# Patient Record
Sex: FEMALE | Race: WHITE | NOT HISPANIC OR LATINO | Employment: PART TIME | URBAN - METROPOLITAN AREA
[De-identification: names, ages, dates, MRNs, and addresses within clinical notes are randomized per-mention and may not be internally consistent; named-entity substitution may affect disease eponyms.]

---

## 2017-02-10 ENCOUNTER — OFFICE VISIT (OUTPATIENT)
Dept: FAMILY MEDICINE CLINIC | Facility: CLINIC | Age: 67
End: 2017-02-10

## 2017-02-10 VITALS
HEART RATE: 78 BPM | WEIGHT: 163.4 LBS | HEIGHT: 61 IN | BODY MASS INDEX: 30.85 KG/M2 | TEMPERATURE: 98.7 F | SYSTOLIC BLOOD PRESSURE: 118 MMHG | OXYGEN SATURATION: 96 % | DIASTOLIC BLOOD PRESSURE: 80 MMHG | RESPIRATION RATE: 16 BRPM

## 2017-02-10 DIAGNOSIS — I10 ESSENTIAL HYPERTENSION: Primary | ICD-10-CM

## 2017-02-10 DIAGNOSIS — M85.80 OSTEOPENIA: ICD-10-CM

## 2017-02-10 DIAGNOSIS — J06.9 VIRAL URI: ICD-10-CM

## 2017-02-10 DIAGNOSIS — E78.00 HYPERCHOLESTEROLEMIA: ICD-10-CM

## 2017-02-10 DIAGNOSIS — Z13.820 ENCOUNTER FOR SCREENING FOR OSTEOPOROSIS: ICD-10-CM

## 2017-02-10 DIAGNOSIS — J30.2 SEASONAL ALLERGIC RHINITIS, UNSPECIFIED ALLERGIC RHINITIS TRIGGER: ICD-10-CM

## 2017-02-10 DIAGNOSIS — Z12.11 ENCOUNTER FOR SCREENING COLONOSCOPY: ICD-10-CM

## 2017-02-10 DIAGNOSIS — D35.2 PITUITARY ADENOMA (HCC): ICD-10-CM

## 2017-02-10 PROCEDURE — 99214 OFFICE O/P EST MOD 30 MIN: CPT | Performed by: FAMILY MEDICINE

## 2017-02-10 RX ORDER — ASPIRIN 81 MG/1
81 TABLET ORAL DAILY
COMMUNITY
End: 2019-12-16 | Stop reason: HOSPADM

## 2017-02-10 RX ORDER — FLUTICASONE PROPIONATE 50 MCG
2 SPRAY, SUSPENSION (ML) NASAL DAILY
Qty: 1 EACH | Refills: 5 | Status: SHIPPED | OUTPATIENT
Start: 2017-02-10 | End: 2017-03-12

## 2017-02-10 RX ORDER — ALENDRONATE SODIUM 70 MG/1
70 TABLET ORAL
Qty: 12 TABLET | Refills: 1 | Status: SHIPPED | OUTPATIENT
Start: 2017-02-10 | End: 2017-07-30 | Stop reason: SDUPTHER

## 2017-02-10 RX ORDER — BENZONATATE 200 MG/1
200 CAPSULE ORAL 3 TIMES DAILY PRN
Qty: 20 CAPSULE | Refills: 0 | Status: SHIPPED | OUTPATIENT
Start: 2017-02-10 | End: 2017-08-17

## 2017-02-10 RX ORDER — ATORVASTATIN CALCIUM 40 MG/1
40 TABLET, FILM COATED ORAL DAILY
Qty: 90 TABLET | Refills: 1 | Status: SHIPPED | OUTPATIENT
Start: 2017-02-10 | End: 2018-04-05 | Stop reason: DRUGHIGH

## 2017-02-10 RX ORDER — PHENOL 1.4 %
600 AEROSOL, SPRAY (ML) MUCOUS MEMBRANE DAILY
COMMUNITY
End: 2020-10-23

## 2017-02-10 RX ORDER — LISINOPRIL AND HYDROCHLOROTHIAZIDE 12.5; 1 MG/1; MG/1
1 TABLET ORAL DAILY
Qty: 90 TABLET | Refills: 1 | Status: SHIPPED | OUTPATIENT
Start: 2017-02-10 | End: 2017-08-17 | Stop reason: SDUPTHER

## 2017-02-10 NOTE — PROGRESS NOTES
"Subjective   Ariadne Telles is a 66 y.o. female.     Hypertension    History of Present Illness     Repetitive URI, allergy symptoms.  On and off this winter.  Last 10 days or so and get better.  Sore throat runny nose and cough.  No wheezing.  No fever.  She works in .  History of allergies.    Hypertension follow up. Doing well with current medication which she is taking as directed. No known high or low blood pressure episodes. No cardiovascular or neurological symptoms. Today's BP: 118/80.      Hyperlipidemia follow up. She is taking statin medication without complaint. No myopathy symptoms.     Last lipid panel:   Lab Results   Component Value Date    HDL 55 08/25/2016     Lab Results   Component Value Date     (H) 08/25/2016     Lab Results   Component Value Date    TRIG 103 08/25/2016     History of osteopenia.  Fosamax weekly prescribed in the past.  She continues to take this.  She's not taking calcium lately but she is taking vitamin D.  Last DEXA scan was sometime ago.  She's not sure.    In need of screening colonoscopy last one was 10 years ago.    History of what sounds like a pituitary adenoma.  A number of years ago she continued to breast-feed after 5 years despite not really trying.  She states she had a MRI done which showed a \"pituitary tumor\".  They watched her for number of years and she last checked in about 10 years ago.  She is complaining of some intermittent mild headaches on the right temporal region.  However no focal neurological deficits.  Breast leakage.  No vaginal bleeding.  No changes in vision.    The following portions of the patient's history were reviewed and updated as appropriate: allergies, current medications, past family history, past medical history, past social history, past surgical history and problem list.      Review of Systems   Constitutional: Negative for activity change, appetite change, fatigue and fever.   Eyes: Negative for visual " "disturbance.   Respiratory: Positive for cough. Negative for chest tightness and shortness of breath.    Cardiovascular: Negative for chest pain, palpitations and leg swelling.   Gastrointestinal: Negative.    Skin: Negative for rash.   Neurological: Positive for headaches.   Psychiatric/Behavioral: Negative for confusion.       Objective   Blood pressure 118/80, pulse 78, temperature 98.7 °F (37.1 °C), temperature source Oral, resp. rate 16, height 61\" (154.9 cm), weight 163 lb 6.4 oz (74.1 kg), SpO2 96 %.  Physical Exam   Constitutional: She appears well-developed and well-nourished. No distress.   No acute distress.  Nontoxic.   HENT:   Right Ear: Tympanic membrane, external ear and ear canal normal.   Left Ear: Tympanic membrane, external ear and ear canal normal.   Nose: Nose normal.   Mouth/Throat: Oropharynx is clear and moist. No oropharyngeal exudate.   Eyes: Conjunctivae are normal. Right eye exhibits no discharge. Left eye exhibits no discharge. No scleral icterus.   Neck: No thyromegaly present.   Cardiovascular: Normal rate, regular rhythm, normal heart sounds and intact distal pulses.    Pulmonary/Chest: Effort normal and breath sounds normal. No stridor. No respiratory distress. She has no wheezes. She has no rales.   No tachypnea   Musculoskeletal: She exhibits no edema.   Lymphadenopathy:     She has no cervical adenopathy.   Skin: Skin is warm and dry. No rash noted.   Psychiatric: She has a normal mood and affect. Her behavior is normal. Judgment and thought content normal.   Nursing note and vitals reviewed.      Assessment/Plan   Ariadne was seen today for hypertension.    Diagnoses and all orders for this visit:    Essential hypertension  -     lisinopril-hydrochlorothiazide (PRINZIDE,ZESTORETIC) 10-12.5 MG per tablet; Take 1 tablet by mouth Daily.    Hypercholesterolemia  -     atorvastatin (LIPITOR) 40 MG tablet; Take 1 tablet by mouth Daily.    Osteopenia  -     DEXA Bone Density Axial; " Future  -     alendronate (FOSAMAX) 70 MG tablet; Take 1 tablet by mouth Every 7 (Seven) Days.    Seasonal allergic rhinitis, unspecified allergic rhinitis trigger    Encounter for screening colonoscopy  -     Ambulatory Referral For Screening Colonoscopy    Encounter for screening for osteoporosis  -     DEXA Bone Density Axial; Future    Viral URI    Pituitary adenoma  -     MRI Brain Without Contrast; Future    Other orders  -     fluticasone (FLONASE) 50 MCG/ACT nasal spray; 2 sprays into each nostril Daily for 30 days.  -     benzonatate (TESSALON) 200 MG capsule; Take 1 capsule by mouth 3 (Three) Times a Day As Needed for cough.      Hypertension.  Stable.  Refilled lisinopril hydrochlorothiazide.    Hyperlipidemia.  Refill atorvastatin.  Lipid panel and CMP August of last year was within normal limits.    History of osteopenia.  I'm repeating a DEXA scan.  Continue Fosamax as instructed.    Allergies versus viral URI versus both.  Flonase.  Tessalon Perles as needed.  Nonsmoker, never smoker.  If cough persists 1 more month, return for evaluation, chest x-ray, and to consider changing the lisinopril.    History of what sounds like a benign pituitary adenoma.  She previously had pituitary symptoms including persistent breast milk.  She's having some headaches now for some time but they are mild to moderate and not specific.  I'm recommending a follow-up MRI.  Last one was done about 10 years ago.    I'll see her in 6 months for a blood pressure check an annual wellness visit.  She will call sooner with concerns    Screening colonoscopy ordered

## 2017-02-17 ENCOUNTER — APPOINTMENT (OUTPATIENT)
Dept: BONE DENSITY | Facility: HOSPITAL | Age: 67
End: 2017-02-17

## 2017-02-23 ENCOUNTER — HOSPITAL ENCOUNTER (OUTPATIENT)
Dept: MRI IMAGING | Facility: HOSPITAL | Age: 67
Discharge: HOME | End: 2017-02-23
Admitting: FAMILY MEDICINE

## 2017-02-23 ENCOUNTER — APPOINTMENT (OUTPATIENT)
Dept: BONE DENSITY | Facility: HOSPITAL | Age: 67
End: 2017-02-23

## 2017-02-23 ENCOUNTER — HOSPITAL ENCOUNTER (OUTPATIENT)
Dept: BONE DENSITY | Facility: HOSPITAL | Age: 67
Discharge: HOME | End: 2017-02-23

## 2017-02-23 DIAGNOSIS — D35.2 PITUITARY ADENOMA (HCC): ICD-10-CM

## 2017-02-23 DIAGNOSIS — M85.80 OSTEOPENIA: ICD-10-CM

## 2017-02-23 DIAGNOSIS — Z13.820 ENCOUNTER FOR SCREENING FOR OSTEOPOROSIS: ICD-10-CM

## 2017-02-23 PROCEDURE — 70553 MRI BRAIN STEM W/O & W/DYE: CPT

## 2017-02-23 PROCEDURE — 77080 DXA BONE DENSITY AXIAL: CPT

## 2017-02-23 PROCEDURE — 0 GADOBENATE DIMEGLUMINE 529 MG/ML SOLUTION: Performed by: FAMILY MEDICINE

## 2017-02-23 PROCEDURE — A9577 INJ MULTIHANCE: HCPCS | Performed by: FAMILY MEDICINE

## 2017-02-23 PROCEDURE — 82565 ASSAY OF CREATININE: CPT

## 2017-02-23 RX ADMIN — GADOBENATE DIMEGLUMINE 15 ML: 529 INJECTION, SOLUTION INTRAVENOUS at 12:00

## 2017-02-24 LAB — CREAT BLDA-MCNC: 0.8 MG/DL (ref 0.6–1.3)

## 2017-02-24 NOTE — PROGRESS NOTES
The bone density scan shows osteopenia but not osteoporosis.  This is good.  I do not have an old study to compare to, however I would recommend continuing the Fosamax as is.

## 2017-03-06 DIAGNOSIS — Z86.018 HISTORY OF PITUITARY ADENOMA: Primary | ICD-10-CM

## 2017-03-06 NOTE — PROGRESS NOTES
Phone call with patient.  MRI was read as normal.  However the radiologist called me, he cannot completely exclude a very small adenoma.  I am going to order a prolactin level as precaution.  Patient is otherwise doing well.  If headaches are getting worse, will need further investigation.

## 2017-03-10 LAB — PROLACTIN SERPL-MCNC: 8.3 NG/ML (ref 4.8–23.3)

## 2017-03-11 ENCOUNTER — RESULTS ENCOUNTER (OUTPATIENT)
Dept: FAMILY MEDICINE CLINIC | Facility: CLINIC | Age: 67
End: 2017-03-11

## 2017-03-11 DIAGNOSIS — Z86.018 HISTORY OF PITUITARY ADENOMA: ICD-10-CM

## 2017-05-22 RX ORDER — ATORVASTATIN CALCIUM 40 MG/1
TABLET, FILM COATED ORAL
Qty: 90 TABLET | Refills: 1 | Status: SHIPPED | OUTPATIENT
Start: 2017-05-22 | End: 2017-08-17 | Stop reason: SDUPTHER

## 2017-07-30 DIAGNOSIS — M85.80 OSTEOPENIA: ICD-10-CM

## 2017-07-31 RX ORDER — ALENDRONATE SODIUM 70 MG/1
TABLET ORAL
Qty: 12 TABLET | Refills: 1 | Status: SHIPPED | OUTPATIENT
Start: 2017-07-31 | End: 2018-02-10 | Stop reason: SDUPTHER

## 2017-08-17 ENCOUNTER — OFFICE VISIT (OUTPATIENT)
Dept: OBSTETRICS AND GYNECOLOGY | Age: 67
End: 2017-08-17

## 2017-08-17 VITALS
DIASTOLIC BLOOD PRESSURE: 80 MMHG | BODY MASS INDEX: 30.21 KG/M2 | WEIGHT: 160 LBS | SYSTOLIC BLOOD PRESSURE: 124 MMHG | HEIGHT: 61 IN

## 2017-08-17 DIAGNOSIS — Z11.51 SPECIAL SCREENING EXAMINATION FOR HUMAN PAPILLOMAVIRUS (HPV): ICD-10-CM

## 2017-08-17 DIAGNOSIS — Z01.419 WELL WOMAN EXAM WITH ROUTINE GYNECOLOGICAL EXAM: Primary | ICD-10-CM

## 2017-08-17 DIAGNOSIS — I10 ESSENTIAL HYPERTENSION: ICD-10-CM

## 2017-08-17 DIAGNOSIS — Z85.42 HISTORY OF ENDOMETRIAL CANCER: ICD-10-CM

## 2017-08-17 PROBLEM — C54.1 ENDOMETRIAL CARCINOMA: Status: ACTIVE | Noted: 2017-08-17

## 2017-08-17 PROBLEM — C54.1 ENDOMETRIAL CARCINOMA: Status: RESOLVED | Noted: 2017-08-17 | Resolved: 2017-08-17

## 2017-08-17 LAB
BILIRUB BLD-MCNC: NEGATIVE MG/DL
GLUCOSE UR STRIP-MCNC: NEGATIVE MG/DL
KETONES UR QL: NEGATIVE
LEUKOCYTE EST, POC: NEGATIVE
NITRITE UR-MCNC: NEGATIVE MG/ML
PH UR: 6 [PH] (ref 5–8)
PROT UR STRIP-MCNC: NEGATIVE MG/DL
RBC # UR STRIP: ABNORMAL /UL
SP GR UR: 1.03 (ref 1–1.03)
UROBILINOGEN UR QL: NORMAL

## 2017-08-17 PROCEDURE — 81003 URINALYSIS AUTO W/O SCOPE: CPT | Performed by: OBSTETRICS & GYNECOLOGY

## 2017-08-17 PROCEDURE — G0101 CA SCREEN;PELVIC/BREAST EXAM: HCPCS | Performed by: OBSTETRICS & GYNECOLOGY

## 2017-08-17 NOTE — PROGRESS NOTES
ANNUAL GYN EXAM        2017    Subjective     Ariadne Telles is a 67 y.o.,  (one still birth) White Female.    Chief Complaint   Patient presents with   • Gynecologic Exam     New patient, former patient last seen ~five years ago.  She had a mammogram in 2017 at [facility].  Her last colonoscopy was ten years ago and she stated that she had colon polyps.          Gyn Complaints:  No complaints        Current contraception:  ~ ANGEL, BSO  Endometrial cancer,  NO HRT.  Exercise:   no  Mammogram: up to date.  Colonoscopy: recommended.  DEXA: up to date. =Osteopenia. Taking fosamax.  Last Pap Smear:  3-5 Yr ago. Had an abn pap  or , colposcopy= Bx OK.            1. Menstrual Hx:  About ,  ANGEL, BSO for endometrial carcinoma, is not on HRT.    2. Pap Smear Hx: History of regular Pap smears.    She did have an abnormal Pap smear in either  or , but colposcopy with biopsy was normal.  Subsequent Pap smears were normal.  Last Pap smear about 3-5 years ago.    3. Breast / Ovarian Hx:  Regular SBE.        Family history of breast cancer: Maternal Aunt in her 70's.      Family history of ovarian cancer: None    4. Family Hx of Colon Ca: None      Family Hx of Uterine Ca: None    5. New Past Medical History:  None         Past Medical History:   Diagnosis Date   • Colon polyps    [facility]    • Hyperlipidemia    • Hypertension    • Osteoporosis    • Pituitary adenoma    • Vaginal delivery     x1  NITO        6. New Past Surgical History:   Right Cataract Surgery 2016        Past Surgical History:   Procedure Laterality Date   • CATARACT EXTRACTION Right 2016   •  SECTION      x2   one stillborn Ariadne Hernandez  chela Mathews   • COLONOSCOPY  2007    polyps  Dr. Rueda    • TOTAL ABDOMINAL HYSTERECTOMY WITH SALPINGO OOPHORECTOMY      Dr. Alejo        7. New Family Medical History: None         Family History   Problem Relation Age  of Onset   • Heart disease Mother    • COPD Father    • Parkinsonism Father      ?   • Heart defect Sister       at 18   • Alcohol abuse Brother    • Diabetes Brother    • Heart disease Brother    • Hypertension Brother    • Hyperlipidemia Brother    • No Known Problems Daughter    • Sleep apnea Son    • No Known Problems Maternal Grandmother    • No Known Problems Paternal Grandmother    • Breast cancer Maternal Aunt 75   • No Known Problems Paternal Aunt    • No Known Problems Maternal Grandfather    • No Known Problems Paternal Grandfather    • Kidney cancer Sister    • No Known Problems Son    • BRCA 1/2 Neg Hx    • Colon cancer Neg Hx    • Endometrial cancer Neg Hx    • Ovarian cancer Neg Hx          8.   OB History    Para Term  AB SAB TAB Ectopic Multiple Living   3 3 3 0 0 0 0 0 0 2      # Outcome Date GA Lbr Junior/2nd Weight Sex Delivery Anes PTL Lv   3 Term     M CS-Unspec   Y   2 Term     F CS-Unspec   FD   1 Term     M Vag-Spont   Y           9.   Health Maintenance   Topic Date Due   • PNEUMOCOCCAL VACCINES (65+ LOW/MEDIUM RISK) (1 of 2 - PCV13) 2015   • HEPATITIS C SCREENING  2016   • MEDICARE ANNUAL WELLNESS  2016   • ZOSTER VACCINE  2016   • COLONOSCOPY  2017   • LIPID PANEL  2017   • INFLUENZA VACCINE  2017   • DXA SCAN  2019   • MAMMOGRAM  2019   • TDAP/TD VACCINES (2 - Td) 2026       The following portions of the patient's history were reviewed / updated as appropriate: allergies, current medications, past medical history, past surgical history, past family history, past social history, and problem list.    Review of Systems   HENT: Positive for hearing loss (decreasing for the last 5 years. Saw an audiologist.).    Cardiovascular:        Has a very slight narrowing of right carotid artery.   Gastrointestinal:        Reflux / indigestion with anything she eats. Can occur with suresh crackers alone.  "  Allergic/Immunologic: Positive for environmental allergies (seasonal allergies.).   All other systems reviewed and are negative.      Objective     /80  Ht 61\" (154.9 cm)  Wt 160 lb (72.6 kg)  LMP  (LMP Unknown)  BMI 30.23 kg/m2    PHYSICAL EXAM    Constitutional: Well-developed, well-nourished, Slightly obese white female, in no distress, alert and well oriented ×3.    Skin is warm, dry, without rash.       Neck appears normal, trachea in the midline.  Thyroid exam normal.  No carotid bruits bilaterally.         No cervical lymphadenopathy.    Lungs clear to auscultation.  Chest wall appears normal.    Heart with regular rhythm without murmur or gallop.    Breasts:         Right breast nontender, without dominant mass.  No nipple discharge.            No superficial skin changes.  No axillary adenopathy.        Left breast nontender, without dominant mass.  No nipple discharge.            No superficial skin changes.  No axillary adenopathy.      Abdomen is flat, soft and nontender.No palpable mass.           No hepatosplenomegaly.  Flanks are negative.          Bowel sounds normal.  No abdominal bruit.          No inguinal lymphadenopathy bilaterally.     Pelvic exam :  Vulva normal.           External genitalia normal.  BUS negative.             Introitus normal.  Vaginal mucosa atrophic, pale.           Vaginal cuff is normal, atrophic, Pap smear done with reflex.  No tenderness.  No palpable mass.          Rectovaginal exam negative .  Stool heme negative.  Minimal stool is present.    Musc /Skel: Lower extremities without edema.     Neuro: Coordination is grossly normal.  Gait is normal.    Psych: Mood and affect is normal.  Behavior normal.  Thought content normal.            Judgment normal.       =All questions were answered.   =Breast self-examination technique was reviewed and the patient encouraged to perform self breast exams monthly.   = We discussed healthy lifestyle modifications.  She " has a fairly reasonable diet.  =I recommended 30 minutes of aerobic exercise 5 times a week.  The patient doesn't really planned exercise.       Assessment/Plan   Ariadne was seen today for gynecologic exam.    Diagnoses and all orders for this visit:    Well woman exam with routine gynecological exam  -     POC Urinalysis Dipstick, Automated  -     PapIG, HPV, Rfx 16 / 18    Special screening examination for human papillomavirus (HPV)  -     PapIG, HPV, Rfx 16 / 18    History of endometrial cancer  -     PapIG, HPV, Rfx 16 / 18      COMMENTS: Since it has been over 20 years since her hysterectomy for endometrial carcinoma, it would be reasonable to use a small dose of vaginal estrogen if she is interested in this to facilitate intercourse.  I don't think they're actually having intercourse that often and I doubt that she will decide to do this.    Catracho Alejo MD  8/17/2017

## 2017-08-18 ENCOUNTER — RESULTS ENCOUNTER (OUTPATIENT)
Dept: FAMILY MEDICINE CLINIC | Facility: CLINIC | Age: 67
End: 2017-08-18

## 2017-08-18 ENCOUNTER — OFFICE VISIT (OUTPATIENT)
Dept: FAMILY MEDICINE CLINIC | Facility: CLINIC | Age: 67
End: 2017-08-18

## 2017-08-18 VITALS
DIASTOLIC BLOOD PRESSURE: 82 MMHG | WEIGHT: 161.6 LBS | HEART RATE: 73 BPM | HEIGHT: 61 IN | BODY MASS INDEX: 30.51 KG/M2 | TEMPERATURE: 97.8 F | SYSTOLIC BLOOD PRESSURE: 122 MMHG | OXYGEN SATURATION: 98 %

## 2017-08-18 DIAGNOSIS — E78.00 HYPERCHOLESTEROLEMIA: ICD-10-CM

## 2017-08-18 DIAGNOSIS — I10 ESSENTIAL HYPERTENSION: ICD-10-CM

## 2017-08-18 DIAGNOSIS — M85.80 OSTEOPENIA: ICD-10-CM

## 2017-08-18 DIAGNOSIS — Z12.11 ENCOUNTER FOR SCREENING COLONOSCOPY: ICD-10-CM

## 2017-08-18 DIAGNOSIS — Z00.00 MEDICARE ANNUAL WELLNESS VISIT, SUBSEQUENT: Primary | ICD-10-CM

## 2017-08-18 DIAGNOSIS — G56.01 CARPAL TUNNEL SYNDROME OF RIGHT WRIST: ICD-10-CM

## 2017-08-18 PROBLEM — Z86.018 HISTORY OF PITUITARY ADENOMA: Status: RESOLVED | Noted: 2017-03-06 | Resolved: 2017-08-18

## 2017-08-18 LAB
ALBUMIN SERPL-MCNC: 4.6 G/DL (ref 3.5–5.2)
ALBUMIN/GLOB SERPL: 1.6 G/DL
ALP SERPL-CCNC: 116 U/L (ref 39–117)
ALT SERPL-CCNC: 25 U/L (ref 1–33)
AST SERPL-CCNC: 28 U/L (ref 1–32)
BILIRUB SERPL-MCNC: 0.9 MG/DL (ref 0.1–1.2)
BUN SERPL-MCNC: 21 MG/DL (ref 8–23)
BUN/CREAT SERPL: 28.4 (ref 7–25)
CALCIUM SERPL-MCNC: 9.7 MG/DL (ref 8.6–10.5)
CHLORIDE SERPL-SCNC: 102 MMOL/L (ref 98–107)
CHOLEST SERPL-MCNC: 202 MG/DL (ref 0–200)
CO2 SERPL-SCNC: 26.7 MMOL/L (ref 22–29)
CREAT SERPL-MCNC: 0.74 MG/DL (ref 0.57–1)
GLOBULIN SER CALC-MCNC: 2.9 GM/DL
GLUCOSE SERPL-MCNC: 79 MG/DL (ref 65–99)
HDLC SERPL-MCNC: 59 MG/DL (ref 40–60)
LDLC SERPL CALC-MCNC: 117 MG/DL (ref 0–100)
POTASSIUM SERPL-SCNC: 4.1 MMOL/L (ref 3.5–5.2)
PROT SERPL-MCNC: 7.5 G/DL (ref 6–8.5)
SODIUM SERPL-SCNC: 144 MMOL/L (ref 136–145)
TRIGL SERPL-MCNC: 131 MG/DL (ref 0–150)
VLDLC SERPL CALC-MCNC: 26.2 MG/DL (ref 5–40)

## 2017-08-18 PROCEDURE — 99213 OFFICE O/P EST LOW 20 MIN: CPT | Performed by: FAMILY MEDICINE

## 2017-08-18 PROCEDURE — 90670 PCV13 VACCINE IM: CPT | Performed by: FAMILY MEDICINE

## 2017-08-18 PROCEDURE — G0439 PPPS, SUBSEQ VISIT: HCPCS | Performed by: FAMILY MEDICINE

## 2017-08-18 PROCEDURE — G0009 ADMIN PNEUMOCOCCAL VACCINE: HCPCS | Performed by: FAMILY MEDICINE

## 2017-08-18 RX ORDER — LISINOPRIL AND HYDROCHLOROTHIAZIDE 12.5; 1 MG/1; MG/1
TABLET ORAL
Qty: 90 TABLET | Refills: 1 | Status: SHIPPED | OUTPATIENT
Start: 2017-08-18 | End: 2018-02-16 | Stop reason: SDUPTHER

## 2017-08-18 NOTE — PROGRESS NOTES
"Subjective   Ariadne Telles is a 67 y.o. female.     Annual Exam (subsequent, awv pt is fasting)    History of Present Illness    Hypertension follow up. Doing well with current medication which she is taking as directed. No known high or low blood pressure episodes. No cardiovascular or neurological symptoms. Today's BP: 122/82.      Hyperlipidemia follow up. She is taking statin medication without complaint. No myopathy symptoms.     Last lipid panel:   Lab Results   Component Value Date    HDL 55 08/25/2016     Lab Results   Component Value Date     (H) 08/25/2016     Lab Results   Component Value Date    TRIG 103 08/25/2016     Osteopenia. DEXA scan stable.  You're continuing the weekly Fosamax    . The following portions of the patient's history were reviewed and updated as appropriate: allergies, current medications, past family history, past medical history, past social history, past surgical history and problem list.      Review of Systems   Constitutional: Negative.    Respiratory: Negative.    Cardiovascular: Negative.    Musculoskeletal: Negative.    Neurological: Negative.    Psychiatric/Behavioral: Negative.        Objective   Blood pressure 122/82, pulse 73, temperature 97.8 °F (36.6 °C), temperature source Oral, height 61\" (154.9 cm), weight 161 lb 9.6 oz (73.3 kg), SpO2 98 %.  Physical Exam   Constitutional: She appears well-developed and well-nourished. No distress.   Neck: No thyromegaly present.   Cardiovascular: Normal rate, regular rhythm, normal heart sounds and intact distal pulses.    Pulmonary/Chest: Effort normal and breath sounds normal.   Musculoskeletal: She exhibits no edema.   Skin: Skin is warm and dry.   Psychiatric: She has a normal mood and affect. Her behavior is normal. Judgment and thought content normal.   Nursing note and vitals reviewed.      Assessment/Plan   Ariadne was seen today for annual exam.    Diagnoses and all orders for this visit:    Medicare annual " wellness visit, subsequent    Hypercholesterolemia  -     Comprehensive Metabolic Panel  -     Lipid Panel    Essential hypertension    Osteopenia    Carpal tunnel syndrome of right wrist  -     Ambulatory Referral to Hand Surgery    Encounter for screening colonoscopy  -     Cologuard; Future    Other orders  -     Pneumococcal Conjugate Vaccine 13-Valent All      Hyperlipidemia.  Continue atorvastatin.  Check CMP and lipid panel today.    Hypertension.  Stable.  Continue lisinopril hydrochlorothiazide.    Osteopenia.  DEXA scan stable.  Continue weekly Fosamax.  She's been on it now about 2 years altogether.    Follow-up in 6 months for recheck

## 2017-08-18 NOTE — PROGRESS NOTES
QUICK REFERENCE INFORMATION:  The ABCs of the Annual Wellness Visit    Subsequent Medicare Wellness Visit    HEALTH RISK ASSESSMENT    1950    Recent Hospitalizations:  No hospitalization(s) within the last year..        Current Medical Providers:  Patient Care Team:  Nick Ariza MD as PCP - General  Nick Ariza MD as PCP - Claims Attributed        Smoking Status:  History   Smoking Status   • Never Smoker   Smokeless Tobacco   • Never Used       Alcohol Consumption:  History   Alcohol Use No       Depression Screen:   PHQ-9 Depression Screening 8/18/2017   Little interest or pleasure in doing things 0   Feeling down, depressed, or hopeless 0   PHQ-9 Total Score 0       Health Habits and Functional and Cognitive Screening:  Functional & Cognitive Status 8/18/2017   Do you have difficulty preparing food and eating? No   Do you have difficulty bathing yourself? No   Do you have difficulty getting dressed? No   Do you have difficulty using the toilet? No   Do you have difficulty moving around from place to place? No   In the past year have you fallen or experienced a near fall? No   Do you need help using the phone?  No   Are you deaf or do you have serious difficulty hearing?  No   Do you need help with transportation? No   Do you need help shopping? No   Do you need help preparing meals?  No   Do you need help with housework?  No   Do you need help with laundry? No   Do you need help taking your medications? No   Do you need help managing money? No   Do you have difficulty concentrating, remembering or making decisions? No       Health Habits  Current Diet: Unhealthy Diet  Dental Exam: Up to date  Eye Exam: Up to date  Exercise (times per week): 0 times per week  Current Exercise Activities Include: None      Does the patient have evidence of cognitive impairment? No    Aspirin use counseling: Taking ASA appropriately as indicated      Recent Lab Results:  CMP:  Lab Results   Component Value Date    GLU  85 08/25/2016    BUN 22 08/25/2016    CREATININE 0.80 02/23/2017    EGFRIFNONA 69 08/25/2016    EGFRIFAFRI 83 08/25/2016    BCR 26.5 (H) 08/25/2016     08/25/2016    K 4.3 08/25/2016    CO2 24.3 08/25/2016    CALCIUM 9.8 08/25/2016    PROTENTOTREF 7.2 08/25/2016    ALBUMIN 4.60 08/25/2016    LABGLOBREF 2.6 08/25/2016    LABIL2 1.8 08/25/2016    BILITOT 0.7 08/25/2016    ALKPHOS 139 (H) 08/25/2016    AST 24 08/25/2016    ALT 17 08/25/2016     Lipid Panel:  Lab Results   Component Value Date    TRIG 103 08/25/2016    HDL 55 08/25/2016    VLDL 20.6 08/25/2016    LDLHDL 3.5 08/27/2015     HbA1c:       Visual Acuity:  No exam data present    Age-appropriate Screening Schedule:  Refer to the list below for future screening recommendations based on patient's age, sex and/or medical conditions. Orders for these recommended tests are listed in the plan section. The patient has been provided with a written plan.    Health Maintenance   Topic Date Due   • PNEUMOCOCCAL VACCINES (65+ LOW/MEDIUM RISK) (1 of 2 - PCV13) 06/13/2015   • ZOSTER VACCINE  02/25/2016   • COLONOSCOPY  01/31/2017   • LIPID PANEL  08/25/2017   • INFLUENZA VACCINE  09/01/2017   • DXA SCAN  02/23/2019   • MAMMOGRAM  07/20/2019   • TDAP/TD VACCINES (2 - Td) 08/25/2026        Immunization History   Administered Date(s) Administered   • Pneumococcal Conjugate 13-Valent 08/18/2017   • Td 08/01/2007   • Tdap 08/25/2016         Subjective   History of Present Illness    Ariadne Telles is a 67 y.o. female who presents for an Subsequent Wellness Visit.    The following portions of the patient's history were reviewed and updated as appropriate: allergies, current medications, past family history, past medical history, past social history, past surgical history and problem list.    Outpatient Medications Prior to Visit   Medication Sig Dispense Refill   • alendronate (FOSAMAX) 70 MG tablet TAKE 1 TABLET BY MOUTH EVERY 7 DAYS 12 tablet 1   • aspirin 81 MG EC  "tablet Take 81 mg by mouth Daily.     • atorvastatin (LIPITOR) 40 MG tablet Take 1 tablet by mouth Daily. 90 tablet 1   • calcium carbonate (OS-SHANNAN) 600 MG tablet Take 600 mg by mouth Daily.     • Cholecalciferol (VITAMIN D PO) Take  by mouth.     • Cyanocobalamin (VITAMIN B 12 PO) Take  by mouth.     • glucosamine-chondroitin 500-400 MG per tablet Take 1 tablet by mouth 3 (three) times a day.     • lisinopril-hydrochlorothiazide (PRINZIDE,ZESTORETIC) 10-12.5 MG per tablet TAKE ONE TABLET BY MOUTH ONCE DAILY 90 tablet 1   • Multiple Vitamin (MULTIVITAMIN) capsule Take  by mouth.     • BESIVANCE 0.6 % suspension ophthalmic suspension   1   • diclofenac (VOLTAREN) 0.1 % ophthalmic solution   1     No facility-administered medications prior to visit.        Patient Active Problem List   Diagnosis   • Gastroesophageal reflux disease   • Bradycardia   • Hypercholesterolemia   • Essential hypertension   • Osteopenia   • Rectal polyp   • History of endometrial cancer       Advance Care Planning:  has an advance directive - a copy HAS NOT been provided. Have asked the patient to send this to us to add to record.    Identification of Risk Factors:  Risk factors include: cardiovascular risk.    Review of Systems    Compared to one year ago, the patient feels her physical health is the same.  Compared to one year ago, the patient feels her mental health is the same.    Objective     Physical Exam    Vitals:    08/18/17 0946   BP: 122/82   Pulse: 73   Temp: 97.8 °F (36.6 °C)   TempSrc: Oral   SpO2: 98%   Weight: 161 lb 9.6 oz (73.3 kg)   Height: 61\" (154.9 cm)       Body mass index is 30.53 kg/(m^2).  Discussed the patient's BMI with her. The BMI is elevated. .    Assessment/Plan   Patient Self-Management and Personalized Health Advice  The patient has been provided with information about: exercise, prevention of cardiac or vascular disease and designing advance directives and preventive services including:   · Advance " directive, Pneumococcal vaccine .  · Colorectal cancer screening.  Patient states she cannot afford the out-of-pocket expense for the colonoscopy.  History of polyps.  I do recommend a Cologuard screening test.  This has been ordered.    Visit Diagnoses:    ICD-10-CM ICD-9-CM   1. Medicare annual wellness visit, subsequent Z00.00 V70.0   2. Hypercholesterolemia E78.00 272.0   3. Essential hypertension I10 401.9   4. Osteopenia M85.80 733.90   5. Carpal tunnel syndrome of right wrist G56.01 354.0   6. Encounter for screening colonoscopy Z12.11 V76.51       Orders Placed This Encounter   Procedures   • Pneumococcal Conjugate Vaccine 13-Valent All   • Comprehensive Metabolic Panel   • Lipid Panel   • Cologuard     Standing Status:   Future     Standing Expiration Date:   8/18/2018   • Ambulatory Referral to Hand Surgery     Referral Priority:   Routine     Referral Type:   Consultation     Referral Reason:   Specialty Services Required     Referred to Provider:   Jasvir Guerrero MD     Requested Specialty:   Hand Surgery     Number of Visits Requested:   1       Outpatient Encounter Prescriptions as of 8/18/2017   Medication Sig Dispense Refill   • alendronate (FOSAMAX) 70 MG tablet TAKE 1 TABLET BY MOUTH EVERY 7 DAYS 12 tablet 1   • aspirin 81 MG EC tablet Take 81 mg by mouth Daily.     • atorvastatin (LIPITOR) 40 MG tablet Take 1 tablet by mouth Daily. 90 tablet 1   • calcium carbonate (OS-SHANNAN) 600 MG tablet Take 600 mg by mouth Daily.     • Cholecalciferol (VITAMIN D PO) Take  by mouth.     • Cyanocobalamin (VITAMIN B 12 PO) Take  by mouth.     • glucosamine-chondroitin 500-400 MG per tablet Take 1 tablet by mouth 3 (three) times a day.     • lisinopril-hydrochlorothiazide (PRINZIDE,ZESTORETIC) 10-12.5 MG per tablet TAKE ONE TABLET BY MOUTH ONCE DAILY 90 tablet 1   • Multiple Vitamin (MULTIVITAMIN) capsule Take  by mouth.     • [DISCONTINUED] BESIVANCE 0.6 % suspension ophthalmic suspension   1   •  [DISCONTINUED] diclofenac (VOLTAREN) 0.1 % ophthalmic solution   1     No facility-administered encounter medications on file as of 8/18/2017.        Reviewed use of high risk medication in the elderly: yes  Reviewed for potential of harmful drug interactions in the elderly: yes    Follow Up:  Return in about 6 months (around 2/18/2018) for Recheck 1/2 hour.     An After Visit Summary and PPPS with all of these plans were given to the patient.

## 2017-08-25 LAB
CYTOLOGIST CVX/VAG CYTO: ABNORMAL
CYTOLOGY CVX/VAG DOC THIN PREP: ABNORMAL
DX ICD CODE: ABNORMAL
DX ICD CODE: ABNORMAL
HIV 1 & 2 AB SER-IMP: ABNORMAL
HPV I/H RISK 1 DNA CVX QL PROBE+SIG AMP: POSITIVE
OTHER STN SPEC: ABNORMAL
PATH REPORT.FINAL DX SPEC: ABNORMAL
PATHOLOGIST CVX/VAG CYTO: ABNORMAL
STAT OF ADQ CVX/VAG CYTO-IMP: ABNORMAL

## 2017-09-05 ENCOUNTER — TELEPHONE (OUTPATIENT)
Dept: OBSTETRICS AND GYNECOLOGY | Age: 67
End: 2017-09-05

## 2017-09-27 ENCOUNTER — PROCEDURE VISIT (OUTPATIENT)
Dept: OBSTETRICS AND GYNECOLOGY | Age: 67
End: 2017-09-27

## 2017-09-27 VITALS
DIASTOLIC BLOOD PRESSURE: 70 MMHG | HEIGHT: 61 IN | WEIGHT: 163 LBS | BODY MASS INDEX: 30.78 KG/M2 | SYSTOLIC BLOOD PRESSURE: 108 MMHG

## 2017-09-27 DIAGNOSIS — R87.620 ATYPICAL SQUAMOUS CELL CHANGES OF UNDETERMINED SIGNIFICANCE (ASCUS) ON VAGINAL CYTOLOGY WITH POSITIVE HIGH RISK HUMAN PAPILLOMA VIRUS (HPV): Primary | ICD-10-CM

## 2017-09-27 DIAGNOSIS — R87.811 ATYPICAL SQUAMOUS CELL CHANGES OF UNDETERMINED SIGNIFICANCE (ASCUS) ON VAGINAL CYTOLOGY WITH POSITIVE HIGH RISK HUMAN PAPILLOMA VIRUS (HPV): Primary | ICD-10-CM

## 2017-09-27 PROCEDURE — 99212 OFFICE O/P EST SF 10 MIN: CPT | Performed by: OBSTETRICS & GYNECOLOGY

## 2017-09-27 PROCEDURE — 57452 EXAM OF CERVIX W/SCOPE: CPT | Performed by: OBSTETRICS & GYNECOLOGY

## 2017-09-27 NOTE — PROGRESS NOTES
"COLPOSCOPY EXAM                         9/27/2017     Subjective        Ariadne Telles is a 67 y.o., G 3, P 3002 ( one stillborn ), White  Female    Indication: August 18, 2017, annual exam Pap smear showed ASCUS and ROLANDO (atypical glandular cells of undetermined significance).  She is also positive for high risk HPV. She has a history of ANGEL, BSO in 1994 for an endometrial carcinoma.       She is rarely sexually active, the last intercourse she thinks was about 6 months agoand her partner has erectile dysfunction, it did not soundlike they even had penetration.    Pap Smear History: History of regular Pap smears.    She did have an abnormal Pap smear in either 2008 or 2009, but colposcopy with biopsy was normal.  Subsequent Pap smears were normal.  Last Pap smear about 3-5 years ago.    Objective         /70  Ht 61\" (154.9 cm)  Wt 163 lb (73.9 kg)  LMP  (LMP Unknown)  BMI 30.8 kg/m2    EXAM       Vulva: normal exam.    COLPOSCOPY  Speculum placed in vagina, visualization of the cervix is adequate.   3 cotton balls soaked with vinegar placed in the vagina and left for 1 minute or more, then removed.     Examination of the vaginal cuff with the colposcope revealed:         Normal vagina.  No areas of white epithelium,punctation, mosaic, or atypical vessels at all.  Very normal exam.    TISSUE SAMPLES obtained:  None.       VAGINA was examined as the speculum was removed slowly: No abnormalities were seen.     EXTERNAL GENITALIA, VULVA, AND PERIANAL AREA were then examined with the colposcope= No abnormalities were seen.     ADEQUACY OF EXAM: Adequate.    The vagina and vulva were completely visualized.     Assessment/Plan   Ariadne was seen today for procedure.    Diagnoses and all orders for this visit:    Atypical squamous cell changes of undetermined significance (ASCUS) on vaginal cytology with positive high risk human papilloma virus (HPV)  -     Colposcopy        COLPOSCOPY " ASSESSMENT  FINDINGS:  Vagina normal.  Vulva normal.     The patient tolerated the procedure very well.   The findings were explained, and drawings were made.  All questions were answered, the patient demonstrated understanding.    PLAN: I will repeat a Pap smear in 6 months only because of her past history of endometrial carcinoma.    Catracho Alejo MD    9/27/2017  7:42 PM

## 2017-10-19 ENCOUNTER — TELEPHONE (OUTPATIENT)
Dept: OBSTETRICS AND GYNECOLOGY | Age: 67
End: 2017-10-19

## 2018-01-19 ENCOUNTER — HOSPITAL ENCOUNTER (OUTPATIENT)
Dept: CARDIOLOGY | Facility: HOSPITAL | Age: 68
Discharge: HOME | End: 2018-01-19
Attending: PLASTIC SURGERY | Admitting: PLASTIC SURGERY

## 2018-01-19 ENCOUNTER — TRANSCRIBE ORDERS (OUTPATIENT)
Dept: ADMINISTRATIVE | Facility: HOSPITAL | Age: 68
End: 2018-01-19

## 2018-01-19 ENCOUNTER — LAB (OUTPATIENT)
Dept: LAB | Facility: HOSPITAL | Age: 68
End: 2018-01-19
Attending: PLASTIC SURGERY

## 2018-01-19 DIAGNOSIS — Z01.818 PRE-OP TESTING: ICD-10-CM

## 2018-01-19 DIAGNOSIS — Z01.818 PRE-OP TESTING: Primary | ICD-10-CM

## 2018-01-19 LAB
ANION GAP SERPL CALCULATED.3IONS-SCNC: 11.8 MMOL/L
BUN BLD-MCNC: 21 MG/DL (ref 8–23)
BUN/CREAT SERPL: 26.3 (ref 7–25)
CALCIUM SPEC-SCNC: 10.1 MG/DL (ref 8.6–10.5)
CHLORIDE SERPL-SCNC: 102 MMOL/L (ref 98–107)
CO2 SERPL-SCNC: 30.2 MMOL/L (ref 22–29)
CREAT BLD-MCNC: 0.8 MG/DL (ref 0.57–1)
DEPRECATED RDW RBC AUTO: 42.8 FL (ref 37–54)
ERYTHROCYTE [DISTWIDTH] IN BLOOD BY AUTOMATED COUNT: 12.5 % (ref 11.7–13)
GFR SERPL CREATININE-BSD FRML MDRD: 72 ML/MIN/1.73
GLUCOSE BLD-MCNC: 93 MG/DL (ref 65–99)
HCT VFR BLD AUTO: 41.7 % (ref 35.6–45.5)
HGB BLD-MCNC: 14.4 G/DL (ref 11.9–15.5)
MCH RBC QN AUTO: 31.9 PG (ref 26.9–32)
MCHC RBC AUTO-ENTMCNC: 34.5 G/DL (ref 32.4–36.3)
MCV RBC AUTO: 92.5 FL (ref 80.5–98.2)
PLATELET # BLD AUTO: 290 10*3/MM3 (ref 140–500)
PMV BLD AUTO: 8.9 FL (ref 6–12)
POTASSIUM BLD-SCNC: 3.9 MMOL/L (ref 3.5–5.2)
RBC # BLD AUTO: 4.51 10*6/MM3 (ref 3.9–5.2)
SODIUM BLD-SCNC: 144 MMOL/L (ref 136–145)
WBC NRBC COR # BLD: 6.56 10*3/MM3 (ref 4.5–10.7)

## 2018-01-19 PROCEDURE — 36415 COLL VENOUS BLD VENIPUNCTURE: CPT

## 2018-01-19 PROCEDURE — 80048 BASIC METABOLIC PNL TOTAL CA: CPT

## 2018-01-19 PROCEDURE — 85027 COMPLETE CBC AUTOMATED: CPT

## 2018-01-19 PROCEDURE — 93005 ELECTROCARDIOGRAM TRACING: CPT | Performed by: PLASTIC SURGERY

## 2018-01-19 PROCEDURE — 93010 ELECTROCARDIOGRAM REPORT: CPT | Performed by: INTERNAL MEDICINE

## 2018-02-10 DIAGNOSIS — M85.80 OSTEOPENIA: ICD-10-CM

## 2018-02-12 RX ORDER — ALENDRONATE SODIUM 70 MG/1
TABLET ORAL
Qty: 12 TABLET | Refills: 1 | Status: SHIPPED | OUTPATIENT
Start: 2018-02-12 | End: 2018-07-24 | Stop reason: SDUPTHER

## 2018-02-14 DIAGNOSIS — E78.00 PURE HYPERCHOLESTEROLEMIA: ICD-10-CM

## 2018-02-14 DIAGNOSIS — I10 ESSENTIAL HYPERTENSION: Primary | ICD-10-CM

## 2018-02-15 LAB
ALBUMIN SERPL-MCNC: 4.1 G/DL (ref 3.5–5.2)
ALBUMIN/GLOB SERPL: 1.5 G/DL
ALP SERPL-CCNC: 128 U/L (ref 39–117)
ALT SERPL-CCNC: 20 U/L (ref 1–33)
AST SERPL-CCNC: 24 U/L (ref 1–32)
BILIRUB SERPL-MCNC: 0.7 MG/DL (ref 0.1–1.2)
BUN SERPL-MCNC: 27 MG/DL (ref 8–23)
BUN/CREAT SERPL: 32.9 (ref 7–25)
CALCIUM SERPL-MCNC: 9.5 MG/DL (ref 8.6–10.5)
CHLORIDE SERPL-SCNC: 100 MMOL/L (ref 98–107)
CHOLEST SERPL-MCNC: 161 MG/DL (ref 0–200)
CO2 SERPL-SCNC: 28.5 MMOL/L (ref 22–29)
CREAT SERPL-MCNC: 0.82 MG/DL (ref 0.57–1)
GFR SERPLBLD CREATININE-BSD FMLA CKD-EPI: 70 ML/MIN/1.73
GFR SERPLBLD CREATININE-BSD FMLA CKD-EPI: 84 ML/MIN/1.73
GLOBULIN SER CALC-MCNC: 2.8 GM/DL
GLUCOSE SERPL-MCNC: 82 MG/DL (ref 65–99)
HDLC SERPL-MCNC: 56 MG/DL (ref 40–60)
LDLC SERPL CALC-MCNC: 87 MG/DL (ref 0–100)
LDLC/HDLC SERPL: 1.55 {RATIO}
POTASSIUM SERPL-SCNC: 4.2 MMOL/L (ref 3.5–5.2)
PROT SERPL-MCNC: 6.9 G/DL (ref 6–8.5)
SODIUM SERPL-SCNC: 141 MMOL/L (ref 136–145)
TRIGL SERPL-MCNC: 90 MG/DL (ref 0–150)
VLDLC SERPL CALC-MCNC: 18 MG/DL (ref 5–40)

## 2018-02-16 DIAGNOSIS — I10 ESSENTIAL HYPERTENSION: ICD-10-CM

## 2018-02-16 RX ORDER — LISINOPRIL AND HYDROCHLOROTHIAZIDE 12.5; 1 MG/1; MG/1
TABLET ORAL
Qty: 90 TABLET | Refills: 1 | Status: SHIPPED | OUTPATIENT
Start: 2018-02-16 | End: 2018-02-22

## 2018-02-22 ENCOUNTER — OFFICE VISIT (OUTPATIENT)
Dept: FAMILY MEDICINE CLINIC | Facility: CLINIC | Age: 68
End: 2018-02-22

## 2018-02-22 VITALS
HEART RATE: 69 BPM | SYSTOLIC BLOOD PRESSURE: 120 MMHG | BODY MASS INDEX: 30.94 KG/M2 | TEMPERATURE: 98 F | HEIGHT: 61 IN | DIASTOLIC BLOOD PRESSURE: 84 MMHG | OXYGEN SATURATION: 98 % | WEIGHT: 163.9 LBS

## 2018-02-22 DIAGNOSIS — E78.00 HYPERCHOLESTEROLEMIA: Primary | ICD-10-CM

## 2018-02-22 DIAGNOSIS — M85.80 OSTEOPENIA, UNSPECIFIED LOCATION: ICD-10-CM

## 2018-02-22 DIAGNOSIS — I10 ESSENTIAL HYPERTENSION: ICD-10-CM

## 2018-02-22 PROBLEM — G56.00 CARPAL TUNNEL SYNDROME: Status: ACTIVE | Noted: 2018-02-22

## 2018-02-22 PROCEDURE — 99214 OFFICE O/P EST MOD 30 MIN: CPT | Performed by: FAMILY MEDICINE

## 2018-02-22 RX ORDER — OXYCODONE HYDROCHLORIDE AND ACETAMINOPHEN 5; 325 MG/1; MG/1
TABLET ORAL
COMMUNITY
Start: 2018-02-08 | End: 2018-04-05

## 2018-02-22 RX ORDER — LISINOPRIL 10 MG/1
10 TABLET ORAL DAILY
Qty: 90 TABLET | Refills: 1 | Status: SHIPPED | OUTPATIENT
Start: 2018-02-22 | End: 2018-07-30 | Stop reason: SDUPTHER

## 2018-02-22 RX ORDER — FLUTICASONE PROPIONATE 50 MCG
SPRAY, SUSPENSION (ML) NASAL
COMMUNITY
Start: 2017-12-19 | End: 2018-02-22 | Stop reason: SDUPTHER

## 2018-02-22 RX ORDER — FLUTICASONE PROPIONATE 50 MCG
2 SPRAY, SUSPENSION (ML) NASAL DAILY
Qty: 16 G | Refills: 3 | Status: SHIPPED | OUTPATIENT
Start: 2018-02-22 | End: 2023-03-02

## 2018-02-22 NOTE — PROGRESS NOTES
"Subjective   Ariadne Telles is a 67 y.o. female.     Hypertension (follow up labs)    History of Present Illness    Hypertension follow up. Doing well with current medication which she is taking as directed.  She has had some lightheadedness.  At times when she stands up her vision goes little dark.  She almost passed out one point.  No focal neurological deficits.  No vertigo.  No palpitations.  No chest pain.  Symptoms have been going on for about 2 months.  A few times a week.  Fairly consistent.  She has not been checking her blood pressure at home.  She does have a blood pressure monitor.  However at the hand surgeon's office, she recently had carpal tunnel surgery, she states it was low both times.  Today's BP: 120/84.      Hyperlipidemia follow up. She is taking statin medication without complaint. No myopathy symptoms.     Last lipid panel:   Lab Results   Component Value Date    HDL 56 02/15/2018     Lab Results   Component Value Date    LDL 87 02/15/2018     Lab Results   Component Value Date    TRIG 90 02/15/2018     Osteopenia.  Patient continues Fosamax.    The following portions of the patient's history were reviewed and updated as appropriate: allergies, current medications, past family history, past medical history, past social history, past surgical history and problem list.      Review of Systems   Constitutional: Negative.    Eyes: Positive for visual disturbance.   Respiratory: Negative.    Cardiovascular: Negative.    Musculoskeletal: Negative.    Neurological: Positive for light-headedness. Negative for dizziness, facial asymmetry and headaches.   Psychiatric/Behavioral: Negative.        Objective   Blood pressure 120/84, pulse 69, temperature 98 °F (36.7 °C), temperature source Oral, height 154.9 cm (60.98\"), weight 74.3 kg (163 lb 14.4 oz), SpO2 98 %.  Physical Exam    Assessment/Plan   Ariadne was seen today for hypertension.    Diagnoses and all orders for this " visit:    Hypercholesterolemia    Essential hypertension    Osteopenia, unspecified location    Other orders  -     fluticasone (FLONASE) 50 MCG/ACT nasal spray; 2 sprays into each nostril Daily.  -     lisinopril (PRINIVIL,ZESTRIL) 10 MG tablet; Take 1 tablet by mouth Daily.      Hypertension.  Not stable.  Not adequately controlled.  At this time blood pressure medication dosing is too high.  I'm recommending that we stop the hydrochlorothiazide.  Continue lisinopril 10 mg a day.  I've instructed the patient to please check her blood pressure at home a few times a week.  Then let us know what the numbers are.  I also want to see her back within one month for recheck.  I'm concerned about overtreatment of her hypertension at this time.    Hyperlipidemia.  Cholesterol panel remains at target.  She continues her statin.    Osteopenia.  Patient continues her Fosamax without complaint.

## 2018-03-09 ENCOUNTER — TREATMENT (OUTPATIENT)
Dept: PHYSICAL THERAPY | Facility: CLINIC | Age: 68
End: 2018-03-09

## 2018-03-09 ENCOUNTER — TRANSCRIBE ORDERS (OUTPATIENT)
Dept: PHYSICAL THERAPY | Facility: CLINIC | Age: 68
End: 2018-03-09

## 2018-03-09 DIAGNOSIS — Z98.890 S/P CUBITAL TUNNEL RELEASE: Primary | ICD-10-CM

## 2018-03-09 DIAGNOSIS — M79.641 PAIN OF RIGHT HAND: Primary | ICD-10-CM

## 2018-03-09 DIAGNOSIS — Z98.890 S/P CARPAL TUNNEL RELEASE: ICD-10-CM

## 2018-03-09 PROCEDURE — G8985 CARRY GOAL STATUS: HCPCS | Performed by: PHYSICAL THERAPIST

## 2018-03-09 PROCEDURE — 97110 THERAPEUTIC EXERCISES: CPT | Performed by: PHYSICAL THERAPIST

## 2018-03-09 PROCEDURE — G8984 CARRY CURRENT STATUS: HCPCS | Performed by: PHYSICAL THERAPIST

## 2018-03-09 PROCEDURE — 97161 PT EVAL LOW COMPLEX 20 MIN: CPT | Performed by: PHYSICAL THERAPIST

## 2018-03-09 NOTE — PROGRESS NOTES
Physical Therapy Initial Evaluation and Plan of Care    Patient Name: Ariadne Telles       Patient MRN: [id number]  : 1950  Physician:Jasvir Guerrero MD  Date: 3/9/2018    Encounter Diagnoses   Name Primary?   • S/P cubital tunnel release Yes   • S/P carpal tunnel release        Subjective Evaluation    History of Present Illness  Onset date: 2 yrs ago.  Date of surgery: 2018    Subjective comment: scars are sensitive, weak   Patient Occupation: works at [employer] in    Precautions and Work Restrictions: RTW Monday full dutyPain  Current pain ratin  At worst pain ratin  Location: medial elbow  Quality: sharp and dull ache  Aggravating factors: movement and lifting (gripping)    Social Support  Lives with: alone    Hand dominance: right    Diagnostic Tests  EMG: normal    Treatments  Previous treatment: physical therapy  Patient Goals  Patient goals for therapy: decreased edema, decreased pain, increased motion, increased strength, independence with ADLs/IADLs and return to work           Objective       Observations     Right Wrist/Hand   Positive for edema (medial albow at incision, CT and CuT incisions well healed) and incision (scar tissue under distal CT scar and distal 1/3 on CuT scar).     Tenderness     Additional Tenderness Details  CT and CuT incisions      Neurological Testing     Sensation     Wrist/Hand     Right   Diminished: light touch    Comments   Right light touch: median and ulnar nerve dist, numb medial ebow    Active Range of Motion     Right Elbow   Flexion: WFL  Extension: -13 degrees     Right Wrist   Wrist flexion: 76 degrees   Wrist extension: 65 degrees     Additional Active Range of Motion Details  Full composite fist and full ext of all fingers on R    Strength/Myotome Testing     Left Wrist/Hand      (2nd hand position)   lbs: 35    Right Wrist/Hand      (2nd hand position)     Trial 1: 21 lbs    Trial 2: 16 lbs    Trial 3: 15 lbs     Average: 17.33 lbs    Thumb Strength   Key/Lateral Pinch     Trial 1: 6 lbs  Tip/Two-Point Pinch     Trial 1: 5 lbs  Palmar/Three-Point Pinch     Trial 1: 7 lbs    Tests     Right Elbow   Positive Tinel's sign (cubital tunnel).     Right Wrist/Hand   Positive Tinel's sign (medial nerve).          Assessment & Plan     Assessment  Impairments: abnormal or restricted ROM, activity intolerance, impaired physical strength, lacks appropriate home exercise program and pain with function  Other impairment: decreased sensation causing safety issue  Assessment details: Patient is a candidate for PT to address the listed impairments and functional limitations. Patient is s/p CT and CuT release.   Prognosis: good  Functional Limitations: carrying objects, lifting, pulling, pushing, uncomfortable because of pain, reaching behind back, reaching overhead and unable to perform repetitive tasks  Goals  Plan Goals: STG: To be met in 2 weeks:  1. Patient demonstrates independence/compliance with HEP to maintain gains made in PT.  2. Right elbow ext AROM is 0 degrees.  3. Patient tolerated  R  and pinch strengthening without problems.          Plan  Therapy options: will be seen for skilled physical therapy services  Planned modality interventions: cryotherapy, thermotherapy (paraffin bath), ultrasound and high voltage pulsed current (pain management)  Planned therapy interventions: home exercise program, manual therapy, joint mobilization, soft tissue mobilization, strengthening, stretching and functional ROM exercises  Frequency: 2x week  Duration in weeks: 3  Treatment plan discussed with: patient        See Exercise, Manual, and Modality Logs for complete treatment.     PROCEDURES AND MODALITIES:  Paraffin:   pre-rx  Moist Heat:    Ice:   post-rx  E-Stim:    Ultrasound:    Ionto:   Traction:    Dry Needling:         Manual PT [id number] 6 minutes and Therapy Exercise [id number] 12 minutes     Timed Code Treatment: 18 Minutes  Total  Treatment Time: 45 Minutes    PT SIGNATURE: Irais Canada PT, DPT  [place] License # [id number]  DATE TREATMENT INITIATED: 3/9/2018    Initial Certification  Certification Period: 6/7/2018  I certify that the therapy services are furnished while this patient is under my care.  The services outlined above are required by this patient, and will be reviewed every 90 days.     PHYSICIAN: Jasvir Guerrero MD      DATE:     Please sign and return via fax to [phone].. Thank you, [facility].

## 2018-03-16 ENCOUNTER — TREATMENT (OUTPATIENT)
Dept: PHYSICAL THERAPY | Facility: CLINIC | Age: 68
End: 2018-03-16

## 2018-03-16 DIAGNOSIS — Z98.890 S/P CUBITAL TUNNEL RELEASE: Primary | ICD-10-CM

## 2018-03-16 DIAGNOSIS — Z98.890 S/P CARPAL TUNNEL RELEASE: ICD-10-CM

## 2018-03-16 PROCEDURE — 97110 THERAPEUTIC EXERCISES: CPT | Performed by: PHYSICAL THERAPIST

## 2018-03-16 PROCEDURE — 97140 MANUAL THERAPY 1/> REGIONS: CPT | Performed by: PHYSICAL THERAPIST

## 2018-03-18 NOTE — PROGRESS NOTES
Physical Therapy Daily Progress Note    Visit #: 2  Ariadne Telles reports: Still numb at the elbow. CT scar is the most tender. I have been doing a lot of scar massage.   Pain Scale (0-10):     Subjective       Objective   See Exercise, Manual, and Modality Logs for complete treatment.     PROCEDURES AND MODALITIES:  Paraffin:   pre-rx  Moist Heat:    Ice:   post-rx  E-Stim:    Ultrasound: Rx Minutes: 7/10  Ionto:   Traction:    Dry Needling:         Manual PT [id number] 8 minutes and Therapy Exercise [id number] 11 minutes     Timed Code Treatment: 29 Minutes  Total Treatment Time: 35 Minutes    Assessment/Plan  Wrist and elbow ext are improved. Tolerated  strengthening well.    Progress strengthening /stabilization /functional activity      Irais Canada, PT, DPT  Physical Therapist  [place] License # [id number]

## 2018-03-23 ENCOUNTER — TREATMENT (OUTPATIENT)
Dept: PHYSICAL THERAPY | Facility: CLINIC | Age: 68
End: 2018-03-23

## 2018-03-23 DIAGNOSIS — Z98.890 S/P CUBITAL TUNNEL RELEASE: Primary | ICD-10-CM

## 2018-03-23 DIAGNOSIS — Z98.890 S/P CARPAL TUNNEL RELEASE: ICD-10-CM

## 2018-03-23 PROCEDURE — 97140 MANUAL THERAPY 1/> REGIONS: CPT | Performed by: PHYSICAL THERAPIST

## 2018-03-23 PROCEDURE — 97110 THERAPEUTIC EXERCISES: CPT | Performed by: PHYSICAL THERAPIST

## 2018-03-23 NOTE — PROGRESS NOTES
Physical Therapy Daily Progress Note    Visit #: 3  Ariadne Telles reports: I am not having much pain. Tenderness of scars is improved a lot in past week. Still has numbness about elbow.   Pain Scale (0-10):     Subjective       Objective   See Exercise, Manual, and Modality Logs for complete treatment.     PROCEDURES AND MODALITIES:  Paraffin:   pre-rx  Moist Heat:    Ice:   post-rx  E-Stim:    Ultrasound: Rx Minutes: 7/10  Ionto:   Traction:    Dry Needling:         Manual PT [id number] 10 minutes and Therapy Exercise [id number] 14 minutes     Timed Code Treatment: 34 Minutes  Total Treatment Time: 35 Minutes    Assessment/Plan  Pain is improving. Sensation is normal in hand. Tolerating strengthening well. Pt is progressing well under current treatment plan and will continue to improve with skilled PT and HEP performance.       Progress strengthening /stabilization /functional activity      Irais Canada, PT, DPT  Physical Therapist  [place] License # [id number]

## 2018-04-05 ENCOUNTER — PROCEDURE VISIT (OUTPATIENT)
Dept: OBSTETRICS AND GYNECOLOGY | Age: 68
End: 2018-04-05

## 2018-04-05 VITALS
DIASTOLIC BLOOD PRESSURE: 84 MMHG | SYSTOLIC BLOOD PRESSURE: 130 MMHG | HEIGHT: 61 IN | WEIGHT: 169 LBS | BODY MASS INDEX: 31.91 KG/M2

## 2018-04-05 DIAGNOSIS — Z11.51 SCREENING FOR HUMAN PAPILLOMAVIRUS: ICD-10-CM

## 2018-04-05 DIAGNOSIS — R87.620 ATYPICAL SQUAMOUS CELL CHANGES OF UNDETERMINED SIGNIFICANCE (ASCUS) ON VAGINAL CYTOLOGY WITH POSITIVE HIGH RISK HUMAN PAPILLOMA VIRUS (HPV): Primary | ICD-10-CM

## 2018-04-05 DIAGNOSIS — Z12.4 ENCOUNTER FOR REPEAT PAPANICOLAOU SMEAR OF CERVIX: ICD-10-CM

## 2018-04-05 DIAGNOSIS — R87.811 ATYPICAL SQUAMOUS CELL CHANGES OF UNDETERMINED SIGNIFICANCE (ASCUS) ON VAGINAL CYTOLOGY WITH POSITIVE HIGH RISK HUMAN PAPILLOMA VIRUS (HPV): Primary | ICD-10-CM

## 2018-04-05 PROCEDURE — 99213 OFFICE O/P EST LOW 20 MIN: CPT | Performed by: OBSTETRICS & GYNECOLOGY

## 2018-04-05 RX ORDER — ATORVASTATIN CALCIUM 40 MG/1
40 TABLET, FILM COATED ORAL DAILY
COMMUNITY
End: 2018-04-18

## 2018-04-05 NOTE — ADDENDUM NOTE
Addended by: YESENIA CHAN on: 4/5/2018 11:45 AM     Modules accepted: Orders     Informed pt of lab results. Reviewed and disucssed results with pt. Verbalized understanding.    Informed pt that he has some more labs that have been ordered. Pt informed that he will need to fast over night, and have the labs completed in the morning. Informed pt that he can take his medication with him and take them after he has his labs completed. Verbalized understanding.

## 2018-04-05 NOTE — PROGRESS NOTES
" GYN PROBLEM EXAM                                    2018    Subjective   Hattie Telles is a 67 y.o.  Female,      Chief complaint:  Follow-up (HATTIE IS HERE FOR A REPEAT PAP SMEAR. PAP WITH ascus positive for high risk HPV. )    History of Present Illness:  S/P ANGEL, BSO for endometrial cancer.   Pap last year with ASCUS and AGCUS, but colpo was normal.  Repeat Pap today.    Her son has now been 5 years clean, off of drugs.    The following portions of the patient's history were reviewed / updated as appropriate:   allergies, current medications,  past medical history, past surgical history, past family history, past social history, and problem list.    Review of Systems   : No itching or irritation.  No discharge.  No dysuria.    /84 (BP Location: Left arm, Patient Position: Sitting, Cuff Size: Large Adult)   Ht 154.9 cm (60.98\")   Wt 76.7 kg (169 lb)   LMP  (LMP Unknown)   Breastfeeding? No   BMI 31.95 kg/m²     Objective   OBGyn Exam     PHYSICAL EXAM      Well-developed, well-nourished, slightly obese  female, in no distress, alert and well oriented ×3.         Pelvic exam :  Vulva normal.           External genitalia normal.  BUS negative.             Introitus atrophic.  Vaginal mucosa looks surprisingly good for menopause.  Thin white discharge, looks normal.          Vaginal cuff looks normal.  Pap smear done with HPV.          Bimanual exam is normal.  2 tiny granular areas felt, but not seen on the anterior aspect of the vaginal cuff,  Feels about as big as a grain of sand.              No tenderness.  No palpable mass.          Rectovaginal exam deferred .           Lower extremities without edema.          Coordination is grossly normal.         Mood and affect is normal.  Behavior normal.  Thought content normal.            Judgment normal.         Assessment/Plan   Hattie was seen today for follow-up.    Diagnoses and all orders for this visit:    Atypical " squamous cell changes of undetermined significance (ASCUS) on vaginal cytology with positive high risk human papilloma virus (HPV)  Comments:  Normal colposcopy       COMMENTS: Repeat Pap smear today.  We'll repeat Pap smear in 6 months at her annual exam than yearly if these are normal.    Catracho Alejo MD  4/5/2018

## 2018-04-10 LAB
CYTOLOGIST CVX/VAG CYTO: ABNORMAL
CYTOLOGY CVX/VAG DOC THIN PREP: ABNORMAL
DX ICD CODE: ABNORMAL
HIV 1 & 2 AB SER-IMP: ABNORMAL
HPV I/H RISK 1 DNA CVX QL PROBE+SIG AMP: POSITIVE
HPV16 DNA CVX QL PROBE+SIG AMP: NEGATIVE
HPV18 DNA CVX QL PROBE+SIG AMP: NEGATIVE
Lab: ABNORMAL
OTHER STN SPEC: ABNORMAL
PATH REPORT.FINAL DX SPEC: ABNORMAL
STAT OF ADQ CVX/VAG CYTO-IMP: ABNORMAL

## 2018-04-14 NOTE — PROGRESS NOTES
April 14, 2018.  3:51 PM.  I notified Ariadne that her Pap smear this time came back negative, no abnormal cells, no atypical cells, but was positive for high-risk HPV.  However it was negative for type 16 and 18.  I think this is reassuring.  Her last Pap smear showed some atypical cells and was positive for high-risk HPV, but her colposcopy was totally negative.  I have recommended that I will see her back in 6 months for a repeat Pap smear at her annual exam.  I think this is a normal progression back to normal Pap smears.

## 2018-04-18 DIAGNOSIS — E78.00 HYPERCHOLESTEROLEMIA: ICD-10-CM

## 2018-04-18 RX ORDER — ATORVASTATIN CALCIUM 40 MG/1
TABLET, FILM COATED ORAL
Qty: 90 TABLET | Refills: 1 | Status: SHIPPED | OUTPATIENT
Start: 2018-04-18 | End: 2018-07-24 | Stop reason: SDUPTHER

## 2018-05-15 ENCOUNTER — HOSPITAL ENCOUNTER (OUTPATIENT)
Dept: CARDIOLOGY | Facility: HOSPITAL | Age: 68
Discharge: HOME | End: 2018-05-15
Admitting: INTERNAL MEDICINE

## 2018-05-15 ENCOUNTER — HOSPITAL ENCOUNTER (EMERGENCY)
Facility: HOSPITAL | Age: 68
Discharge: HOME | End: 2018-05-15
Attending: EMERGENCY MEDICINE | Admitting: EMERGENCY MEDICINE

## 2018-05-15 ENCOUNTER — HOSPITAL ENCOUNTER (OUTPATIENT)
Dept: CARDIOLOGY | Facility: HOSPITAL | Age: 68
Discharge: HOME | End: 2018-05-15
Attending: INTERNAL MEDICINE

## 2018-05-15 ENCOUNTER — APPOINTMENT (OUTPATIENT)
Dept: GENERAL RADIOLOGY | Facility: HOSPITAL | Age: 68
End: 2018-05-15

## 2018-05-15 VITALS
HEART RATE: 64 BPM | WEIGHT: 166 LBS | DIASTOLIC BLOOD PRESSURE: 67 MMHG | SYSTOLIC BLOOD PRESSURE: 139 MMHG | TEMPERATURE: 97.6 F | RESPIRATION RATE: 20 BRPM | HEIGHT: 61 IN | OXYGEN SATURATION: 96 % | BODY MASS INDEX: 31.34 KG/M2

## 2018-05-15 VITALS
SYSTOLIC BLOOD PRESSURE: 162 MMHG | HEART RATE: 64 BPM | RESPIRATION RATE: 16 BRPM | DIASTOLIC BLOOD PRESSURE: 84 MMHG | OXYGEN SATURATION: 94 %

## 2018-05-15 VITALS — HEART RATE: 74 BPM

## 2018-05-15 DIAGNOSIS — E78.00 HYPERCHOLESTEROLEMIA: ICD-10-CM

## 2018-05-15 DIAGNOSIS — I34.1 MVP (MITRAL VALVE PROLAPSE): Chronic | ICD-10-CM

## 2018-05-15 DIAGNOSIS — R07.9 CHEST PAIN WITH HIGH RISK FOR CARDIAC ETIOLOGY: Primary | ICD-10-CM

## 2018-05-15 DIAGNOSIS — I10 ESSENTIAL HYPERTENSION: ICD-10-CM

## 2018-05-15 DIAGNOSIS — R07.2 PRECORDIAL PAIN: Primary | ICD-10-CM

## 2018-05-15 DIAGNOSIS — R07.2 PRECORDIAL PAIN: ICD-10-CM

## 2018-05-15 LAB
ALBUMIN SERPL-MCNC: 4.2 G/DL (ref 3.5–5.2)
ALBUMIN/GLOB SERPL: 1.3 G/DL
ALP SERPL-CCNC: 119 U/L (ref 39–117)
ALT SERPL W P-5'-P-CCNC: 16 U/L (ref 1–33)
ANION GAP SERPL CALCULATED.3IONS-SCNC: 11.9 MMOL/L
ASCENDING AORTA: 3.1 CM
AST SERPL-CCNC: 20 U/L (ref 1–32)
BASOPHILS # BLD AUTO: 0.05 10*3/MM3 (ref 0–0.2)
BASOPHILS NFR BLD AUTO: 0.6 % (ref 0–1.5)
BH CV ECHO MEAS - ACS: 1.7 CM
BH CV ECHO MEAS - AO MAX PG: 5.7 MMHG
BH CV ECHO MEAS - AO ROOT AREA (BSA CORRECTED): 1.7
BH CV ECHO MEAS - AO ROOT AREA: 6.9 CM^2
BH CV ECHO MEAS - AO ROOT DIAM: 3 CM
BH CV ECHO MEAS - AO V2 MAX: 119.4 CM/SEC
BH CV ECHO MEAS - BSA(HAYCOCK): 1.8 M^2
BH CV ECHO MEAS - BSA: 1.7 M^2
BH CV ECHO MEAS - BZI_BMI: 31.4 KILOGRAMS/M^2
BH CV ECHO MEAS - BZI_METRIC_HEIGHT: 154.9 CM
BH CV ECHO MEAS - BZI_METRIC_WEIGHT: 75.3 KG
BH CV ECHO MEAS - CONTRAST EF 4CH: 65.5 ML/M^2
BH CV ECHO MEAS - EDV(MOD-SP4): 87 ML
BH CV ECHO MEAS - EDV(TEICH): 72.9 ML
BH CV ECHO MEAS - EF(CUBED): 72.5 %
BH CV ECHO MEAS - EF(MOD-BP): 66 %
BH CV ECHO MEAS - EF(MOD-SP4): 65.5 %
BH CV ECHO MEAS - EF(TEICH): 64.8 %
BH CV ECHO MEAS - ESV(MOD-SP4): 30 ML
BH CV ECHO MEAS - ESV(TEICH): 25.7 ML
BH CV ECHO MEAS - FS: 35 %
BH CV ECHO MEAS - IVS/LVPW: 1
BH CV ECHO MEAS - IVSD: 1.3 CM
BH CV ECHO MEAS - LAT PEAK E' VEL: 8 CM/SEC
BH CV ECHO MEAS - LV DIASTOLIC VOL/BSA (35-75): 49.8 ML/M^2
BH CV ECHO MEAS - LV MASS(C)D: 182.8 GRAMS
BH CV ECHO MEAS - LV MASS(C)DI: 104.7 GRAMS/M^2
BH CV ECHO MEAS - LV SYSTOLIC VOL/BSA (12-30): 17.2 ML/M^2
BH CV ECHO MEAS - LVIDD: 4.1 CM
BH CV ECHO MEAS - LVIDS: 2.6 CM
BH CV ECHO MEAS - LVLD AP4: 7.4 CM
BH CV ECHO MEAS - LVLS AP4: 5.8 CM
BH CV ECHO MEAS - LVPWD: 1.3 CM
BH CV ECHO MEAS - MED PEAK E' VEL: 4 CM/SEC
BH CV ECHO MEAS - MR MAX PG: 89.3 MMHG
BH CV ECHO MEAS - MR MAX VEL: 472.4 CM/SEC
BH CV ECHO MEAS - MV A DUR: 0.13 SEC
BH CV ECHO MEAS - MV A MAX VEL: 83.4 CM/SEC
BH CV ECHO MEAS - MV DEC SLOPE: 189.8 CM/SEC^2
BH CV ECHO MEAS - MV DEC TIME: 0.27 SEC
BH CV ECHO MEAS - MV E MAX VEL: 54.3 CM/SEC
BH CV ECHO MEAS - MV E/A: 0.65
BH CV ECHO MEAS - MV P1/2T MAX VEL: 54.8 CM/SEC
BH CV ECHO MEAS - MV P1/2T: 84.6 MSEC
BH CV ECHO MEAS - MVA P1/2T LCG: 4 CM^2
BH CV ECHO MEAS - MVA(P1/2T): 2.6 CM^2
BH CV ECHO MEAS - PULM A REVS DUR: 0.11 SEC
BH CV ECHO MEAS - PULM A REVS VEL: 21.8 CM/SEC
BH CV ECHO MEAS - PULM DIAS VEL: 31 CM/SEC
BH CV ECHO MEAS - PULM S/D: 1.3
BH CV ECHO MEAS - PULM SYS VEL: 39.3 CM/SEC
BH CV ECHO MEAS - RAP SYSTOLE: 8 MMHG
BH CV ECHO MEAS - RVSP: 27.1 MMHG
BH CV ECHO MEAS - SI(CUBED): 28 ML/M^2
BH CV ECHO MEAS - SI(MOD-SP4): 32.7 ML/M^2
BH CV ECHO MEAS - SI(TEICH): 27 ML/M^2
BH CV ECHO MEAS - SV(CUBED): 48.8 ML
BH CV ECHO MEAS - SV(MOD-SP4): 57 ML
BH CV ECHO MEAS - SV(TEICH): 47.2 ML
BH CV ECHO MEAS - TAPSE (>1.6): 2.1 CM2
BH CV ECHO MEAS - TR MAX VEL: 218.5 CM/SEC
BH CV ECHO MEASUREMENTS AVERAGE E/E' RATIO: 9.05
BH CV STRESS BP STAGE 1: NORMAL
BH CV STRESS BP STAGE 2: NORMAL
BH CV STRESS DURATION MIN STAGE 1: 3
BH CV STRESS DURATION MIN STAGE 2: 3
BH CV STRESS DURATION MIN STAGE 3: 1
BH CV STRESS DURATION SEC STAGE 1: 0
BH CV STRESS DURATION SEC STAGE 2: 0
BH CV STRESS DURATION SEC STAGE 3: 10
BH CV STRESS ECHO POST STRESS EJECTION FRACTION EF: 75 %
BH CV STRESS GRADE STAGE 1: 10
BH CV STRESS GRADE STAGE 2: 12
BH CV STRESS GRADE STAGE 3: 14
BH CV STRESS HR STAGE 1: 105
BH CV STRESS HR STAGE 2: 121
BH CV STRESS HR STAGE 3: 132
BH CV STRESS METS STAGE 1: 5
BH CV STRESS METS STAGE 2: 7.5
BH CV STRESS METS STAGE 3: 10
BH CV STRESS PROTOCOL 1: NORMAL
BH CV STRESS SPEED STAGE 1: 1.7
BH CV STRESS SPEED STAGE 2: 2.5
BH CV STRESS SPEED STAGE 3: 3.4
BH CV STRESS STAGE 1: 1
BH CV STRESS STAGE 2: 2
BH CV STRESS STAGE 3: 3
BH CV XLRA - RV BASE: 2.3 CM
BH CV XLRA - TDI S': 13 CM/SEC
BILIRUB SERPL-MCNC: 0.9 MG/DL (ref 0.1–1.2)
BUN BLD-MCNC: 20 MG/DL (ref 8–23)
BUN/CREAT SERPL: 28.6 (ref 7–25)
CALCIUM SPEC-SCNC: 10.1 MG/DL (ref 8.6–10.5)
CHLORIDE SERPL-SCNC: 103 MMOL/L (ref 98–107)
CO2 SERPL-SCNC: 26.1 MMOL/L (ref 22–29)
CREAT BLD-MCNC: 0.7 MG/DL (ref 0.57–1)
DEPRECATED RDW RBC AUTO: 42.4 FL (ref 37–54)
EOSINOPHIL # BLD AUTO: 0.32 10*3/MM3 (ref 0–0.7)
EOSINOPHIL NFR BLD AUTO: 3.6 % (ref 0.3–6.2)
ERYTHROCYTE [DISTWIDTH] IN BLOOD BY AUTOMATED COUNT: 12.5 % (ref 11.7–13)
GFR SERPL CREATININE-BSD FRML MDRD: 83 ML/MIN/1.73
GLOBULIN UR ELPH-MCNC: 3.2 GM/DL
GLUCOSE BLD-MCNC: 92 MG/DL (ref 65–99)
HCT VFR BLD AUTO: 42.3 % (ref 35.6–45.5)
HGB BLD-MCNC: 14.9 G/DL (ref 11.9–15.5)
IMM GRANULOCYTES # BLD: 0.02 10*3/MM3 (ref 0–0.03)
IMM GRANULOCYTES NFR BLD: 0.2 % (ref 0–0.5)
LEFT ATRIUM VOLUME INDEX: 23 ML/M2
LEFT ATRIUM VOLUME: 41 CM3
LV EF 2D ECHO EST: 66 %
LYMPHOCYTES # BLD AUTO: 1.6 10*3/MM3 (ref 0.9–4.8)
LYMPHOCYTES NFR BLD AUTO: 17.9 % (ref 19.6–45.3)
MAXIMAL PREDICTED HEART RATE: 153 BPM
MCH RBC QN AUTO: 33.1 PG (ref 26.9–32)
MCHC RBC AUTO-ENTMCNC: 35.2 G/DL (ref 32.4–36.3)
MCV RBC AUTO: 94 FL (ref 80.5–98.2)
MONOCYTES # BLD AUTO: 0.65 10*3/MM3 (ref 0.2–1.2)
MONOCYTES NFR BLD AUTO: 7.3 % (ref 5–12)
NEUTROPHILS # BLD AUTO: 6.34 10*3/MM3 (ref 1.9–8.1)
NEUTROPHILS NFR BLD AUTO: 70.6 % (ref 42.7–76)
NT-PROBNP SERPL-MCNC: 65.9 PG/ML (ref 5–900)
PERCENT MAX PREDICTED HR: 86.27 %
PLATELET # BLD AUTO: 277 10*3/MM3 (ref 140–500)
PMV BLD AUTO: 9.4 FL (ref 6–12)
POTASSIUM BLD-SCNC: 3.9 MMOL/L (ref 3.5–5.2)
PROT SERPL-MCNC: 7.4 G/DL (ref 6–8.5)
RBC # BLD AUTO: 4.5 10*6/MM3 (ref 3.9–5.2)
SINUS: 2.8 CM
SODIUM BLD-SCNC: 141 MMOL/L (ref 136–145)
STJ: 2.6 CM
STRESS BASELINE BP: NORMAL MMHG
STRESS BASELINE HR: 74 BPM
STRESS O2 SAT REST: 94 %
STRESS PERCENT HR: 101 %
STRESS POST ESTIMATED WORKLOAD: 8 METS
STRESS POST EXERCISE DUR MIN: 7 MIN
STRESS POST EXERCISE DUR SEC: 10 SEC
STRESS POST PEAK BP: NORMAL MMHG
STRESS POST PEAK HR: 132 BPM
STRESS TARGET HR: 130 BPM
TROPONIN T SERPL-MCNC: <0.01 NG/ML (ref 0–0.03)
TROPONIN T SERPL-MCNC: <0.01 NG/ML (ref 0–0.03)
WBC NRBC COR # BLD: 8.96 10*3/MM3 (ref 4.5–10.7)

## 2018-05-15 PROCEDURE — 93005 ELECTROCARDIOGRAM TRACING: CPT | Performed by: EMERGENCY MEDICINE

## 2018-05-15 PROCEDURE — 94760 N-INVAS EAR/PLS OXIMETRY 1: CPT

## 2018-05-15 PROCEDURE — 93018 CV STRESS TEST I&R ONLY: CPT | Performed by: INTERNAL MEDICINE

## 2018-05-15 PROCEDURE — 25010000002 PERFLUTREN (DEFINITY) 8.476 MG IN SODIUM CHLORIDE 0.9 % 10 ML INJECTION: Performed by: INTERNAL MEDICINE

## 2018-05-15 PROCEDURE — 99284 EMERGENCY DEPT VISIT MOD MDM: CPT

## 2018-05-15 PROCEDURE — 99204 OFFICE O/P NEW MOD 45 MIN: CPT | Performed by: INTERNAL MEDICINE

## 2018-05-15 PROCEDURE — 84484 ASSAY OF TROPONIN QUANT: CPT | Performed by: EMERGENCY MEDICINE

## 2018-05-15 PROCEDURE — 93350 STRESS TTE ONLY: CPT | Performed by: INTERNAL MEDICINE

## 2018-05-15 PROCEDURE — 93320 DOPPLER ECHO COMPLETE: CPT

## 2018-05-15 PROCEDURE — 93325 DOPPLER ECHO COLOR FLOW MAPG: CPT | Performed by: INTERNAL MEDICINE

## 2018-05-15 PROCEDURE — 83880 ASSAY OF NATRIURETIC PEPTIDE: CPT | Performed by: EMERGENCY MEDICINE

## 2018-05-15 PROCEDURE — 93352 ADMIN ECG CONTRAST AGENT: CPT | Performed by: INTERNAL MEDICINE

## 2018-05-15 PROCEDURE — 93350 STRESS TTE ONLY: CPT

## 2018-05-15 PROCEDURE — 93325 DOPPLER ECHO COLOR FLOW MAPG: CPT

## 2018-05-15 PROCEDURE — 71046 X-RAY EXAM CHEST 2 VIEWS: CPT

## 2018-05-15 PROCEDURE — 93010 ELECTROCARDIOGRAM REPORT: CPT | Performed by: INTERNAL MEDICINE

## 2018-05-15 PROCEDURE — 85025 COMPLETE CBC W/AUTO DIFF WBC: CPT | Performed by: EMERGENCY MEDICINE

## 2018-05-15 PROCEDURE — 93017 CV STRESS TEST TRACING ONLY: CPT

## 2018-05-15 PROCEDURE — 93016 CV STRESS TEST SUPVJ ONLY: CPT | Performed by: INTERNAL MEDICINE

## 2018-05-15 PROCEDURE — 93320 DOPPLER ECHO COMPLETE: CPT | Performed by: INTERNAL MEDICINE

## 2018-05-15 PROCEDURE — 80053 COMPREHEN METABOLIC PANEL: CPT | Performed by: EMERGENCY MEDICINE

## 2018-05-15 RX ORDER — SODIUM CHLORIDE 0.9 % (FLUSH) 0.9 %
10 SYRINGE (ML) INJECTION AS NEEDED
Status: DISCONTINUED | OUTPATIENT
Start: 2018-05-15 | End: 2018-05-15 | Stop reason: HOSPADM

## 2018-05-15 RX ORDER — NITROGLYCERIN 0.4 MG/1
0.4 TABLET SUBLINGUAL
Status: DISCONTINUED | OUTPATIENT
Start: 2018-05-15 | End: 2018-11-30

## 2018-05-15 RX ORDER — ACETAMINOPHEN 500 MG
1000 TABLET ORAL ONCE
Status: COMPLETED | OUTPATIENT
Start: 2018-05-15 | End: 2018-05-15

## 2018-05-15 RX ORDER — ASPIRIN 325 MG
325 TABLET ORAL ONCE
Status: COMPLETED | OUTPATIENT
Start: 2018-05-15 | End: 2018-05-15

## 2018-05-15 RX ORDER — NITROGLYCERIN 0.4 MG/1
0.4 TABLET SUBLINGUAL
Status: DISCONTINUED | OUTPATIENT
Start: 2018-05-15 | End: 2018-05-15 | Stop reason: HOSPADM

## 2018-05-15 RX ORDER — SODIUM CHLORIDE 0.9 % (FLUSH) 0.9 %
10 SYRINGE (ML) INJECTION AS NEEDED
Status: DISCONTINUED | OUTPATIENT
Start: 2018-05-15 | End: 2018-11-30

## 2018-05-15 RX ADMIN — ASPIRIN 325 MG: 325 TABLET ORAL at 08:17

## 2018-05-15 RX ADMIN — ACETAMINOPHEN 1000 MG: 500 TABLET, FILM COATED ORAL at 10:10

## 2018-05-15 RX ADMIN — PERFLUTREN 3 ML: 6.52 INJECTION, SUSPENSION INTRAVENOUS at 15:00

## 2018-05-15 RX ADMIN — NITROGLYCERIN 0.4 MG: 0.4 TABLET SUBLINGUAL at 08:18

## 2018-05-15 NOTE — PROGRESS NOTES
Subjective:     Encounter Date:05/15/2018      Patient ID: Ariadne Telles is a 67 y.o. female.    Chief Complaint: CP  History of Present Illness    Dear Dr. Ariza,    This patient was referred from the ER for evaluation of CP. She presented to the ER when she developed CP at work.She states this was a mid precordial discomfort that she's had off and on for some time now it occurs both with activity as well as at rest.  She had a little bit of this discomfort seemed to radiate into her neck.  She took a deep breath it perhaps was a little bit more prominent.  No associated vomiting, or diaphoresis.  He did feel a little bit of nausea.  She states she mentioned this to a coworker and they recommended that she go to the emergency room.    Patient came to the emergency room at [facility].  She was given one sublingual nitroglycerin but she states the pain was pretty much gone by the time that occurred.  With the nitroglycerin she simply got a headache.  Once her pain went away it did not recur.    She was seen in the emergency room and had a negative evaluation.  2 serial troponins were obtained and were unremarkable.  She was then referred to our clinic for further evaluation.    She did not have any sensation of palpitations or heart racing.  No presyncope or syncope.  No orthopnea or PND.    This patient has no known cardiac history.  This patient has no history of coronary artery disease, congestive heart failure, rheumatic fever, rheumatic heart disease, congenital heart disease or heart murmur.  This patient has never required invasive cardiovascular evaluation.    She saw Dr. Kinney many, many years ago and at that point her evaluation was unremarkable.  She's not had to be seen by cardiologist anytime recently.    The following portions of the patient's history were reviewed and updated as appropriate: allergies, current medications, past family history, past medical history, past social history, past  surgical history and problem list.    Past Medical History:   Diagnosis Date   • Colon polyps    [facility] Cat[facility]    • Hyperlipidemia    • Hypertension    • MVP (mitral valve prolapse) 5/15/2018   • Osteoporosis    • Pituitary adenoma    • Vaginal delivery     x1  NITO       Past Surgical History:   Procedure Laterality Date   • CARPAL TUNNEL RELEASE WITH CUBITAL TUNNEL RELEASE  2018    right   • CATARACT EXTRACTION Right 2016   •  SECTION      x2   one stillborn Ariadne Isbell   • COLONOSCOPY  2007    polyps  Dr. Rueda    • TOTAL ABDOMINAL HYSTERECTOMY WITH SALPINGO OOPHORECTOMY      Dr. Alejo       Social History     Social History   • Marital status:      Spouse name: N/A   • Number of children: 2   • Years of education: N/A     Occupational History   • Not on file.     Social History Main Topics   • Smoking status: Never Smoker   • Smokeless tobacco: Never Used   • Alcohol use No   • Drug use: No   • Sexual activity: Defer      Comment:      Other Topics Concern   • Not on file     Social History Narrative   • No narrative on file       Review of Systems   Constitution: Negative for chills, decreased appetite, fever and night sweats.   HENT: Negative for ear discharge, ear pain, hearing loss, nosebleeds and sore throat.    Eyes: Negative for blurred vision, double vision and pain.   Cardiovascular: Negative for cyanosis.   Respiratory: Negative for hemoptysis and sputum production.    Endocrine: Negative for cold intolerance and heat intolerance.   Hematologic/Lymphatic: Negative for adenopathy.   Skin: Negative for dry skin, itching, nail changes, rash and suspicious lesions.   Musculoskeletal: Negative for arthritis, gout, muscle cramps, muscle weakness, myalgias and neck pain.   Gastrointestinal: Negative for anorexia, bowel incontinence, constipation, diarrhea, dysphagia, hematemesis and jaundice.    Genitourinary: Negative for bladder incontinence, dysuria, flank pain, frequency, hematuria and nocturia.   Neurological: Negative for focal weakness, numbness, paresthesias and seizures.   Psychiatric/Behavioral: Negative for altered mental status, hallucinations, hypervigilance, suicidal ideas and thoughts of violence.   Allergic/Immunologic: Negative for persistent infections.       Procedures       Objective:     Vitals:    05/15/18 1316 05/15/18 1320   BP: (!) 183/91 162/84   BP Location: Right arm Left arm   Patient Position: Sitting Sitting   Pulse: 64    Resp: 16    SpO2: 94%          Physical Exam   Constitutional: She is oriented to person, place, and time. She appears well-developed and well-nourished. No distress.   HENT:   Head: Normocephalic and atraumatic.   Nose: Nose normal.   Mouth/Throat: Oropharynx is clear and moist.   Eyes: Conjunctivae and EOM are normal. Pupils are equal, round, and reactive to light. Right eye exhibits no discharge. Left eye exhibits no discharge.   Neck: Normal range of motion. Neck supple. No tracheal deviation present. No thyromegaly present.   Cardiovascular: Normal rate, regular rhythm, S1 normal, S2 normal, normal heart sounds and normal pulses.  Exam reveals no S3.    Pulmonary/Chest: Effort normal and breath sounds normal. No stridor. No respiratory distress. She exhibits no tenderness.   Abdominal: Soft. Bowel sounds are normal. She exhibits no distension and no mass. There is no tenderness. There is no rebound and no guarding.   Musculoskeletal: Normal range of motion. She exhibits no tenderness or deformity.   Lymphadenopathy:     She has no cervical adenopathy.   Neurological: She is alert and oriented to person, place, and time. She has normal reflexes.   Skin: Skin is warm and dry. No rash noted. She is not diaphoretic. No erythema.   Psychiatric: She has a normal mood and affect. Thought content normal.       Lab Review:     Results from last 7 days  Lab  Units 05/15/18  0805   SODIUM mmol/L 141   POTASSIUM mmol/L 3.9   CHLORIDE mmol/L 103   CO2 mmol/L 26.1   BUN mg/dL 20   CREATININE mg/dL 0.70   GLUCOSE mg/dL 92   CALCIUM mg/dL 10.1           Performed        Assessment:          Diagnosis Plan   1. Precordial pain  Cardiac Monitoring    Vital Signs - Once    Vital Signs - As Needed    Pulse Oximetry    Oxygen Therapy- Nasal Cannula; Titrate for SPO2: 92%, equal to or greater than    Insert Peripheral IV    sodium chloride 0.9 % flush 10 mL    nitroglycerin (NITROSTAT) SL tablet 0.4 mg    NPO Diet    Bathroom Privileges With Assistance    Cardiac Monitoring    Vital Signs - Once    Oxygen Therapy- Nasal Cannula; Titrate for SPO2: 92%, equal to or greater than    Insert Peripheral IV    NPO Diet    Bathroom Privileges With Assistance   2. Essential hypertension     3. Hypercholesterolemia            Plan:       1.  Chest discomfort-patient presented with chest discomfort with both typical and atypical components.  Patient has now undergone a stress echocardiogram.  This is normal with no evidence of ischemia and normal left ventricular systolic function.  Accordingly, these results are consistent with a noncardiac etiology of chest discomfort.  She will be discharged from the Cardiac Evaluation Clinic.  Thank you very much for allowing us to participate in the care of this pleasant patient.  Please don't hesitate to call if I can be of assistance in any way.      Current Outpatient Prescriptions:   •  alendronate (FOSAMAX) 70 MG tablet, TAKE 1 TABLET BY MOUTH EVERY 7 DAYS, Disp: 12 tablet, Rfl: 1  •  aspirin 81 MG EC tablet, Take 81 mg by mouth Daily., Disp: , Rfl:   •  atorvastatin (LIPITOR) 40 MG tablet, TAKE ONE TABLET BY MOUTH ONCE DAILY, Disp: 90 tablet, Rfl: 1  •  calcium carbonate (OS-SHANNAN) 600 MG tablet, Take 600 mg by mouth Daily., Disp: , Rfl:   •  Cholecalciferol (VITAMIN D PO), Take  by mouth., Disp: , Rfl:   •  Cyanocobalamin (VITAMIN B 12 PO), Take  by  mouth., Disp: , Rfl:   •  fluticasone (FLONASE) 50 MCG/ACT nasal spray, 2 sprays into each nostril Daily., Disp: 16 g, Rfl: 3  •  glucosamine-chondroitin 500-400 MG per tablet, Take 1 tablet by mouth 3 (three) times a day., Disp: , Rfl:   •  lisinopril (PRINIVIL,ZESTRIL) 10 MG tablet, Take 1 tablet by mouth Daily., Disp: 90 tablet, Rfl: 1  •  Multiple Vitamin (MULTIVITAMIN) capsule, Take  by mouth., Disp: , Rfl:     Current Facility-Administered Medications:   •  nitroglycerin (NITROSTAT) SL tablet 0.4 mg, 0.4 mg, Sublingual, Q5 Min PRN, Osman Reilly III, MD  •  sodium chloride 0.9 % flush 10 mL, 10 mL, Intravenous, PRN, Osman Reilly III, MD    Facility-Administered Medications Ordered in Other Encounters:   •  nitroglycerin (NITROSTAT) SL tablet 0.4 mg, 0.4 mg, Sublingual, Q5 Min PRN, Franklin Garza MD, 0.4 mg at 05/15/18 0818  •  Insert peripheral IV, , , Once **AND** sodium chloride 0.9 % flush 10 mL, 10 mL, Intravenous, PRN, Franklin Garza MD

## 2018-05-15 NOTE — ED PROVIDER NOTES
EMERGENCY DEPARTMENT ENCOUNTER    CHIEF COMPLAINT  Chief Complaint: chest pain  History given by: Patient  History limited by: N/A  Room Number: 03/03  PMD: Nick Ariza MD      HPI:  Pt is a 67 y.o. female who presents complaining of chest pain which began at 0645 this morning while doing routine work.  Pt reports the pain began in her left chest and radiated to her L neck and slightly into her L arm. Pt states the pain was 9/10 at it's highest, and is currently 4-5/10. Pt reports taking her BP at the time and reports it was 155/98.  Pt also c/o nausea, diaphoresis and generalized weakness at the onset of the episode, but denies SOA, fever, abd pain, diarrhea, black or bloody stools, or BLE swelling.  Pt denies any exertion, heavy lifting, or recent long travel.  Pt reports hx of HTN and hyperlipidemia, but denies hx of blood clots.    Duration:  1 hour  Onset: gradual  Timing: constant  Location: L chest  Radiation: L neck, slightly L arm  Quality: pain, currently 4-5/10  Intensity/Severity: moderate  Progression: gradually improving  Associated Symptoms: nausea, diaphoresis and generalized weakness   Aggravating Factors: none stated  Alleviating Factors: none stated  Previous Episodes: none  Treatment before arrival: none    PAST MEDICAL HISTORY  Active Ambulatory Problems     Diagnosis Date Noted   • Gastroesophageal reflux disease 02/25/2016   • Bradycardia 02/25/2016   • Hypercholesterolemia 02/25/2016   • Essential hypertension 02/25/2016   • Osteopenia 02/25/2016   • Rectal polyp 02/25/2016   • History of endometrial cancer 08/17/2017   • Carpal tunnel syndrome 02/22/2018     Resolved Ambulatory Problems     Diagnosis Date Noted   • Headache 02/25/2016   • History of pituitary adenoma 03/06/2017   • Endometrial carcinoma 08/17/2017     Past Medical History:   Diagnosis Date   • Colon polyps 2007   • Endometrial cancer    • Hyperlipidemia    • Hypertension    • Osteoporosis    • Pituitary adenoma    •  Vaginal delivery        PAST SURGICAL HISTORY  Past Surgical History:   Procedure Laterality Date   • CARPAL TUNNEL RELEASE WITH CUBITAL TUNNEL RELEASE  2018    right   • CATARACT EXTRACTION Right 2016   •  SECTION      x2   one stillborn Ariadne Isbell   • COLONOSCOPY  2007    polyps  Dr. Rueda    • TOTAL ABDOMINAL HYSTERECTOMY WITH SALPINGO OOPHORECTOMY      Dr. Alejo       FAMILY HISTORY  Family History   Problem Relation Age of Onset   • Heart disease Mother    • COPD Father    • Parkinsonism Father      ?   • Heart defect Sister       at 18   • Alcohol abuse Brother    • Diabetes Brother    • Heart disease Brother    • Hypertension Brother    • Hyperlipidemia Brother    • Other Daughter      stillborn   • Sleep apnea Son    • No Known Problems Maternal Grandmother    • No Known Problems Paternal Grandmother    • Breast cancer Maternal Aunt 75   • No Known Problems Paternal Aunt    • No Known Problems Maternal Grandfather    • No Known Problems Paternal Grandfather    • Kidney cancer Sister    • No Known Problems Son    • BRCA 1/2 Neg Hx    • Colon cancer Neg Hx    • Endometrial cancer Neg Hx    • Ovarian cancer Neg Hx        SOCIAL HISTORY  Social History     Social History   • Marital status:      Spouse name: N/A   • Number of children: 2   • Years of education: N/A     Occupational History   • Not on file.     Social History Main Topics   • Smoking status: Never Smoker   • Smokeless tobacco: Never Used   • Alcohol use No   • Drug use: No   • Sexual activity: Not on file      Comment:      Other Topics Concern   • Not on file     Social History Narrative   • No narrative on file       ALLERGIES  Naproxen and Paroxetine    REVIEW OF SYSTEMS  Review of Systems   Constitutional: Positive for diaphoresis. Negative for fever.   HENT: Negative for sore throat.    Eyes: Negative.    Respiratory: Negative for cough and shortness of breath.    Cardiovascular:  Positive for chest pain (L sided, 1 hour).   Gastrointestinal: Positive for nausea. Negative for abdominal pain, blood in stool, diarrhea and vomiting.   Genitourinary: Negative for dysuria.   Musculoskeletal: Negative for neck pain.   Skin: Negative for rash.   Allergic/Immunologic: Negative.    Neurological: Positive for weakness (generalized). Negative for numbness and headaches.   Hematological: Negative.    Psychiatric/Behavioral: Negative.    All other systems reviewed and are negative.      PHYSICAL EXAM  ED Triage Vitals   Temp Heart Rate Resp BP SpO2   05/15/18 0746 05/15/18 0746 05/15/18 0746 05/15/18 0751 05/15/18 0746   97.6 °F (36.4 °C) 96 16 163/94 91 %      Temp src Heart Rate Source Patient Position BP Location FiO2 (%)   05/15/18 0746 05/15/18 0746 -- -- --   Tympanic Monitor          Physical Exam   Constitutional: She is oriented to person, place, and time and well-developed, well-nourished, and in no distress. No distress.   HENT:   Head: Normocephalic and atraumatic.   Eyes: EOM are normal. Pupils are equal, round, and reactive to light.   Neck: Normal range of motion. Neck supple.   Cardiovascular: Normal rate, regular rhythm and normal heart sounds.    Pulmonary/Chest: Effort normal and breath sounds normal. No respiratory distress.   Abdominal: Soft. There is no tenderness. There is no rebound and no guarding.   Musculoskeletal: Normal range of motion. She exhibits no edema.   Neurological: She is alert and oriented to person, place, and time. She has normal sensation and normal strength.   Skin: Skin is warm and dry. No rash noted.   Psychiatric: Mood and affect normal.   Nursing note and vitals reviewed.      LAB RESULTS  Lab Results (last 24 hours)     Procedure Component Value Units Date/Time    CBC & Differential [[id number]] Collected:  05/15/18 0805    Specimen:  Blood Updated:  05/15/18 0819    Narrative:       The following orders were created for panel order CBC &  Differential.  Procedure                               Abnormality         Status                     ---------                               -----------         ------                     CBC Auto Differential[[id number]]        Abnormal            Final result                 Please view results for these tests on the individual orders.    Comprehensive Metabolic Panel [[id number]]  (Abnormal) Collected:  05/15/18 0805    Specimen:  Blood Updated:  05/15/18 0842     Glucose 92 mg/dL      BUN 20 mg/dL      Creatinine 0.70 mg/dL      Sodium 141 mmol/L      Potassium 3.9 mmol/L      Chloride 103 mmol/L      CO2 26.1 mmol/L      Calcium 10.1 mg/dL      Total Protein 7.4 g/dL      Albumin 4.20 g/dL      ALT (SGPT) 16 U/L      AST (SGOT) 20 U/L      Alkaline Phosphatase 119 (H) U/L      Total Bilirubin 0.9 mg/dL      eGFR Non African Amer 83 mL/min/1.73      Globulin 3.2 gm/dL      A/G Ratio 1.3 g/dL      BUN/Creatinine Ratio 28.6 (H)     Anion Gap 11.9 mmol/L     BNP [[id number]]  (Normal) Collected:  05/15/18 0805    Specimen:  Blood Updated:  05/15/18 0840     proBNP 65.9 pg/mL     Narrative:       Among patients with dyspnea, NT-proBNP is highly sensitive for the detection of acute congestive heart failure. In addition NT-proBNP of <300 pg/ml effectively rules out acute congestive heart failure with 99% negative predictive value.    Troponin [[id number]]  (Normal) Collected:  05/15/18 0805    Specimen:  Blood Updated:  05/15/18 0842     Troponin T <0.010 ng/mL     Narrative:       Troponin T Reference Ranges:  Less than 0.03 ng/mL:    Negative for AMI  0.03 to 0.09 ng/mL:      Indeterminant for AMI  Greater than 0.09 ng/mL: Positive for AMI    CBC Auto Differential [[id number]]  (Abnormal) Collected:  05/15/18 0805    Specimen:  Blood Updated:  05/15/18 0819     WBC 8.96 10*3/mm3      RBC 4.50 10*6/mm3      Hemoglobin 14.9 g/dL      Hematocrit 42.3 %      MCV 94.0 fL      MCH 33.1 (H) pg      MCHC 35.2 g/dL      RDW 12.5  %      RDW-SD 42.4 fl      MPV 9.4 fL      Platelets 277 10*3/mm3      Neutrophil % 70.6 %      Lymphocyte % 17.9 (L) %      Monocyte % 7.3 %      Eosinophil % 3.6 %      Basophil % 0.6 %      Immature Grans % 0.2 %      Neutrophils, Absolute 6.34 10*3/mm3      Lymphocytes, Absolute 1.60 10*3/mm3      Monocytes, Absolute 0.65 10*3/mm3      Eosinophils, Absolute 0.32 10*3/mm3      Basophils, Absolute 0.05 10*3/mm3      Immature Grans, Absolute 0.02 10*3/mm3     Troponin [[id number]]  (Normal) Collected:  05/15/18 1014    Specimen:  Blood Updated:  05/15/18 1123     Troponin T <0.010 ng/mL     Narrative:       Troponin T Reference Ranges:  Less than 0.03 ng/mL:    Negative for AMI  0.03 to 0.09 ng/mL:      Indeterminant for AMI  Greater than 0.09 ng/mL: Positive for AMI          I ordered the above labs and reviewed the results    RADIOLOGY  XR Chest 2 View   Final Result   No evidence for active disease in the chest.       This report was finalized on 5/15/2018 9:25 AM by Dr. Jose Francisco Reddy M.D.               I ordered the above noted radiological studies. Interpreted by radiologist.  Reviewed by me in PACS.       PROCEDURES  Procedures    EKG           EKG time: 0746  Rhythm/Rate: NSR, 69  P waves and ID: normal  QRS, axis: RBBB   ST and T waves: normal     Interpreted Contemporaneously by me, independently viewed  Unchanged compared to prior 1/19/18.    HEART Score for Major Cardiac Events from [email or web address]  on 5/15/2018  ** All calculations should be rechecked by clinician prior to use **    RESULT SUMMARY:  5 points  Moderate Score (4-6 points)    Risk of MACE of 12-16.6%.      INPUTS:  History --> 1 = Moderately suspicious  EKG --> 0 = Normal  Age --> 2 = =65  Risk factors --> 2 = =3 risk factors or history of atherosclerotic disease  Initial troponin --> 0 = =normal limit    PROGRESS AND CONSULTS  ED Course   Comment By Time   11:14 AM  Awaiting repeat troponin results. Labs drawn at 1015. Franklin Garza MD  05/15 1114     0746  Ordered EKG.     0800  Initial encounter. Discussed with pt I have reviewed her EKG, which is acutely unremarkable.  Discussed plan to order the pt ASA and nitro for her pain and perform testing for further evaluation.  Pt understands and agrees with the plan, all questions answered.    0802  Ordered ASA and nitro for the pt's CP.  Ordered CXR, CMP, NP, Troponin, CBC.      0846  Ordered repeat troponin.    1006  Per RN, pt's CP has resolved and she is currently c/o HA.  Ordered tylenol for the pt's HA.      1130  Rechecked pt, who is resting comfortably.  Discussed with pt her labs, including troponin and repeat troponin, resulted unremarkably, and CXR shows NAD.  Discussed plan to discuss case with cardiology. Pt understands and agrees with the plan, all questions answered.    1126  Performed HEART Score=5 (see results in procedure note). Placed call to [facility].      1207  Discussed pt's [place] [place] can be seen at the [facility] Pain Unit by [facility] today.    1215  Rechecked pt, who is resting comfortably. Discussed with pt plan for discharge to go to the Chest Pain Unit for further cardiac workup..  Pt understands and agrees with the plan, all questions answered.      MEDICAL DECISION MAKING  Results were reviewed/discussed with the patient and they were also made aware of online access. Pt also made aware that some labs, such as cultures, will not be resulted during ER visit and follow up with PMD is necessary.     MDM  Number of Diagnoses or Management Options     Amount and/or Complexity of Data Reviewed  Clinical lab tests: reviewed and ordered (Troponin and repeat troponin negative. BNP: 65.9.  Lab results unremarkable.)  Tests in the radiology section of CPT®: reviewed and ordered (CXR shows NAD.)  Tests in the medicine section of CPT®: reviewed and ordered (See EKG in procedure note.)  Discuss the patient with other providers: yes (Dr. Doyle  (cardiology))  Independent visualization of images, tracings, or specimens: yes    Patient Progress  Patient progress: stable         DIAGNOSIS  Final diagnoses:   Chest pain with high risk for cardiac etiology       DISPOSITION  DISCHARGE    Patient discharged in stable condition.    Reviewed implications of results, diagnosis, meds, responsibility to follow up, warning signs and symptoms of possible worsening, potential complications and reasons to return to ER.    Patient/Family voiced understanding of above instructions.    Discussed plan for discharge, as there is no emergent indication for admission. Patient referred to primary care provider for BP management due to today's BP. Pt/family is agreeable and understands need for follow up and repeat testing.  Pt is aware that discharge does not mean that nothing is wrong but it indicates no emergency is present that requires admission and they must continue care with follow-up as given below or physician of their choice.     FOLLOW-UP  Osman Reilly III, MD  [address] [address]  [phone]    Today           Medication List      No changes were made to your prescriptions during this visit.           Latest Documented Vital Signs:  As of 12:33 PM  BP- 139/67 HR- 64 Temp- 97.6 °F (36.4 °C) (Tympanic) O2 sat- 96%    --  Documentation assistance provided by courtney Sood for Dr. Garza.  Information recorded by the scribe was done at my direction and has been verified and validated by me.            Reena Sood  05/15/18 1234       Franklin Garza MD  05/15/18 1400

## 2018-05-15 NOTE — ADDENDUM NOTE
Encounter addended by: Melba Martinez RN on: 5/15/2018  2:56 PM<BR>    Actions taken: LDA properties accepted

## 2018-05-15 NOTE — ED TRIAGE NOTES
Pt states that she started having left sided chest pain that would feel like a tiny spot and grow larger until it radiated into left neck. Pt states that she garcia also had nausea. She states that she started having episodes of this chest pain this morning.

## 2018-05-21 ENCOUNTER — TELEPHONE (OUTPATIENT)
Dept: CARDIOLOGY | Facility: HOSPITAL | Age: 68
End: 2018-05-21

## 2018-05-24 ENCOUNTER — EPISODE CHANGES (OUTPATIENT)
Dept: CASE MANAGEMENT | Facility: OTHER | Age: 68
End: 2018-05-24

## 2018-06-29 ENCOUNTER — OFFICE VISIT (OUTPATIENT)
Dept: FAMILY MEDICINE CLINIC | Facility: CLINIC | Age: 68
End: 2018-06-29

## 2018-06-29 ENCOUNTER — APPOINTMENT (OUTPATIENT)
Dept: CT IMAGING | Facility: HOSPITAL | Age: 68
End: 2018-06-29

## 2018-06-29 ENCOUNTER — HOSPITAL ENCOUNTER (EMERGENCY)
Facility: HOSPITAL | Age: 68
Discharge: HOME | End: 2018-06-29
Attending: EMERGENCY MEDICINE | Admitting: EMERGENCY MEDICINE

## 2018-06-29 VITALS
OXYGEN SATURATION: 98 % | TEMPERATURE: 98.3 F | HEIGHT: 61 IN | BODY MASS INDEX: 31.93 KG/M2 | HEART RATE: 86 BPM | SYSTOLIC BLOOD PRESSURE: 130 MMHG | WEIGHT: 169.1 LBS | DIASTOLIC BLOOD PRESSURE: 70 MMHG

## 2018-06-29 VITALS
RESPIRATION RATE: 16 BRPM | HEIGHT: 61 IN | HEART RATE: 67 BPM | TEMPERATURE: 97.9 F | DIASTOLIC BLOOD PRESSURE: 84 MMHG | SYSTOLIC BLOOD PRESSURE: 159 MMHG | WEIGHT: 170 LBS | OXYGEN SATURATION: 96 % | BODY MASS INDEX: 32.1 KG/M2

## 2018-06-29 DIAGNOSIS — R10.9 FLANK PAIN: ICD-10-CM

## 2018-06-29 DIAGNOSIS — N30.90 BLADDER INFECTION: ICD-10-CM

## 2018-06-29 DIAGNOSIS — R10.31 RIGHT LOWER QUADRANT ABDOMINAL PAIN: Primary | ICD-10-CM

## 2018-06-29 DIAGNOSIS — Z87.442 HISTORY OF KIDNEY STONES: ICD-10-CM

## 2018-06-29 DIAGNOSIS — N30.90 CYSTITIS: Primary | ICD-10-CM

## 2018-06-29 LAB
ALBUMIN SERPL-MCNC: 4.5 G/DL (ref 3.5–5.2)
ALBUMIN/GLOB SERPL: 1.3 G/DL
ALP SERPL-CCNC: 128 U/L (ref 39–117)
ALT SERPL W P-5'-P-CCNC: 16 U/L (ref 1–33)
ANION GAP SERPL CALCULATED.3IONS-SCNC: 13.7 MMOL/L
AST SERPL-CCNC: 19 U/L (ref 1–32)
BACTERIA UR QL AUTO: ABNORMAL /HPF
BASOPHILS # BLD AUTO: 0.05 10*3/MM3 (ref 0–0.2)
BASOPHILS NFR BLD AUTO: 0.5 % (ref 0–1.5)
BILIRUB BLD-MCNC: NEGATIVE MG/DL
BILIRUB SERPL-MCNC: 0.8 MG/DL (ref 0.1–1.2)
BILIRUB UR QL STRIP: NEGATIVE
BUN BLD-MCNC: 22 MG/DL (ref 8–23)
BUN/CREAT SERPL: 28.9 (ref 7–25)
CALCIUM SPEC-SCNC: 9.9 MG/DL (ref 8.6–10.5)
CHLORIDE SERPL-SCNC: 101 MMOL/L (ref 98–107)
CLARITY UR: CLEAR
CLARITY, POC: CLEAR
CO2 SERPL-SCNC: 27.3 MMOL/L (ref 22–29)
COLOR UR: YELLOW
COLOR UR: YELLOW
CREAT BLD-MCNC: 0.76 MG/DL (ref 0.57–1)
DEPRECATED RDW RBC AUTO: 43.6 FL (ref 37–54)
EOSINOPHIL # BLD AUTO: 0.32 10*3/MM3 (ref 0–0.7)
EOSINOPHIL NFR BLD AUTO: 2.9 % (ref 0.3–6.2)
ERYTHROCYTE [DISTWIDTH] IN BLOOD BY AUTOMATED COUNT: 12.5 % (ref 11.7–13)
GFR SERPL CREATININE-BSD FRML MDRD: 76 ML/MIN/1.73
GLOBULIN UR ELPH-MCNC: 3.5 GM/DL
GLUCOSE BLD-MCNC: 79 MG/DL (ref 65–99)
GLUCOSE UR STRIP-MCNC: NEGATIVE MG/DL
GLUCOSE UR STRIP-MCNC: NEGATIVE MG/DL
HCT VFR BLD AUTO: 45.3 % (ref 35.6–45.5)
HGB BLD-MCNC: 15.4 G/DL (ref 11.9–15.5)
HGB UR QL STRIP.AUTO: ABNORMAL
HOLD SPECIMEN: NORMAL
HOLD SPECIMEN: NORMAL
HYALINE CASTS UR QL AUTO: ABNORMAL /LPF
IMM GRANULOCYTES # BLD: 0.02 10*3/MM3 (ref 0–0.03)
IMM GRANULOCYTES NFR BLD: 0.2 % (ref 0–0.5)
KETONES UR QL STRIP: NEGATIVE
KETONES UR QL: NEGATIVE
LEUKOCYTE EST, POC: ABNORMAL
LEUKOCYTE ESTERASE UR QL STRIP.AUTO: ABNORMAL
LIPASE SERPL-CCNC: 30 U/L (ref 13–60)
LYMPHOCYTES # BLD AUTO: 1.38 10*3/MM3 (ref 0.9–4.8)
LYMPHOCYTES NFR BLD AUTO: 12.6 % (ref 19.6–45.3)
MCH RBC QN AUTO: 32.4 PG (ref 26.9–32)
MCHC RBC AUTO-ENTMCNC: 34 G/DL (ref 32.4–36.3)
MCV RBC AUTO: 95.2 FL (ref 80.5–98.2)
MONOCYTES # BLD AUTO: 1.09 10*3/MM3 (ref 0.2–1.2)
MONOCYTES NFR BLD AUTO: 9.9 % (ref 5–12)
NEUTROPHILS # BLD AUTO: 8.1 10*3/MM3 (ref 1.9–8.1)
NEUTROPHILS NFR BLD AUTO: 73.9 % (ref 42.7–76)
NITRITE UR QL STRIP: NEGATIVE
NITRITE UR-MCNC: NEGATIVE MG/ML
PH UR STRIP.AUTO: 7 [PH] (ref 5–8)
PH UR: 6 [PH] (ref 5–8)
PLATELET # BLD AUTO: 301 10*3/MM3 (ref 140–500)
PMV BLD AUTO: 9.6 FL (ref 6–12)
POTASSIUM BLD-SCNC: 4.2 MMOL/L (ref 3.5–5.2)
PROT SERPL-MCNC: 8 G/DL (ref 6–8.5)
PROT UR QL STRIP: NEGATIVE
PROT UR STRIP-MCNC: NEGATIVE MG/DL
RBC # BLD AUTO: 4.76 10*6/MM3 (ref 3.9–5.2)
RBC # UR STRIP: ABNORMAL /UL
RBC # UR: ABNORMAL /HPF
REF LAB TEST METHOD: ABNORMAL
SODIUM BLD-SCNC: 142 MMOL/L (ref 136–145)
SP GR UR STRIP: 1.01 (ref 1–1.03)
SP GR UR: 1.01 (ref 1–1.03)
SQUAMOUS #/AREA URNS HPF: ABNORMAL /HPF
UROBILINOGEN UR QL STRIP: ABNORMAL
UROBILINOGEN UR QL: NORMAL
WBC NRBC COR # BLD: 10.96 10*3/MM3 (ref 4.5–10.7)
WBC UR QL AUTO: ABNORMAL /HPF
WHOLE BLOOD HOLD SPECIMEN: NORMAL
WHOLE BLOOD HOLD SPECIMEN: NORMAL

## 2018-06-29 PROCEDURE — 81001 URINALYSIS AUTO W/SCOPE: CPT

## 2018-06-29 PROCEDURE — 74176 CT ABD & PELVIS W/O CONTRAST: CPT

## 2018-06-29 PROCEDURE — 81003 URINALYSIS AUTO W/O SCOPE: CPT | Performed by: NURSE PRACTITIONER

## 2018-06-29 PROCEDURE — 85025 COMPLETE CBC W/AUTO DIFF WBC: CPT | Performed by: PHYSICIAN ASSISTANT

## 2018-06-29 PROCEDURE — 99283 EMERGENCY DEPT VISIT LOW MDM: CPT

## 2018-06-29 PROCEDURE — 80053 COMPREHEN METABOLIC PANEL: CPT | Performed by: PHYSICIAN ASSISTANT

## 2018-06-29 PROCEDURE — 83690 ASSAY OF LIPASE: CPT | Performed by: PHYSICIAN ASSISTANT

## 2018-06-29 PROCEDURE — 99214 OFFICE O/P EST MOD 30 MIN: CPT | Performed by: NURSE PRACTITIONER

## 2018-06-29 RX ORDER — PHENAZOPYRIDINE HYDROCHLORIDE 200 MG/1
200 TABLET, FILM COATED ORAL 3 TIMES DAILY PRN
Qty: 6 TABLET | Refills: 0 | Status: SHIPPED | OUTPATIENT
Start: 2018-06-29 | End: 2018-08-17

## 2018-06-29 RX ORDER — HYDROCODONE BITARTRATE AND ACETAMINOPHEN 7.5; 325 MG/1; MG/1
1 TABLET ORAL ONCE
Status: COMPLETED | OUTPATIENT
Start: 2018-06-29 | End: 2018-06-29

## 2018-06-29 RX ORDER — HYDROCODONE BITARTRATE AND ACETAMINOPHEN 5; 325 MG/1; MG/1
1 TABLET ORAL EVERY 6 HOURS PRN
Qty: 10 TABLET | Refills: 0 | Status: SHIPPED | OUTPATIENT
Start: 2018-06-29 | End: 2018-08-17

## 2018-06-29 RX ORDER — SULFAMETHOXAZOLE AND TRIMETHOPRIM 800; 160 MG/1; MG/1
1 TABLET ORAL 2 TIMES DAILY
Qty: 14 TABLET | Refills: 0 | Status: SHIPPED | OUTPATIENT
Start: 2018-06-29 | End: 2018-08-17

## 2018-06-29 RX ORDER — SODIUM CHLORIDE 0.9 % (FLUSH) 0.9 %
10 SYRINGE (ML) INJECTION AS NEEDED
Status: DISCONTINUED | OUTPATIENT
Start: 2018-06-29 | End: 2018-06-29 | Stop reason: HOSPADM

## 2018-06-29 RX ADMIN — HYDROCODONE BITARTRATE AND ACETAMINOPHEN 1 TABLET: 7.5; 325 TABLET ORAL at 17:20

## 2018-06-29 NOTE — PROGRESS NOTES
Subjective   Ariadne Telles is a 68 y.o. female presents with one week history of urinary symptoms. Became more persistent on Monday with bright red blood in her urine. The next day, she noticed a clot, but no further bleeding since that time. She denies fever or chills, although she reports feeling very hot and clammy since giving a urine specimen. Her shirt is damp from perspiration. She did have pain 8/10 with urination, towards end of stream. She denies flank pain. Pain is lower abdomen that seemed to improve yesterday, but woke at 2 am in a significant amount of pain. She decided to make an appointment at that time. History of kidney stones on two separate occasion, feels similar, last one around 10 years ago.     Difficulty Urinating   This is a new problem. The current episode started in the past 7 days. The problem occurs intermittently. The problem has been gradually worsening. Associated symptoms include abdominal pain (right lower abdominal), diaphoresis (today while in office) and urinary symptoms. Pertinent negatives include no anorexia, arthralgias, change in bowel habit, chest pain, chills, congestion, coughing, fatigue, fever, headaches, joint swelling, myalgias, nausea, neck pain, numbness, rash, sore throat, swollen glands, vertigo, visual change, vomiting or weakness. Exacerbated by: urinating. She has tried rest and acetaminophen for the symptoms. The treatment provided no relief.        The following portions of the patient's history were reviewed and updated as appropriate: allergies, current medications, past family history, past medical history, past social history, past surgical history and problem list.    Review of Systems   Constitutional: Positive for diaphoresis (today while in office). Negative for appetite change, chills, fatigue and fever.   HENT: Negative.  Negative for congestion and sore throat.    Eyes: Negative.    Respiratory: Negative.  Negative for cough.     Cardiovascular: Negative.  Negative for chest pain.   Gastrointestinal: Positive for abdominal pain (right lower abdominal). Negative for anorexia, change in bowel habit, nausea and vomiting.   Endocrine: Negative.    Genitourinary: Positive for decreased urine volume, difficulty urinating, dysuria, frequency, hematuria (x 1 week) and urgency. Negative for flank pain.   Musculoskeletal: Negative for arthralgias, back pain, joint swelling, myalgias and neck pain.   Skin: Negative.  Negative for rash.   Allergic/Immunologic: Negative.    Neurological: Negative.  Negative for vertigo, weakness, numbness and headaches.   Hematological: Negative.    Psychiatric/Behavioral: Negative.        Objective   Physical Exam   Constitutional: She appears well-developed and well-nourished. She appears ill.   Clammy, facial flushing   HENT:   Head: Normocephalic and atraumatic.   Cardiovascular: Normal rate, regular rhythm and normal heart sounds.  Exam reveals no gallop and no friction rub.    No murmur heard.  Pulmonary/Chest: Effort normal and breath sounds normal. No respiratory distress. She has no wheezes. She has no rales.   Abdominal: Soft. Normal appearance and bowel sounds are normal. There is tenderness in the right lower quadrant. There is guarding and CVA tenderness (right). There is no rigidity and no rebound.   Vitals reviewed.    Vitals:    06/29/18 1356   BP: 130/70   Pulse: 86   Temp: 98.3 °F (36.8 °C)   SpO2: 98%       Assessment/Plan   Ariadne was seen today for urinary tract infection.    Diagnoses and all orders for this visit:    Right lower quadrant abdominal pain    Bladder infection  -     POCT urinalysis dipstick, automated  -     Urine Culture - Urine, Urine, Clean Catch    Flank pain    History of kidney stones      With significant RLQ pain and flank pain, recommend further evaluation in ER. Patient declined EMS transport. Has not eaten or had anything to drink since before noon today. Instructed patient  to remain NPO until further evaluation in ER. Discussed importance of follow up including infection, obstructive kidney stone ect, especially with symptoms ongoing x 7 days. Patient agreeable with recommendations. Patient left ambulatory in stable condition

## 2018-07-04 LAB
BACTERIA UR CULT: ABNORMAL
BACTERIA UR CULT: ABNORMAL
OTHER ANTIBIOTIC SUSC ISLT: ABNORMAL

## 2018-07-05 ENCOUNTER — EPISODE CHANGES (OUTPATIENT)
Dept: CASE MANAGEMENT | Facility: OTHER | Age: 68
End: 2018-07-05

## 2018-07-24 DIAGNOSIS — M85.80 OSTEOPENIA, UNSPECIFIED LOCATION: ICD-10-CM

## 2018-07-24 DIAGNOSIS — E78.00 HYPERCHOLESTEROLEMIA: ICD-10-CM

## 2018-07-24 RX ORDER — ALENDRONATE SODIUM 70 MG/1
70 TABLET ORAL
Qty: 12 TABLET | Refills: 0 | Status: SHIPPED | OUTPATIENT
Start: 2018-07-24 | End: 2018-12-20 | Stop reason: SDUPTHER

## 2018-07-24 RX ORDER — ATORVASTATIN CALCIUM 40 MG/1
40 TABLET, FILM COATED ORAL DAILY
Qty: 90 TABLET | Refills: 0 | Status: SHIPPED | OUTPATIENT
Start: 2018-07-24 | End: 2019-02-09 | Stop reason: SDUPTHER

## 2018-07-27 DIAGNOSIS — E78.00 HYPERCHOLESTEROLEMIA: ICD-10-CM

## 2018-07-30 ENCOUNTER — TELEPHONE (OUTPATIENT)
Dept: FAMILY MEDICINE CLINIC | Facility: CLINIC | Age: 68
End: 2018-07-30

## 2018-07-30 RX ORDER — LISINOPRIL 10 MG/1
10 TABLET ORAL DAILY
Qty: 90 TABLET | Refills: 0 | Status: SHIPPED | OUTPATIENT
Start: 2018-07-30 | End: 2018-08-02 | Stop reason: SDUPTHER

## 2018-08-02 RX ORDER — LISINOPRIL 10 MG/1
10 TABLET ORAL DAILY
Qty: 90 TABLET | Refills: 0 | Status: SHIPPED | OUTPATIENT
Start: 2018-08-02 | End: 2018-08-17 | Stop reason: SDUPTHER

## 2018-08-17 ENCOUNTER — OFFICE VISIT (OUTPATIENT)
Dept: FAMILY MEDICINE CLINIC | Facility: CLINIC | Age: 68
End: 2018-08-17

## 2018-08-17 VITALS
TEMPERATURE: 97.3 F | OXYGEN SATURATION: 98 % | BODY MASS INDEX: 31.42 KG/M2 | WEIGHT: 166.4 LBS | HEIGHT: 61 IN | DIASTOLIC BLOOD PRESSURE: 89 MMHG | HEART RATE: 60 BPM | SYSTOLIC BLOOD PRESSURE: 164 MMHG

## 2018-08-17 DIAGNOSIS — M85.80 OSTEOPENIA, UNSPECIFIED LOCATION: Chronic | ICD-10-CM

## 2018-08-17 DIAGNOSIS — E78.00 HYPERCHOLESTEROLEMIA: Chronic | ICD-10-CM

## 2018-08-17 DIAGNOSIS — M19.019 AC JOINT ARTHROPATHY: ICD-10-CM

## 2018-08-17 DIAGNOSIS — I10 ESSENTIAL HYPERTENSION: Primary | Chronic | ICD-10-CM

## 2018-08-17 PROCEDURE — 99214 OFFICE O/P EST MOD 30 MIN: CPT | Performed by: FAMILY MEDICINE

## 2018-08-17 RX ORDER — LISINOPRIL 20 MG/1
20 TABLET ORAL DAILY
Qty: 90 TABLET | Refills: 1 | Status: SHIPPED | OUTPATIENT
Start: 2018-08-17 | End: 2019-02-08 | Stop reason: SDUPTHER

## 2018-08-17 NOTE — PROGRESS NOTES
Subjective   Ariadne Telles is a 68 y.o. female.     Hypertension (pt is fasting); Hyperlipidemia; and Shoulder Pain (bilateral shoulders in the joints has trouble sleeping because of it.)    History of Present Illness    Hypertension follow up. Doing well with current medication which she is taking as directed. No known high or low blood pressure episodes. No cardiovascular or neurological symptoms. Today's BP: 164/89.  Last visit we stopped the hydrochlorothiazide because of lightheadedness when standing and some low blood pressure readings.  The lightheadedness stopped after stopping the hydrochlorothiazide.  However she didn't really check her blood pressure.  It was high here last visit with the nurse practitioner.  High today.    Hyperlipidemia follow up. She is taking statin medication without complaint. No myopathy symptoms.     Last lipid panel:   Lab Results   Component Value Date    HDL 56 02/15/2018     Lab Results   Component Value Date    LDL 87 02/15/2018     Lab Results   Component Value Date    TRIG 90 02/15/2018     Osteopenia.  Patient continues Fosamax 70 mg weekly.    Left shoulder pain.  A few months.  Bothers her only at nighttime.  When she lays on it a certain way.  No weakness.  No numbness.  No troubles during the day.  But the pain is getting worse.  She's got now persistent symptoms.  She does not recall any injury.    The following portions of the patient's history were reviewed and updated as appropriate: allergies, current medications, past family history, past medical history, past social history, past surgical history and problem list.      Review of Systems   Constitutional: Negative.    Respiratory: Negative.    Cardiovascular: Negative.    Musculoskeletal: Positive for arthralgias. Negative for joint swelling.   Neurological: Negative.    Psychiatric/Behavioral: Negative.        Objective   Blood pressure 164/89, pulse 60, temperature 97.3 °F (36.3 °C), temperature source  "Oral, height 154.9 cm (60.98\"), weight 75.5 kg (166 lb 6.4 oz), SpO2 98 %, not currently breastfeeding.  Physical Exam   Constitutional: She appears well-developed and well-nourished. No distress.   Neck: No thyromegaly present.   Cardiovascular: Normal rate, regular rhythm, normal heart sounds and intact distal pulses.    Pulmonary/Chest: Effort normal and breath sounds normal.   Musculoskeletal: She exhibits no edema.   Left shoulder.  Good range of motion.  Including internal rotation.  Negative impingement testing.  She has pain to palpation over the left acromioclavicular joint.   Skin: Skin is warm and dry.   Psychiatric: She has a normal mood and affect. Her behavior is normal. Judgment and thought content normal.   Nursing note and vitals reviewed.      Assessment/Plan   Ariadne was seen today for hypertension, hyperlipidemia and shoulder pain.    Diagnoses and all orders for this visit:    Essential hypertension    AC joint arthropathy  -     Ambulatory Referral to Sports Medicine    Hypercholesterolemia  -     Lipid Panel; Future  -     Comprehensive Metabolic Panel; Future    Osteopenia, unspecified location    Other orders  -     lisinopril (PRINIVIL,ZESTRIL) 20 MG tablet; Take 1 tablet by mouth Daily.      Hypertension.  Only fair control.  Increase lisinopril from 10 mg to 20 mg a day.  If not improving, and hydrochlorothiazide.  Recommend she check her blood pressure at home and let us know the results.  Otherwise see me in 6 months.    Acromioclavicular joint inflammation versus rotator cuff tendinitis.  Referral to sports medicine.  She may be a good candidate for steroid injection.    Hyperlipidemia.  Continue atorvastatin.    Osteopenia.  Nearly osteoporosis on DEXA scan.  Recommend continuing Fosamax 70 mg a week.  She's taking according to instructions including an empty stomach, plenty water, not lying down for half hour afterwards.    Needs wellness visit next visit         "

## 2018-08-24 ENCOUNTER — OFFICE VISIT (OUTPATIENT)
Dept: SPORTS MEDICINE | Facility: CLINIC | Age: 68
End: 2018-08-24

## 2018-08-24 VITALS
BODY MASS INDEX: 30.96 KG/M2 | WEIGHT: 164 LBS | SYSTOLIC BLOOD PRESSURE: 130 MMHG | DIASTOLIC BLOOD PRESSURE: 74 MMHG | HEIGHT: 61 IN

## 2018-08-24 DIAGNOSIS — M25.512 CHRONIC PAIN OF BOTH SHOULDERS: Primary | ICD-10-CM

## 2018-08-24 DIAGNOSIS — M25.511 CHRONIC PAIN OF BOTH SHOULDERS: Primary | ICD-10-CM

## 2018-08-24 DIAGNOSIS — M75.42 IMPINGEMENT SYNDROME OF BOTH SHOULDERS: ICD-10-CM

## 2018-08-24 DIAGNOSIS — G89.29 CHRONIC PAIN OF BOTH SHOULDERS: Primary | ICD-10-CM

## 2018-08-24 DIAGNOSIS — M75.41 IMPINGEMENT SYNDROME OF BOTH SHOULDERS: ICD-10-CM

## 2018-08-24 PROCEDURE — 73030 X-RAY EXAM OF SHOULDER: CPT | Performed by: FAMILY MEDICINE

## 2018-08-24 PROCEDURE — 99214 OFFICE O/P EST MOD 30 MIN: CPT | Performed by: FAMILY MEDICINE

## 2018-08-24 PROCEDURE — 20610 DRAIN/INJ JOINT/BURSA W/O US: CPT | Performed by: FAMILY MEDICINE

## 2018-08-24 RX ORDER — TRIAMCINOLONE ACETONIDE 40 MG/ML
80 INJECTION, SUSPENSION INTRA-ARTICULAR; INTRAMUSCULAR ONCE
Status: COMPLETED | OUTPATIENT
Start: 2018-08-24 | End: 2018-08-24

## 2018-08-24 RX ADMIN — TRIAMCINOLONE ACETONIDE 80 MG: 40 INJECTION, SUSPENSION INTRA-ARTICULAR; INTRAMUSCULAR at 14:49

## 2018-08-24 RX ADMIN — TRIAMCINOLONE ACETONIDE 80 MG: 40 INJECTION, SUSPENSION INTRA-ARTICULAR; INTRAMUSCULAR at 14:50

## 2018-08-24 NOTE — PROGRESS NOTES
"Ariadne is a 68 y.o. year old female    Chief Complaint   Patient presents with   • Shoulder Pain     Bi-lat       History of Present Illness  HPI   L>R shoulder pain, worsening gradually for a few months, worse with laying on that side in bed. Sharp, no radiation, no assoc numbness.     I have reviewed the patient's medical, family, and social history in detail and updated the computerized patient record.    Review of Systems   Constitutional: Negative for fever.   Skin: Negative for wound.   Neurological: Negative for numbness.   All other systems reviewed and are negative.      /74   Ht 154.9 cm (61\")   Wt 74.4 kg (164 lb)   LMP  (LMP Unknown)   BMI 30.99 kg/m²      Physical Exam    Vital signs reviewed.   General: No acute distress.  Eyes: conjunctiva clear; pupils equally round and reactive  ENT: external ears and nose atraumatic; oropharynx clear  CV: no peripheral edema, 2+ distal pulses  Resp: normal respiratory effort, no use of accessory muscles  Skin: no rashes or wounds; normal turgor  Psych: mood and affect appropriate; recent and remote memory intact  Neuro: sensation to light touch intact    MSK Exam:  Ortho Exam  Bilateral shoulders: Normal appearance.  Left worse than right tenderness in the subacromial space.  Normal range of motion, but there is pain with flexion, abduction, internal rotation.  Positive Neer and Carver tests bilaterally.  Normal strength rotator cuff testing, but there is pain with empty can.  Mild proximal biceps tenderness palpation on the left.    Bilateral Shoulder X-Ray  Indication: Pain  Views: AP Internal and External Rotation    Findings:  No fracture  No bony lesion  Normal soft tissues  Normal joint spaces    No prior studies were available for comparison.    Shoulder Injection Procedure Note    Bilateral shoulder subacromial bursa injection was discussed with the patient in detail, including indication, risks, benefits, and alternatives. Verbal consent was given " "for the procedure. Injection was performed by MD.  Injection site was identified by physical examination and cleaned with Betadine and alcohol swabs. Prior to needle insertion, ethyl chloride spray was used for surface anesthesia. Sterile technique was used.  A 25-gauge, 1.5\" needle was directed to the joint from a(n) posterior approach. Injectate was passed into the subacromial bursa without difficulty. The needle was removed and a simple bandage was applied. The procedure was tolerated well without difficulty.    Injection mixture:  1% lidocaine without epinephrine: 2 mL  40 mg/mL triamcinolone acetonide: 2 mL         Diagnoses and all orders for this visit:    Chronic pain of both shoulders  -     XR Shoulder 2+ View Bilateral    Impingement syndrome of both shoulders  -     triamcinolone acetonide (KENALOG-40) injection 80 mg; Inject 2 mL into the appropriate joint as directed by provider 1 (One) Time.  -     triamcinolone acetonide (KENALOG-40) injection 80 mg; Inject 2 mL into the appropriate joint as directed by provider 1 (One) Time.    Discussed the nature of subacromial impingement.  Discussed treatment options.  Given her past allergic reaction to naproxen, will try to avoid oral NSAIDs.  Agreed on bilateral subacromial injection, which was tolerated well.      EMR Dragon/Transcription disclaimer:    Much of this encounter note is an electronic transcription/translation of spoken language to printed text.  The electronic translation of spoken language may permit erroneous, or at times, nonsensical words or phrases to be inadvertently transcribed.  Although I have reviewed the note for such errors some may still exist.    "

## 2018-11-15 ENCOUNTER — RESULTS ENCOUNTER (OUTPATIENT)
Dept: FAMILY MEDICINE CLINIC | Facility: CLINIC | Age: 68
End: 2018-11-15

## 2018-11-15 DIAGNOSIS — E78.00 HYPERCHOLESTEROLEMIA: Chronic | ICD-10-CM

## 2018-11-30 ENCOUNTER — OFFICE VISIT (OUTPATIENT)
Dept: OBSTETRICS AND GYNECOLOGY | Age: 68
End: 2018-11-30

## 2018-11-30 VITALS
WEIGHT: 164 LBS | HEIGHT: 61 IN | SYSTOLIC BLOOD PRESSURE: 158 MMHG | BODY MASS INDEX: 30.96 KG/M2 | DIASTOLIC BLOOD PRESSURE: 98 MMHG

## 2018-11-30 DIAGNOSIS — Z11.51 SCREENING FOR HPV (HUMAN PAPILLOMAVIRUS): ICD-10-CM

## 2018-11-30 DIAGNOSIS — Z12.31 VISIT FOR SCREENING MAMMOGRAM: ICD-10-CM

## 2018-11-30 DIAGNOSIS — Z01.419 WELL WOMAN EXAM WITH ROUTINE GYNECOLOGICAL EXAM: Primary | ICD-10-CM

## 2018-11-30 DIAGNOSIS — Z85.42 HISTORY OF ENDOMETRIAL CANCER: ICD-10-CM

## 2018-11-30 DIAGNOSIS — Z13.89 SCREENING FOR HEMATURIA OR PROTEINURIA: ICD-10-CM

## 2018-11-30 DIAGNOSIS — Z78.0 MENOPAUSE: ICD-10-CM

## 2018-11-30 DIAGNOSIS — R87.811 VAGINAL HIGH RISK HUMAN PAPILLOMAVIRUS (HPV) DNA TEST POSITIVE: ICD-10-CM

## 2018-11-30 DIAGNOSIS — Z12.4 SCREENING FOR CERVICAL CANCER: ICD-10-CM

## 2018-11-30 LAB
BILIRUB BLD-MCNC: NEGATIVE MG/DL
CLARITY, POC: CLEAR
COLOR UR: YELLOW
GLUCOSE UR STRIP-MCNC: NEGATIVE MG/DL
KETONES UR QL: NEGATIVE
LEUKOCYTE EST, POC: NEGATIVE
NITRITE UR-MCNC: NEGATIVE MG/ML
PH UR: 7 [PH] (ref 5–8)
PROT UR STRIP-MCNC: NEGATIVE MG/DL
RBC # UR STRIP: ABNORMAL /UL
SP GR UR: 1.02 (ref 1–1.03)
UROBILINOGEN UR QL: NORMAL

## 2018-11-30 PROCEDURE — 81002 URINALYSIS NONAUTO W/O SCOPE: CPT | Performed by: OBSTETRICS & GYNECOLOGY

## 2018-11-30 PROCEDURE — G0101 CA SCREEN;PELVIC/BREAST EXAM: HCPCS | Performed by: OBSTETRICS & GYNECOLOGY

## 2018-12-02 PROBLEM — M81.0 OSTEOPOROSIS: Status: ACTIVE | Noted: 2017-01-01

## 2018-12-05 LAB
CYTOLOGIST CVX/VAG CYTO: ABNORMAL
CYTOLOGY CVX/VAG DOC THIN PREP: ABNORMAL
DX ICD CODE: ABNORMAL
HIV 1 & 2 AB SER-IMP: ABNORMAL
HPV I/H RISK 4 DNA CVX QL PROBE+SIG AMP: POSITIVE
HPV16 DNA CVX QL PROBE+SIG AMP: NEGATIVE
HPV18+45 E6+E7 MRNA CVX QL NAA+PROBE: NEGATIVE
Lab: ABNORMAL
OTHER STN SPEC: ABNORMAL
PATH REPORT.FINAL DX SPEC: ABNORMAL
STAT OF ADQ CVX/VAG CYTO-IMP: ABNORMAL

## 2018-12-10 ENCOUNTER — TELEPHONE (OUTPATIENT)
Dept: OBSTETRICS AND GYNECOLOGY | Age: 68
End: 2018-12-10

## 2018-12-10 NOTE — PROGRESS NOTES
December 10, 2018.  I called and left a message for Ariadne that her Pap smear was the same as it was last year.  Both times she has had a negative Pap but positive for high risk HPV but negative for types 16, 18, and 45.  She has had a hysterectomy.  I would just recommend that we repeat the Pap in 6 months, next May, and if it still shows HPV I will do a colposcopy.  PLEASE SCHEDULE A REPEAT PAP FOR EARLY MAY.

## 2018-12-10 NOTE — TELEPHONE ENCOUNTER
Dr Alejo pt (aware he's not in the office) states she doesn't have a voicemail on her mobile but she missed a call from Dr HECK and requests he call her back when possible.

## 2018-12-11 ENCOUNTER — TELEPHONE (OUTPATIENT)
Dept: OBSTETRICS AND GYNECOLOGY | Age: 68
End: 2018-12-11

## 2018-12-14 NOTE — TELEPHONE ENCOUNTER
December 13, 2018.  9:27 PM  I spoke to Ariadne redding.  Explained her Pap smear findings: The last 2 Paps have been negative for atypical or abnormal cells, but positive for HR-HPV, but negative for types 16, 18, 45.  So we will repeat a Pap smear next May, and if it is still positive for high risk HPV I will do a colposcopy.

## 2018-12-20 DIAGNOSIS — M85.80 OSTEOPENIA, UNSPECIFIED LOCATION: ICD-10-CM

## 2018-12-20 RX ORDER — ALENDRONATE SODIUM 70 MG/1
TABLET ORAL
Qty: 12 TABLET | Refills: 2 | Status: SHIPPED | OUTPATIENT
Start: 2018-12-20 | End: 2019-09-30 | Stop reason: SDUPTHER

## 2019-01-09 ENCOUNTER — TELEPHONE (OUTPATIENT)
Dept: OBSTETRICS AND GYNECOLOGY | Age: 69
End: 2019-01-09

## 2019-01-11 ENCOUNTER — APPOINTMENT (OUTPATIENT)
Dept: MAMMOGRAPHY | Facility: HOSPITAL | Age: 69
End: 2019-01-11
Attending: OBSTETRICS & GYNECOLOGY

## 2019-02-08 RX ORDER — LISINOPRIL 20 MG/1
20 TABLET ORAL DAILY
Qty: 90 TABLET | Refills: 1 | Status: SHIPPED | OUTPATIENT
Start: 2019-02-08 | End: 2019-04-26

## 2019-02-09 DIAGNOSIS — E78.00 HYPERCHOLESTEROLEMIA: ICD-10-CM

## 2019-02-11 RX ORDER — ATORVASTATIN CALCIUM 40 MG/1
40 TABLET, FILM COATED ORAL DAILY
Qty: 90 TABLET | Refills: 1 | Status: SHIPPED | OUTPATIENT
Start: 2019-02-11 | End: 2019-08-22 | Stop reason: SDUPTHER

## 2019-02-12 ENCOUNTER — TELEPHONE (OUTPATIENT)
Dept: OBSTETRICS AND GYNECOLOGY | Age: 69
End: 2019-02-12

## 2019-02-12 NOTE — TELEPHONE ENCOUNTER
Codes we submitted with pap  (medicare)  Z85.42 - hx of endometrial carcinoma  Z78.0   Z13.89  Z11.51 - screen for HPV  Z12.4  ------ Routine ??

## 2019-02-13 NOTE — TELEPHONE ENCOUNTER
The main reasons for Ariadne's Pap smear was her HISTORY OF ENDOMETRIAL CARCINOMA for which she had a ANGEL/BSO, and also her PAP SMEAR IN 2017 THAT WAS NEGATIVE, BUT POSITIVE FOR HIGH RISK HPV, negative for genotype 16 and 18.  So therefore she will need a repeat Pap smear this year.  THIS WAS NOT A ROUTINE PAP SMEAR.  That should be removed from the request.  The diagnoses should be history of positive HPV in 2017 and history of endometrial carcinoma.

## 2019-02-13 NOTE — TELEPHONE ENCOUNTER
Spoke with Vanessa at [email or web address]  She said to email her and have z12.4 routine code taken off.  However, she feels what was not paid is the HPV testing.  She stated she did not think it mattered if patient had personal hx of endometrial carcinoma.  She will give me an update.    Her email  [email or web address]

## 2019-02-15 ENCOUNTER — PROCEDURE VISIT (OUTPATIENT)
Dept: OBSTETRICS AND GYNECOLOGY | Age: 69
End: 2019-02-15

## 2019-02-15 ENCOUNTER — APPOINTMENT (OUTPATIENT)
Dept: WOMENS IMAGING | Facility: HOSPITAL | Age: 69
End: 2019-02-15

## 2019-02-15 DIAGNOSIS — Z12.31 VISIT FOR SCREENING MAMMOGRAM: Primary | ICD-10-CM

## 2019-02-15 PROCEDURE — 77067 SCR MAMMO BI INCL CAD: CPT | Performed by: OBSTETRICS & GYNECOLOGY

## 2019-02-15 PROCEDURE — 77067 SCR MAMMO BI INCL CAD: CPT | Performed by: RADIOLOGY

## 2019-02-20 NOTE — PROGRESS NOTES
Normal Mammogram. Breasts are heterogeneously dense.  Should consider 3D mammograms in the future.  No suspicious findings or significant change.  BI-RADS 1. Repeat in one year.

## 2019-03-01 ENCOUNTER — OFFICE VISIT (OUTPATIENT)
Dept: FAMILY MEDICINE CLINIC | Facility: CLINIC | Age: 69
End: 2019-03-01

## 2019-03-01 VITALS
OXYGEN SATURATION: 96 % | HEIGHT: 61 IN | SYSTOLIC BLOOD PRESSURE: 164 MMHG | DIASTOLIC BLOOD PRESSURE: 99 MMHG | TEMPERATURE: 97.2 F | BODY MASS INDEX: 31.34 KG/M2 | WEIGHT: 166 LBS | HEART RATE: 68 BPM

## 2019-03-01 DIAGNOSIS — I10 ESSENTIAL HYPERTENSION: Chronic | ICD-10-CM

## 2019-03-01 DIAGNOSIS — Z00.00 MEDICARE ANNUAL WELLNESS VISIT, SUBSEQUENT: Primary | ICD-10-CM

## 2019-03-01 DIAGNOSIS — E78.00 HYPERCHOLESTEROLEMIA: Chronic | ICD-10-CM

## 2019-03-01 DIAGNOSIS — M85.80 OSTEOPENIA, UNSPECIFIED LOCATION: Chronic | ICD-10-CM

## 2019-03-01 PROBLEM — I65.23 BILATERAL CAROTID ARTERY STENOSIS: Status: ACTIVE | Noted: 2019-03-01

## 2019-03-01 PROCEDURE — G0009 ADMIN PNEUMOCOCCAL VACCINE: HCPCS | Performed by: FAMILY MEDICINE

## 2019-03-01 PROCEDURE — G0439 PPPS, SUBSEQ VISIT: HCPCS | Performed by: FAMILY MEDICINE

## 2019-03-01 PROCEDURE — 90732 PPSV23 VACC 2 YRS+ SUBQ/IM: CPT | Performed by: FAMILY MEDICINE

## 2019-03-01 PROCEDURE — 99214 OFFICE O/P EST MOD 30 MIN: CPT | Performed by: FAMILY MEDICINE

## 2019-03-01 NOTE — PROGRESS NOTES
QUICK REFERENCE INFORMATION:  The ABCs of the Annual Wellness Visit    Subsequent Medicare Wellness Visit    HEALTH RISK ASSESSMENT    1950    Recent Hospitalizations:  No hospitalization(s) within the last year..        Current Medical Providers:  Patient Care Team:  Nick Ariza MD as PCP - General  Nick Ariza MD as PCP - Claims Attributed        Smoking Status:  Social History     Tobacco Use   Smoking Status Never Smoker   Smokeless Tobacco Never Used       Alcohol Consumption:  Social History     Substance and Sexual Activity   Alcohol Use No       Depression Screen:   PHQ-2/PHQ-9 Depression Screening 3/1/2019   Little interest or pleasure in doing things 0   Feeling down, depressed, or hopeless 0   Total Score 0       Health Habits and Functional and Cognitive Screening:  Functional & Cognitive Status 3/1/2019   Do you have difficulty preparing food and eating? No   Do you have difficulty bathing yourself, getting dressed or grooming yourself? No   Do you have difficulty using the toilet? No   Do you have difficulty moving around from place to place? No   Do you have trouble with steps or getting out of a bed or a chair? Yes   In the past year have you fallen or experienced a near fall? No   Current Diet Unhealthy Diet   Dental Exam Up to date   Eye Exam Not up to date   Exercise (times per week) 0 times per week   Current Exercise Activities Include None   Do you need help using the phone?  No   Are you deaf or do you have serious difficulty hearing?  No   Do you need help with transportation? No   Do you need help shopping? No   Do you need help preparing meals?  No   Do you need help with housework?  No   Do you need help with laundry? No   Do you need help taking your medications? No   Do you need help managing money? No   Do you ever drive or ride in a car without wearing a seat belt? No   Have you felt unusual stress, anger or loneliness in the last month? No   Who do you live with? Alone    If you need help, do you have trouble finding someone available to you? No   Have you been bothered in the last four weeks by sexual problems? No   Do you have difficulty concentrating, remembering or making decisions? No           Does the patient have evidence of cognitive impairment? No    Aspirin use counseling: Taking ASA appropriately as indicated      Recent Lab Results:  CMP:  Lab Results   Component Value Date    GLU 82 02/15/2018    BUN 22 06/29/2018    CREATININE 0.76 06/29/2018    EGFRIFNONA 76 06/29/2018    EGFRIFAFRI 84 02/15/2018    BCR 28.9 (H) 06/29/2018     06/29/2018    K 4.2 06/29/2018    CO2 27.3 06/29/2018    CALCIUM 9.9 06/29/2018    PROTENTOTREF 6.9 02/15/2018    ALBUMIN 4.50 06/29/2018    LABGLOBREF 2.8 02/15/2018    LABIL2 1.5 02/15/2018    BILITOT 0.8 06/29/2018    ALKPHOS 128 (H) 06/29/2018    AST 19 06/29/2018    ALT 16 06/29/2018     Lipid Panel:  Lab Results   Component Value Date    TRIG 90 02/15/2018    HDL 56 02/15/2018    VLDL 18 02/15/2018    LDLHDL 1.55 02/15/2018     HbA1c:       Visual Acuity:  No exam data present    Age-appropriate Screening Schedule:  Refer to the list below for future screening recommendations based on patient's age, sex and/or medical conditions. Orders for these recommended tests are listed in the plan section. The patient has been provided with a written plan.    Health Maintenance   Topic Date Due   • ZOSTER VACCINE (1 of 2) 06/13/2000   • COLONOSCOPY  01/31/2017   • INFLUENZA VACCINE  08/01/2018   • PNEUMOCOCCAL VACCINES (65+ LOW/MEDIUM RISK) (2 of 2 - PPSV23) 08/18/2018   • LIPID PANEL  02/15/2019   • DXA SCAN  02/23/2019   • MAMMOGRAM  02/15/2021   • TDAP/TD VACCINES (2 - Td) 08/25/2026        Subjective   History of Present Illness    Ariadne Telles is a 68 y.o. female who presents for an Subsequent Wellness Visit.    The following portions of the patient's history were reviewed and updated as appropriate: allergies, current medications,  "past family history, past medical history, past social history, past surgical history and problem list.    Outpatient Medications Prior to Visit   Medication Sig Dispense Refill   • alendronate (FOSAMAX) 70 MG tablet TAKE 1 TABLET BY MOUTH EVERY 7 DAYS 12 tablet 2   • aspirin 81 MG EC tablet Take 81 mg by mouth Daily.     • atorvastatin (LIPITOR) 40 MG tablet TAKE 1 TABLET BY MOUTH DAILY 90 tablet 1   • calcium carbonate (OS-SHANNAN) 600 MG tablet Take 600 mg by mouth Daily.     • Cholecalciferol (VITAMIN D PO) Take  by mouth.     • fluticasone (FLONASE) 50 MCG/ACT nasal spray 2 sprays into each nostril Daily. 16 g 3   • lisinopril (PRINIVIL,ZESTRIL) 20 MG tablet TAKE 1 TABLET BY MOUTH DAILY 90 tablet 1   • Multiple Vitamin (MULTIVITAMIN) capsule Take  by mouth.       No facility-administered medications prior to visit.        Patient Active Problem List   Diagnosis   • Gastroesophageal reflux disease   • Bradycardia   • Hypercholesterolemia   • Essential hypertension   • Osteopenia   • Rectal polyp   • History of endometrial cancer   • Carpal tunnel syndrome   • MVP (mitral valve prolapse)   • Osteoporosis   • Bilateral carotid artery stenosis       Advance Care Planning:  has an advance directive - a copy HAS NOT been provided. Have asked the patient to send this to us to add to record.    Identification of Risk Factors:  Risk factors include: cardiovascular risk.    Review of Systems    Compared to one year ago, the patient feels her physical health is the same.  Compared to one year ago, the patient feels her mental health is the same.    Objective     Physical Exam    Vitals:    03/01/19 0854   BP: 164/99   Pulse: 68   Temp: 97.2 °F (36.2 °C)   TempSrc: Oral   SpO2: 96%   Weight: 75.3 kg (166 lb)   Height: 154.9 cm (60.98\")       Patient's Body mass index is 31.38 kg/m². BMI is above normal parameters. Recommendations include: exercise counseling.      Assessment/Plan   Patient Self-Management and Personalized " Health Advice  The patient has been provided with information about: exercise, prevention of cardiac or vascular disease and designing advance directives and preventive services including:   · Advance directive, Pneumococcal vaccine .  · Cologuard -2 years ago.  · Mammogram up-to-date  · Discussed benefits and risks of low-dose aspirin    Visit Diagnoses:    ICD-10-CM ICD-9-CM   1. Medicare annual wellness visit, subsequent Z00.00 V70.0   2. Hypercholesterolemia E78.00 272.0   3. Essential hypertension I10 401.9   4. Osteopenia, unspecified location M85.80 733.90       Orders Placed This Encounter   Procedures   • Pneumococcal Polysaccharide Vaccine 23-Valent Greater Than or Equal To 3yo Subcutaneous / IM   • Comprehensive Metabolic Panel   • Lipid Panel       Outpatient Encounter Medications as of 3/1/2019   Medication Sig Dispense Refill   • alendronate (FOSAMAX) 70 MG tablet TAKE 1 TABLET BY MOUTH EVERY 7 DAYS 12 tablet 2   • aspirin 81 MG EC tablet Take 81 mg by mouth Daily.     • atorvastatin (LIPITOR) 40 MG tablet TAKE 1 TABLET BY MOUTH DAILY 90 tablet 1   • calcium carbonate (OS-SHANNAN) 600 MG tablet Take 600 mg by mouth Daily.     • Cholecalciferol (VITAMIN D PO) Take  by mouth.     • fluticasone (FLONASE) 50 MCG/ACT nasal spray 2 sprays into each nostril Daily. 16 g 3   • lisinopril (PRINIVIL,ZESTRIL) 20 MG tablet TAKE 1 TABLET BY MOUTH DAILY 90 tablet 1   • Multiple Vitamin (MULTIVITAMIN) capsule Take  by mouth.       No facility-administered encounter medications on file as of 3/1/2019.        Reviewed use of high risk medication in the elderly: yes  Reviewed for potential of harmful drug interactions in the elderly: yes    Follow Up:  No Follow-up on file.     An After Visit Summary and PPPS with all of these plans were given to the patient.

## 2019-03-01 NOTE — PROGRESS NOTES
"Subjective   Ariadne Telles is a 68 y.o. female.     Medicare Wellness-subsequent (pt is fasting) and Skin Problem (has a spot on her left lower leg x over a year when she got hit with a stick)    History of Present Illness    Hypertension follow up. Doing well with current medication which she is taking as directed.  Blood pressure elevated today.  Also the last couple of visits.  She was checking her blood pressure at home and then she stopped.  She continues the lisinopril 20 mg a day.  She was previously on hydrochlorothiazide but I stopped it because of low blood pressure and lightheadedness.. No cardiovascular or neurological symptoms. Today's BP: 164/99.      Hyperlipidemia follow up. She is taking statin medication without complaint. No myopathy symptoms.     Last lipid panel:   Lab Results   Component Value Date    HDL 56 02/15/2018     Lab Results   Component Value Date    LDL 87 02/15/2018     Lab Results   Component Value Date    TRIG 90 02/15/2018     Osteopenia.  She continues Fosamax weekly.  Borderline osteoporosis.  On 70 mg a week without GI symptoms.    She injured her left lower leg last summer after a stick hit it while mowing the lawn.  She states she had a hematoma.  Now some mild tenderness and discoloration.  Overall unchanged and slowly improving.  She continues aspirin 81 mg a day.  No bleeding episodes otherwise.    The following portions of the patient's history were reviewed and updated as appropriate: allergies, current medications, past family history, past medical history, past social history, past surgical history and problem list.      Review of Systems   Constitutional: Negative.    Respiratory: Negative.    Cardiovascular: Negative.    Musculoskeletal: Negative.    Skin: Negative for wound.       Objective   Blood pressure 164/99, pulse 68, temperature 97.2 °F (36.2 °C), temperature source Oral, height 154.9 cm (60.98\"), weight 75.3 kg (166 lb), SpO2 96 %, not currently " breastfeeding.  Physical Exam   Constitutional: She appears well-developed and well-nourished. No distress.   Neck: No thyromegaly present.   Cardiovascular: Normal rate, regular rhythm, normal heart sounds and intact distal pulses.   Pulmonary/Chest: Effort normal and breath sounds normal.   Musculoskeletal: She exhibits no edema.   Left lower extremity reveals an area of postinflammatory hyperpigmentation with a old resolving hematoma that slightly tender.  There is no skin breakdown.  There is some probable localized venous insufficiency.   Skin: Skin is warm and dry.   Psychiatric: She has a normal mood and affect. Her behavior is normal. Judgment and thought content normal.   Nursing note and vitals reviewed.      Assessment/Plan   Ariadne was seen today for medicare wellness-subsequent and skin problem.    Diagnoses and all orders for this visit:    Medicare annual wellness visit, subsequent    Hypercholesterolemia  -     Comprehensive Metabolic Panel  -     Lipid Panel    Essential hypertension    Osteopenia, unspecified location    Other orders  -     Pneumococcal Polysaccharide Vaccine 23-Valent Greater Than or Equal To 1yo Subcutaneous / IM      Hyperlipidemia.  Continue atorvastatin.  Check CMP and lipid panel.    Hypertension.  May very well need better control.  We need home blood pressure numbers.  Previously with additional blood pressure medicine her blood pressures plummeted and became symptomatic.  She is going to send us blood pressure readings the next 2 or 3 weeks.  If still high, add low-dose hydrochlorothiazide 12.5 mg a day otherwise see me in 6 months for recheck    Osteopenia.  Borderline osteoporosis.  Continue Fosamax 70 mg a day

## 2019-03-02 LAB
ALBUMIN SERPL-MCNC: 4.3 G/DL (ref 3.5–5.2)
ALBUMIN/GLOB SERPL: 1.5 G/DL
ALP SERPL-CCNC: 147 U/L (ref 39–117)
ALT SERPL-CCNC: 15 U/L (ref 1–33)
AST SERPL-CCNC: 18 U/L (ref 1–32)
BILIRUB SERPL-MCNC: 0.7 MG/DL (ref 0.1–1.2)
BUN SERPL-MCNC: 16 MG/DL (ref 8–23)
BUN/CREAT SERPL: 21.9 (ref 7–25)
CALCIUM SERPL-MCNC: 10 MG/DL (ref 8.6–10.5)
CHLORIDE SERPL-SCNC: 106 MMOL/L (ref 98–107)
CHOLEST SERPL-MCNC: 163 MG/DL (ref 0–200)
CO2 SERPL-SCNC: 26.9 MMOL/L (ref 22–29)
CREAT SERPL-MCNC: 0.73 MG/DL (ref 0.57–1)
GLOBULIN SER CALC-MCNC: 2.9 GM/DL
GLUCOSE SERPL-MCNC: 83 MG/DL (ref 65–99)
HDLC SERPL-MCNC: 60 MG/DL (ref 40–60)
LDLC SERPL CALC-MCNC: 80 MG/DL (ref 0–100)
POTASSIUM SERPL-SCNC: 4.1 MMOL/L (ref 3.5–5.2)
PROT SERPL-MCNC: 7.2 G/DL (ref 6–8.5)
SODIUM SERPL-SCNC: 146 MMOL/L (ref 136–145)
TRIGL SERPL-MCNC: 113 MG/DL (ref 0–150)
VLDLC SERPL CALC-MCNC: 22.6 MG/DL (ref 5–40)

## 2019-03-15 ENCOUNTER — TELEPHONE (OUTPATIENT)
Dept: FAMILY MEDICINE CLINIC | Facility: CLINIC | Age: 69
End: 2019-03-15

## 2019-03-15 RX ORDER — CHLORTHALIDONE 25 MG/1
25 TABLET ORAL DAILY
Qty: 30 TABLET | Refills: 3 | Status: SHIPPED | OUTPATIENT
Start: 2019-03-15 | End: 2019-04-23

## 2019-03-15 NOTE — TELEPHONE ENCOUNTER
They are running still too high.  Have her continue the lisinopril as is.  I am adding chlorthalidone 25 mg a day.  I sent it to the pharmacy.  This is a diuretic.  Have her take it in the morning.  May cause some increased urination.  But should not be a problem.  If it is let us know.  Continue to check the blood pressure.  See me in 6-8 weeks for blood pressure check.

## 2019-03-15 NOTE — TELEPHONE ENCOUNTER
Pt is calling saying you had asked her to get blood pressure readings over the last 2 wks. She said that they have been ranging from 177-204 on the top and the bottom numbers is . Please advise

## 2019-03-22 ENCOUNTER — OFFICE VISIT (OUTPATIENT)
Dept: SPORTS MEDICINE | Facility: CLINIC | Age: 69
End: 2019-03-22

## 2019-03-22 VITALS
WEIGHT: 163 LBS | BODY MASS INDEX: 30.78 KG/M2 | SYSTOLIC BLOOD PRESSURE: 110 MMHG | HEIGHT: 61 IN | DIASTOLIC BLOOD PRESSURE: 60 MMHG

## 2019-03-22 DIAGNOSIS — M75.42 IMPINGEMENT SYNDROME OF BOTH SHOULDERS: Primary | ICD-10-CM

## 2019-03-22 DIAGNOSIS — M75.41 IMPINGEMENT SYNDROME OF BOTH SHOULDERS: Primary | ICD-10-CM

## 2019-03-22 PROCEDURE — 20610 DRAIN/INJ JOINT/BURSA W/O US: CPT | Performed by: FAMILY MEDICINE

## 2019-03-22 PROCEDURE — 99213 OFFICE O/P EST LOW 20 MIN: CPT | Performed by: FAMILY MEDICINE

## 2019-03-22 RX ORDER — TRIAMCINOLONE ACETONIDE 40 MG/ML
80 INJECTION, SUSPENSION INTRA-ARTICULAR; INTRAMUSCULAR ONCE
Status: COMPLETED | OUTPATIENT
Start: 2019-03-22 | End: 2019-03-22

## 2019-03-22 RX ADMIN — TRIAMCINOLONE ACETONIDE 80 MG: 40 INJECTION, SUSPENSION INTRA-ARTICULAR; INTRAMUSCULAR at 10:07

## 2019-03-22 RX ADMIN — TRIAMCINOLONE ACETONIDE 80 MG: 40 INJECTION, SUSPENSION INTRA-ARTICULAR; INTRAMUSCULAR at 10:06

## 2019-03-25 NOTE — PROGRESS NOTES
"Ariadne is a 68 y.o. year old female    Chief Complaint   Patient presents with   • Shoulder Pain     left       History of Present Illness  HPI   Here for recurrent L>R shoulder pain. Gradually worsening for about a month, sharp, moderately severe, worse with raising arm.  No radiation.    I have reviewed the patient's medical, family, and social history in detail and updated the computerized patient record.    Review of Systems   Constitutional: Negative for fever.   Musculoskeletal:        Per HPI   Skin: Negative for wound.   Neurological: Negative for numbness.   All other systems reviewed and are negative.      /60 (BP Location: Left arm, Patient Position: Sitting, Cuff Size: Adult)   Ht 154.9 cm (60.98\")   Wt 73.9 kg (163 lb)   LMP  (LMP Unknown) Comment: NO HRT  BMI 30.81 kg/m²      Physical Exam    Vital signs reviewed.   General: No acute distress.  Eyes: conjunctiva clear; pupils equally round and reactive  ENT: external ears and nose atraumatic; oropharynx clear  CV: no peripheral edema, 2+ distal pulses  Resp: normal respiratory effort, no use of accessory muscles  Skin: no rashes or wounds; normal turgor  Psych: mood and affect appropriate; recent and remote memory intact  Neuro: sensation to light touch intact    MSK Exam:  Right Shoulder Exam     Tenderness   Right shoulder tenderness location: subacromial space.    Range of Motion   The patient has normal right shoulder ROM.  Right shoulder active abduction: painful.   Right shoulder internal rotation 0 degrees: painful.     Muscle Strength   The patient has normal right shoulder strength.    Tests   Carver test: positive  Impingement: positive  Drop arm: negative    Other   Erythema: absent  Sensation: normal      Left Shoulder Exam     Tenderness   Left shoulder tenderness location: subacromial space.    Range of Motion   The patient has normal left shoulder ROM.  Left shoulder active abduction: painful.   Left shoulder internal rotation 0 " "degrees: painful.     Muscle Strength   The patient has normal left shoulder strength.    Tests   Carver test: positive  Impingement: positive  Drop arm: negative    Other   Erythema: absent  Sensation: normal             Shoulder Injection Procedure Note    Bilateral shoulder subacromial bursa injection was discussed with the patient in detail, including indication, risks, benefits, and alternatives. Verbal consent was given for the procedure. Injection was performed by physician.  Injection site was identified by physical examination and cleaned with Betadine and alcohol swabs. Prior to needle insertion, ethyl chloride spray was used for surface anesthesia. Sterile technique was used.  A 25-gauge, 1.5\" needle was directed to the joint from a(n) posterior approach. Injectate was passed into the subacromial bursa without difficulty. The needle was removed and a simple bandage was applied. The procedure was tolerated well without difficulty.    Injection mixture:  1% lidocaine without epinephrine: 1 mL  40 mg/mL triamcinolone acetonide: 2 mL     Diagnoses and all orders for this visit:    Impingement syndrome of both shoulders  -     triamcinolone acetonide (KENALOG-40) injection 80 mg  -     triamcinolone acetonide (KENALOG-40) injection 80 mg  -     Ambulatory Referral to Physical Therapy Evaluate and treat    Repeat subacromial bursa injections today tolerated well.  Encourage physical therapy to optimize long-term recovery.      EMR Dragon/Transcription disclaimer:    Much of this encounter note is an electronic transcription/translation of spoken language to printed text.  The electronic translation of spoken language may permit erroneous, or at times, nonsensical words or phrases to be inadvertently transcribed.  Although I have reviewed the note for such errors some may still exist.    "

## 2019-04-23 ENCOUNTER — OFFICE VISIT (OUTPATIENT)
Dept: FAMILY MEDICINE CLINIC | Facility: CLINIC | Age: 69
End: 2019-04-23

## 2019-04-23 ENCOUNTER — APPOINTMENT (OUTPATIENT)
Dept: LAB | Facility: HOSPITAL | Age: 69
End: 2019-04-23

## 2019-04-23 VITALS
DIASTOLIC BLOOD PRESSURE: 71 MMHG | BODY MASS INDEX: 29.77 KG/M2 | HEIGHT: 61 IN | HEART RATE: 94 BPM | TEMPERATURE: 97 F | WEIGHT: 157.7 LBS | OXYGEN SATURATION: 97 % | SYSTOLIC BLOOD PRESSURE: 103 MMHG

## 2019-04-23 DIAGNOSIS — K52.9 ACUTE GASTROENTERITIS: Primary | ICD-10-CM

## 2019-04-23 DIAGNOSIS — I95.89 HYPOTENSION DUE TO HYPOVOLEMIA: ICD-10-CM

## 2019-04-23 DIAGNOSIS — E86.1 HYPOTENSION DUE TO HYPOVOLEMIA: ICD-10-CM

## 2019-04-23 LAB
ALBUMIN SERPL-MCNC: 4.1 G/DL (ref 3.5–5.2)
ALBUMIN/GLOB SERPL: 1.3 G/DL
ALP SERPL-CCNC: 113 U/L (ref 39–117)
ALT SERPL W P-5'-P-CCNC: 17 U/L (ref 1–33)
ANION GAP SERPL CALCULATED.3IONS-SCNC: 14.8 MMOL/L
AST SERPL-CCNC: 20 U/L (ref 1–32)
BASOPHILS # BLD AUTO: 0.06 10*3/MM3 (ref 0–0.2)
BASOPHILS NFR BLD AUTO: 0.5 % (ref 0–1.5)
BILIRUB SERPL-MCNC: 0.5 MG/DL (ref 0.1–1.2)
BUN BLD-MCNC: 45 MG/DL (ref 8–23)
BUN/CREAT SERPL: 23.3 (ref 7–25)
CALCIUM SPEC-SCNC: 10.1 MG/DL (ref 8.6–10.5)
CHLORIDE SERPL-SCNC: 104 MMOL/L (ref 98–107)
CO2 SERPL-SCNC: 21.2 MMOL/L (ref 22–29)
CREAT BLD-MCNC: 1.93 MG/DL (ref 0.57–1)
DEPRECATED RDW RBC AUTO: 43.4 FL (ref 37–54)
EOSINOPHIL # BLD AUTO: 0.07 10*3/MM3 (ref 0–0.4)
EOSINOPHIL NFR BLD AUTO: 0.6 % (ref 0.3–6.2)
ERYTHROCYTE [DISTWIDTH] IN BLOOD BY AUTOMATED COUNT: 13 % (ref 12.3–15.4)
GFR SERPL CREATININE-BSD FRML MDRD: 26 ML/MIN/1.73
GLOBULIN UR ELPH-MCNC: 3.1 GM/DL
GLUCOSE BLD-MCNC: 117 MG/DL (ref 65–99)
HCT VFR BLD AUTO: 39 % (ref 34–46.6)
HGB BLD-MCNC: 13.8 G/DL (ref 12–15.9)
IMM GRANULOCYTES # BLD AUTO: 0.12 10*3/MM3 (ref 0–0.05)
IMM GRANULOCYTES NFR BLD AUTO: 1 % (ref 0–0.5)
LYMPHOCYTES # BLD AUTO: 1.25 10*3/MM3 (ref 0.7–3.1)
LYMPHOCYTES NFR BLD AUTO: 10.8 % (ref 19.6–45.3)
MCH RBC QN AUTO: 32.5 PG (ref 26.6–33)
MCHC RBC AUTO-ENTMCNC: 35.4 G/DL (ref 31.5–35.7)
MCV RBC AUTO: 92 FL (ref 79–97)
MONOCYTES # BLD AUTO: 1.15 10*3/MM3 (ref 0.1–0.9)
MONOCYTES NFR BLD AUTO: 9.9 % (ref 5–12)
NEUTROPHILS # BLD AUTO: 8.95 10*3/MM3 (ref 1.7–7)
NEUTROPHILS NFR BLD AUTO: 77.2 % (ref 42.7–76)
NRBC BLD AUTO-RTO: 0 /100 WBC (ref 0–0.2)
PLATELET # BLD AUTO: 280 10*3/MM3 (ref 140–450)
PMV BLD AUTO: 8.7 FL (ref 6–12)
POTASSIUM BLD-SCNC: 3.9 MMOL/L (ref 3.5–5.2)
PROT SERPL-MCNC: 7.2 G/DL (ref 6–8.5)
RBC # BLD AUTO: 4.24 10*6/MM3 (ref 3.77–5.28)
SODIUM BLD-SCNC: 140 MMOL/L (ref 136–145)
WBC NRBC COR # BLD: 11.6 10*3/MM3 (ref 3.4–10.8)

## 2019-04-23 PROCEDURE — 85025 COMPLETE CBC W/AUTO DIFF WBC: CPT | Performed by: FAMILY MEDICINE

## 2019-04-23 PROCEDURE — 80053 COMPREHEN METABOLIC PANEL: CPT | Performed by: FAMILY MEDICINE

## 2019-04-23 PROCEDURE — 99214 OFFICE O/P EST MOD 30 MIN: CPT | Performed by: FAMILY MEDICINE

## 2019-04-23 PROCEDURE — 36415 COLL VENOUS BLD VENIPUNCTURE: CPT | Performed by: FAMILY MEDICINE

## 2019-04-23 NOTE — PROGRESS NOTES
Subjective   Ariadne Telles is a 68 y.o. female.     Chief Complaint   Patient presents with   • Diarrhea     x fri    • Nausea   • Fatigue   • Excessive Sweating   • Loss of Consciousness     black out, she this this is from the water pill she is on         History of Present Illness    1 month ago we started the patient on chlorthalidone 25 mg a day her blood pressure at home was 180 systolic taking lisinopril.  She is also having some work stressors.  Start chlorthalidone as above.  She then started having over the last few weeks occasional lightheadedness at work with her vision going partially black.  No loss of consciousness.  No syncopal episodes.  And then Friday night, about 5 days ago, she developed diarrhea and some abdominal cramping and some nausea.  No fever.  Some vomiting.  The diarrhea went throughout the weekend.  Copious watery stool with no blood.  No dysuria.  She felt a little bit better yesterday.  She did not take her blood pressure medicine over the weekend.  She restarted her lisinopril and chlorthalidone yesterday.  She went back to work today.  She is feeling lightheaded again.  Feeling tired.  Some nausea.  Some sweating.  But no fever.  The diarrhea is almost gone.  No urinary symptoms.  She has been drinking a lot of ginger ale.  Otherwise poor appetite.      The following portions of the patient's history were reviewed and updated as appropriate: allergies, current medications, past family history, past medical history, past social history, past surgical history and problem list.          Review of Systems   Constitutional: Positive for diaphoresis and fatigue. Negative for chills and fever.   HENT: Negative.    Respiratory: Negative.    Cardiovascular: Negative.    Gastrointestinal: Positive for diarrhea. Negative for blood in stool.   Genitourinary: Negative for dysuria.   Neurological: Positive for light-headedness.   Psychiatric/Behavioral: Negative.        Objective   Blood  "pressure 103/71, pulse 94, temperature 97 °F (36.1 °C), temperature source Oral, height 154.9 cm (60.98\"), weight 71.5 kg (157 lb 11.2 oz), SpO2 97 %, not currently breastfeeding.  Physical Exam   HENT:   Mouth/Throat: Oropharynx is clear and moist. No oropharyngeal exudate.   Eyes: Conjunctivae are normal. No scleral icterus.   Neck: Normal range of motion. Neck supple.   Cardiovascular: Normal rate and regular rhythm.   Borderline tachycardia   Pulmonary/Chest: Effort normal and breath sounds normal.   Abdominal: Soft. She exhibits no distension and no mass. There is tenderness. There is no rebound and no guarding. No hernia.   Abdomen is soft.  Bowel sounds are slightly hyperactive.  She has some suprapubic discomfort to palpation that is not consistent.  No rebound tenderness.   Lymphadenopathy:     She has no cervical adenopathy.       Assessment/Plan   Ariadne was seen today for diarrhea, nausea, fatigue, excessive sweating and loss of consciousness.    Diagnoses and all orders for this visit:    Acute gastroenteritis  -     Comprehensive Metabolic Panel  -     CBC & Differential    Hypotension due to hypovolemia  -     Comprehensive Metabolic Panel  -     CBC & Differential      Acute gastroenteritis.  Likely viral.  Resolving.  She has a relative hypotension due to hypovolemia.  She also has hypertension that is at this point overtreated.  She is nontoxic.  She has mild dehydration.  I am recommending over-the-counter Pedialyte, chicken soup, water.  Avoid ginger ale and other only sugary drinks.  I am checking a CMP and a CBC.  I am to see her back if not better later this week.  Work note given.  I want her to stop the chlorthalidone.  Upon history she had a similar problem with hydrochlorothiazide in the past.  Continue the lisinopril but hold if blood pressure is low.  I will see her back in 3 weeks for follow-up.  If blood pressure starts to go about recommend low-dose amlodipine.         "

## 2019-04-23 NOTE — PROGRESS NOTES
The patient has some acute kidney insufficiency from the recent diarrhea exacerbated by the lisinopril and chlorthalidone use.  She understands to drink Pedialyte and plenty of water.  She is stopping the chlorthalidone.  I want her to hold lisinopril for the next 2 days.  I want to see her back this week for recheck on Friday morning.  We will repeat lab work that morning also.

## 2019-04-26 ENCOUNTER — APPOINTMENT (OUTPATIENT)
Dept: LAB | Facility: HOSPITAL | Age: 69
End: 2019-04-26

## 2019-04-26 ENCOUNTER — OFFICE VISIT (OUTPATIENT)
Dept: FAMILY MEDICINE CLINIC | Facility: CLINIC | Age: 69
End: 2019-04-26

## 2019-04-26 VITALS
WEIGHT: 157 LBS | SYSTOLIC BLOOD PRESSURE: 155 MMHG | HEIGHT: 61 IN | DIASTOLIC BLOOD PRESSURE: 91 MMHG | OXYGEN SATURATION: 99 % | HEART RATE: 67 BPM | BODY MASS INDEX: 29.64 KG/M2 | TEMPERATURE: 96.9 F

## 2019-04-26 DIAGNOSIS — N17.9 ACUTE KIDNEY INJURY (HCC): Primary | ICD-10-CM

## 2019-04-26 DIAGNOSIS — A08.4 VIRAL GASTROENTERITIS: ICD-10-CM

## 2019-04-26 DIAGNOSIS — I10 ESSENTIAL HYPERTENSION: Chronic | ICD-10-CM

## 2019-04-26 LAB
ALBUMIN SERPL-MCNC: 4 G/DL (ref 3.5–5.2)
ALBUMIN/GLOB SERPL: 1.3 G/DL
ALP SERPL-CCNC: 113 U/L (ref 39–117)
ALT SERPL W P-5'-P-CCNC: 20 U/L (ref 1–33)
ANION GAP SERPL CALCULATED.3IONS-SCNC: 11.7 MMOL/L
AST SERPL-CCNC: 22 U/L (ref 1–32)
BACTERIA UR QL AUTO: ABNORMAL /HPF
BASOPHILS # BLD AUTO: 0.05 10*3/MM3 (ref 0–0.2)
BASOPHILS NFR BLD AUTO: 0.6 % (ref 0–1.5)
BILIRUB SERPL-MCNC: 0.4 MG/DL (ref 0.1–1.2)
BILIRUB UR QL STRIP: NEGATIVE
BUN BLD-MCNC: 34 MG/DL (ref 8–23)
BUN/CREAT SERPL: 33.7 (ref 7–25)
CALCIUM SPEC-SCNC: 9.4 MG/DL (ref 8.6–10.5)
CHLORIDE SERPL-SCNC: 102 MMOL/L (ref 98–107)
CLARITY UR: CLEAR
CO2 SERPL-SCNC: 27.3 MMOL/L (ref 22–29)
COLOR UR: YELLOW
CREAT BLD-MCNC: 1.01 MG/DL (ref 0.57–1)
DEPRECATED RDW RBC AUTO: 42.8 FL (ref 37–54)
EOSINOPHIL # BLD AUTO: 0.1 10*3/MM3 (ref 0–0.4)
EOSINOPHIL NFR BLD AUTO: 1.3 % (ref 0.3–6.2)
ERYTHROCYTE [DISTWIDTH] IN BLOOD BY AUTOMATED COUNT: 12.6 % (ref 12.3–15.4)
GFR SERPL CREATININE-BSD FRML MDRD: 55 ML/MIN/1.73
GLOBULIN UR ELPH-MCNC: 3 GM/DL
GLUCOSE BLD-MCNC: 92 MG/DL (ref 65–99)
GLUCOSE UR STRIP-MCNC: NEGATIVE MG/DL
HCT VFR BLD AUTO: 39 % (ref 34–46.6)
HGB BLD-MCNC: 13.6 G/DL (ref 12–15.9)
HGB UR QL STRIP.AUTO: ABNORMAL
HYALINE CASTS UR QL AUTO: ABNORMAL /LPF
IMM GRANULOCYTES # BLD AUTO: 0.12 10*3/MM3 (ref 0–0.05)
IMM GRANULOCYTES NFR BLD AUTO: 1.5 % (ref 0–0.5)
KETONES UR QL STRIP: NEGATIVE
LEUKOCYTE ESTERASE UR QL STRIP.AUTO: ABNORMAL
LYMPHOCYTES # BLD AUTO: 1.34 10*3/MM3 (ref 0.7–3.1)
LYMPHOCYTES NFR BLD AUTO: 17 % (ref 19.6–45.3)
MCH RBC QN AUTO: 32.2 PG (ref 26.6–33)
MCHC RBC AUTO-ENTMCNC: 34.9 G/DL (ref 31.5–35.7)
MCV RBC AUTO: 92.2 FL (ref 79–97)
MONOCYTES # BLD AUTO: 0.66 10*3/MM3 (ref 0.1–0.9)
MONOCYTES NFR BLD AUTO: 8.4 % (ref 5–12)
NEUTROPHILS # BLD AUTO: 5.63 10*3/MM3 (ref 1.7–7)
NEUTROPHILS NFR BLD AUTO: 71.2 % (ref 42.7–76)
NITRITE UR QL STRIP: NEGATIVE
NRBC BLD AUTO-RTO: 0 /100 WBC (ref 0–0.2)
PH UR STRIP.AUTO: 5.5 [PH] (ref 5–8)
PLATELET # BLD AUTO: 255 10*3/MM3 (ref 140–450)
PMV BLD AUTO: 8.2 FL (ref 6–12)
POTASSIUM BLD-SCNC: 3.8 MMOL/L (ref 3.5–5.2)
PROT SERPL-MCNC: 7 G/DL (ref 6–8.5)
PROT UR QL STRIP: NEGATIVE
RBC # BLD AUTO: 4.23 10*6/MM3 (ref 3.77–5.28)
RBC # UR: ABNORMAL /HPF
REF LAB TEST METHOD: ABNORMAL
SODIUM BLD-SCNC: 141 MMOL/L (ref 136–145)
SP GR UR STRIP: 1.02 (ref 1–1.03)
SQUAMOUS #/AREA URNS HPF: ABNORMAL /HPF
UROBILINOGEN UR QL STRIP: ABNORMAL
WBC NRBC COR # BLD: 7.9 10*3/MM3 (ref 3.4–10.8)
WBC UR QL AUTO: ABNORMAL /HPF

## 2019-04-26 PROCEDURE — 81001 URINALYSIS AUTO W/SCOPE: CPT | Performed by: FAMILY MEDICINE

## 2019-04-26 PROCEDURE — 99214 OFFICE O/P EST MOD 30 MIN: CPT | Performed by: FAMILY MEDICINE

## 2019-04-26 PROCEDURE — 80053 COMPREHEN METABOLIC PANEL: CPT | Performed by: FAMILY MEDICINE

## 2019-04-26 PROCEDURE — 36415 COLL VENOUS BLD VENIPUNCTURE: CPT | Performed by: FAMILY MEDICINE

## 2019-04-26 PROCEDURE — 85025 COMPLETE CBC W/AUTO DIFF WBC: CPT | Performed by: FAMILY MEDICINE

## 2019-04-26 NOTE — PROGRESS NOTES
Please let patient know the blood work is much improved and the kidney function is now back to almost normal.  She is going to monitor her blood pressure over the weekend.  We are seeing her early next week.

## 2019-04-26 NOTE — PROGRESS NOTES
"Subjective   Ariadne Telles is a 68 y.o. female.     Chief Complaint   Patient presents with   • Hypertension     follow up labs        History of Present Illness    Follow-up viral gastroenteritis, dehydration, acute kidney injury.  She was seen here earlier this week.  After a weekend of diarrhea that was copious.  The diarrhea has now resolved.  We started her on Pedialyte.  We held her chlorthalidone.  Had her continue the lisinopril.  Did lab work.  Her creatinine was 1.93 with a GFR of 26.  We had her continue hydration at home.  I held her lisinopril.  She has not taken her blood pressure medicine this week.  She is not checking her blood pressure at home.  She is feeling better.  Still feeling tired but improved overall.  She describes no change in urine function.  Normal urine function.  No hematuria.  No abdominal pain.  Diarrhea gone.  No fever.  No lightheadedness.  No dizziness.  She is wondering when she can go back to work.  She feels still weak and not ready to go back to work this weekend.  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      The following portions of the patient's history were reviewed and updated as appropriate: allergies, current medications, past family history, past medical history, past social history, past surgical history and problem list.          Review of Systems   Constitutional: Positive for fatigue. Negative for fever.   HENT: Negative.    Respiratory: Negative.    Cardiovascular: Negative.    Gastrointestinal: Negative.    Genitourinary: Negative.    Skin: Negative.    Neurological: Negative.    Psychiatric/Behavioral: Negative.        Objective   Blood pressure 155/91, pulse 67, temperature 96.9 °F (36.1 °C), temperature source Oral, height 154.9 cm (60.98\"), weight 71.2 kg (157 lb), SpO2 99 %, not currently breastfeeding.  Physical Exam   Constitutional: She is oriented to person, place, and time. She appears well-developed and well-nourished. No distress.   HENT: "   Head: Atraumatic.   Mouth/Throat: Oropharynx is clear and moist.   Eyes: Conjunctivae are normal.   Neck: No thyromegaly present.   Cardiovascular: Normal rate, regular rhythm, normal heart sounds and intact distal pulses.   Pulmonary/Chest: Effort normal and breath sounds normal.   Musculoskeletal: She exhibits no edema.   Neurological: She is alert and oriented to person, place, and time.   Skin: Skin is warm and dry.   Psychiatric: She has a normal mood and affect. Her behavior is normal. Judgment and thought content normal.   Nursing note and vitals reviewed.      Assessment/Plan   Ariadne was seen today for hypertension.    Diagnoses and all orders for this visit:    Acute kidney injury (CMS/HCC)  -     CBC & Differential  -     Comprehensive Metabolic Panel  -     Urinalysis With Microscopic If Indicated (No Culture) - Urine, Clean Catch    Viral gastroenteritis  -     CBC & Differential  -     Comprehensive Metabolic Panel  -     Urinalysis With Microscopic If Indicated (No Culture) - Urine, Clean Catch    Essential hypertension        Follow-up acute kidney injury and viral gastroenteritis.  The gastroneuritis is resolved.  Rechecking labs this morning including CMP and CBC.  Also checking urine with microscopy if indicated.  At this time her blood pressure moderately elevated.  However I want to see what her blood pressure readings are at home... In addition to the lab work.... before restart medication.  I am going to see her back in 72 hours, Monday morning.  She can transition off the Pedialyte now.  She can drink water or whatever she wants.  I had her avoiding soda last time.  She will call with questions.

## 2019-04-29 ENCOUNTER — OFFICE VISIT (OUTPATIENT)
Dept: FAMILY MEDICINE CLINIC | Facility: CLINIC | Age: 69
End: 2019-04-29

## 2019-04-29 VITALS
HEIGHT: 61 IN | SYSTOLIC BLOOD PRESSURE: 138 MMHG | TEMPERATURE: 97.6 F | WEIGHT: 157 LBS | DIASTOLIC BLOOD PRESSURE: 90 MMHG | HEART RATE: 83 BPM | BODY MASS INDEX: 29.64 KG/M2 | OXYGEN SATURATION: 97 %

## 2019-04-29 DIAGNOSIS — I10 ESSENTIAL HYPERTENSION: Primary | Chronic | ICD-10-CM

## 2019-04-29 PROCEDURE — 99213 OFFICE O/P EST LOW 20 MIN: CPT | Performed by: FAMILY MEDICINE

## 2019-04-29 RX ORDER — LISINOPRIL 20 MG/1
20 TABLET ORAL DAILY
COMMUNITY
End: 2019-08-16

## 2019-04-29 NOTE — PROGRESS NOTES
"Subjective   Ariadne Telles is a 68 y.o. female.     Chief Complaint   Patient presents with   • Hypertension     pt has readings        History of Present Illness    Follow-up hypertension, acute kidney injury related to dehydration and relative hypotension.  Doing much better.  Her energy level is nearly back to normal.  Her creatinine is now much improved.  GFR nearly normal.  She checked her blood pressure through the weekend.  Running high.  About 150/100.  No cardiovascular symptoms.  She is not been taking her blood pressure medication per my request.      The following portions of the patient's history were reviewed and updated as appropriate: allergies, current medications, past family history, past medical history, past social history, past surgical history and problem list.          Review of Systems   Constitutional: Negative.    Respiratory: Negative.    Cardiovascular: Negative.    Musculoskeletal: Negative.        Objective   Blood pressure 138/90, pulse 83, temperature 97.6 °F (36.4 °C), temperature source Oral, height 154.9 cm (60.98\"), weight 71.2 kg (157 lb), SpO2 97 %, not currently breastfeeding.  Physical Exam   Constitutional: She appears well-developed and well-nourished. No distress.   Neck: No thyromegaly present.   Cardiovascular: Normal rate, regular rhythm, normal heart sounds and intact distal pulses.   Pulmonary/Chest: Effort normal and breath sounds normal.   Musculoskeletal: She exhibits no edema.   Skin: Skin is warm and dry.   Psychiatric: She has a normal mood and affect. Her behavior is normal. Judgment and thought content normal.   Nursing note and vitals reviewed.      Assessment/Plan   Ariadne was seen today for hypertension.    Diagnoses and all orders for this visit:    Essential hypertension        Follow-up hypertension and acute kidney injury.  The renal function is much improved.  I have recommended she restart her lisinopril 20 mg a day.  She will send us blood " pressure readings at the end of the week.  If still not down to more normal levels, add amlodipine.  Otherwise see me on May 14 for recheck and CMP that day

## 2019-05-13 ENCOUNTER — OFFICE VISIT (OUTPATIENT)
Dept: FAMILY MEDICINE CLINIC | Facility: CLINIC | Age: 69
End: 2019-05-13

## 2019-05-13 ENCOUNTER — APPOINTMENT (OUTPATIENT)
Dept: LAB | Facility: HOSPITAL | Age: 69
End: 2019-05-13

## 2019-05-13 VITALS
SYSTOLIC BLOOD PRESSURE: 144 MMHG | BODY MASS INDEX: 30.53 KG/M2 | OXYGEN SATURATION: 98 % | DIASTOLIC BLOOD PRESSURE: 74 MMHG | WEIGHT: 161.7 LBS | HEIGHT: 61 IN | HEART RATE: 62 BPM | TEMPERATURE: 97.6 F

## 2019-05-13 DIAGNOSIS — I10 ESSENTIAL HYPERTENSION: Primary | Chronic | ICD-10-CM

## 2019-05-13 DIAGNOSIS — E78.00 HYPERCHOLESTEROLEMIA: Chronic | ICD-10-CM

## 2019-05-13 LAB
ANION GAP SERPL CALCULATED.3IONS-SCNC: 10.9 MMOL/L
BUN BLD-MCNC: 21 MG/DL (ref 8–23)
BUN/CREAT SERPL: 26.6 (ref 7–25)
CALCIUM SPEC-SCNC: 9.4 MG/DL (ref 8.6–10.5)
CHLORIDE SERPL-SCNC: 109 MMOL/L (ref 98–107)
CO2 SERPL-SCNC: 27.1 MMOL/L (ref 22–29)
CREAT BLD-MCNC: 0.79 MG/DL (ref 0.57–1)
GFR SERPL CREATININE-BSD FRML MDRD: 72 ML/MIN/1.73
GLUCOSE BLD-MCNC: 79 MG/DL (ref 65–99)
POTASSIUM BLD-SCNC: 3.6 MMOL/L (ref 3.5–5.2)
SODIUM BLD-SCNC: 147 MMOL/L (ref 136–145)

## 2019-05-13 PROCEDURE — 36415 COLL VENOUS BLD VENIPUNCTURE: CPT | Performed by: FAMILY MEDICINE

## 2019-05-13 PROCEDURE — 99213 OFFICE O/P EST LOW 20 MIN: CPT | Performed by: FAMILY MEDICINE

## 2019-05-13 PROCEDURE — 80048 BASIC METABOLIC PNL TOTAL CA: CPT | Performed by: FAMILY MEDICINE

## 2019-05-13 NOTE — PROGRESS NOTES
"Subjective   Ariadne Telles is a 68 y.o. female.     Hypertension (follow up labs)    History of Present Illness    Hypertension follow up.  Follow-up relative hypotension with viral gastroenteritis and acute kidney injury.  Her lab work had improved last check.  She is back on lisinopril 20 mg a day.  We have discontinue the diuretic.  She states she feels a little tired at work, more than normal.  Otherwise doing well.  She has not been checking her blood pressure at home because her machine broke.  No cardiovascular or neurological symptoms. Today's BP: 144/74.      Hyperlipidemia follow up. She is taking statin medication without complaint. No myopathy symptoms.     Last lipid panel:   Lab Results   Component Value Date    HDL 60 03/01/2019     Lab Results   Component Value Date    LDL 80 03/01/2019     Lab Results   Component Value Date    TRIG 113 03/01/2019       The following portions of the patient's history were reviewed and updated as appropriate: allergies, current medications, past family history, past medical history, past social history, past surgical history and problem list.      Review of Systems   Constitutional: Positive for fatigue.   Respiratory: Negative.    Cardiovascular: Negative.    Musculoskeletal: Negative.        Objective   Blood pressure 144/74, pulse 62, temperature 97.6 °F (36.4 °C), temperature source Oral, height 154.9 cm (60.98\"), weight 73.3 kg (161 lb 11.2 oz), SpO2 98 %, not currently breastfeeding.  Physical Exam   Constitutional: She appears well-developed and well-nourished. No distress.   Neck: No thyromegaly present.   Cardiovascular: Normal rate, regular rhythm, normal heart sounds and intact distal pulses.   Pulmonary/Chest: Effort normal and breath sounds normal.   Musculoskeletal: She exhibits no edema.   Skin: Skin is warm and dry.   Psychiatric: She has a normal mood and affect. Her behavior is normal. Judgment and thought content normal.   Nursing note and " vitals reviewed.      Assessment/Plan   Ariadne was seen today for hypertension.    Diagnoses and all orders for this visit:    Essential hypertension  -     Basic Metabolic Panel    Hypercholesterolemia      Hypertension.  Overall better control.  The dehydration, acute kidney injury, and viral gastroenteritis has resolved.  I am rechecking BMP today to assure continued improvement.  At this time we discussed in great detail the need for home blood pressure monitoring.  Also at work if possible.  It is possible we are over treating her.  However for the last 3 visits her systolic blood pressure has been elevated here in the office.  I will see her back within 6 months for follow-up.  Sooner as needed.

## 2019-05-13 NOTE — PROGRESS NOTES
Please call patient.  The lab work is now back to normal.  Normal kidney function.  The sodium is just slightly high.  I would recommend she drink plenty of water.  Otherwise no change in treatment needed.

## 2019-06-03 ENCOUNTER — OFFICE VISIT (OUTPATIENT)
Dept: FAMILY MEDICINE CLINIC | Facility: CLINIC | Age: 69
End: 2019-06-03

## 2019-06-03 VITALS
HEART RATE: 70 BPM | SYSTOLIC BLOOD PRESSURE: 146 MMHG | HEIGHT: 61 IN | TEMPERATURE: 97.9 F | BODY MASS INDEX: 29.64 KG/M2 | DIASTOLIC BLOOD PRESSURE: 80 MMHG | WEIGHT: 157 LBS | RESPIRATION RATE: 18 BRPM | OXYGEN SATURATION: 97 %

## 2019-06-03 DIAGNOSIS — B02.9 HERPES ZOSTER WITHOUT COMPLICATION: Primary | ICD-10-CM

## 2019-06-03 PROCEDURE — 99213 OFFICE O/P EST LOW 20 MIN: CPT | Performed by: NURSE PRACTITIONER

## 2019-06-03 NOTE — PROGRESS NOTES
"Subjective   Ariadne Telles is a 68 y.o. female.     Chief Complaint   Patient presents with   • Herpes Zoster     [facility] follow up      HPI  Patient is new to me, here for shingles f/u. Started 1 week ago this past Saturday and went to  2 days ago. Has been taking valtrex as directed but is still having a great deal of pain in right upper inner arm and right back, hurts at night when she is trying to sleep and is mostly weird pain in her skin.  Has not taken anything for this pain. Has never had shingles in past.    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     Social History     Tobacco Use   • Smoking status: Never Smoker   • Smokeless tobacco: Never Used   Substance Use Topics   • Alcohol use: No   • Drug use: No       The following portions of the patient's history were reviewed and updated as appropriate: allergies, current medications, past family history, past medical history, past social history, past surgical history and problem list.    Review of Systems   Constitutional: Negative for chills and fever.   HENT: Negative for congestion, facial swelling and sore throat.    Eyes: Negative for pain and visual disturbance.   Respiratory: Negative for cough and shortness of breath.    Cardiovascular: Negative for chest pain and palpitations.   Gastrointestinal: Negative for abdominal pain, diarrhea, nausea and vomiting.   Neurological: Negative for headaches.       Objective   Blood pressure 146/80, pulse 70, temperature 97.9 °F (36.6 °C), resp. rate 18, height 154.9 cm (61\"), weight 71.2 kg (157 lb), SpO2 97 %, not currently breastfeeding.    Physical Exam   Constitutional: She is oriented to person, place, and time. She appears well-developed and well-nourished. No distress.   HENT:   Head: Normocephalic and atraumatic.   Mouth/Throat: Oropharynx is clear and moist.   Eyes: Conjunctivae are normal. Right eye exhibits no " discharge. Left eye exhibits no discharge.   Neck: Neck supple.   Cardiovascular: Normal rate, regular rhythm and normal heart sounds.   Pulmonary/Chest: Effort normal and breath sounds normal.   Abdominal: Soft. Bowel sounds are normal. There is no tenderness.   Musculoskeletal: She exhibits no deformity.   Gait smooth and steady   Lymphadenopathy:     She has no cervical adenopathy.   Neurological: She is alert and oriented to person, place, and time.   Skin: Skin is warm and dry. She is not diaphoretic.   Right upper back over scapular region with mostly scabbed vesicular appearing rash on erythematous base    Right upper inner arm with light pink rash with few scattered vesicles, some scabbed  in linear pattern   Psychiatric: She has a normal mood and affect.   Nursing note and vitals reviewed.      Assessment   Problem List Items Addressed This Visit     None      Visit Diagnoses     Herpes zoster without complication    -  Primary           Procedures           Impression and Plan:  We discussed mgmt of shingles-she will continue valtrex as directed.  She can try tylenol for pain, if this is not helpful she will let me know tomorrow.  We could consider few doses tramadol or gabapentin.  I reviewed her most recent labs and it appears she has had some increases in her creatinine so would avoid NSAIDs.  S/S requiring emergent tx reviewed.      She will call her work to see when she can return-we discussed that this is usually when all vesicles are scabbed and no new forming.  She may need a note to return to work which we can give her.     There are no preventive care reminders to display for this patient.           EMR Dragon/Transcription disclaimer:   Much of this encounter note is an electronic transcription/translation of spoken language to printed text. The electronic translation of spoken language may permit erroneous, or at times, nonsensical words or phrases to be inadvertently transcribed; Although I  have reviewed the note for such errors, some may still exist.

## 2019-06-13 ENCOUNTER — TELEPHONE (OUTPATIENT)
Dept: OBSTETRICS AND GYNECOLOGY | Age: 69
End: 2019-06-13

## 2019-08-16 RX ORDER — LISINOPRIL 20 MG/1
20 TABLET ORAL DAILY
Qty: 90 TABLET | Refills: 1 | Status: SHIPPED | OUTPATIENT
Start: 2019-08-16 | End: 2020-04-30

## 2019-08-22 DIAGNOSIS — E78.00 HYPERCHOLESTEROLEMIA: ICD-10-CM

## 2019-08-23 RX ORDER — ATORVASTATIN CALCIUM 40 MG/1
40 TABLET, FILM COATED ORAL DAILY
Qty: 90 TABLET | Refills: 1 | Status: SHIPPED | OUTPATIENT
Start: 2019-08-23 | End: 2020-02-21 | Stop reason: SDUPTHER

## 2019-09-30 DIAGNOSIS — M85.80 OSTEOPENIA, UNSPECIFIED LOCATION: ICD-10-CM

## 2019-10-01 RX ORDER — ALENDRONATE SODIUM 70 MG/1
TABLET ORAL
Qty: 12 TABLET | Refills: 1 | Status: SHIPPED | OUTPATIENT
Start: 2019-10-01 | End: 2020-04-16

## 2019-11-14 ENCOUNTER — OFFICE VISIT (OUTPATIENT)
Dept: FAMILY MEDICINE CLINIC | Facility: CLINIC | Age: 69
End: 2019-11-14

## 2019-11-14 ENCOUNTER — APPOINTMENT (OUTPATIENT)
Dept: LAB | Facility: HOSPITAL | Age: 69
End: 2019-11-14

## 2019-11-14 VITALS
TEMPERATURE: 97 F | HEART RATE: 62 BPM | HEIGHT: 61 IN | DIASTOLIC BLOOD PRESSURE: 81 MMHG | SYSTOLIC BLOOD PRESSURE: 155 MMHG | OXYGEN SATURATION: 62 % | WEIGHT: 163.1 LBS | BODY MASS INDEX: 30.79 KG/M2

## 2019-11-14 DIAGNOSIS — I10 ESSENTIAL HYPERTENSION: Primary | Chronic | ICD-10-CM

## 2019-11-14 LAB
ANION GAP SERPL CALCULATED.3IONS-SCNC: 10.2 MMOL/L (ref 5–15)
BUN BLD-MCNC: 13 MG/DL (ref 8–23)
BUN/CREAT SERPL: 16.9 (ref 7–25)
CALCIUM SPEC-SCNC: 9.9 MG/DL (ref 8.6–10.5)
CHLORIDE SERPL-SCNC: 102 MMOL/L (ref 98–107)
CO2 SERPL-SCNC: 29.8 MMOL/L (ref 22–29)
CREAT BLD-MCNC: 0.77 MG/DL (ref 0.57–1)
GFR SERPL CREATININE-BSD FRML MDRD: 74 ML/MIN/1.73
GLUCOSE BLD-MCNC: 88 MG/DL (ref 65–99)
POTASSIUM BLD-SCNC: 3.8 MMOL/L (ref 3.5–5.2)
SODIUM BLD-SCNC: 142 MMOL/L (ref 136–145)

## 2019-11-14 PROCEDURE — 99213 OFFICE O/P EST LOW 20 MIN: CPT | Performed by: FAMILY MEDICINE

## 2019-11-14 PROCEDURE — 36415 COLL VENOUS BLD VENIPUNCTURE: CPT | Performed by: FAMILY MEDICINE

## 2019-11-14 PROCEDURE — 80048 BASIC METABOLIC PNL TOTAL CA: CPT | Performed by: FAMILY MEDICINE

## 2019-11-14 RX ORDER — AMLODIPINE BESYLATE 2.5 MG/1
2.5 TABLET ORAL DAILY
Qty: 90 TABLET | Refills: 1 | Status: SHIPPED | OUTPATIENT
Start: 2019-11-14 | End: 2020-01-03 | Stop reason: HOSPADM

## 2019-11-14 NOTE — PROGRESS NOTES
"Subjective   Ariadne Telles is a 69 y.o. female.     Chief Complaint   Patient presents with   • Hypertension     pt is fasting        History of Present Illness    Follow-up hypertension.  Patient continues lisinopril 20 mg a day.  She is not taking the diuretics anymore.  With both hydrochlorothiazide and chlorthalidone she had an issue, plus she had GI symptoms that resulted in acute kidney injury while on chlorthalidone.  The lab work in May of this year revealed normal creatinine and GFR.  Her sodium was borderline elevated.  She otherwise feels fine.  She is had occasional sinus pressure that she think is related to allergies.  She is not taking decongestants.  Her blood pressure at home is running approximately 140 up to 150 systolic on average.  It is high two thirds of the time.  I reviewed her readings.      The following portions of the patient's history were reviewed and updated as appropriate: allergies, current medications, past family history, past medical history, past social history, past surgical history and problem list.          Review of Systems   Constitutional: Negative.    HENT: Positive for congestion.    Respiratory: Negative.    Cardiovascular: Negative.    Musculoskeletal: Negative.        Objective   Blood pressure 155/81, pulse 62, temperature 97 °F (36.1 °C), temperature source Oral, height 154.9 cm (60.98\"), weight 74 kg (163 lb 1.6 oz), SpO2 (!) 62 %, not currently breastfeeding.  Physical Exam   Constitutional: She appears well-developed and well-nourished. No distress.   Neck: No thyromegaly present.   Cardiovascular: Normal rate, regular rhythm, normal heart sounds and intact distal pulses.   Pulmonary/Chest: Effort normal and breath sounds normal.   Musculoskeletal: She exhibits no edema.   Skin: Skin is warm and dry.   Psychiatric: She has a normal mood and affect. Her behavior is normal. Judgment and thought content normal.   Nursing note and vitals reviewed.  O2 saturation " 92%, not 62%.    Assessment/Plan   Ariadne was seen today for hypertension.    Diagnoses and all orders for this visit:    Essential hypertension  -     Basic Metabolic Panel    Other orders  -     amLODIPine (NORVASC) 2.5 MG tablet; Take 1 tablet by mouth Daily.      Hypertension.  Needs better control.  Continue lisinopril 20 mg day.  Adding amlodipine at low-dose 2.5 mg a day.  Blood pressure not improving we will increase to 5 mg a day.  See me in 6 months for follow-up.  Rechecking BMP today

## 2019-11-14 NOTE — PROGRESS NOTES
Please call and let patient know that the repeat chemistry tests are normal.  Sodium is normal.  Normal kidney function.  If no answer, okay for voicemail message only.

## 2019-12-15 ENCOUNTER — APPOINTMENT (OUTPATIENT)
Dept: MRI IMAGING | Facility: HOSPITAL | Age: 69
End: 2019-12-15

## 2019-12-15 ENCOUNTER — HOSPITAL ENCOUNTER (INPATIENT)
Facility: HOSPITAL | Age: 69
LOS: 1 days | Discharge: HOME | End: 2019-12-16
Attending: EMERGENCY MEDICINE | Admitting: INTERNAL MEDICINE

## 2019-12-15 DIAGNOSIS — I48.91 ATRIAL FIBRILLATION WITH RVR (HCC): Primary | ICD-10-CM

## 2019-12-15 LAB
ALBUMIN SERPL-MCNC: 4.3 G/DL (ref 3.5–5.2)
ALBUMIN/GLOB SERPL: 1.3 G/DL
ALP SERPL-CCNC: 149 U/L (ref 39–117)
ALT SERPL W P-5'-P-CCNC: 17 U/L (ref 1–33)
ANION GAP SERPL CALCULATED.3IONS-SCNC: 14.5 MMOL/L (ref 5–15)
AST SERPL-CCNC: 22 U/L (ref 1–32)
BASOPHILS # BLD AUTO: 0.09 10*3/MM3 (ref 0–0.2)
BASOPHILS NFR BLD AUTO: 0.9 % (ref 0–1.5)
BILIRUB SERPL-MCNC: 0.4 MG/DL (ref 0.2–1.2)
BUN BLD-MCNC: 17 MG/DL (ref 8–23)
BUN/CREAT SERPL: 25.8 (ref 7–25)
CALCIUM SPEC-SCNC: 9.3 MG/DL (ref 8.6–10.5)
CHLORIDE SERPL-SCNC: 106 MMOL/L (ref 98–107)
CO2 SERPL-SCNC: 22.5 MMOL/L (ref 22–29)
CREAT BLD-MCNC: 0.66 MG/DL (ref 0.57–1)
DEPRECATED RDW RBC AUTO: 41.9 FL (ref 37–54)
EOSINOPHIL # BLD AUTO: 0.42 10*3/MM3 (ref 0–0.4)
EOSINOPHIL NFR BLD AUTO: 4.2 % (ref 0.3–6.2)
ERYTHROCYTE [DISTWIDTH] IN BLOOD BY AUTOMATED COUNT: 12.2 % (ref 12.3–15.4)
GFR SERPL CREATININE-BSD FRML MDRD: 89 ML/MIN/1.73
GLOBULIN UR ELPH-MCNC: 3.3 GM/DL
GLUCOSE BLD-MCNC: 117 MG/DL (ref 65–99)
HCT VFR BLD AUTO: 43.9 % (ref 34–46.6)
HGB BLD-MCNC: 14.9 G/DL (ref 12–15.9)
HOLD SPECIMEN: NORMAL
HOLD SPECIMEN: NORMAL
IMM GRANULOCYTES # BLD AUTO: 0.06 10*3/MM3 (ref 0–0.05)
IMM GRANULOCYTES NFR BLD AUTO: 0.6 % (ref 0–0.5)
LYMPHOCYTES # BLD AUTO: 1.5 10*3/MM3 (ref 0.7–3.1)
LYMPHOCYTES NFR BLD AUTO: 14.9 % (ref 19.6–45.3)
MCH RBC QN AUTO: 31.1 PG (ref 26.6–33)
MCHC RBC AUTO-ENTMCNC: 33.9 G/DL (ref 31.5–35.7)
MCV RBC AUTO: 91.6 FL (ref 79–97)
MONOCYTES # BLD AUTO: 0.85 10*3/MM3 (ref 0.1–0.9)
MONOCYTES NFR BLD AUTO: 8.4 % (ref 5–12)
NEUTROPHILS # BLD AUTO: 7.16 10*3/MM3 (ref 1.7–7)
NEUTROPHILS NFR BLD AUTO: 71 % (ref 42.7–76)
NRBC BLD AUTO-RTO: 0 /100 WBC (ref 0–0.2)
PLATELET # BLD AUTO: 321 10*3/MM3 (ref 140–450)
PMV BLD AUTO: 8.9 FL (ref 6–12)
POTASSIUM BLD-SCNC: 4 MMOL/L (ref 3.5–5.2)
PROT SERPL-MCNC: 7.6 G/DL (ref 6–8.5)
RBC # BLD AUTO: 4.79 10*6/MM3 (ref 3.77–5.28)
SODIUM BLD-SCNC: 143 MMOL/L (ref 136–145)
T4 FREE SERPL-MCNC: 1.22 NG/DL (ref 0.93–1.7)
TROPONIN T SERPL-MCNC: <0.01 NG/ML (ref 0–0.03)
TSH SERPL DL<=0.05 MIU/L-ACNC: 3.39 UIU/ML (ref 0.27–4.2)
WBC NRBC COR # BLD: 10.08 10*3/MM3 (ref 3.4–10.8)
WHOLE BLOOD HOLD SPECIMEN: NORMAL
WHOLE BLOOD HOLD SPECIMEN: NORMAL

## 2019-12-15 PROCEDURE — 84484 ASSAY OF TROPONIN QUANT: CPT | Performed by: EMERGENCY MEDICINE

## 2019-12-15 PROCEDURE — 80053 COMPREHEN METABOLIC PANEL: CPT | Performed by: EMERGENCY MEDICINE

## 2019-12-15 PROCEDURE — 99284 EMERGENCY DEPT VISIT MOD MDM: CPT

## 2019-12-15 PROCEDURE — 99223 1ST HOSP IP/OBS HIGH 75: CPT | Performed by: INTERNAL MEDICINE

## 2019-12-15 PROCEDURE — 84443 ASSAY THYROID STIM HORMONE: CPT | Performed by: EMERGENCY MEDICINE

## 2019-12-15 PROCEDURE — 93010 ELECTROCARDIOGRAM REPORT: CPT | Performed by: INTERNAL MEDICINE

## 2019-12-15 PROCEDURE — A9577 INJ MULTIHANCE: HCPCS | Performed by: INTERNAL MEDICINE

## 2019-12-15 PROCEDURE — 84439 ASSAY OF FREE THYROXINE: CPT | Performed by: EMERGENCY MEDICINE

## 2019-12-15 PROCEDURE — 85025 COMPLETE CBC W/AUTO DIFF WBC: CPT | Performed by: EMERGENCY MEDICINE

## 2019-12-15 PROCEDURE — 70553 MRI BRAIN STEM W/O & W/DYE: CPT

## 2019-12-15 PROCEDURE — 0 GADOBENATE DIMEGLUMINE 529 MG/ML SOLUTION: Performed by: INTERNAL MEDICINE

## 2019-12-15 PROCEDURE — 25010000002 ENOXAPARIN PER 10 MG: Performed by: INTERNAL MEDICINE

## 2019-12-15 PROCEDURE — 93005 ELECTROCARDIOGRAM TRACING: CPT | Performed by: EMERGENCY MEDICINE

## 2019-12-15 RX ORDER — ACETAMINOPHEN 650 MG/1
650 SUPPOSITORY RECTAL EVERY 4 HOURS PRN
Status: DISCONTINUED | OUTPATIENT
Start: 2019-12-15 | End: 2019-12-16 | Stop reason: HOSPADM

## 2019-12-15 RX ORDER — ACETAMINOPHEN 325 MG/1
650 TABLET ORAL EVERY 4 HOURS PRN
Status: DISCONTINUED | OUTPATIENT
Start: 2019-12-15 | End: 2019-12-16 | Stop reason: HOSPADM

## 2019-12-15 RX ORDER — FLUTICASONE PROPIONATE 50 MCG
2 SPRAY, SUSPENSION (ML) NASAL DAILY
Status: DISCONTINUED | OUTPATIENT
Start: 2019-12-15 | End: 2019-12-16 | Stop reason: HOSPADM

## 2019-12-15 RX ORDER — SODIUM CHLORIDE 0.9 % (FLUSH) 0.9 %
10 SYRINGE (ML) INJECTION EVERY 12 HOURS SCHEDULED
Status: DISCONTINUED | OUTPATIENT
Start: 2019-12-15 | End: 2019-12-16 | Stop reason: HOSPADM

## 2019-12-15 RX ORDER — NITROGLYCERIN 0.4 MG/1
0.4 TABLET SUBLINGUAL
Status: DISCONTINUED | OUTPATIENT
Start: 2019-12-15 | End: 2019-12-16 | Stop reason: HOSPADM

## 2019-12-15 RX ORDER — ACETAMINOPHEN 160 MG/5ML
650 SOLUTION ORAL EVERY 4 HOURS PRN
Status: DISCONTINUED | OUTPATIENT
Start: 2019-12-15 | End: 2019-12-16 | Stop reason: HOSPADM

## 2019-12-15 RX ORDER — SODIUM CHLORIDE 9 MG/ML
125 INJECTION, SOLUTION INTRAVENOUS CONTINUOUS
Status: DISCONTINUED | OUTPATIENT
Start: 2019-12-15 | End: 2019-12-15

## 2019-12-15 RX ORDER — SODIUM CHLORIDE 0.9 % (FLUSH) 0.9 %
10 SYRINGE (ML) INJECTION AS NEEDED
Status: DISCONTINUED | OUTPATIENT
Start: 2019-12-15 | End: 2019-12-16 | Stop reason: HOSPADM

## 2019-12-15 RX ORDER — ATORVASTATIN CALCIUM 20 MG/1
40 TABLET, FILM COATED ORAL DAILY
Status: DISCONTINUED | OUTPATIENT
Start: 2019-12-15 | End: 2019-12-16 | Stop reason: HOSPADM

## 2019-12-15 RX ORDER — LORAZEPAM 0.5 MG/1
0.5 TABLET ORAL EVERY 8 HOURS PRN
Status: DISCONTINUED | OUTPATIENT
Start: 2019-12-15 | End: 2019-12-16 | Stop reason: HOSPADM

## 2019-12-15 RX ADMIN — FLUTICASONE PROPIONATE 2 SPRAY: 50 SPRAY, METERED NASAL at 10:48

## 2019-12-15 RX ADMIN — SODIUM CHLORIDE 125 ML/HR: 9 INJECTION, SOLUTION INTRAVENOUS at 03:38

## 2019-12-15 RX ADMIN — SODIUM CHLORIDE, PRESERVATIVE FREE 10 ML: 5 INJECTION INTRAVENOUS at 22:24

## 2019-12-15 RX ADMIN — ATORVASTATIN CALCIUM 40 MG: 20 TABLET, FILM COATED ORAL at 12:46

## 2019-12-15 RX ADMIN — SODIUM CHLORIDE, PRESERVATIVE FREE 10 ML: 5 INJECTION INTRAVENOUS at 10:49

## 2019-12-15 RX ADMIN — METOPROLOL TARTRATE 12.5 MG: 25 TABLET ORAL at 22:24

## 2019-12-15 RX ADMIN — ENOXAPARIN SODIUM 70 MG: 80 INJECTION SUBCUTANEOUS at 22:25

## 2019-12-15 RX ADMIN — SODIUM CHLORIDE 5 MG/HR: 900 INJECTION, SOLUTION INTRAVENOUS at 02:34

## 2019-12-15 RX ADMIN — GADOBENATE DIMEGLUMINE 14 ML: 529 INJECTION, SOLUTION INTRAVENOUS at 14:31

## 2019-12-15 RX ADMIN — ENOXAPARIN SODIUM 70 MG: 80 INJECTION SUBCUTANEOUS at 12:45

## 2019-12-15 RX ADMIN — METOPROLOL TARTRATE 25 MG: 25 TABLET ORAL at 03:38

## 2019-12-15 RX ADMIN — SODIUM CHLORIDE 10 MG/HR: 900 INJECTION, SOLUTION INTRAVENOUS at 10:48

## 2019-12-15 NOTE — ED NOTES
Cardizem drip increased to 10mg/hr pulse remains in  upper 140-150's bpm      Yesi Jane, RN  12/15/19 [id number]

## 2019-12-15 NOTE — H&P
Patient Name: Ariadne Telles  :1950  69 y.o.    Date of Admission: 12/15/2019  Encounter Provider: Ela Rey MD  Date of Encounter Visit: 12/15/19  Place of Service: [facility] CARDIOLOGY  Referring Provider: Ela Rey MD  Patient Care Team:  Nick Ariza MD as PCP - General  Nick Ariza MD as PCP - Claims Attributed      Chief complaint: Dizziness and nausea      History of Present Illness:  Ariadne Telles is a 69 year old female patient with a history of hypertension. She has was seen in the [facility] in May 2018 for chest pain and underwent an exercise stress echo that revealed an EF 66% and left ventricular diastolic dysfunction that was consisted with impaired relaxation. Her stress echo showed no significant evidence for myocardial ischemia. Her chest pain was determined to be non cardiac in nature and she was discharged to follow up with her PCP, Dr. Ariza.      3 days ago she began having a lot of dizziness which she describes as a lightheadedness.  It was worse at night when she would lay in bed and then tried to move her head.  He came significantly worse last night.  She felt like everything was spinning around her.  She got nauseous.  She went to get up to go to the bathroom to throw up and thought she was going to fall so she got to the floor and crawled to the bathroom.  There she threw up and felt absolutely terrible.  She forgot her phone so she had to crawl back to her bed and call her son.  She had diarrhea.  She did not have chest pain, shortness of breath or palpitations.  In the emergency room, she was found to be in rapid atrial fibrillation.  Troponins negative.  TSH is normal.  She was started on a diltiazem drip.  She is still in atrial fibrillation but with a controlled heart rate.  Blood pressure is borderline low.  She still has vertigo this morning but not as severe.  She denies chest pain, shortness of breath or  "palpitations.      Previous Cardiac Testing:  Stress Echo   5/15/2018    Past Medical History:   Diagnosis Date   • Bradycardia 2016   • Carpal tunnel syndrome 2018   • Essential hypertension 2016   • Gastroesophageal reflux disease 2016   • History of endometrial cancer 2017, S/P ANGEL, BSO.  Dr. Catracho Alejo.   • Hx of bone density study 2017, DEXA scan: Osteoporosis in L1, severe osteopenia in both femoral necks.  T-scores: L1, -2.5; L2, -1.9; L3, -0.7; L4, +0.3.  Right femoral neck, -2.1; total -1.8.  Left femoral neck, -2.3; total, -1.8.   • Hypercholesterolemia 2016   • Kidney stone    • MVP (mitral valve prolapse) 5/15/2018   • Osteopenia 2016, DEXA scan: Severe osteopenia in both femoral necks, T score= -2.1 in the right femoral neck, -2.3 in the left femoral neck.   • Osteoporosis 2017    DEXA scan, T score L1= -2.5   • Pituitary adenoma (CMS/HCC) 2018    Thought to have a pituitary microadenoma on previous studies but MRI of the pituitary with and without contrast, February 10, 2017: \"No convincing evidence to suggest a pituitary adenoma on this MRI study\".  \"Incidentally noted relatively mild changes of chronic small vessel ischemia phenomena.\"   • Rectal polyp 2016   • Surgical menopause     ANGEL, BSO, for endometrial carcinoma.   • Vaginal delivery     x1  NITO, (and 2 C-sections, one stillbirth).       Past Surgical History:   Procedure Laterality Date   • CARPAL TUNNEL RELEASE WITH CUBITAL TUNNEL RELEASE  2018    right   • CATARACT EXTRACTION Right 2016   •  SECTION      x2   one stillborn Ariadne Hernandez  chela Mathews   • COLONOSCOPY  2007    polyps  Dr. Rueda    • TOTAL ABDOMINAL HYSTERECTOMY WITH SALPINGO OOPHORECTOMY      Dr. Alejo         Prior to Admission medications    Medication Sig Start Date End Date Taking? Authorizing Provider   amLODIPine (NORVASC) 2.5 MG tablet Take 1 tablet by mouth " Daily. 19  Yes Nick Ariza MD   aspirin 81 MG EC tablet Take 81 mg by mouth Daily.   Yes Provider, MD Ghazal   atorvastatin (LIPITOR) 40 MG tablet TAKE 1 TABLET BY MOUTH DAILY 19  Yes Nick Ariza MD   calcium carbonate (OS-SHANNAN) 600 MG tablet Take 600 mg by mouth Daily.   Yes Provider, MD Ghazal   Cholecalciferol (VITAMIN D PO) Take  by mouth.   Yes Emergency, Nurse Epic, RN   fluticasone (FLONASE) 50 MCG/ACT nasal spray 2 sprays into each nostril Daily. 18  Yes Nick Ariza MD   lisinopril (PRINIVIL,ZESTRIL) 20 MG tablet TAKE 1 TABLET BY MOUTH DAILY 19  Yes Nick Ariza MD   Multiple Vitamin (MULTIVITAMIN) capsule Take  by mouth. 2/3/14  Yes ProviderGhazal MD   valACYclovir (VALTREX) 1000 MG tablet Take 1 tablet by mouth 3 (Three) Times a Day. 19  Yes Piedad Hansen PA-C   alendronate (FOSAMAX) 70 MG tablet TAKE 1 TABLET BY MOUTH EVERY 7 DAYS  Patient taking differently: Take 70 mg by mouth Every 7 (Seven) Days. 10/1/19   Nick Ariza MD       Allergies   Allergen Reactions   • Naproxen Hives and Swelling   • Paroxetine Other (See Comments)     TREMORS       Social History     Socioeconomic History   • Marital status:      Spouse name:    • Number of children: 2   • Years of education: Not on file   • Highest education level: Not on file   Tobacco Use   • Smoking status: Never Smoker   • Smokeless tobacco: Never Used   Substance and Sexual Activity   • Alcohol use: No   • Drug use: No   • Sexual activity: Never     Birth control/protection: Surgical     Comment:        Family History   Problem Relation Age of Onset   • Heart disease Mother    • Heart attack Mother 67   • COPD Father    • Parkinsonism Father         ?   • Heart defect Sister          at 18   • Alcohol abuse Brother    • Diabetes Brother    • Heart disease Brother    • Hypertension Brother    • Hyperlipidemia Brother    • Other Daughter         stillborn   • Sleep  apnea Son    • No Known Problems Maternal Grandmother    • No Known Problems Paternal Grandmother    • Breast cancer Maternal Aunt 75   • No Known Problems Paternal Aunt    • No Known Problems Maternal Grandfather    • No Known Problems Paternal Grandfather    • Kidney cancer Sister    • No Known Problems Son    • BRCA 1/2 Neg Hx    • Colon cancer Neg Hx    • Endometrial cancer Neg Hx    • Ovarian cancer Neg Hx        REVIEW OF SYSTEMS:   All other systems reviewed and negative.        Objective:     Vitals:    12/15/19 0540 12/15/19 0600 12/15/19 0634 12/15/19 0635   BP: 92/78 100/70 99/59 99/59   BP Location: Left arm Left arm Left arm Left arm   Patient Position: Lying Lying Lying Lying   Pulse: 80 78 80 70   Resp:   16    Temp:   98.2 °F (36.8 °C)    TempSrc:   Oral    SpO2: 90% 93% 91% (!) 87%   Weight:       Height:         Body mass index is 29.82 kg/m².  No intake or output data in the 24 hours ending 12/15/19 0917    Constitutional: She is oriented to person, place, and time. She appears well-developed. She does not appear ill.   HENT:   Head: Normocephalic and atraumatic. Head is without contusion.   Right Ear: Hearing normal. No drainage.   Left Ear: Hearing normal. No drainage.   Nose: No nasal deformity. No epistaxis.   Eyes: Lids are normal. Right eye exhibits no exudate. Left eye exhibits no exudate.  Neck: No JVD present. Carotid bruit is not present. No tracheal deviation present. No thyroid mass and no thyromegaly present.   Cardiovascular: Irregularly irregular.  Heart rate around 100.  No murmurs.  Pulmonary/Chest: Effort normal and breath sounds normal.   Abdominal: Soft. Normal appearance and bowel sounds are normal. There is no tenderness.   Musculoskeletal: Normal range of motion.        Right shoulder: She exhibits no deformity.        Left shoulder: She exhibits no deformity.   Neurological: She is alert and oriented to person, place, and time. She has normal strength.   Skin: Skin is warm,  dry and intact. No rash noted.   Psychiatric: She has a normal mood and affect. Her behavior is normal. Thought content normal.   Vitals reviewed      Lab Review:     Results from last 7 days   Lab Units 12/15/19  0216   SODIUM mmol/L 143   POTASSIUM mmol/L 4.0   CHLORIDE mmol/L 106   CO2 mmol/L 22.5   BUN mg/dL 17   CREATININE mg/dL 0.66   CALCIUM mg/dL 9.3   BILIRUBIN mg/dL 0.4   ALK PHOS U/L 149*   ALT (SGPT) U/L 17   AST (SGOT) U/L 22   GLUCOSE mg/dL 117*     Results from last 7 days   Lab Units 12/15/19  0216   TROPONIN T ng/mL <0.010     Results from last 7 days   Lab Units 12/15/19  0216   WBC 10*3/mm3 10.08   HEMOGLOBIN g/dL 14.9   HEMATOCRIT % 43.9   PLATELETS 10*3/mm3 321                  EKG  12/15/2019      Baseline EKG  5/15/2018    I personally viewed and interpreted the patient's EKG/Telemetry data.        Assessment and Plan:       1.  New onset atrial fibrillation with rapid ventricular response.  CHADS2-Vasc score of 3 which confers a 2.8% annual stroke risk.  Not on anticoagulation at home.  Start Lovenox today.  Continue diltiazem drip for rate control.  Tentative plans for INDRA and cardioversion tomorrow morning.  2.  Right bundle branch block  3.  Hypertension  4.  Hyperlipidemia  5.  Vertigo.  I will see if there is a physical therapist available to try an Epley maneuver.  I am also concerned about stroke since she does have atrial fibrillation.  I will see if we can get an MRI of the brain today.    Ela Rey MD  12/15/19  9:17 AM

## 2019-12-15 NOTE — ED PROVIDER NOTES
EMERGENCY DEPARTMENT ENCOUNTER    CHIEF COMPLAINT  Chief Complaint: Dizziness  History given by: Patient  History limited by: None  Room Number:   PMD: Nick Ariza MD      HPI:  Pt is a 69 y.o. female who presents complaining of dizziness that began 3 days ago and is progressively worsening. Patient reports associated nausea, but denies chest pain, SOA, headache, or cardiac history. She reports she drinks Coca-Cola about once per day, and she denies known thyroid history.    Duration/Onset/Timing: 3 days/gradual/constant  Location: neuro  Radiation: none  Quality: dizziness  Intensity/Severity: moderate  Associated Symptoms: nausea  Aggravating or Alleviating Factors: none  Previous Episodes: none      PAST MEDICAL HISTORY  Active Ambulatory Problems     Diagnosis Date Noted   • Gastroesophageal reflux disease 2016   • Bradycardia 2016   • Hypercholesterolemia 2016   • Essential hypertension 2016   • Osteopenia 2016   • Rectal polyp 2016   • History of endometrial cancer 2017   • Carpal tunnel syndrome 2018   • MVP (mitral valve prolapse) 05/15/2018   • Osteoporosis 2017   • Bilateral carotid artery stenosis 2019     Resolved Ambulatory Problems     Diagnosis Date Noted   • Headache 2016   • History of pituitary adenoma 2017   • Endometrial carcinoma (CMS/HCC) 2017     Past Medical History:   Diagnosis Date   • Hx of bone density study    • Kidney stone    • Pituitary adenoma (CMS/HCC)    • Surgical menopause    • Vaginal delivery        PAST SURGICAL HISTORY  Past Surgical History:   Procedure Laterality Date   • CARPAL TUNNEL RELEASE WITH CUBITAL TUNNEL RELEASE  2018    right   • CATARACT EXTRACTION Right    •  SECTION      x2   one stillborn Ariadne Isbell   • COLONOSCOPY  2007    polyps  Dr. Rueda    • TOTAL ABDOMINAL HYSTERECTOMY WITH SALPINGO OOPHORECTOMY        [facility]       FAMILY HISTORY  Family History   Problem Relation Age of Onset   • Heart disease Mother    • Heart attack Mother 67   • COPD Father    • Parkinsonism Father         ?   • Heart defect Sister          at 18   • Alcohol abuse Brother    • Diabetes Brother    • Heart disease Brother    • Hypertension Brother    • Hyperlipidemia Brother    • Other Daughter         stillborn   • Sleep apnea Son    • No Known Problems Maternal Grandmother    • No Known Problems Paternal Grandmother    • Breast cancer Maternal Aunt 75   • No Known Problems Paternal Aunt    • No Known Problems Maternal Grandfather    • No Known Problems Paternal Grandfather    • Kidney cancer Sister    • No Known Problems Son    • BRCA 1/2 Neg Hx    • Colon cancer Neg Hx    • Endometrial cancer Neg Hx    • Ovarian cancer Neg Hx        SOCIAL HISTORY  Social History     Socioeconomic History   • Marital status:      Spouse name:    • Number of children: 2   • Years of education: Not on file   • Highest education level: Not on file   Tobacco Use   • Smoking status: Never Smoker   • Smokeless tobacco: Never Used   Substance and Sexual Activity   • Alcohol use: No   • Drug use: No   • Sexual activity: Never     Birth control/protection: Surgical     Comment:        ALLERGIES  Naproxen and Paroxetine    REVIEW OF SYSTEMS  Review of Systems   Constitutional: Negative for fever.   HENT: Negative for sore throat.    Eyes: Negative.    Respiratory: Negative for cough and shortness of breath.    Cardiovascular: Negative for chest pain.   Gastrointestinal: Negative for abdominal pain, diarrhea and vomiting.   Genitourinary: Negative for dysuria.   Musculoskeletal: Negative for neck pain.   Skin: Negative for rash.   Allergic/Immunologic: Negative.    Neurological: Positive for dizziness. Negative for weakness, numbness and headaches.   Hematological: Negative.    Psychiatric/Behavioral: Negative.    All other systems reviewed and  are negative.      PHYSICAL EXAM  ED Triage Vitals [12/15/19 0155]   Temp Heart Rate Resp BP SpO2   97.1 °F (36.2 °C) 86 16 148/92 96 %      Temp src Heart Rate Source Patient Position BP Location FiO2 (%)   Tympanic Monitor Sitting Right arm --       Physical Exam   Constitutional: She is oriented to person, place, and time. No distress.   HENT:   Head: Normocephalic and atraumatic.   Eyes: Pupils are equal, round, and reactive to light. EOM are normal.   Neck: Normal range of motion. Neck supple.   Cardiovascular: Normal heart sounds. An irregularly irregular rhythm present. Tachycardia present. Exam reveals no gallop and no friction rub.   No murmur heard.  Pulmonary/Chest: Effort normal and breath sounds normal. No respiratory distress. She has no wheezes. She has no rhonchi. She has no rales.   Abdominal: Soft. There is no tenderness. There is no rebound and no guarding.   Musculoskeletal: Normal range of motion. She exhibits no edema or deformity.   Neurological: She is alert and oriented to person, place, and time. She has normal sensation and normal strength.   Skin: Skin is warm and dry. No rash noted.   Psychiatric: Affect normal. Her mood appears anxious.   Nursing note and vitals reviewed.      LAB RESULTS  Lab Results (last 24 hours)     Procedure Component Value Units Date/Time    CBC & Differential [[id number]] Collected:  12/15/19 0216    Specimen:  Blood Updated:  12/15/19 0225    Narrative:       The following orders were created for panel order CBC & Differential.  Procedure                               Abnormality         Status                     ---------                               -----------         ------                     CBC Auto Differential[[id number]]        Abnormal            Final result                 Please view results for these tests on the individual orders.    Comprehensive Metabolic Panel [[id number]]  (Abnormal) Collected:  12/15/19 0216    Specimen:  Blood Updated:   12/15/19 0249     Glucose 117 mg/dL      BUN 17 mg/dL      Creatinine 0.66 mg/dL      Sodium 143 mmol/L      Potassium 4.0 mmol/L      Chloride 106 mmol/L      CO2 22.5 mmol/L      Calcium 9.3 mg/dL      Total Protein 7.6 g/dL      Albumin 4.30 g/dL      ALT (SGPT) 17 U/L      AST (SGOT) 22 U/L      Alkaline Phosphatase 149 U/L      Total Bilirubin 0.4 mg/dL      eGFR Non African Amer 89 mL/min/1.73      Globulin 3.3 gm/dL      A/G Ratio 1.3 g/dL      BUN/Creatinine Ratio 25.8     Anion Gap 14.5 mmol/L     Narrative:       GFR Normal >60  Chronic Kidney Disease <60  Kidney Failure <15      Troponin [[id number]]  (Normal) Collected:  12/15/19 0216    Specimen:  Blood Updated:  12/15/19 0249     Troponin T <0.010 ng/mL     Narrative:       Troponin T Reference Range:  <= 0.03 ng/mL-   Negative for AMI  >0.03 ng/mL-     Abnormal for myocardial necrosis.  Clinicians would have to utilize clinical acumen, EKG, Troponin and serial changes to determine if it is an Acute Myocardial Infarction or myocardial injury due to an underlying chronic condition.     CBC Auto Differential [[id number]]  (Abnormal) Collected:  12/15/19 0216    Specimen:  Blood Updated:  12/15/19 0225     WBC 10.08 10*3/mm3      RBC 4.79 10*6/mm3      Hemoglobin 14.9 g/dL      Hematocrit 43.9 %      MCV 91.6 fL      MCH 31.1 pg      MCHC 33.9 g/dL      RDW 12.2 %      RDW-SD 41.9 fl      MPV 8.9 fL      Platelets 321 10*3/mm3      Neutrophil % 71.0 %      Lymphocyte % 14.9 %      Monocyte % 8.4 %      Eosinophil % 4.2 %      Basophil % 0.9 %      Immature Grans % 0.6 %      Neutrophils, Absolute 7.16 10*3/mm3      Lymphocytes, Absolute 1.50 10*3/mm3      Monocytes, Absolute 0.85 10*3/mm3      Eosinophils, Absolute 0.42 10*3/mm3      Basophils, Absolute 0.09 10*3/mm3      Immature Grans, Absolute 0.06 10*3/mm3      nRBC 0.0 /100 WBC     TSH [[id number]]  (Normal) Collected:  12/15/19 0216    Specimen:  Blood Updated:  12/15/19 0316     TSH 3.390 uIU/mL      T4, Free [[id number]]  (Normal) Collected:  12/15/19 0216    Specimen:  Blood Updated:  12/15/19 0316     Free T4 1.22 ng/dL           I ordered the above labs and reviewed the results      PROCEDURES  Critical Care  Performed by: Simón Guillory MD  Authorized by: Simón Guillory MD     Critical care provider statement:     Critical care time (minutes):  35    Critical care start time:  12/15/2019 2:00 AM    Critical care end time:  12/15/2019 2:35 AM    Critical care time was exclusive of:  Separately billable procedures and treating other patients    Critical care was necessary to treat or prevent imminent or life-threatening deterioration of the following conditions: atrial fibrillation with RVR.    Critical care was time spent personally by me on the following activities:  Blood draw for specimens, development of treatment plan with patient or surrogate, discussions with consultants, evaluation of patient's response to treatment, examination of patient, interpretation of cardiac output measurements, obtaining history from patient or surrogate, ordering and performing treatments and interventions, ordering and review of laboratory studies, pulse oximetry, re-evaluation of patient's condition and review of old charts    I assumed direction of critical care for this patient from another provider in my specialty: no          EKG          EKG time: 0215  Rhythm/Rate: atrial fibrillation, 157  P waves and SC: absent  QRS, axis: RBBB   ST and T waves: normal     Interpreted Contemporaneously by me, independently viewed  Afib is new compared to prior 5/15/18 RBBB is old and was present previously    PROGRESS AND CONSULTS     0215 Labs ordered for evaluation.    0228 Ordered Cardizem for treatment.     0330 Ordered Lopressor for treatment.    0326 Reviewed the patient's case with Dr. Rey, cardiology, who will admit the patient.     0331 Rechecked the patient who is resting comfortably and in NAD. Patient is stable. BP- (!)  129/108 HR- (!) 128 Temp- 97.1 °F (36.2 °C) (Tympanic) O2 sat- 93%. Informed the patient of consult with Dr. Rey and informed the patient of all labs and EKG. Discussed the plan for admission for further management and treatment of atrial fibrillation with RVR. Pt understands and agrees with the plan, all questions answered.    MEDICAL DECISION MAKING  Results were reviewed/discussed with the patient and they were also made aware of online access. Pt also made aware that some labs, such as cultures, will not be resulted during ER visit and follow up with PMD is necessary.     MDM  Number of Diagnoses or Management Options  Atrial fibrillation with RVR (CMS/HCC):      Amount and/or Complexity of Data Reviewed  Clinical lab tests: ordered and reviewed  Tests in the medicine section of CPT®: ordered and reviewed (See procedure note for EKG.)  Decide to obtain previous medical records or to obtain history from someone other than the patient: yes  Review and summarize past medical records: yes  Discuss the patient with other providers: yes (Dr. Rey, cardiology)    Patient Progress  Patient progress: stable         DIAGNOSIS  Final diagnoses:   Atrial fibrillation with RVR (CMS/HCC)       DISPOSITION  ADMISSION    Discussed treatment plan and reason for admission with pt/family and admitting physician.  Pt/family voiced understanding of the plan for admission for further testing/treatment as needed.       Latest Documented Vital Signs:  As of 3:59 AM  BP- (!) 129/108 HR- (!) 128 Temp- 97.1 °F (36.2 °C) (Tympanic) O2 sat- 93%    --  Documentation assistance provided by courtney Felix for Dr. Pastora MD.  Information recorded by the scralejandra was done at my direction and has been verified and validated by me.                    Gay Felix  12/15/19 3649       Simón Guillory MD  12/15/19 [id number]

## 2019-12-16 ENCOUNTER — TELEPHONE (OUTPATIENT)
Dept: CARDIOLOGY | Facility: CLINIC | Age: 69
End: 2019-12-16

## 2019-12-16 ENCOUNTER — TELEPHONE (OUTPATIENT)
Dept: FAMILY MEDICINE CLINIC | Facility: CLINIC | Age: 69
End: 2019-12-16

## 2019-12-16 VITALS
WEIGHT: 157.8 LBS | OXYGEN SATURATION: 95 % | HEART RATE: 79 BPM | DIASTOLIC BLOOD PRESSURE: 73 MMHG | BODY MASS INDEX: 29.79 KG/M2 | HEIGHT: 61 IN | RESPIRATION RATE: 16 BRPM | SYSTOLIC BLOOD PRESSURE: 129 MMHG | TEMPERATURE: 97.9 F

## 2019-12-16 DIAGNOSIS — I34.1 MVP (MITRAL VALVE PROLAPSE): Primary | Chronic | ICD-10-CM

## 2019-12-16 DIAGNOSIS — I48.91 ATRIAL FIBRILLATION WITH RVR (HCC): ICD-10-CM

## 2019-12-16 LAB
ANION GAP SERPL CALCULATED.3IONS-SCNC: 13.7 MMOL/L (ref 5–15)
BUN BLD-MCNC: 18 MG/DL (ref 8–23)
BUN/CREAT SERPL: 25.4 (ref 7–25)
CALCIUM SPEC-SCNC: 9 MG/DL (ref 8.6–10.5)
CHLORIDE SERPL-SCNC: 107 MMOL/L (ref 98–107)
CHOLEST SERPL-MCNC: 174 MG/DL (ref 0–200)
CO2 SERPL-SCNC: 22.3 MMOL/L (ref 22–29)
CREAT BLD-MCNC: 0.71 MG/DL (ref 0.57–1)
DEPRECATED RDW RBC AUTO: 40.8 FL (ref 37–54)
ERYTHROCYTE [DISTWIDTH] IN BLOOD BY AUTOMATED COUNT: 12 % (ref 12.3–15.4)
GFR SERPL CREATININE-BSD FRML MDRD: 82 ML/MIN/1.73
GLUCOSE BLD-MCNC: 96 MG/DL (ref 65–99)
HCT VFR BLD AUTO: 41.1 % (ref 34–46.6)
HDLC SERPL-MCNC: 43 MG/DL (ref 40–60)
HGB BLD-MCNC: 13.9 G/DL (ref 12–15.9)
LDLC SERPL CALC-MCNC: 99 MG/DL (ref 0–100)
LDLC/HDLC SERPL: 2.31 {RATIO}
MCH RBC QN AUTO: 31.2 PG (ref 26.6–33)
MCHC RBC AUTO-ENTMCNC: 33.8 G/DL (ref 31.5–35.7)
MCV RBC AUTO: 92.4 FL (ref 79–97)
PLATELET # BLD AUTO: 310 10*3/MM3 (ref 140–450)
PMV BLD AUTO: 9.1 FL (ref 6–12)
POTASSIUM BLD-SCNC: 4 MMOL/L (ref 3.5–5.2)
RBC # BLD AUTO: 4.45 10*6/MM3 (ref 3.77–5.28)
SODIUM BLD-SCNC: 143 MMOL/L (ref 136–145)
TRIGL SERPL-MCNC: 159 MG/DL (ref 0–150)
VLDLC SERPL-MCNC: 31.8 MG/DL (ref 5–40)
WBC NRBC COR # BLD: 9.19 10*3/MM3 (ref 3.4–10.8)

## 2019-12-16 PROCEDURE — 85027 COMPLETE CBC AUTOMATED: CPT | Performed by: INTERNAL MEDICINE

## 2019-12-16 PROCEDURE — 93010 ELECTROCARDIOGRAM REPORT: CPT | Performed by: INTERNAL MEDICINE

## 2019-12-16 PROCEDURE — 97162 PT EVAL MOD COMPLEX 30 MIN: CPT

## 2019-12-16 PROCEDURE — 99239 HOSP IP/OBS DSCHRG MGMT >30: CPT | Performed by: NURSE PRACTITIONER

## 2019-12-16 PROCEDURE — 80061 LIPID PANEL: CPT | Performed by: INTERNAL MEDICINE

## 2019-12-16 PROCEDURE — 93005 ELECTROCARDIOGRAM TRACING: CPT | Performed by: NURSE PRACTITIONER

## 2019-12-16 PROCEDURE — 80048 BASIC METABOLIC PNL TOTAL CA: CPT | Performed by: INTERNAL MEDICINE

## 2019-12-16 PROCEDURE — 97110 THERAPEUTIC EXERCISES: CPT

## 2019-12-16 RX ADMIN — METOPROLOL TARTRATE 12.5 MG: 25 TABLET ORAL at 09:38

## 2019-12-16 RX ADMIN — ATORVASTATIN CALCIUM 40 MG: 20 TABLET, FILM COATED ORAL at 08:45

## 2019-12-16 NOTE — PLAN OF CARE
Problem: Patient Care Overview  Goal: Plan of Care Review  Outcome: Ongoing (interventions implemented as appropriate)  Flowsheets (Taken 12/16/2019 1131)  Plan of Care Reviewed With: patient  Outcome Summary: pt presents with mild unsteadiness with gait and will benefit from PT to address, pt symptoms of dizziness and room spinning have improved and I am not able to recreate these symptoms, at this time an Epley maneuver is not indicated, but PT will cont to see for functional mobility for safe discharge home

## 2019-12-16 NOTE — DISCHARGE SUMMARY
Hospital Discharge    Patient Name: Ariadne Telles  Age/Sex: 69 y.o. female  : 1950  MRN: [id number]    Encounter Provider: DALILA Armstrong  Referring Provider: Ela Rey MD  Place of Service: [facility]  Patient Care Team:  Nick Ariza MD as PCP - General  Nick Ariza MD as PCP - Claims Attributed         Date of Discharge:  2019   Date of Admit: 12/15/2019    Discharge Condition: Stable  Discharge Diagnosis:    Bradycardia    Essential hypertension    Atrial fibrillation with RVR (CMS/[facility])      Hospital Course:   Ariadne Telles is a 69 y.o. female who has been diagnosed with hypertension, bradycardia, and hypercholesterolemia.     In May 2018, he was evaluated in our [facility] for chest pain.  An exercise stress echocardiogram revealed an EF of 66%, grade 1 diastolic dysfunction, and stress portion was negative for myocardial ischemia.  Her chest pain was found to be noncardiac.    This past week she has been experiencing dizziness in which she describes a spinning sensation associated with nausea.  She said 1 day was so bad with the nausea and vomiting that she had to crawl back to her son.  She was taken to the [facility] Emergency department on 12/15 for evaluation.  Incidentally she was found to be in atrial fibrillation with rapid ventricular response.  TSH and troponin level were normal.    Plans were tentatively for INDRA/cardioversion today.  She had converted to a normal sinus rhythm and remains in normal rhythm today.  She is bradycardic, but asymptomatic.  She feels that her dizziness and lightheadedness have both improved.  She feels that she is having right ear problems in which she feels a pressure which improves with palpation of her ear.  She has ambulated in the hallway without any adverse effects of dizziness and is deemed ready for discharge today.      Objective:  Temp:  [97.4 °F (36.3  °C)-98.1 °F (36.7 °C)] 97.9 °F (36.6 °C)  Heart Rate:  [54-79] 79  Resp:  [16] 16  BP: (126-152)/(66-84) 129/73    Intake/Output Summary (Last 24 hours) at 12/16/2019 1454  Last data filed at 12/16/2019 1125  Gross per 24 hour   Intake 600 ml   Output 1000 ml   Net -400 ml     Body mass index is 29.82 kg/m².      12/15/19  0259 12/15/19  0529   Weight: 77.1 kg (170 lb) 71.6 kg (157 lb 12.8 oz)     Weight change:     Physical Exam:  PHYSICAL EXAM:    Vitals Reviewed.   General Appearance: No acute distress, well developed and well nourished.   Eyes: Conjunctiva and lids: No erythema, swelling, or discharge. Sclera non-icteric.   HENT: Atraumatic, normocephalic. External eyes, ears, and nose normal. No hearing loss noted. Mucous membranes normal. Lips not cyanotic. Neck supple with no tenderness.  Respiratory: No signs of respiratory distress. Respiration rhythm and depth normal.   Clear to auscultation. No rales, crackles, rhonchi, or wheezing auscultated.   Cardiovascular:  Jugular Venous Pressure: Normal  Heart Rate and Rhythm: Normal, Heart Sounds: Normal S1 and S2. No S3 or S4 noted.  Murmurs: Right upper sternal border mild systolic murmur present.. No rubs, thrills, or gallops.   Lower Extremities: No edema noted.  Gastrointestinal:  Abdomen soft, non-distended, non-tender. Normal bowel sounds. No hepatomegaly.   Musculoskeletal: Normal movement of extremities  Skin and Nails: General appearance normal. No pallor, cyanosis, diaphoresis. Skin temperature normal. No clubbing of fingernails.   Psychiatric: Patient alert and oriented to person, place, and time. Speech and behavior appropriate. Normal mood and affect.       Consults:  Consults     Date and Time Order Name Status Description    12/15/2019 0319 YASHIRA (on-call MD unless specified) Completed           Pertinent Test Results:  Results from last 7 days   Lab Units 12/16/19  0410 12/15/19  0216   SODIUM mmol/L 143 143   POTASSIUM mmol/L 4.0 4.0   CHLORIDE  mmol/L 107 106   CO2 mmol/L 22.3 22.5   BUN mg/dL 18 17   CREATININE mg/dL 0.71 0.66   GLUCOSE mg/dL 96 117*   CALCIUM mg/dL 9.0 9.3   AST (SGOT) U/L  --  22   ALT (SGPT) U/L  --  17     Results from last 7 days   Lab Units 12/15/19  0216   TROPONIN T ng/mL <0.010     Results from last 7 days   Lab Units 12/16/19  0410 12/15/19  0216   WBC 10*3/mm3 9.19 10.08   HEMOGLOBIN g/dL 13.9 14.9   HEMATOCRIT % 41.1 43.9   PLATELETS 10*3/mm3 310 321             Results from last 7 days   Lab Units 12/16/19  0410   CHOLESTEROL mg/dL 174   TRIGLYCERIDES mg/dL 159*   HDL CHOL mg/dL 43         Results from last 7 days   Lab Units 12/15/19  0216   TSH uIU/mL 3.390       Discharge Medications     Discharge Medications      ASK your doctor about these medications      Instructions Start Date   alendronate 70 MG tablet  Commonly known as:  FOSAMAX   TAKE 1 TABLET BY MOUTH EVERY 7 DAYS      amLODIPine 2.5 MG tablet  Commonly known as:  NORVASC   2.5 mg, Oral, Daily      atorvastatin 40 MG tablet  Commonly known as:  LIPITOR   40 mg, Oral, Daily      calcium carbonate 600 MG tablet  Commonly known as:  OS-SHANNAN   600 mg, Oral, Daily      fluticasone 50 MCG/ACT nasal spray  Commonly known as:  FLONASE   2 sprays, Nasal, Daily      lisinopril 20 MG tablet  Commonly known as:  PRINIVIL,ZESTRIL   20 mg, Oral, Daily      multivitamin capsule   Oral      VITAMIN D PO   Oral             Discharge Diet:    Dietary Orders (From admission, onward)     Start     Ordered    12/16/19 0948  Diet Regular; Cardiac  Diet Effective Now     Question Answer Comment   Diet Texture / Consistency Regular    Common Modifiers Cardiac        12/16/19 0947              Discharge disposition: home     Discharge Instructions and Follow ups:  Future Appointments   Date Time Provider Department Center   5/14/2020  8:00 AM Nick Ariza MD [facility] None     Follow-up Information     Nick Ariza MD .    Specialty:  Family Medicine  Contact information:  [address]  [address]  [facility] [address]  [phone]                 Assessment and Plan:    1.  New onset atrial fibrillation-she was on a diltiazem drip which has been discontinued and she converted to a normal sinus rhythm.  Dr. Rey saw her yesterday and we recommended starting metoprolol succinate 12.5 mg at nighttime.  Her Chads 2 Vasc Score is 3 putting her at 2.8% risk of stroke annually and we have recommended starting apixaban 5 mg twice per day.  Risk versus benefits of anticoagulation were discussed with the patient.  She should stop aspirin and avoid NSAIDs.    2.  Vertigo: She was experiencing dizziness.  We try to get a physical therapist to apply the Epley maneuver and there was not a physical therapist available to do so.  I will send a message to Dr. Ariza for patient to be seen in his office for vertigo.  I do not think that her dizzy symptoms were from the atrial fibrillation based on her description.    3.  Right Bundle Branch Block: Unchanged.    4.  Hypertension-blood pressure mildly elevated.  We will continue to monitor.    5.  Bradycardia-we will reassess in the office.    6.  Hyperlipidemia-remains on statin therapy.    7.  We will arrange a 1-2-week hospital follow-up in our office and same-day echocardiogram.     Dulce Maria Salazar, DALILA  12/16/19  2:54 PM    Time: Discharge greater than 30 minutes    EMR Dragon/Transcription disclaimer:   Much of this encounter note is an electronic transcription/translation of spoken language to printed text. The electronic translation of spoken language may permit erroneous, or at times, nonsensical words or phrases to be inadvertently transcribed; Although I have reviewed the note for such errors, some may still exist.

## 2019-12-16 NOTE — TELEPHONE ENCOUNTER
----- Message from Radha Wolf sent at 12/16/2019  3:47 PM EST -----  Dulce Maria    Can you please place an order for the Echo?    Thank you  Radha ARMSTRONG  ----- Message -----  From: Dulce Maria Salazar, DALILA  Sent: 12/16/2019   2:54 PM EST  To: Jeanne Layton      Please arrange a 1-2-week follow-up office appointment with me and same-day echocardiogram.  Thank you

## 2019-12-16 NOTE — TELEPHONE ENCOUNTER
Pt will be discharged from the hospital on 12/16/19 and needs a hospital follow up. When will be be able to work pt in to see ?

## 2019-12-16 NOTE — THERAPY EVALUATION
"Patient Name: Ariadne Telles  : 1950    MRN: [id number]                              Today's Date: 2019       Admit Date: 12/15/2019    Visit Dx:     ICD-10-CM ICD-9-CM   1. Atrial fibrillation with RVR (CMS/HCC) I48.91 427.31     Patient Active Problem List   Diagnosis   • Gastroesophageal reflux disease   • Bradycardia   • Hypercholesterolemia   • Essential hypertension   • Osteopenia   • Rectal polyp   • History of endometrial cancer   • Carpal tunnel syndrome   • MVP (mitral valve prolapse)   • Osteoporosis   • Bilateral carotid artery stenosis   • Atrial fibrillation with RVR (CMS/HCC)     Past Medical History:   Diagnosis Date   • Bradycardia 2016   • Carpal tunnel syndrome 2018   • Essential hypertension 2016   • Gastroesophageal reflux disease 2016   • History of endometrial cancer 2017, S/P ANGEL, BSO.  Dr. Catracho Alejo.   • Hx of bone density study 2017, DEXA scan: Osteoporosis in L1, severe osteopenia in both femoral necks.  T-scores: L1, -2.5; L2, -1.9; L3, -0.7; L4, +0.3.  Right femoral neck, -2.1; total -1.8.  Left femoral neck, -2.3; total, -1.8.   • Hypercholesterolemia 2016   • Kidney stone    • MVP (mitral valve prolapse) 5/15/2018   • Osteopenia 2016, DEXA scan: Severe osteopenia in both femoral necks, T score= -2.1 in the right femoral neck, -2.3 in the left femoral neck.   • Osteoporosis 2017    DEXA scan, T score L1= -2.5   • Pituitary adenoma (CMS/HCC) 2018    Thought to have a pituitary microadenoma on previous studies but MRI of the pituitary with and without contrast, February 10, 2017: \"No convincing evidence to suggest a pituitary adenoma on this MRI study\".  \"Incidentally noted relatively mild changes of chronic small vessel ischemia phenomena.\"   • Rectal polyp 2016   • Surgical menopause     ANGEL, BSO, for endometrial carcinoma.   • Vaginal delivery     x1  NITO, (and 2 C-sections, one stillbirth). "     Past Surgical History:   Procedure Laterality Date   • CARPAL TUNNEL RELEASE WITH CUBITAL TUNNEL RELEASE  2018    right   • CATARACT EXTRACTION Right 2016   •  SECTION      x2   one stillborn Ariadne Hernandez  and  Reid   • COLONOSCOPY  2007    polyps  Dr. Rueda    • TOTAL ABDOMINAL HYSTERECTOMY WITH SALPINGO OOPHORECTOMY      Dr. Alejo     General Information     Row Name 19 1126          PT Evaluation Time/Intention    Document Type  evaluation  -PC     Mode of Treatment  physical therapy  -PC     Row Name 19 1126          General Information    Patient Profile Reviewed?  yes  -PC     Prior Level of Function  independent:  -PC     Existing Precautions/Restrictions  no known precautions/restrictions  -PC     Row Name 19 1126          Relationship/Environment    Lives With  alone  -PC     Row Name 19 1126          Resource/Environmental Concerns    Current Living Arrangements  home/apartment/condo  -PC     Row Name 19 1126          Cognitive Assessment/Intervention- PT/OT    Orientation Status (Cognition)  oriented x 4  -PC     Cognitive Assessment/Intervention Comment  pt c/o nausea, but the spinning sensation has resolved at this time  -PC       User Key  (r) = Recorded By, (t) = Taken By, (c) = Cosigned By    Initials Name Provider Type    PC Sandy Chapman, PT Physical Therapist        Mobility     Row Name 19 1132          Bed Mobility Assessment/Treatment    Bed Mobility Assessment/Treatment  supine-sit  -PC     Supine-Sit [place] (Bed Mobility)  independent  -PC     Row Name 19 1132          Sit-Stand Transfer    Sit-Stand [place] (Transfers)  supervision  -     Row Name 19 1132          Gait/Stairs Assessment/Training    [place] Level (Gait)  supervision  -PC     Distance in Feet (Gait)  100  ft  -PC     Pattern (Gait)  step-through  -PC     Comment (Gait/Stairs)  gait was guarded, slow with mild unsteadiness,  no inc of spinning sensation, pt able to turn her head to talk to me while walking without inc in symptoms  -PC       User Key  (r) = Recorded By, (t) = Taken By, (c) = Cosigned By    Initials Name Provider Type    PC Sandy Chapman, PT Physical Therapist        Obj/Interventions     Row Name 12/16/19 1134          General ROM    GENERAL ROM COMMENTS  WFL  -PC     [facility] Name 12/16/19 1134          MMT (Manual Muscle Testing)    General MMT Comments  WNL  -PC     Row Name 12/16/19 1134          Static Sitting Balance    Level of [place] (Unsupported Sitting, Static Balance)  independent  -PC     Row Name 12/16/19 1134          Static Standing Balance    Level of [place] (Supported Standing, Static Balance)  supervision  -PC     Row Name 12/16/19 1134          Sensory Assessment/Intervention    Sensory General Assessment  no sensation deficits identified  -PC       User Key  (r) = Recorded By, (t) = Taken By, (c) = Cosigned By    Initials Name Provider Type    PC Sandy Chapman, PT Physical Therapist        Goals/Plan     Row Name 12/16/19 1136          Gait Training Goal 1 (PT)    Activity/Assistive Device (Gait Training Goal 1, PT)  gait (walking locomotion)  -PC     [place] Level (Gait Training Goal 1, PT)  conditional independence  -PC     Distance (Gait Goal 1, PT)  200 ft  -PC     Time Frame (Gait Training Goal 1, PT)  1 week  -PC       User Key  (r) = Recorded By, (t) = Taken By, (c) = Cosigned By    Initials Name Provider Type    PC Sandy Chapman, PT Physical Therapist        Clinical Impression     Row Name 12/16/19 1134          Pain Assessment    Additional Documentation  Pain Scale: Numbers Pre/Post-Treatment (Group)  -PC     [facility] Name 12/16/19 1134          Pain Scale: Numbers Pre/Post-Treatment    Pain Scale: Numbers, Pretreatment  0/10 - no pain  -PC     [facility] Name 12/16/19 1134          Plan of Care Review    Plan of Care Reviewed With  patient  -PC     [facility] Name 12/16/19 113           Physical Therapy Clinical Impression    Criteria for Skilled Interventions Met (PT Clinical Impression)  yes;treatment indicated  -PC     Predicted Duration of Therapy (PT)  pt appropriate to see for mobility but Epley maneuver not indicated as symptoms are improving  -PC     Row Name 12/16/19 1134          Positioning and Restraints    Pre-Treatment Position  in bed  -PC     Post Treatment Position  chair  -PC     In Chair  reclined;call light within reach;encouraged to call for assist;notified nsg  -PC       User Key  (r) = Recorded By, (t) = Taken By, (c) = Cosigned By    Initials Name Provider Type    PC Sandy Chapman PT Physical Therapist        Outcome Measures     Row Name 12/16/19 1137          How much help from another person do you currently need...    Turning from your back to your side while in flat bed without using bedrails?  4  -PC     Moving from lying on back to sitting on the side of a flat bed without bedrails?  4  -PC     Moving to and from a bed to a chair (including a wheelchair)?  4  -PC     Standing up from a chair using your arms (e.g., wheelchair, bedside chair)?  4  -PC     Climbing 3-5 steps with a railing?  3  -PC     To walk in hospital room?  3  -PC     AM-PAC 6 Clicks Score (PT)  22  -PC     Row Name 12/16/19 1137          Functional Assessment    Outcome Measure Options  AM-PAC 6 Clicks Basic Mobility (PT)  -PC       User Key  (r) = Recorded By, (t) = Taken By, (c) = Cosigned By    Initials Name Provider Type    PC Sandy Chapman PT Physical Therapist        Physical Therapy Education                 Title: PT OT SLP Therapies (Done)     Topic: Physical Therapy (Done)     Point: Mobility training (Done)     Description:   Instruct learner(s) on safety and technique for assisting patient out of bed, chair or wheelchair.  Instruct in the proper use of assistive devices, such as walker, crutches, cane or brace.              Patient Friendly Description:   It's important to get you  on your feet again, but we need to do so in a way that is safe for you. Falling has serious consequences, and your personal safety is the most important thing of all.        When it's time to get out of bed, one of us or a family member will sit next to you on the bed to give you support.     If your doctor or nurse tells you to use a walker, crutches, a cane, or a brace, be sure you use it every time you get out of bed, even if you think you don't need it.    Learning Progress Summary           Patient Acceptance, E,D, DU by  at 12/16/2019 1137                   Point: Home exercise program (Done)     Description:   Instruct learner(s) on appropriate technique for monitoring, assisting and/or progressing patient with therapeutic exercises and activities.              Learning Progress Summary           Patient Acceptance, E,D, DU by  at 12/16/2019 1137                   Point: Body mechanics (Done)     Description:   Instruct learner(s) on proper positioning and spine alignment for patient and/or caregiver during mobility tasks and/or exercises.              Learning Progress Summary           Patient Acceptance, E,D, DU by  at 12/16/2019 1137                   Point: Precautions (Done)     Description:   Instruct learner(s) on prescribed precautions during mobility and gait tasks              Learning Progress Summary           Patient Acceptance, E,D, DU by  at 12/16/2019 1137                               User Key     Initials Effective Dates Name Provider Type Discipline     04/03/18 -  Sandy Chapman, PT Physical Therapist PT              PT Recommendation and Plan  Planned Therapy Interventions (PT Eval): gait training, strengthening  Outcome Summary/Treatment Plan (PT)  Anticipated Discharge Disposition (PT): home, home with assist  Plan of Care Reviewed With: patient  Outcome Summary: pt presents with mild unsteadiness with gait and will benefit from PT to address, pt symptoms of dizziness and  room spinning have improved and I am not able to recreate these symptoms, at this time an Epley maneuver is not indicated, but PT will cont to see for functional mobility for safe discharge home     Time Calculation:   PT Charges     Row Name 12/16/19 1142             Time Calculation    PT Received On  12/16/19  -PC      PT - Next Appointment  12/17/19  -PC      PT Goal Re-Cert Due Date  12/23/19  -PC        User Key  (r) = Recorded By, (t) = Taken By, (c) = Cosigned By    Initials Name Provider Type    PC Sandy Chapman, PT Physical Therapist        Therapy Charges for Today     Code Description Service Date Service Provider Modifiers Qty    72370891805 HC PT EVAL MOD COMPLEXITY 2 12/16/2019 Sandy Chapman, PT GP 1    26245969736 HC PT THER PROC EA 15 MIN 12/16/2019 Sandy Chapman, PT GP 1          PT G-Codes  Outcome Measure Options: AM-PAC 6 Clicks Basic Mobility (PT)  AM-PAC 6 Clicks Score (PT): 22    Sandy Chapman PT  12/16/2019

## 2019-12-16 NOTE — PLAN OF CARE
Pt remains in NRS with frequent PVC's and PAC's, denies pain or discomfort, rested well throughout night, VSS. INDRA canceled per MD. Possible D/C today.

## 2019-12-24 ENCOUNTER — OFFICE VISIT (OUTPATIENT)
Dept: FAMILY MEDICINE CLINIC | Facility: CLINIC | Age: 69
End: 2019-12-24

## 2019-12-24 VITALS
WEIGHT: 159.6 LBS | BODY MASS INDEX: 30.13 KG/M2 | HEART RATE: 55 BPM | HEIGHT: 61 IN | SYSTOLIC BLOOD PRESSURE: 133 MMHG | OXYGEN SATURATION: 98 % | DIASTOLIC BLOOD PRESSURE: 74 MMHG | TEMPERATURE: 97.4 F

## 2019-12-24 DIAGNOSIS — H81.11 BENIGN PAROXYSMAL POSITIONAL VERTIGO OF RIGHT EAR: ICD-10-CM

## 2019-12-24 DIAGNOSIS — I10 ESSENTIAL HYPERTENSION: Chronic | ICD-10-CM

## 2019-12-24 DIAGNOSIS — I48.91 ATRIAL FIBRILLATION WITH RVR (HCC): Primary | ICD-10-CM

## 2019-12-24 PROCEDURE — 99214 OFFICE O/P EST MOD 30 MIN: CPT | Performed by: FAMILY MEDICINE

## 2019-12-24 NOTE — PROGRESS NOTES
"Subjective   Ariadne eTlles is a 69 y.o. female.     Atrial Fibrillation (hospital follow up ); Dizziness; and Earache (right ear )    History of Present Illness     Follow-up hospitalization for atrial fibrillation with rapid ventricular rate, and dizziness.    She has had dizziness and vertigo symptoms for the better part of a year.  They come and go.  Usually when she turns her head.  However on or about December 12 or 13 she started having worsening dizziness symptoms.  Sometimes at night when she turned her head.  Other times when she turns her head standing.  She describes some pressure in the right ear and pain at times.  The dizziness got so severe one morning that she had to crawl to her bathroom vomit and then crawled back and call her son.  Her son came to the house.  Wanted to call an ambulance with the patient refused.  But then she noticed some \"fluttering\" like a \"fish flopping\" in her chest.  She decided to go to the ER.  She was driven.    In the emergency room she was found to be in rapid atrial fibrillation.  This was controlled.  Her cardiovascular risk scores were elevated, she was started on Eliquis.  She was going to be cardioverted but she spontaneously returned to normal sinus rhythm.  She was discharged on beta-blockers and Eliquis in addition to her regular medication.    Hypertension.  Longstanding.  Better control recently.    With regards to dizziness she describes no focal neurological deficits.  No visual loss.  No troubles with coordination.  The dizziness has definitely been worse with turning her head to one direction.  The symptoms are much improved in the last week.  She has not gone back to work yet.  She is afraid to get back to work but she knows she needs to because of money.      The following portions of the patient's history were reviewed and updated as appropriate: allergies, current medications, past family history, past medical history, past social history, past " "surgical history and problem list.      Review of Systems   Constitutional: Negative.    Respiratory: Negative.    Cardiovascular: Negative.    Musculoskeletal: Negative.    Neurological: Positive for dizziness. Negative for light-headedness.       Objective   Blood pressure 133/74, pulse 55, temperature 97.4 °F (36.3 °C), temperature source Oral, height 154.9 cm (60.98\"), weight 72.4 kg (159 lb 9.6 oz), SpO2 98 %, not currently breastfeeding.  Physical Exam   Constitutional: She is oriented to person, place, and time.   She looks tired but nontoxic   HENT:   Head: Atraumatic.   Eyes: Pupils are equal, round, and reactive to light. EOM are normal.   Neck: Neck supple.   Cardiovascular: Normal rate, regular rhythm and normal heart sounds.   No murmur heard.  Regular rate and rhythm today, near bradycardic   Pulmonary/Chest: Effort normal and breath sounds normal.   Neurological: She is alert and oriented to person, place, and time. She exhibits normal muscle tone. Coordination normal.   Neurological exam.  Pupils equal and reactive to light.  Extra ocular muscle movements intact all directions.  Face symmetric.  Gait unremarkable.  No ataxia.  No dysmetria.    Negative Arjun-Hallpike.  No nystagmus     Psychiatric: She has a normal mood and affect. Her behavior is normal.       Assessment/Plan   Ariadne was seen today for atrial fibrillation, dizziness and earache.    Diagnoses and all orders for this visit:    Atrial fibrillation with RVR (CMS/HCC)    Essential hypertension    Benign paroxysmal positional vertigo of right ear  -     Ambulatory Referral to Physical Therapy Vestibular      Atrial fibrillation with rapid ventricular rate, now resolved.  She continues on beta-blockade, Eliquis.  I described atrial fibrillation to her.  She understands the risk of stroke given her diagnosis, and need for Eliquis.  Also discussed risk of bleeding with Eliquis.  Benefits outweigh risks at this time.  She will keep her " appointment with cardiology coming up.  Hopefully they can get her off the Eliquis, since this may be a lone episode of atrial fibrillation.    Very probable benign paroxysmal positional vertigo.  Less likely central vertigo.  Longstanding duration 1 year.  Worse in recent weeks but better now.  I am getting to her to a vestibular physical therapist for further evaluation.  I want to see her back in 6 weeks for recheck.    Hypertension.  Better control.    I gave her a note to return to work.  On the 29th.  The patient is aware if she cannot work, she should not work.  If she does not feel up to it she should not do it.  She will let me know.  She states she can do some lighter work to start with.  She will call with questions.

## 2019-12-30 ENCOUNTER — HOSPITAL ENCOUNTER (OUTPATIENT)
Facility: HOSPITAL | Age: 69
Setting detail: OBSERVATION
Discharge: HOME | End: 2020-01-03
Attending: EMERGENCY MEDICINE | Admitting: HOSPITALIST

## 2019-12-30 ENCOUNTER — APPOINTMENT (OUTPATIENT)
Dept: CT IMAGING | Facility: HOSPITAL | Age: 69
End: 2019-12-30

## 2019-12-30 ENCOUNTER — APPOINTMENT (OUTPATIENT)
Dept: GENERAL RADIOLOGY | Facility: HOSPITAL | Age: 69
End: 2019-12-30

## 2019-12-30 DIAGNOSIS — I48.91 ATRIAL FIBRILLATION WITH RVR (HCC): Primary | ICD-10-CM

## 2019-12-30 DIAGNOSIS — R42 DIZZINESS: ICD-10-CM

## 2019-12-30 DIAGNOSIS — R42 VERTIGO: ICD-10-CM

## 2019-12-30 DIAGNOSIS — R11.2 INTRACTABLE VOMITING WITH NAUSEA, UNSPECIFIED VOMITING TYPE: ICD-10-CM

## 2019-12-30 LAB
ALBUMIN SERPL-MCNC: 4.3 G/DL (ref 3.5–5.2)
ALBUMIN/GLOB SERPL: 1.4 G/DL
ALP SERPL-CCNC: 153 U/L (ref 39–117)
ALT SERPL W P-5'-P-CCNC: 17 U/L (ref 1–33)
ANION GAP SERPL CALCULATED.3IONS-SCNC: 13 MMOL/L (ref 5–15)
AST SERPL-CCNC: 18 U/L (ref 1–32)
BASOPHILS # BLD AUTO: 0.08 10*3/MM3 (ref 0–0.2)
BASOPHILS NFR BLD AUTO: 1 % (ref 0–1.5)
BILIRUB SERPL-MCNC: 0.4 MG/DL (ref 0.2–1.2)
BUN BLD-MCNC: 13 MG/DL (ref 8–23)
BUN/CREAT SERPL: 18.6 (ref 7–25)
CALCIUM SPEC-SCNC: 9.4 MG/DL (ref 8.6–10.5)
CHLORIDE SERPL-SCNC: 102 MMOL/L (ref 98–107)
CO2 SERPL-SCNC: 25 MMOL/L (ref 22–29)
CREAT BLD-MCNC: 0.7 MG/DL (ref 0.57–1)
DEPRECATED RDW RBC AUTO: 38.3 FL (ref 37–54)
EOSINOPHIL # BLD AUTO: 0.47 10*3/MM3 (ref 0–0.4)
EOSINOPHIL NFR BLD AUTO: 5.6 % (ref 0.3–6.2)
ERYTHROCYTE [DISTWIDTH] IN BLOOD BY AUTOMATED COUNT: 11.8 % (ref 12.3–15.4)
GFR SERPL CREATININE-BSD FRML MDRD: 83 ML/MIN/1.73
GLOBULIN UR ELPH-MCNC: 3 GM/DL
GLUCOSE BLD-MCNC: 123 MG/DL (ref 65–99)
HCT VFR BLD AUTO: 42.4 % (ref 34–46.6)
HGB BLD-MCNC: 14.8 G/DL (ref 12–15.9)
HOLD SPECIMEN: NORMAL
HOLD SPECIMEN: NORMAL
IMM GRANULOCYTES # BLD AUTO: 0.06 10*3/MM3 (ref 0–0.05)
IMM GRANULOCYTES NFR BLD AUTO: 0.7 % (ref 0–0.5)
LYMPHOCYTES # BLD AUTO: 1.5 10*3/MM3 (ref 0.7–3.1)
LYMPHOCYTES NFR BLD AUTO: 18 % (ref 19.6–45.3)
MCH RBC QN AUTO: 31.2 PG (ref 26.6–33)
MCHC RBC AUTO-ENTMCNC: 34.9 G/DL (ref 31.5–35.7)
MCV RBC AUTO: 89.5 FL (ref 79–97)
MONOCYTES # BLD AUTO: 0.66 10*3/MM3 (ref 0.1–0.9)
MONOCYTES NFR BLD AUTO: 7.9 % (ref 5–12)
NEUTROPHILS # BLD AUTO: 5.56 10*3/MM3 (ref 1.7–7)
NEUTROPHILS NFR BLD AUTO: 66.8 % (ref 42.7–76)
NRBC BLD AUTO-RTO: 0 /100 WBC (ref 0–0.2)
PLATELET # BLD AUTO: 272 10*3/MM3 (ref 140–450)
PMV BLD AUTO: 9.3 FL (ref 6–12)
POTASSIUM BLD-SCNC: 3.4 MMOL/L (ref 3.5–5.2)
PROT SERPL-MCNC: 7.3 G/DL (ref 6–8.5)
RBC # BLD AUTO: 4.74 10*6/MM3 (ref 3.77–5.28)
SODIUM BLD-SCNC: 140 MMOL/L (ref 136–145)
TROPONIN T SERPL-MCNC: <0.01 NG/ML (ref 0–0.03)
WBC NRBC COR # BLD: 8.33 10*3/MM3 (ref 3.4–10.8)
WHOLE BLOOD HOLD SPECIMEN: NORMAL
WHOLE BLOOD HOLD SPECIMEN: NORMAL

## 2019-12-30 PROCEDURE — 96374 THER/PROPH/DIAG INJ IV PUSH: CPT

## 2019-12-30 PROCEDURE — 93005 ELECTROCARDIOGRAM TRACING: CPT

## 2019-12-30 PROCEDURE — G0378 HOSPITAL OBSERVATION PER HR: HCPCS

## 2019-12-30 PROCEDURE — 71046 X-RAY EXAM CHEST 2 VIEWS: CPT

## 2019-12-30 PROCEDURE — 85025 COMPLETE CBC W/AUTO DIFF WBC: CPT | Performed by: PHYSICIAN ASSISTANT

## 2019-12-30 PROCEDURE — 25010000002 ONDANSETRON PER 1 MG: Performed by: PHYSICIAN ASSISTANT

## 2019-12-30 PROCEDURE — 70450 CT HEAD/BRAIN W/O DYE: CPT

## 2019-12-30 PROCEDURE — 93005 ELECTROCARDIOGRAM TRACING: CPT | Performed by: EMERGENCY MEDICINE

## 2019-12-30 PROCEDURE — 99213 OFFICE O/P EST LOW 20 MIN: CPT | Performed by: INTERNAL MEDICINE

## 2019-12-30 PROCEDURE — 80053 COMPREHEN METABOLIC PANEL: CPT | Performed by: PHYSICIAN ASSISTANT

## 2019-12-30 PROCEDURE — 25010000002 ONDANSETRON PER 1 MG: Performed by: NURSE PRACTITIONER

## 2019-12-30 PROCEDURE — 93010 ELECTROCARDIOGRAM REPORT: CPT | Performed by: INTERNAL MEDICINE

## 2019-12-30 PROCEDURE — 84484 ASSAY OF TROPONIN QUANT: CPT | Performed by: PHYSICIAN ASSISTANT

## 2019-12-30 PROCEDURE — 96376 TX/PRO/DX INJ SAME DRUG ADON: CPT

## 2019-12-30 PROCEDURE — 84484 ASSAY OF TROPONIN QUANT: CPT | Performed by: NURSE PRACTITIONER

## 2019-12-30 PROCEDURE — 99285 EMERGENCY DEPT VISIT HI MDM: CPT

## 2019-12-30 PROCEDURE — 96375 TX/PRO/DX INJ NEW DRUG ADDON: CPT

## 2019-12-30 RX ORDER — POTASSIUM CHLORIDE 7.45 MG/ML
10 INJECTION INTRAVENOUS
Status: DISCONTINUED | OUTPATIENT
Start: 2019-12-30 | End: 2020-01-03 | Stop reason: HOSPADM

## 2019-12-30 RX ORDER — ACETAMINOPHEN 160 MG/5ML
650 SOLUTION ORAL EVERY 4 HOURS PRN
Status: DISCONTINUED | OUTPATIENT
Start: 2019-12-30 | End: 2020-01-03 | Stop reason: HOSPADM

## 2019-12-30 RX ORDER — POTASSIUM CHLORIDE 750 MG/1
40 CAPSULE, EXTENDED RELEASE ORAL AS NEEDED
Status: DISCONTINUED | OUTPATIENT
Start: 2019-12-30 | End: 2020-01-03 | Stop reason: HOSPADM

## 2019-12-30 RX ORDER — FLUTICASONE PROPIONATE 50 MCG
2 SPRAY, SUSPENSION (ML) NASAL DAILY
Status: DISCONTINUED | OUTPATIENT
Start: 2019-12-30 | End: 2020-01-03 | Stop reason: HOSPADM

## 2019-12-30 RX ORDER — SODIUM CHLORIDE 0.9 % (FLUSH) 0.9 %
10 SYRINGE (ML) INJECTION AS NEEDED
Status: DISCONTINUED | OUTPATIENT
Start: 2019-12-30 | End: 2020-01-03 | Stop reason: HOSPADM

## 2019-12-30 RX ORDER — ONDANSETRON 2 MG/ML
4 INJECTION INTRAMUSCULAR; INTRAVENOUS ONCE
Status: COMPLETED | OUTPATIENT
Start: 2019-12-30 | End: 2019-12-30

## 2019-12-30 RX ORDER — POTASSIUM CHLORIDE 750 MG/1
40 CAPSULE, EXTENDED RELEASE ORAL AS NEEDED
Status: DISCONTINUED | OUTPATIENT
Start: 2019-12-30 | End: 2019-12-30

## 2019-12-30 RX ORDER — ONDANSETRON 2 MG/ML
4 INJECTION INTRAMUSCULAR; INTRAVENOUS EVERY 6 HOURS PRN
Status: DISCONTINUED | OUTPATIENT
Start: 2019-12-30 | End: 2020-01-03 | Stop reason: HOSPADM

## 2019-12-30 RX ORDER — ATORVASTATIN CALCIUM 20 MG/1
40 TABLET, FILM COATED ORAL DAILY
Status: DISCONTINUED | OUTPATIENT
Start: 2019-12-30 | End: 2020-01-03 | Stop reason: HOSPADM

## 2019-12-30 RX ORDER — MECLIZINE HYDROCHLORIDE 25 MG/1
25 TABLET ORAL 3 TIMES DAILY PRN
Status: DISCONTINUED | OUTPATIENT
Start: 2019-12-30 | End: 2020-01-03 | Stop reason: HOSPADM

## 2019-12-30 RX ORDER — POTASSIUM CHLORIDE 1.5 G/1.77G
40 POWDER, FOR SOLUTION ORAL AS NEEDED
Status: DISCONTINUED | OUTPATIENT
Start: 2019-12-30 | End: 2020-01-03 | Stop reason: HOSPADM

## 2019-12-30 RX ORDER — LISINOPRIL 20 MG/1
20 TABLET ORAL DAILY
Status: DISCONTINUED | OUTPATIENT
Start: 2019-12-30 | End: 2020-01-03 | Stop reason: HOSPADM

## 2019-12-30 RX ORDER — ACETAMINOPHEN 325 MG/1
650 TABLET ORAL EVERY 4 HOURS PRN
Status: DISCONTINUED | OUTPATIENT
Start: 2019-12-30 | End: 2020-01-03 | Stop reason: HOSPADM

## 2019-12-30 RX ORDER — NITROGLYCERIN 0.4 MG/1
0.4 TABLET SUBLINGUAL
Status: DISCONTINUED | OUTPATIENT
Start: 2019-12-30 | End: 2020-01-03 | Stop reason: HOSPADM

## 2019-12-30 RX ORDER — POTASSIUM CHLORIDE 1.5 G/1.77G
40 POWDER, FOR SOLUTION ORAL AS NEEDED
Status: DISCONTINUED | OUTPATIENT
Start: 2019-12-30 | End: 2019-12-30

## 2019-12-30 RX ORDER — ONDANSETRON 4 MG/1
4 TABLET, FILM COATED ORAL EVERY 6 HOURS PRN
Status: DISCONTINUED | OUTPATIENT
Start: 2019-12-30 | End: 2020-01-03 | Stop reason: HOSPADM

## 2019-12-30 RX ORDER — ACETAMINOPHEN 650 MG/1
650 SUPPOSITORY RECTAL EVERY 4 HOURS PRN
Status: DISCONTINUED | OUTPATIENT
Start: 2019-12-30 | End: 2020-01-03 | Stop reason: HOSPADM

## 2019-12-30 RX ORDER — AMLODIPINE BESYLATE 2.5 MG/1
2.5 TABLET ORAL DAILY
Status: DISCONTINUED | OUTPATIENT
Start: 2019-12-30 | End: 2020-01-02

## 2019-12-30 RX ORDER — POTASSIUM CHLORIDE 7.45 MG/ML
10 INJECTION INTRAVENOUS
Status: DISCONTINUED | OUTPATIENT
Start: 2019-12-30 | End: 2019-12-30

## 2019-12-30 RX ADMIN — AMLODIPINE BESYLATE 2.5 MG: 2.5 TABLET ORAL at 21:57

## 2019-12-30 RX ADMIN — LISINOPRIL 20 MG: 20 TABLET ORAL at 21:57

## 2019-12-30 RX ADMIN — ONDANSETRON 4 MG: 2 INJECTION INTRAMUSCULAR; INTRAVENOUS at 08:05

## 2019-12-30 RX ADMIN — POTASSIUM CHLORIDE 40 MEQ: 750 CAPSULE, EXTENDED RELEASE ORAL at 12:47

## 2019-12-30 RX ADMIN — SODIUM CHLORIDE 1000 ML: 9 INJECTION, SOLUTION INTRAVENOUS at 04:24

## 2019-12-30 RX ADMIN — ONDANSETRON 4 MG: 2 INJECTION INTRAMUSCULAR; INTRAVENOUS at 04:26

## 2019-12-30 RX ADMIN — METOPROLOL TARTRATE 5 MG: 5 INJECTION, SOLUTION INTRAVENOUS at 05:56

## 2019-12-30 RX ADMIN — ATORVASTATIN CALCIUM 40 MG: 20 TABLET, FILM COATED ORAL at 21:57

## 2019-12-30 RX ADMIN — METOPROLOL TARTRATE 25 MG: 25 TABLET ORAL at 21:57

## 2019-12-30 RX ADMIN — POTASSIUM CHLORIDE 40 MEQ: 750 CAPSULE, EXTENDED RELEASE ORAL at 22:07

## 2019-12-30 RX ADMIN — METOPROLOL TARTRATE 5 MG: 5 INJECTION, SOLUTION INTRAVENOUS at 04:24

## 2019-12-30 RX ADMIN — APIXABAN 5 MG: 5 TABLET, FILM COATED ORAL at 21:57

## 2019-12-30 RX ADMIN — FLUTICASONE PROPIONATE 2 SPRAY: 50 SPRAY, METERED NASAL at 21:57

## 2019-12-31 PROBLEM — H81.93 PERIPHERAL VESTIBULOPATHY OF BOTH EARS: Status: ACTIVE | Noted: 2019-12-30

## 2019-12-31 LAB
ANION GAP SERPL CALCULATED.3IONS-SCNC: 12.3 MMOL/L (ref 5–15)
BUN BLD-MCNC: 18 MG/DL (ref 8–23)
BUN/CREAT SERPL: 22.2 (ref 7–25)
CALCIUM SPEC-SCNC: 11 MG/DL (ref 8.6–10.5)
CHLORIDE SERPL-SCNC: 106 MMOL/L (ref 98–107)
CO2 SERPL-SCNC: 24.7 MMOL/L (ref 22–29)
CREAT BLD-MCNC: 0.81 MG/DL (ref 0.57–1)
DEPRECATED RDW RBC AUTO: 40 FL (ref 37–54)
ERYTHROCYTE [DISTWIDTH] IN BLOOD BY AUTOMATED COUNT: 11.8 % (ref 12.3–15.4)
GFR SERPL CREATININE-BSD FRML MDRD: 70 ML/MIN/1.73
GLUCOSE BLD-MCNC: 90 MG/DL (ref 65–99)
HCT VFR BLD AUTO: 41.3 % (ref 34–46.6)
HGB BLD-MCNC: 14.2 G/DL (ref 12–15.9)
MAGNESIUM SERPL-MCNC: 2.1 MG/DL (ref 1.6–2.4)
MCH RBC QN AUTO: 31.8 PG (ref 26.6–33)
MCHC RBC AUTO-ENTMCNC: 34.4 G/DL (ref 31.5–35.7)
MCV RBC AUTO: 92.4 FL (ref 79–97)
PLATELET # BLD AUTO: 306 10*3/MM3 (ref 140–450)
PMV BLD AUTO: 9.3 FL (ref 6–12)
POTASSIUM BLD-SCNC: 4.9 MMOL/L (ref 3.5–5.2)
RBC # BLD AUTO: 4.47 10*6/MM3 (ref 3.77–5.28)
SODIUM BLD-SCNC: 143 MMOL/L (ref 136–145)
WBC NRBC COR # BLD: 9.7 10*3/MM3 (ref 3.4–10.8)

## 2019-12-31 PROCEDURE — 99204 OFFICE O/P NEW MOD 45 MIN: CPT | Performed by: INTERNAL MEDICINE

## 2019-12-31 PROCEDURE — G0378 HOSPITAL OBSERVATION PER HR: HCPCS

## 2019-12-31 PROCEDURE — 36415 COLL VENOUS BLD VENIPUNCTURE: CPT | Performed by: NURSE PRACTITIONER

## 2019-12-31 PROCEDURE — 85027 COMPLETE CBC AUTOMATED: CPT | Performed by: NURSE PRACTITIONER

## 2019-12-31 PROCEDURE — 99213 OFFICE O/P EST LOW 20 MIN: CPT | Performed by: INTERNAL MEDICINE

## 2019-12-31 PROCEDURE — 97162 PT EVAL MOD COMPLEX 30 MIN: CPT

## 2019-12-31 PROCEDURE — 80048 BASIC METABOLIC PNL TOTAL CA: CPT | Performed by: NURSE PRACTITIONER

## 2019-12-31 PROCEDURE — 25010000002 METHYLPREDNISOLONE PER 125 MG: Performed by: PSYCHIATRY & NEUROLOGY

## 2019-12-31 PROCEDURE — 99204 OFFICE O/P NEW MOD 45 MIN: CPT | Performed by: PSYCHIATRY & NEUROLOGY

## 2019-12-31 PROCEDURE — 96375 TX/PRO/DX INJ NEW DRUG ADDON: CPT

## 2019-12-31 PROCEDURE — 97116 GAIT TRAINING THERAPY: CPT

## 2019-12-31 PROCEDURE — 83735 ASSAY OF MAGNESIUM: CPT | Performed by: NURSE PRACTITIONER

## 2019-12-31 PROCEDURE — 97530 THERAPEUTIC ACTIVITIES: CPT

## 2019-12-31 RX ORDER — CLONAZEPAM 0.5 MG/1
0.25 TABLET ORAL
Status: DISCONTINUED | OUTPATIENT
Start: 2019-12-31 | End: 2020-01-01

## 2019-12-31 RX ORDER — METHYLPREDNISOLONE SODIUM SUCCINATE 125 MG/2ML
60 INJECTION, POWDER, LYOPHILIZED, FOR SOLUTION INTRAMUSCULAR; INTRAVENOUS DAILY
Status: COMPLETED | OUTPATIENT
Start: 2019-12-31 | End: 2020-01-01

## 2019-12-31 RX ADMIN — APIXABAN 5 MG: 5 TABLET, FILM COATED ORAL at 21:32

## 2019-12-31 RX ADMIN — SODIUM CHLORIDE, PRESERVATIVE FREE 10 ML: 5 INJECTION INTRAVENOUS at 09:33

## 2019-12-31 RX ADMIN — METOPROLOL TARTRATE 25 MG: 25 TABLET ORAL at 09:33

## 2019-12-31 RX ADMIN — ATORVASTATIN CALCIUM 40 MG: 20 TABLET, FILM COATED ORAL at 09:32

## 2019-12-31 RX ADMIN — METOPROLOL TARTRATE 25 MG: 25 TABLET ORAL at 21:34

## 2019-12-31 RX ADMIN — FLUTICASONE PROPIONATE 2 SPRAY: 50 SPRAY, METERED NASAL at 09:32

## 2019-12-31 RX ADMIN — LISINOPRIL 20 MG: 20 TABLET ORAL at 09:32

## 2019-12-31 RX ADMIN — AMLODIPINE BESYLATE 2.5 MG: 2.5 TABLET ORAL at 09:33

## 2019-12-31 RX ADMIN — APIXABAN 5 MG: 5 TABLET, FILM COATED ORAL at 09:33

## 2019-12-31 RX ADMIN — CLONAZEPAM 0.25 MG: 0.5 TABLET ORAL at 18:35

## 2019-12-31 RX ADMIN — METHYLPREDNISOLONE SODIUM SUCCINATE 60 MG: 125 INJECTION, POWDER, FOR SOLUTION INTRAMUSCULAR; INTRAVENOUS at 18:36

## 2020-01-01 PROCEDURE — 99212 OFFICE O/P EST SF 10 MIN: CPT | Performed by: PSYCHIATRY & NEUROLOGY

## 2020-01-01 PROCEDURE — G0378 HOSPITAL OBSERVATION PER HR: HCPCS

## 2020-01-01 PROCEDURE — 97110 THERAPEUTIC EXERCISES: CPT

## 2020-01-01 PROCEDURE — 96376 TX/PRO/DX INJ SAME DRUG ADON: CPT

## 2020-01-01 PROCEDURE — 25010000002 METHYLPREDNISOLONE PER 125 MG: Performed by: PSYCHIATRY & NEUROLOGY

## 2020-01-01 PROCEDURE — 99213 OFFICE O/P EST LOW 20 MIN: CPT | Performed by: INTERNAL MEDICINE

## 2020-01-01 RX ORDER — CLONAZEPAM 0.5 MG/1
0.5 TABLET ORAL 2 TIMES DAILY
Status: DISCONTINUED | OUTPATIENT
Start: 2020-01-01 | End: 2020-01-03 | Stop reason: HOSPADM

## 2020-01-01 RX ORDER — FLECAINIDE ACETATE 50 MG/1
50 TABLET ORAL EVERY 12 HOURS SCHEDULED
Status: DISCONTINUED | OUTPATIENT
Start: 2020-01-01 | End: 2020-01-03 | Stop reason: HOSPADM

## 2020-01-01 RX ADMIN — METOPROLOL TARTRATE 25 MG: 25 TABLET ORAL at 22:55

## 2020-01-01 RX ADMIN — CLONAZEPAM 0.25 MG: 0.5 TABLET ORAL at 09:18

## 2020-01-01 RX ADMIN — LISINOPRIL 20 MG: 20 TABLET ORAL at 09:15

## 2020-01-01 RX ADMIN — APIXABAN 5 MG: 5 TABLET, FILM COATED ORAL at 09:15

## 2020-01-01 RX ADMIN — FLECAINIDE ACETATE TABLET 50 MG: 50 TABLET ORAL at 22:55

## 2020-01-01 RX ADMIN — FLECAINIDE ACETATE TABLET 50 MG: 50 TABLET ORAL at 14:07

## 2020-01-01 RX ADMIN — METOPROLOL TARTRATE 25 MG: 25 TABLET ORAL at 09:15

## 2020-01-01 RX ADMIN — ATORVASTATIN CALCIUM 40 MG: 20 TABLET, FILM COATED ORAL at 09:18

## 2020-01-01 RX ADMIN — FLUTICASONE PROPIONATE 2 SPRAY: 50 SPRAY, METERED NASAL at 09:15

## 2020-01-01 RX ADMIN — APIXABAN 5 MG: 5 TABLET, FILM COATED ORAL at 22:55

## 2020-01-01 RX ADMIN — CLONAZEPAM 0.5 MG: 0.5 TABLET ORAL at 22:55

## 2020-01-01 RX ADMIN — METHYLPREDNISOLONE SODIUM SUCCINATE 60 MG: 125 INJECTION, POWDER, FOR SOLUTION INTRAMUSCULAR; INTRAVENOUS at 09:15

## 2020-01-01 RX ADMIN — AMLODIPINE BESYLATE 2.5 MG: 2.5 TABLET ORAL at 09:15

## 2020-01-02 PROCEDURE — 99213 OFFICE O/P EST LOW 20 MIN: CPT | Performed by: INTERNAL MEDICINE

## 2020-01-02 PROCEDURE — G0378 HOSPITAL OBSERVATION PER HR: HCPCS

## 2020-01-02 PROCEDURE — 97110 THERAPEUTIC EXERCISES: CPT

## 2020-01-02 RX ORDER — FLECAINIDE ACETATE 50 MG/1
50 TABLET ORAL EVERY 12 HOURS SCHEDULED
Qty: 60 TABLET | Refills: 0 | Status: SHIPPED | OUTPATIENT
Start: 2020-01-02 | End: 2020-01-30 | Stop reason: SDUPTHER

## 2020-01-02 RX ORDER — CLONAZEPAM 0.5 MG/1
0.5 TABLET ORAL 2 TIMES DAILY
Qty: 10 TABLET | Refills: 0 | Status: SHIPPED | OUTPATIENT
Start: 2020-01-02 | End: 2020-01-08

## 2020-01-02 RX ADMIN — ATORVASTATIN CALCIUM 40 MG: 20 TABLET, FILM COATED ORAL at 09:55

## 2020-01-02 RX ADMIN — LISINOPRIL 20 MG: 20 TABLET ORAL at 09:54

## 2020-01-02 RX ADMIN — APIXABAN 5 MG: 5 TABLET, FILM COATED ORAL at 21:38

## 2020-01-02 RX ADMIN — FLECAINIDE ACETATE TABLET 50 MG: 50 TABLET ORAL at 21:38

## 2020-01-02 RX ADMIN — AMLODIPINE BESYLATE 2.5 MG: 2.5 TABLET ORAL at 09:55

## 2020-01-02 RX ADMIN — FLUTICASONE PROPIONATE 2 SPRAY: 50 SPRAY, METERED NASAL at 09:55

## 2020-01-02 RX ADMIN — APIXABAN 5 MG: 5 TABLET, FILM COATED ORAL at 09:55

## 2020-01-02 RX ADMIN — METOPROLOL TARTRATE 25 MG: 25 TABLET ORAL at 09:55

## 2020-01-02 RX ADMIN — FLECAINIDE ACETATE TABLET 50 MG: 50 TABLET ORAL at 09:54

## 2020-01-02 RX ADMIN — CLONAZEPAM 0.5 MG: 0.5 TABLET ORAL at 09:55

## 2020-01-03 VITALS
TEMPERATURE: 97.7 F | HEART RATE: 59 BPM | RESPIRATION RATE: 18 BRPM | BODY MASS INDEX: 31.43 KG/M2 | OXYGEN SATURATION: 93 % | SYSTOLIC BLOOD PRESSURE: 129 MMHG | HEIGHT: 61 IN | WEIGHT: 166.45 LBS | DIASTOLIC BLOOD PRESSURE: 73 MMHG

## 2020-01-03 PROCEDURE — G0378 HOSPITAL OBSERVATION PER HR: HCPCS

## 2020-01-03 PROCEDURE — 93010 ELECTROCARDIOGRAM REPORT: CPT | Performed by: INTERNAL MEDICINE

## 2020-01-03 PROCEDURE — 93005 ELECTROCARDIOGRAM TRACING: CPT | Performed by: INTERNAL MEDICINE

## 2020-01-03 PROCEDURE — 99213 OFFICE O/P EST LOW 20 MIN: CPT | Performed by: NURSE PRACTITIONER

## 2020-01-03 PROCEDURE — 97110 THERAPEUTIC EXERCISES: CPT

## 2020-01-03 RX ADMIN — METOPROLOL TARTRATE 25 MG: 25 TABLET ORAL at 08:43

## 2020-01-03 RX ADMIN — LISINOPRIL 20 MG: 20 TABLET ORAL at 08:43

## 2020-01-03 RX ADMIN — FLUTICASONE PROPIONATE 2 SPRAY: 50 SPRAY, METERED NASAL at 08:43

## 2020-01-03 RX ADMIN — ATORVASTATIN CALCIUM 40 MG: 20 TABLET, FILM COATED ORAL at 08:43

## 2020-01-03 RX ADMIN — APIXABAN 5 MG: 5 TABLET, FILM COATED ORAL at 08:43

## 2020-01-03 RX ADMIN — SODIUM CHLORIDE, PRESERVATIVE FREE 10 ML: 5 INJECTION INTRAVENOUS at 08:43

## 2020-01-03 RX ADMIN — CLONAZEPAM 0.5 MG: 0.5 TABLET ORAL at 08:43

## 2020-01-03 RX ADMIN — FLECAINIDE ACETATE TABLET 50 MG: 50 TABLET ORAL at 08:43

## 2020-01-04 ENCOUNTER — READMISSION MANAGEMENT (OUTPATIENT)
Dept: CALL CENTER | Facility: HOSPITAL | Age: 70
End: 2020-01-04

## 2020-01-04 NOTE — OUTREACH NOTE
Prep Survey      Responses   Facility patient discharged from?  [facility]   Is patient eligible?  Yes   Discharge diagnosis  Afib with RVR   Does the patient have one of the following disease processes/diagnoses(primary or secondary)?  Other   Does the patient have Home health ordered?  No   Is there a DME ordered?  No   Prep survey completed?  Yes          Rebekah Alejo RN

## 2020-01-06 ENCOUNTER — EPISODE CHANGES (OUTPATIENT)
Dept: CASE MANAGEMENT | Facility: OTHER | Age: 70
End: 2020-01-06

## 2020-01-06 ENCOUNTER — READMISSION MANAGEMENT (OUTPATIENT)
Dept: CALL CENTER | Facility: HOSPITAL | Age: 70
End: 2020-01-06

## 2020-01-06 NOTE — OUTREACH NOTE
Medical Week 1 Survey      Responses   Facility patient discharged from?  [place]   Does the patient have one of the following disease processes/diagnoses(primary or secondary)?  Other   Is there a successful TCM telephone encounter documented?  No   Week 1 attempt successful?  No   Unsuccessful attempts  Attempt 1          Orlando Tran RN

## 2020-01-08 ENCOUNTER — READMISSION MANAGEMENT (OUTPATIENT)
Dept: CALL CENTER | Facility: HOSPITAL | Age: 70
End: 2020-01-08

## 2020-01-08 NOTE — OUTREACH NOTE
Medical Week 1 Survey      Responses   Facility patient discharged from?  [facility]   Does the patient have one of the following disease processes/diagnoses(primary or secondary)?  Other   Is there a successful TCM telephone encounter documented?  No   Week 1 attempt successful?  Yes   Call start time  1248   Call end time  1300   Discharge diagnosis  Afib with RVR   Meds reviewed with patient/caregiver?  Yes   Is the patient having any side effects they believe may be caused by any medication additions or changes?  No   Does the patient have all medications ordered at discharge?  Yes   Is the patient taking all medications as directed (includes completed medication regime)?  Yes   Does the patient have a primary care provider?   Yes   Does the patient have an appointment with their PCP within 7 days of discharge?  Greater than 7 days   Comments regarding PCP  Has a cardiology appt on 01/14/2020   What is preventing the patient from scheduling follow up appointments within 7 days of discharge?  Earlier appointment not available   Nursing Interventions  Advised patient to make appointment, Verified appointment date/time/provider   Has the patient kept scheduled appointments due by today?  N/A   Has home health visited the patient within 72 hours of discharge?  N/A   Psychosocial issues?  No   Did the patient receive a copy of their discharge instructions?  Yes   Nursing interventions  Reviewed instructions with patient   What is the patient's perception of their health status since discharge?  Improving   Is the patient/caregiver able to teach back signs and symptoms related to disease process for when to call PCP?  Yes   Is the patient/caregiver able to teach back signs and symptoms related to disease process for when to call 911?  Yes   Is the patient/caregiver able to teach back the hierarchy of who to call/visit for symptoms/problems? PCP, Specialist, Home health nurse, Urgent Care, ED, 911  Yes   Week 1 call  completed?  Yes          Orlando Tran RN

## 2020-01-09 ENCOUNTER — TREATMENT (OUTPATIENT)
Dept: PHYSICAL THERAPY | Facility: CLINIC | Age: 70
End: 2020-01-09

## 2020-01-09 DIAGNOSIS — R42 VERTIGO: Primary | ICD-10-CM

## 2020-01-09 DIAGNOSIS — H81.13 BPPV (BENIGN PAROXYSMAL POSITIONAL VERTIGO), BILATERAL: ICD-10-CM

## 2020-01-09 PROCEDURE — 97161 PT EVAL LOW COMPLEX 20 MIN: CPT | Performed by: PHYSICAL THERAPIST

## 2020-01-09 PROCEDURE — 95992 CANALITH REPOSITIONING PROC: CPT | Performed by: PHYSICAL THERAPIST

## 2020-01-09 NOTE — PROGRESS NOTES
Physical Therapy Initial Evaluation-  Vestibular Therapy    Patient Name: Ariadne Telles       Patient MRN: [id number]  : 1950  Physician:Nick Ariza MD  Date: 2020     Encounter Diagnoses   Name Primary?   • Vertigo Yes   • BPPV (benign paroxysmal positional vertigo), bilateral        Objective Testing:  Positional Testing  Positional Testing: With infrared goggles  [place]-Hallpike Right: Right-beating Nystagmus  [place]-Hallpike Right Duration Time : 15 sec  [place]-Hallpike Left: Upbeat, left rotatory nystagmus(wosr than R)  Horizontal Roll Test Right: Right-beating  Horizontal Roll Test Left: (upbeat L rotn)      DHI: 76/100, severe perception of handicap       THERAPY ASSESSMENT: Patient is a 69 y.o. female. Patient presents to physical therapy due to complaints of episodes of dizziness/vertigo/severe nausea with emesis and imbalance. This has sent her to the ER a couple of times. Episodes started 19. Since the two severe episodes she reports vertigo with certain movements with nausea lasting a couple of minutes.  Signs and symptoms are consistent with B PC BPPV.  L side was treated today with CRP. Patient is a good candidate for physical therapy to address the following:    Functional Limitations: Walking, Difficulty moving, Decreased ability to perform ADL's, Decreased ability to perform critical demands of job   Length of Therapy: 1 month   PT Frequency: PT 2x week   PT Interventions: Home Exercise Program, CRP  Patient Agrees with Plan of Care: Yes    REHAB POTENTIAL: good            SHORT TERM GOALS: To be met in 2 weeks:  1. Patient is independent with HEP.  2. Patient will report at least 30% improvement in overall condition.  LONG TERM GOALS:To be met in 4 weeks:  1. Patient will report decreased disequilibrium/dizziness by at least 90%.  2. Patient will report no loss of balance with ADLs to demonstrate improved functional balance.  3. Patient denies dizziness with daily  activity.  4. DHI score is less than 10.     Other Procedure CPT 42689 minutes 0    Timed Code Treatment: 0   Minutes     Total Treatment Time: 60      Minutes      PT SIGNATURE: Irais Canada PT, DPT, CHT   [place] license #[id number]  DATE TREATMENT INITIATED: 1/9/2020     Medicare Initial Certification  Certification Period: 4/8/2020  I certify that the therapy services are furnished while this patient is under my care.  The services outlined above are required by this patient, and will be reviewed every 90 days.     PHYSICIAN: Nick Ariza MD      DATE:     Please sign and return via fax to [phone].. Thank you, [facility].

## 2020-01-14 ENCOUNTER — OFFICE VISIT (OUTPATIENT)
Dept: CARDIOLOGY | Facility: CLINIC | Age: 70
End: 2020-01-14

## 2020-01-14 ENCOUNTER — TREATMENT (OUTPATIENT)
Dept: PHYSICAL THERAPY | Facility: CLINIC | Age: 70
End: 2020-01-14

## 2020-01-14 ENCOUNTER — HOSPITAL ENCOUNTER (OUTPATIENT)
Dept: CARDIOLOGY | Facility: HOSPITAL | Age: 70
Discharge: HOME | End: 2020-01-14
Admitting: NURSE PRACTITIONER

## 2020-01-14 VITALS
DIASTOLIC BLOOD PRESSURE: 70 MMHG | SYSTOLIC BLOOD PRESSURE: 118 MMHG | WEIGHT: 166 LBS | BODY MASS INDEX: 31.34 KG/M2 | HEIGHT: 61 IN | OXYGEN SATURATION: 97 % | HEART RATE: 57 BPM

## 2020-01-14 VITALS
BODY MASS INDEX: 30.43 KG/M2 | WEIGHT: 161.2 LBS | HEIGHT: 61 IN | HEART RATE: 52 BPM | SYSTOLIC BLOOD PRESSURE: 120 MMHG | DIASTOLIC BLOOD PRESSURE: 80 MMHG

## 2020-01-14 DIAGNOSIS — I48.0 AF (PAROXYSMAL ATRIAL FIBRILLATION) (HCC): Primary | ICD-10-CM

## 2020-01-14 DIAGNOSIS — I48.91 ATRIAL FIBRILLATION WITH RVR (HCC): ICD-10-CM

## 2020-01-14 DIAGNOSIS — R42 VERTIGO: Primary | ICD-10-CM

## 2020-01-14 DIAGNOSIS — H81.13 BPPV (BENIGN PAROXYSMAL POSITIONAL VERTIGO), BILATERAL: ICD-10-CM

## 2020-01-14 DIAGNOSIS — I34.1 MVP (MITRAL VALVE PROLAPSE): ICD-10-CM

## 2020-01-14 LAB
AORTIC ARCH: 2.8 CM
AORTIC ROOT ANNULUS: 2.2 CM
ASCENDING AORTA: 3.1 CM
BH CV ECHO MEAS - ACS: 1.8 CM
BH CV ECHO MEAS - AO MAX PG (FULL): 3.3 MMHG
BH CV ECHO MEAS - AO MAX PG: 7.8 MMHG
BH CV ECHO MEAS - AO MEAN PG (FULL): 2.1 MMHG
BH CV ECHO MEAS - AO MEAN PG: 4.6 MMHG
BH CV ECHO MEAS - AO V2 MAX: 139.6 CM/SEC
BH CV ECHO MEAS - AO V2 MEAN: 103.5 CM/SEC
BH CV ECHO MEAS - AO V2 VTI: 33.5 CM
BH CV ECHO MEAS - ASC AORTA: 3.1 CM
BH CV ECHO MEAS - AVA(I,A): 2 CM^2
BH CV ECHO MEAS - AVA(I,D): 2 CM^2
BH CV ECHO MEAS - AVA(V,A): 2.2 CM^2
BH CV ECHO MEAS - AVA(V,D): 2.2 CM^2
BH CV ECHO MEAS - BSA(HAYCOCK): 1.8 M^2
BH CV ECHO MEAS - BSA: 1.7 M^2
BH CV ECHO MEAS - BZI_BMI: 31.4 KILOGRAMS/M^2
BH CV ECHO MEAS - BZI_METRIC_HEIGHT: 154.9 CM
BH CV ECHO MEAS - BZI_METRIC_WEIGHT: 75.3 KG
BH CV ECHO MEAS - EDV(MOD-SP2): 96 ML
BH CV ECHO MEAS - EDV(MOD-SP4): 87 ML
BH CV ECHO MEAS - EDV(TEICH): 45.5 ML
BH CV ECHO MEAS - EF(CUBED): 76.9 %
BH CV ECHO MEAS - EF(MOD-BP): 64 %
BH CV ECHO MEAS - EF(MOD-SP2): 61.5 %
BH CV ECHO MEAS - EF(MOD-SP4): 65.5 %
BH CV ECHO MEAS - EF(TEICH): 70.2 %
BH CV ECHO MEAS - ESV(MOD-SP2): 37 ML
BH CV ECHO MEAS - ESV(MOD-SP4): 30 ML
BH CV ECHO MEAS - ESV(TEICH): 13.6 ML
BH CV ECHO MEAS - FS: 38.6 %
BH CV ECHO MEAS - IVS/LVPW: 1.1
BH CV ECHO MEAS - IVSD: 1.2 CM
BH CV ECHO MEAS - LAT PEAK E' VEL: 9 CM/SEC
BH CV ECHO MEAS - LV DIASTOLIC VOL/BSA (35-75): 49.8 ML/M^2
BH CV ECHO MEAS - LV MASS(C)D: 116.7 GRAMS
BH CV ECHO MEAS - LV MASS(C)DI: 66.9 GRAMS/M^2
BH CV ECHO MEAS - LV MAX PG: 4.5 MMHG
BH CV ECHO MEAS - LV MEAN PG: 2.6 MMHG
BH CV ECHO MEAS - LV SYSTOLIC VOL/BSA (12-30): 17.2 ML/M^2
BH CV ECHO MEAS - LV V1 MAX: 106.3 CM/SEC
BH CV ECHO MEAS - LV V1 MEAN: 76.8 CM/SEC
BH CV ECHO MEAS - LV V1 VTI: 23.8 CM
BH CV ECHO MEAS - LVIDD: 3.3 CM
BH CV ECHO MEAS - LVIDS: 2.1 CM
BH CV ECHO MEAS - LVLD AP2: 7.2 CM
BH CV ECHO MEAS - LVLD AP4: 7.6 CM
BH CV ECHO MEAS - LVLS AP2: 5.1 CM
BH CV ECHO MEAS - LVLS AP4: 6 CM
BH CV ECHO MEAS - LVOT AREA (M): 2.8 CM^2
BH CV ECHO MEAS - LVOT AREA: 2.8 CM^2
BH CV ECHO MEAS - LVOT DIAM: 1.9 CM
BH CV ECHO MEAS - LVPWD: 1.1 CM
BH CV ECHO MEAS - MED PEAK E' VEL: 7 CM/SEC
BH CV ECHO MEAS - MR MAX PG: 47.1 MMHG
BH CV ECHO MEAS - MR MAX VEL: 343.2 CM/SEC
BH CV ECHO MEAS - MV A DUR: 0.1 SEC
BH CV ECHO MEAS - MV A MAX VEL: 67.7 CM/SEC
BH CV ECHO MEAS - MV DEC SLOPE: 312.1 CM/SEC^2
BH CV ECHO MEAS - MV DEC TIME: 0.23 SEC
BH CV ECHO MEAS - MV E MAX VEL: 68.6 CM/SEC
BH CV ECHO MEAS - MV E/A: 1
BH CV ECHO MEAS - MV MAX PG: 3.2 MMHG
BH CV ECHO MEAS - MV MEAN PG: 1.1 MMHG
BH CV ECHO MEAS - MV P1/2T MAX VEL: 70.7 CM/SEC
BH CV ECHO MEAS - MV P1/2T: 66.4 MSEC
BH CV ECHO MEAS - MV V2 MAX: 89.2 CM/SEC
BH CV ECHO MEAS - MV V2 MEAN: 46.1 CM/SEC
BH CV ECHO MEAS - MV V2 VTI: 33.5 CM
BH CV ECHO MEAS - MVA P1/2T LCG: 3.1 CM^2
BH CV ECHO MEAS - MVA(P1/2T): 3.3 CM^2
BH CV ECHO MEAS - MVA(VTI): 2 CM^2
BH CV ECHO MEAS - PA ACC TIME: 0.12 SEC
BH CV ECHO MEAS - PA MAX PG (FULL): 0.68 MMHG
BH CV ECHO MEAS - PA MAX PG: 2.5 MMHG
BH CV ECHO MEAS - PA PR(ACCEL): 26.7 MMHG
BH CV ECHO MEAS - PA V2 MAX: 78.5 CM/SEC
BH CV ECHO MEAS - PULM A REVS DUR: 0.12 SEC
BH CV ECHO MEAS - PULM A REVS VEL: 24.9 CM/SEC
BH CV ECHO MEAS - PULM DIAS VEL: 36 CM/SEC
BH CV ECHO MEAS - PULM S/D: 1.2
BH CV ECHO MEAS - PULM SYS VEL: 44.1 CM/SEC
BH CV ECHO MEAS - PVA(V,A): 1.9 CM^2
BH CV ECHO MEAS - PVA(V,D): 1.9 CM^2
BH CV ECHO MEAS - QP/QS: 0.53
BH CV ECHO MEAS - RAP SYSTOLE: 3 MMHG
BH CV ECHO MEAS - RV MAX PG: 1.8 MMHG
BH CV ECHO MEAS - RV MEAN PG: 1 MMHG
BH CV ECHO MEAS - RV V1 MAX: 66.8 CM/SEC
BH CV ECHO MEAS - RV V1 MEAN: 47.7 CM/SEC
BH CV ECHO MEAS - RV V1 VTI: 15.8 CM
BH CV ECHO MEAS - RVOT AREA: 2.2 CM^2
BH CV ECHO MEAS - RVOT DIAM: 1.7 CM
BH CV ECHO MEAS - RVSP: 22 MMHG
BH CV ECHO MEAS - SI(CUBED): 16.4 ML/M^2
BH CV ECHO MEAS - SI(LVOT): 38.7 ML/M^2
BH CV ECHO MEAS - SI(MOD-SP2): 33.8 ML/M^2
BH CV ECHO MEAS - SI(MOD-SP4): 32.7 ML/M^2
BH CV ECHO MEAS - SI(TEICH): 18.3 ML/M^2
BH CV ECHO MEAS - SUP REN AO DIAM: 1.7 CM
BH CV ECHO MEAS - SV(CUBED): 28.7 ML
BH CV ECHO MEAS - SV(LVOT): 67.5 ML
BH CV ECHO MEAS - SV(MOD-SP2): 59 ML
BH CV ECHO MEAS - SV(MOD-SP4): 57 ML
BH CV ECHO MEAS - SV(RVOT): 35.4 ML
BH CV ECHO MEAS - SV(TEICH): 31.9 ML
BH CV ECHO MEAS - TAPSE (>1.6): 1.6 CM2
BH CV ECHO MEAS - TR MAX VEL: 219.1 CM/SEC
BH CV ECHO MEASUREMENTS AVERAGE E/E' RATIO: 8.58
BH CV XLRA - RV BASE: 3.1 CM
BH CV XLRA - RV LENGTH: 5.2 CM
BH CV XLRA - RV MID: 2.8 CM
BH CV XLRA - TDI S': 11 CM/SEC
LEFT ATRIUM VOLUME INDEX: 22 ML/M2
LV EF 2D ECHO EST: 64 %
SINUS: 2.9 CM
STJ: 2.6 CM

## 2020-01-14 PROCEDURE — 93306 TTE W/DOPPLER COMPLETE: CPT

## 2020-01-14 PROCEDURE — 93306 TTE W/DOPPLER COMPLETE: CPT | Performed by: INTERNAL MEDICINE

## 2020-01-14 PROCEDURE — 99213 OFFICE O/P EST LOW 20 MIN: CPT | Performed by: INTERNAL MEDICINE

## 2020-01-14 PROCEDURE — 25010000002 PERFLUTREN (DEFINITY) 8.476 MG IN SODIUM CHLORIDE 0.9 % 10 ML INJECTION: Performed by: NURSE PRACTITIONER

## 2020-01-14 PROCEDURE — 95992 CANALITH REPOSITIONING PROC: CPT | Performed by: PHYSICAL THERAPIST

## 2020-01-14 PROCEDURE — 93000 ELECTROCARDIOGRAM COMPLETE: CPT | Performed by: INTERNAL MEDICINE

## 2020-01-14 RX ADMIN — PERFLUTREN 1.5 ML: 6.52 INJECTION, SUSPENSION INTRAVENOUS at 12:25

## 2020-01-14 NOTE — PROGRESS NOTES
Vestibular Daily Progress Note    Visit#:2  Subjective   I am much better but still having some dizziness when I look bend over and get out of bed.   Objective         Positional Testing  Positional Testing: With infrared goggles  [place]-Hallpike Left: Upbeat, left rotatory nystagmus  [place]-Hallpike Left Duration Time : > 1 min          PROCEDURES AND MODALITIES:  Paraffin:    Moist Heat:    Ice:    E-Stim:    Ultrasound:    Ionto:   Traction:    See Exercise, Manual, and Modality Logs for complete treatment.   Other Procedure CPT 81021 minutes 0       Timed Code Treatment: 0 Minutes  Total Treatment Time: 30 Minutes    Assessment/Plan   L PC canalithiasis BPPV is resolved. Today she presets with L PC cupulolithiasis BPPV. Treated with CRP today and tolerated well. She is significantly improved since last visit. Visible in her movement and overall demeanor.     Progress per Plan of Care    Irais Canada, PT, DPT, CHT  Physical Therapist

## 2020-01-14 NOTE — PROGRESS NOTES
Date of Office Visit: 2020  Encounter Provider: Francesco Riojas MD  Place of Service: [facility] CARDIOLOGY  Patient Name: Ariadne Telles  :1950    Chief Complaint   Patient presents with   • Atrial Fibrillation   :     HPI: Ariadne Telles is a 69 y.o. female who presents today for paroxysmal atrial fibrillation.  The patient was seen a couple weeks ago when she was admitted to the hospital with severe dizziness and paroxysmal atrial fibrillation.  She was diagnosed with vertigo, and has been treated with outpatient physical therapy and Epley maneuvers.  She reports good improvement in her dizziness.  She was also suffering from paroxysmal atrial fibrillation, she was started on flecainide.  Since discharge she has not had any further episodes of severe intermittent tachycardia or palpitations.  She is tolerating the flecainide well.  She is continued on anticoagulation.  She has not yet returned to work.          Past Medical History:   Diagnosis Date   • Bradycardia 2016   • Carpal tunnel syndrome 2018   • Essential hypertension 2016   • Gastroesophageal reflux disease 2016   • History of endometrial cancer 2017    1994, S/P ANGEL, BSO.  Dr. Catracho Alejo.   • Hx of bone density study 2017, DEXA scan: Osteoporosis in L1, severe osteopenia in both femoral necks.  T-scores: L1, -2.5; L2, -1.9; L3, -0.7; L4, +0.3.  Right femoral neck, -2.1; total -1.8.  Left femoral neck, -2.3; total, -1.8.   • Hypercholesterolemia 2016   • Kidney stone    • MVP (mitral valve prolapse) 5/15/2018   • Osteopenia 2016, DEXA scan: Severe osteopenia in both femoral necks, T score= -2.1 in the right femoral neck, -2.3 in the left femoral neck.   • Osteoporosis 2017    DEXA scan, T score L1= -2.5   • Pituitary adenoma (CMS/HCC) 2018    Thought to have a pituitary microadenoma on previous studies but MRI of the pituitary with and without  "contrast, February 10, 2017: \"No convincing evidence to suggest a pituitary adenoma on this MRI study\".  \"Incidentally noted relatively mild changes of chronic small vessel ischemia phenomena.\"   • Rectal polyp 2016   • Surgical menopause     ANGEL, BSO, for endometrial carcinoma.   • Vaginal delivery     x1  NITO, (and 2 C-sections, one stillbirth).       Past Surgical History:   Procedure Laterality Date   • CARPAL TUNNEL RELEASE WITH CUBITAL TUNNEL RELEASE  2018    right   • CATARACT EXTRACTION Right 2016   •  SECTION      x2   one stillborn Ariadne Isbell   • COLONOSCOPY  2007    polyps  Dr. Rueda    • TOTAL ABDOMINAL HYSTERECTOMY WITH SALPINGO OOPHORECTOMY      Dr. Alejo       Social History     Socioeconomic History   • Marital status:      Spouse name:    • Number of children: 2   • Years of education: Not on file   • Highest education level: Not on file   Tobacco Use   • Smoking status: Never Smoker   • Smokeless tobacco: Never Used   Substance and Sexual Activity   • Alcohol use: No   • Drug use: No   • Sexual activity: Never     Birth control/protection: Surgical     Comment:        Family History   Problem Relation Age of Onset   • Heart disease Mother    • Heart attack Mother 67   • COPD Father    • Parkinsonism Father         ?   • Heart defect Sister          at 18   • Alcohol abuse Brother    • Diabetes Brother    • Heart disease Brother    • Hypertension Brother    • Hyperlipidemia Brother    • Other Daughter         stillborn   • Sleep apnea Son    • No Known Problems Maternal Grandmother    • No Known Problems Paternal Grandmother    • Breast cancer Maternal Aunt 75   • No Known Problems Paternal Aunt    • No Known Problems Maternal Grandfather    • No Known Problems Paternal Grandfather    • Kidney cancer Sister    • No Known Problems Son    • BRCA 1/2 Neg Hx    • Colon cancer Neg Hx    • Endometrial cancer Neg Hx    • Ovarian " "cancer Neg Hx        Review of Systems   Constitution: Negative.   Cardiovascular: Negative.    Respiratory: Negative.    Gastrointestinal: Negative.    Neurological: Positive for dizziness.       Allergies   Allergen Reactions   • Naproxen Hives and Swelling   • Paroxetine Other (See Comments)     TREMORS         Current Outpatient Medications:   •  alendronate (FOSAMAX) 70 MG tablet, TAKE 1 TABLET BY MOUTH EVERY 7 DAYS (Patient taking differently: Take 70 mg by mouth Every 7 (Seven) Days.), Disp: 12 tablet, Rfl: 1  •  apixaban (ELIQUIS) 5 MG tablet tablet, Take 1 tablet by mouth Every 12 (Twelve) Hours., Disp: 60 tablet, Rfl: 3  •  atorvastatin (LIPITOR) 40 MG tablet, TAKE 1 TABLET BY MOUTH DAILY, Disp: 90 tablet, Rfl: 1  •  calcium carbonate (OS-SHANNAN) 600 MG tablet, Take 600 mg by mouth Daily., Disp: , Rfl:   •  flecainide (TAMBOCOR) 50 MG tablet, Take 1 tablet by mouth every 12 hours., Disp: 60 tablet, Rfl: 0  •  fluticasone (FLONASE) 50 MCG/ACT nasal spray, 2 sprays into each nostril Daily. (Patient taking differently: 2 sprays into the nostril(s) as directed by provider Daily As Needed.), Disp: 16 g, Rfl: 3  •  lisinopril (PRINIVIL,ZESTRIL) 20 MG tablet, TAKE 1 TABLET BY MOUTH DAILY, Disp: 90 tablet, Rfl: 1  •  metoprolol tartrate (LOPRESSOR) 25 MG tablet, Take 1 tablet by mouth Every 12 (Twelve) Hours., Disp: 30 tablet, Rfl: 3  •  Multiple Vitamin (MULTIVITAMIN) capsule, Take  by mouth., Disp: , Rfl:   No current facility-administered medications for this visit.       Objective:     Vitals:    01/14/20 1254   BP: 120/80   Pulse: 52   Weight: 73.1 kg (161 lb 3.2 oz)   Height: 154.9 cm (61\")     Body mass index is 30.46 kg/m².    PHYSICAL EXAM:    Physical Exam   Constitutional: She is oriented to person, place, and time. She appears well-developed and well-nourished. No distress.   HENT:   Head: Normocephalic.   Eyes: Right eye exhibits no discharge. Left eye exhibits no discharge.   Neck: No JVD present. No " tracheal deviation present.   Cardiovascular: Normal rate, regular rhythm and normal heart sounds.   Pulmonary/Chest: Effort normal and breath sounds normal.   Neurological: She is alert and oriented to person, place, and time.   Skin: Skin is dry. She is not diaphoretic.   Psychiatric: She has a normal mood and affect. Her behavior is normal. Thought content normal.   Nursing note and vitals reviewed.          ECG 12 Lead  Date/Time: 1/14/2020 5:04 PM  Performed by: Francesco Riojas MD  Authorized by: Francesco Riojas MD   Comparison: compared with previous ECG from 12/16/2019  Rhythm: sinus rhythm  Conduction: right bundle branch block    Clinical impression: abnormal EKG              Assessment:       Diagnosis Plan   1. AF (paroxysmal atrial fibrillation) (CMS/[facility])  ECG 12 Lead   2. Atrial fibrillation with RVR (CMS/[facility])            Plan:       Symptoms of severe dizziness improved with treatment of vertigo.  She is tolerating flecainide, no recurrent atrial fibrillation.  Her EKG today is unchanged.  We will follow-up in 3 months, or sooner if recurrent symptoms of palpitations or tachycardia.  Instructed her if she did were to have an episode of atrial fibrillation the best treatment is just to lie down, likely that episode will resolve in a few hours on its own.    As always, it has been a pleasure to participate in your patient's care.      Sincerely,         Francesco Riojas MD

## 2020-01-16 ENCOUNTER — READMISSION MANAGEMENT (OUTPATIENT)
Dept: CALL CENTER | Facility: HOSPITAL | Age: 70
End: 2020-01-16

## 2020-01-16 ENCOUNTER — TREATMENT (OUTPATIENT)
Dept: PHYSICAL THERAPY | Facility: CLINIC | Age: 70
End: 2020-01-16

## 2020-01-16 DIAGNOSIS — H81.13 BPPV (BENIGN PAROXYSMAL POSITIONAL VERTIGO), BILATERAL: ICD-10-CM

## 2020-01-16 DIAGNOSIS — R42 VERTIGO: Primary | ICD-10-CM

## 2020-01-16 PROCEDURE — 95992 CANALITH REPOSITIONING PROC: CPT | Performed by: PHYSICAL THERAPIST

## 2020-01-16 NOTE — OUTREACH NOTE
Medical Week 2 Survey      Responses   Facility patient discharged from?  [place]   Does the patient have one of the following disease processes/diagnoses(primary or secondary)?  Other   Week 2 attempt successful?  No   Unsuccessful attempts  Attempt 1          Joan Martinez RN

## 2020-01-16 NOTE — PROGRESS NOTES
Vestibular Daily Progress Note    Visit#:3  Subjective   I haven't had any real bad spinning episodes but I still feel dizzy with certain movements.   Objective         Positional Testing  Positional Testing: With infrared goggles  [place]-Hallpike Left: Upbeat, left rotatory nystagmus  [place]-Hallpike Left Onset Time :  sec  [place]-Hallpike Left Duration Time : > 2 min          PROCEDURES AND MODALITIES:  Paraffin:    Moist Heat:    Ice:    E-Stim:    Ultrasound:    Ionto:   Traction:    See Exercise, Manual, and Modality Logs for complete treatment.   Other Procedure CPT 61819 minutes 0       Timed Code Treatment: 0 Minutes  Total Treatment Time: 30 Minutes    Assessment/Plan   Patient continues to have L PC BPPV. She has a robust response to the DH that lasts longer than 2 min. She has significant nausea after testing but tolerated treatment better. I gave her L Epley to perform at home but will continue treatments for cupulolithiasis in clinic.     Progress per Plan of Care    Irais Canada, PT, DPT, CHT  Physical Therapist

## 2020-01-18 ENCOUNTER — READMISSION MANAGEMENT (OUTPATIENT)
Dept: CALL CENTER | Facility: HOSPITAL | Age: 70
End: 2020-01-18

## 2020-01-18 NOTE — OUTREACH NOTE
Medical Week 2 Survey      Responses   Facility patient discharged from?  [place]   Does the patient have one of the following disease processes/diagnoses(primary or secondary)?  Other   Week 2 attempt successful?  Yes   Call start time  1227   Discharge diagnosis  Afib with RVR   Call end time  1231   Meds reviewed with patient/caregiver?  Yes   Is the patient having any side effects they believe may be caused by any medication additions or changes?  No   Does the patient have all medications ordered at discharge?  Yes   Is the patient taking all medications as directed (includes completed medication regime)?  Yes   Does the patient have a primary care provider?   Yes   Has the patient kept scheduled appointments due by today?  Yes   Comments  had echo and ekg and all was normal   Has home health visited the patient within 72 hours of discharge?  N/A   Psychosocial issues?  No   Did the patient receive a copy of their discharge instructions?  Yes   Nursing interventions  Reviewed instructions with patient   What is the patient's perception of their health status since discharge?  Improving   Is the patient/caregiver able to teach back signs and symptoms related to disease process for when to call PCP?  Yes   Is the patient/caregiver able to teach back signs and symptoms related to disease process for when to call 911?  Yes   Is the patient/caregiver able to teach back the hierarchy of who to call/visit for symptoms/problems? PCP, Specialist, Home health nurse, Urgent Care, ED, 911  Yes   Week 2 Call Completed?  Yes          Ela Carvalho RN

## 2020-01-27 ENCOUNTER — READMISSION MANAGEMENT (OUTPATIENT)
Dept: CALL CENTER | Facility: HOSPITAL | Age: 70
End: 2020-01-27

## 2020-01-27 NOTE — OUTREACH NOTE
Medical Week 3 Survey      Responses   Facility patient discharged from?  [place]   Does the patient have one of the following disease processes/diagnoses(primary or secondary)?  Other   Week 3 attempt successful?  Yes   Call start time  1834   Call end time  1840   Discharge diagnosis  Afib with RVR   Meds reviewed with patient/caregiver?  Yes   Is the patient having any side effects they believe may be caused by any medication additions or changes?  No   Does the patient have all medications ordered at discharge?  Yes   Is the patient taking all medications as directed (includes completed medication regime)?  Yes   Does the patient have a primary care provider?   Yes   Has the patient kept scheduled appointments due by today?  Yes   Has home health visited the patient within 72 hours of discharge?  N/A   Psychosocial issues?  No   Did the patient receive a copy of their discharge instructions?  Yes   Nursing interventions  Reviewed instructions with patient   What is the patient's perception of their health status since discharge?  Improving   Is the patient/caregiver able to teach back signs and symptoms related to disease process for when to call PCP?  Yes   Is the patient/caregiver able to teach back signs and symptoms related to disease process for when to call 911?  Yes   Is the patient/caregiver able to teach back the hierarchy of who to call/visit for symptoms/problems? PCP, Specialist, Home health nurse, Urgent Care, ED, 911  Yes   Additional teach back comments  pt states good understanding of recognizing and managing vertigo episodes, states having PT crystal therapy with good results   Week 3 Call Completed?  Yes          Melyssa Carbajal RN

## 2020-01-30 ENCOUNTER — OFFICE VISIT (OUTPATIENT)
Dept: FAMILY MEDICINE CLINIC | Facility: CLINIC | Age: 70
End: 2020-01-30

## 2020-01-30 VITALS
WEIGHT: 162 LBS | SYSTOLIC BLOOD PRESSURE: 170 MMHG | DIASTOLIC BLOOD PRESSURE: 82 MMHG | TEMPERATURE: 96.9 F | HEART RATE: 49 BPM | OXYGEN SATURATION: 99 % | BODY MASS INDEX: 30.58 KG/M2 | HEIGHT: 61 IN

## 2020-01-30 DIAGNOSIS — I10 ESSENTIAL HYPERTENSION: Primary | Chronic | ICD-10-CM

## 2020-01-30 DIAGNOSIS — I48.91 ATRIAL FIBRILLATION WITH RVR (HCC): ICD-10-CM

## 2020-01-30 DIAGNOSIS — R42 VERTIGO: ICD-10-CM

## 2020-01-30 DIAGNOSIS — E78.00 HYPERCHOLESTEROLEMIA: Chronic | ICD-10-CM

## 2020-01-30 PROCEDURE — 99214 OFFICE O/P EST MOD 30 MIN: CPT | Performed by: FAMILY MEDICINE

## 2020-01-30 RX ORDER — FLECAINIDE ACETATE 50 MG/1
50 TABLET ORAL EVERY 12 HOURS SCHEDULED
Qty: 180 TABLET | Refills: 1 | Status: CANCELLED | OUTPATIENT
Start: 2020-01-30

## 2020-01-30 RX ORDER — FLECAINIDE ACETATE 50 MG/1
50 TABLET ORAL EVERY 12 HOURS SCHEDULED
Qty: 180 TABLET | Refills: 1 | Status: SHIPPED | OUTPATIENT
Start: 2020-01-30 | End: 2020-01-30 | Stop reason: SDUPTHER

## 2020-01-30 RX ORDER — FLECAINIDE ACETATE 50 MG/1
50 TABLET ORAL EVERY 12 HOURS SCHEDULED
Qty: 180 TABLET | Refills: 3 | Status: SHIPPED | OUTPATIENT
Start: 2020-01-30 | End: 2021-01-25

## 2020-01-30 NOTE — PROGRESS NOTES
"Subjective   Ariadne Telles is a 69 y.o. female.     Chief Complaint   Patient presents with   • Atrial Fibrillation     hospital follow up         History of Present Illness    Follow-up hospitalization for atrial fibrillation with rapid ventricular rate and vestibular vertigo.  She was mid for couple days started on flecainide.  She was seen for follow-up recently by the cardiologist, no change in therapy.  She feels tired in the last few weeks.  Ramirez \"sluggish\".  She is not checking her blood pressure at home.  She has had no recurrent dizziness or vertigo.  Work is more strenuous for now.  They change her job around a little bit.  She also continues the Eliquis.    Hypertension.  Continues metoprolol and lisinopril.  Not checking her blood pressure at home.  Elevated today.    Hyperlipidemia.  Continues atorvastatin without complaint.    The following portions of the patient's history were reviewed and updated as appropriate: allergies, current medications, past family history, past medical history, past social history, past surgical history and problem list.          Review of Systems   Constitutional: Positive for fatigue.   Respiratory: Negative.    Cardiovascular: Negative.    Musculoskeletal: Negative.        Objective   Blood pressure 170/82, pulse (!) 49, temperature 96.9 °F (36.1 °C), temperature source Oral, height 154.9 cm (60.98\"), weight 73.5 kg (162 lb), SpO2 99 %, not currently breastfeeding.  Physical Exam   Constitutional: She is oriented to person, place, and time.   HENT:   Head: Atraumatic.   Eyes: Conjunctivae are normal.   Neck: Normal range of motion. Neck supple.   Cardiovascular: Regular rhythm, normal heart sounds and intact distal pulses.   Bradycardic.  Regular rhythm.   Pulmonary/Chest: Effort normal and breath sounds normal.   Musculoskeletal: She exhibits no edema.   Neurological: She is alert and oriented to person, place, and time. Coordination normal.   Skin: Skin is warm " and dry.   Psychiatric: She has a normal mood and affect. Her behavior is normal.       Assessment/Plan   Ariadne was seen today for atrial fibrillation.    Diagnoses and all orders for this visit:    Essential hypertension    Atrial fibrillation with RVR (CMS/HCC)    Vertigo    Hypercholesterolemia      Hypertension.  May need better control.  She is going be checking her blood pressure at home.  Send us readings in the next few weeks.  I will see her back in 3 weeks for recheck.    Atrial fibrillation with rapid ventricular rate, now likely paroxysmal atrial fibrillation.  Currently appears to be in sinus rhythm.  Continue flecainide per cardiology.  She needs a refill.  I have asked her to call them.  If she cannot have a refill by tomorrow with the weekend coming up, I can give her a few weeks supply.  Heart rate is low.  This may be leading to her fatigue.  I want her to check her blood pressure and pulse at home.  I will see her back in 3 weeks.  We may need to decrease the metoprolol.    Vertigo.  Resolved.  Likely benign paroxysmal positional vertigo.  She has been taught Epley maneuvers.    Hyperlipidemia.  Continue atorvastatin.

## 2020-02-04 ENCOUNTER — READMISSION MANAGEMENT (OUTPATIENT)
Dept: CALL CENTER | Facility: HOSPITAL | Age: 70
End: 2020-02-04

## 2020-02-04 NOTE — OUTREACH NOTE
Medical Week 4 Survey      Responses   Facility patient discharged from?  [place]   Does the patient have one of the following disease processes/diagnoses(primary or secondary)?  Other   Week 4 attempt successful?  Yes   Call start time  1611   Call end time  1611   Discharge diagnosis  Afib with RVR   Meds reviewed with patient/caregiver?  Yes   Is the patient having any side effects they believe may be caused by any medication additions or changes?  No   Is the patient taking all medications as directed (includes completed medication regime)?  Yes   Has the patient kept scheduled appointments due by today?  Yes   Is the patient still receiving Home Health Services?  N/A   Psychosocial issues?  No   What is the patient's perception of their health status since discharge?  Improving   Is the patient/caregiver able to teach back signs and symptoms related to disease process for when to call PCP?  Yes   Is the patient/caregiver able to teach back signs and symptoms related to disease process for when to call 911?  Yes   Is the patient/caregiver able to teach back the hierarchy of who to call/visit for symptoms/problems? PCP, Specialist, Home health nurse, Urgent Care, ED, 911  Yes   Week 4 Call Completed?  Yes   Would the patient like one additional call?  No   Graduated  Yes   Did the patient feel the follow up calls were helpful during their recovery period?  Yes   Was the number of calls appropriate?  Yes          Elsy Carvalho RN

## 2020-02-21 ENCOUNTER — OFFICE VISIT (OUTPATIENT)
Dept: FAMILY MEDICINE CLINIC | Facility: CLINIC | Age: 70
End: 2020-02-21

## 2020-02-21 VITALS
WEIGHT: 162 LBS | HEIGHT: 61 IN | DIASTOLIC BLOOD PRESSURE: 83 MMHG | SYSTOLIC BLOOD PRESSURE: 135 MMHG | BODY MASS INDEX: 30.58 KG/M2 | TEMPERATURE: 97.3 F | OXYGEN SATURATION: 96 % | HEART RATE: 47 BPM

## 2020-02-21 DIAGNOSIS — I10 ESSENTIAL HYPERTENSION: Primary | Chronic | ICD-10-CM

## 2020-02-21 DIAGNOSIS — E78.00 HYPERCHOLESTEROLEMIA: ICD-10-CM

## 2020-02-21 DIAGNOSIS — M81.0 OSTEOPOROSIS, UNSPECIFIED OSTEOPOROSIS TYPE, UNSPECIFIED PATHOLOGICAL FRACTURE PRESENCE: ICD-10-CM

## 2020-02-21 DIAGNOSIS — I48.0 PAROXYSMAL ATRIAL FIBRILLATION (HCC): ICD-10-CM

## 2020-02-21 PROCEDURE — 99214 OFFICE O/P EST MOD 30 MIN: CPT | Performed by: FAMILY MEDICINE

## 2020-02-21 RX ORDER — ATORVASTATIN CALCIUM 40 MG/1
40 TABLET, FILM COATED ORAL DAILY
Qty: 90 TABLET | Refills: 1 | Status: SHIPPED | OUTPATIENT
Start: 2020-02-21 | End: 2020-03-10

## 2020-02-21 NOTE — PROGRESS NOTES
"Subjective   Ariadne Telles is a 69 y.o. female.     Hypertension (pt is fasting)    History of Present Illness     Paroxysmal atrial fibrillation.  Minimal symptomatology.  She is taking her Eliquis without bleeding episodes.  No blood in the urine no blood in the stool.  She continues flecainide.  She has an appointment coming with cardiology in a few months.  She feels good overall with better energy level.  She is been working in her yard.    Hypertension follow up. Doing well with current medication which she is taking as directed. No known high or low blood pressure episodes. No cardiovascular or neurological symptoms. Today's BP: 135/83.      Hyperlipidemia follow up. She is taking statin medication without complaint. No myopathy symptoms.     Lab Results   Component Value Date    CHOL 174 12/16/2019    CHLPL 163 03/01/2019    TRIG 159 (H) 12/16/2019    HDL 43 12/16/2019    LDL 99 12/16/2019     Osteoporosis.  She continues Fosamax 70 mg weekly with no GI upset.  She is taking as directed.      The following portions of the patient's history were reviewed and updated as appropriate: allergies, current medications, past family history, past medical history, past social history, past surgical history and problem list.      Review of Systems   Constitutional: Negative.    Respiratory: Negative.    Cardiovascular: Negative.    Musculoskeletal: Negative.        Objective   Blood pressure 135/83, pulse (!) 47, temperature 97.3 °F (36.3 °C), temperature source Oral, height 154.9 cm (60.98\"), weight 73.5 kg (162 lb), SpO2 96 %, not currently breastfeeding.  Physical Exam   Constitutional: She appears well-developed and well-nourished. No distress.   Neck: No thyromegaly present.   Cardiovascular: Normal rate, regular rhythm, normal heart sounds and intact distal pulses.   Pulmonary/Chest: Effort normal and breath sounds normal.   Musculoskeletal: She exhibits no edema.   Skin: Skin is warm and dry.   Psychiatric: " She has a normal mood and affect. Her behavior is normal. Judgment and thought content normal.   Nursing note and vitals reviewed.      Assessment/Plan   Ariadne was seen today for hypertension.    Diagnoses and all orders for this visit:    Essential hypertension  -     Comprehensive Metabolic Panel  -     Lipid Panel    Hypercholesterolemia  -     atorvastatin (LIPITOR) 40 MG tablet; Take 1 tablet by mouth Daily.  -     Comprehensive Metabolic Panel  -     Lipid Panel    Paroxysmal atrial fibrillation (CMS/[facility])    Osteoporosis, unspecified osteoporosis type, unspecified pathological fracture presence      Hypertension.  Well-controlled.    Hyperlipidemia.  Continue atorvastatin.    Paroxysmal atrial fibrillation.  Appears to be in sinus rhythm today.  Rate controlled.  She continues beta-blockade, Eliquis, and flecainide.  She is having no bleeding episodes.  The Eliquis is expensive for her.  I was able to get her a month supply today.  She does have enough at home for further months.    She will keep her appointment with cardiology coming up.  I will see her back in May for annual wellness visit.    Osteoporosis.  She is tolerating the Fosamax.

## 2020-02-22 LAB
ALBUMIN SERPL-MCNC: 4.3 G/DL (ref 3.5–5.2)
ALBUMIN/GLOB SERPL: 1.5 G/DL
ALP SERPL-CCNC: 169 U/L (ref 39–117)
ALT SERPL-CCNC: <5 U/L (ref 1–33)
AST SERPL-CCNC: 13 U/L (ref 1–32)
BILIRUB SERPL-MCNC: 0.9 MG/DL (ref 0.2–1.2)
BUN SERPL-MCNC: 17 MG/DL (ref 8–23)
BUN/CREAT SERPL: 20.2 (ref 7–25)
CALCIUM SERPL-MCNC: 9.8 MG/DL (ref 8.6–10.5)
CHLORIDE SERPL-SCNC: 100 MMOL/L (ref 98–107)
CHOLEST SERPL-MCNC: 194 MG/DL (ref 0–200)
CO2 SERPL-SCNC: 28.3 MMOL/L (ref 22–29)
CREAT SERPL-MCNC: 0.84 MG/DL (ref 0.57–1)
GLOBULIN SER CALC-MCNC: 2.8 GM/DL
GLUCOSE SERPL-MCNC: 76 MG/DL (ref 65–99)
HDLC SERPL-MCNC: 52 MG/DL (ref 40–60)
LDLC SERPL CALC-MCNC: 101 MG/DL (ref 0–100)
POTASSIUM SERPL-SCNC: 4.2 MMOL/L (ref 3.5–5.2)
PROT SERPL-MCNC: 7.1 G/DL (ref 6–8.5)
SODIUM SERPL-SCNC: 140 MMOL/L (ref 136–145)
TRIGL SERPL-MCNC: 203 MG/DL (ref 0–150)
VLDLC SERPL CALC-MCNC: 40.6 MG/DL

## 2020-03-10 DIAGNOSIS — E78.00 HYPERCHOLESTEROLEMIA: ICD-10-CM

## 2020-03-10 RX ORDER — ATORVASTATIN CALCIUM 40 MG/1
40 TABLET, FILM COATED ORAL DAILY
Qty: 90 TABLET | Refills: 1 | Status: SHIPPED | OUTPATIENT
Start: 2020-03-10 | End: 2020-05-14 | Stop reason: SDUPTHER

## 2020-04-16 DIAGNOSIS — M85.80 OSTEOPENIA, UNSPECIFIED LOCATION: ICD-10-CM

## 2020-04-16 RX ORDER — ALENDRONATE SODIUM 70 MG/1
TABLET ORAL
Qty: 12 TABLET | Refills: 1 | Status: SHIPPED | OUTPATIENT
Start: 2020-04-16 | End: 2020-10-23 | Stop reason: SDUPTHER

## 2020-04-17 ENCOUNTER — OFFICE VISIT (OUTPATIENT)
Dept: CARDIOLOGY | Facility: CLINIC | Age: 70
End: 2020-04-17

## 2020-04-17 VITALS
SYSTOLIC BLOOD PRESSURE: 113 MMHG | HEIGHT: 61 IN | HEART RATE: 73 BPM | DIASTOLIC BLOOD PRESSURE: 79 MMHG | BODY MASS INDEX: 30.58 KG/M2 | WEIGHT: 162 LBS

## 2020-04-17 DIAGNOSIS — I48.0 PAROXYSMAL ATRIAL FIBRILLATION (HCC): Primary | ICD-10-CM

## 2020-04-17 PROCEDURE — 99442 PR PHYS/QHP TELEPHONE EVALUATION 11-20 MIN: CPT | Performed by: NURSE PRACTITIONER

## 2020-04-17 NOTE — PROGRESS NOTES
Date of Office Visit: 2020  Encounter Provider: DALILA Zarco  Place of Service: [facility] CARDIOLOGY  Patient Name: Ariadne Telles  :1950    Chief Complaint   Patient presents with   • Follow-up   • Atrial Fibrillation   :     HPI: Ariadne Telles is a 69 y.o. female who is a patient seen by Dr. Reilly in the past for chest pain, stress echo 2018 negative for ischemia, normal EF. She presented to the hospital 0n 2019 with severe dizziness and afib w/RVR. She was found to have severe vertigo which was treated by physical therapy and was improved when she saw Dr. Riojas in the office in January for follow up. She was started on flecainide for her PAF and it was well controlled when she saw him in January.     She had an echo in January which showed normal LV systolic function, EF 64% with mild MR and no other significant abnormalities.     She was scheduled to come into office today for a routine follow up visit but secondary to COVID 19 pandemic we are doing a telehealth visit.    This patient has consented to a telehealth visit via audio/telephone. The visit was scheduled as a telehealth telephone visit to comply with patient safety concerns in accordance with CDC recommendations.  All vitals recorded within this visit are reported by the patient.  I spent 15 minutes in total including but not limited to the 10 minutes spent in direct conversation with this patient.     Today she reports that she is doing okay. She has had a couple of episodes of AF but they have resolved spontaneously fairly quickly with just rest.     She denies chest pain, dyspnea, PND, orthopnea or edema.     Her primary complaint today is that her vertigo has returned, it is not as bad as it was but she has had 2 falls---she did not injure herself or hit anything.    She is on apixban for AC with no bleeding issues.     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        Past Medical History:   Diagnosis Date   • Atrial fibrillation with RVR (CMS/HCC)    • Bradycardia 2016   • Carpal tunnel syndrome 2018   • Essential hypertension 2016   • Gastroesophageal reflux disease 2016   • History of endometrial cancer 2017, S/P ANGEL, BSO.  Dr. Catracho Alejo.   • Hx of bone density study 2017, DEXA scan: Osteoporosis in L1, severe osteopenia in both femoral necks.  T-scores: L1, -2.5; L2, -1.9; L3, -0.7; L4, +0.3.  Right femoral neck, -2.1; total -1.8.  Left femoral neck, -2.3; total, -1.8.   • Hypercholesterolemia 2016   • Hyperlipidemia    • Kidney stone    • MVP (mitral valve prolapse)     last 2 echocardiograms showed no MVP in  and     • Nausea & vomiting    • Osteopenia 2016    2017, DEXA scan: Severe osteopenia in both femoral necks, T score= -2.1 in the right femoral neck, -2.3 in the left femoral neck.   • Osteoporosis 2017    DEXA scan, T score L1= -2.5   • PAF (paroxysmal atrial fibrillation) (CMS/HCC)    • Pituitary adenoma (CMS/HCC) 2018    Thought to have a pituitary microadenoma on previous studies but MRI of the pituitary with and without contrast, February 10, 2017: \"No convincing evidence to suggest a pituitary adenoma on this MRI study\".  \"Incidentally noted relatively mild changes of chronic small vessel ischemia phenomena.\"   • RBBB (right bundle branch block)    • Rectal polyp 2016   • Surgical menopause     ANGEL, BSO, for endometrial carcinoma.   • Vaginal delivery     x1  NITO, (and 2 C-sections, one stillbirth).   • Vertigo        Past Surgical History:   Procedure Laterality Date   • CARPAL TUNNEL RELEASE WITH CUBITAL TUNNEL RELEASE  2018    right   • CATARACT EXTRACTION Right 2016   •  SECTION      x2   one stillborn Ariadne Isbell   • COLONOSCOPY  2007    polyps  Dr." Marybeth    • TOTAL ABDOMINAL HYSTERECTOMY WITH SALPINGO OOPHORECTOMY      Dr. Alejo       Social History     Socioeconomic History   • Marital status:      Spouse name:    • Number of children: 2   • Years of education: Not on file   • Highest education level: Not on file   Tobacco Use   • Smoking status: Never Smoker   • Smokeless tobacco: Never Used   Substance and Sexual Activity   • Alcohol use: No     Comment: caffeine use seldom   • Drug use: No   • Sexual activity: Never     Birth control/protection: Surgical     Comment:        Family History   Problem Relation Age of Onset   • Heart disease Mother    • Heart attack Mother 67   • COPD Father    • Parkinsonism Father         ?   • Heart defect Sister          at 18   • Alcohol abuse Brother    • Diabetes Brother    • Heart disease Brother    • Hypertension Brother    • Hyperlipidemia Brother    • Other Daughter         stillborn   • Sleep apnea Son    • No Known Problems Maternal Grandmother    • No Known Problems Paternal Grandmother    • Breast cancer Maternal Aunt 75   • No Known Problems Paternal Aunt    • No Known Problems Maternal Grandfather    • No Known Problems Paternal Grandfather    • Kidney cancer Sister    • No Known Problems Son    • BRCA 1/2 Neg Hx    • Colon cancer Neg Hx    • Endometrial cancer Neg Hx    • Ovarian cancer Neg Hx        Review of Systems   Constitution: Negative for chills, fever and malaise/fatigue.   Cardiovascular: Positive for palpitations (occasional ). Negative for chest pain, dyspnea on exertion, leg swelling, near-syncope, orthopnea, paroxysmal nocturnal dyspnea and syncope.   Respiratory: Negative for cough and shortness of breath.    Hematologic/Lymphatic: Negative.    Musculoskeletal: Positive for falls. Negative for joint pain, joint swelling and myalgias.   Gastrointestinal: Negative for abdominal pain, diarrhea, melena, nausea and vomiting.   Genitourinary: Negative for frequency and  "hematuria.   Neurological: Positive for vertigo. Negative for light-headedness, numbness, paresthesias and seizures.   Allergic/Immunologic: Negative.    All other systems reviewed and are negative.      Allergies   Allergen Reactions   • Naproxen Hives and Swelling   • Paroxetine Other (See Comments)     TREMORS         Current Outpatient Medications:   •  alendronate (FOSAMAX) 70 MG tablet, TAKE 1 TABLET BY MOUTH EVERY 7 DAYS, Disp: 12 tablet, Rfl: 1  •  apixaban (ELIQUIS) 5 MG tablet tablet, Take 1 tablet by mouth Every 12 (Twelve) Hours., Disp: 60 tablet, Rfl: 1  •  atorvastatin (LIPITOR) 40 MG tablet, TAKE 1 TABLET BY MOUTH DAILY, Disp: 90 tablet, Rfl: 1  •  calcium carbonate (OS-SHANNAN) 600 MG tablet, Take 600 mg by mouth Daily., Disp: , Rfl:   •  flecainide (TAMBOCOR) 50 MG tablet, Take 1 tablet by mouth every 12 hours., Disp: 180 tablet, Rfl: 3  •  fluticasone (FLONASE) 50 MCG/ACT nasal spray, 2 sprays into each nostril Daily. (Patient taking differently: 2 sprays into the nostril(s) as directed by provider Daily As Needed.), Disp: 16 g, Rfl: 3  •  lisinopril (PRINIVIL,ZESTRIL) 20 MG tablet, TAKE 1 TABLET BY MOUTH DAILY, Disp: 90 tablet, Rfl: 1  •  metoprolol tartrate (LOPRESSOR) 25 MG tablet, Take 1 tablet by mouth Every 12 (Twelve) Hours., Disp: 180 tablet, Rfl: 2  •  Multiple Vitamin (MULTIVITAMIN) capsule, Take  by mouth., Disp: , Rfl:       Objective:     Vitals:    04/17/20 1359   BP: 113/79   BP Location: Left arm   Patient Position: Sitting   Cuff Size: Adult   Pulse: 73   Weight: 73.5 kg (162 lb)   Height: 154.9 cm (61\")     Body mass index is 30.61 kg/m².    PHYSICAL EXAM: Audio telehealth visit    Vitals Reviewed.   Respiratory: No signs of respiratory distress during audio visit.   Psychiatric: Patient alert and oriented to person, place, and time. Speech and behavior appropriate. Normal mood and affect.         Assessment:       Diagnosis Plan   1. Paroxysmal atrial fibrillation (CMS/HCC)          "   Plan:       1. PAF, currently well controlled on flecainide and beta blocker. Apixaban for AC. She does have recurrence of vertigo and has fallen twice---discussed risk and being on anticoagulation---she is going to follow up with Dr. Ariza if it does not improve or gets worse.    Follow up with Dr. Riojas in 6 months.     As always, it has been a pleasure to participate in your patient's care.      Sincerely,         DALILA Alfaro

## 2020-04-30 RX ORDER — LISINOPRIL 20 MG/1
20 TABLET ORAL DAILY
Qty: 90 TABLET | Refills: 1 | Status: SHIPPED | OUTPATIENT
Start: 2020-04-30 | End: 2020-05-14 | Stop reason: SDUPTHER

## 2020-05-14 ENCOUNTER — TELEPHONE (OUTPATIENT)
Dept: CARDIOLOGY | Facility: CLINIC | Age: 70
End: 2020-05-14

## 2020-05-14 ENCOUNTER — TELEMEDICINE (OUTPATIENT)
Dept: FAMILY MEDICINE CLINIC | Facility: CLINIC | Age: 70
End: 2020-05-14

## 2020-05-14 DIAGNOSIS — Z00.00 MEDICARE ANNUAL WELLNESS VISIT, SUBSEQUENT: Primary | ICD-10-CM

## 2020-05-14 DIAGNOSIS — R42 VERTIGO: ICD-10-CM

## 2020-05-14 DIAGNOSIS — I48.0 PAROXYSMAL ATRIAL FIBRILLATION (HCC): Primary | ICD-10-CM

## 2020-05-14 DIAGNOSIS — E78.00 HYPERCHOLESTEROLEMIA: Chronic | ICD-10-CM

## 2020-05-14 DIAGNOSIS — I10 ESSENTIAL HYPERTENSION: Chronic | ICD-10-CM

## 2020-05-14 DIAGNOSIS — I48.0 PAROXYSMAL ATRIAL FIBRILLATION (HCC): ICD-10-CM

## 2020-05-14 PROCEDURE — 99214 OFFICE O/P EST MOD 30 MIN: CPT | Performed by: FAMILY MEDICINE

## 2020-05-14 PROCEDURE — G0439 PPPS, SUBSEQ VISIT: HCPCS | Performed by: FAMILY MEDICINE

## 2020-05-14 RX ORDER — ATORVASTATIN CALCIUM 40 MG/1
40 TABLET, FILM COATED ORAL DAILY
Qty: 90 TABLET | Refills: 1 | Status: SHIPPED | OUTPATIENT
Start: 2020-05-14 | End: 2021-04-07

## 2020-05-14 RX ORDER — LISINOPRIL 20 MG/1
20 TABLET ORAL DAILY
Qty: 90 TABLET | Refills: 1 | Status: SHIPPED | OUTPATIENT
Start: 2020-05-14 | End: 2021-02-02

## 2020-05-14 NOTE — PROGRESS NOTES
The ABCs of the Annual Wellness Visit  Subsequent Medicare Wellness Visit    Chief Complaint   Patient presents with   • Medicare Wellness-subsequent       Subjective   History of Present Illness:  Ariadne Telles is a 69 y.o. female who presents for a Subsequent Medicare Wellness Visit.    HEALTH RISK ASSESSMENT    Recent Hospitalizations:  Recently treated at the following:  [facility]    Current Medical Providers:  Patient Care Team:  Nick Ariza MD as PCP - General  Nick Ariza MD as PCP - Claims Attributed    Smoking Status:  Social History     Tobacco Use   Smoking Status Never Smoker   Smokeless Tobacco Never Used       Alcohol Consumption:  Social History     Substance and Sexual Activity   Alcohol Use No    Comment: caffeine use seldom       Depression Screen:   PHQ-2/PHQ-9 Depression Screening 5/14/2020   Little interest or pleasure in doing things 0   Feeling down, depressed, or hopeless 0   Total Score 0       Fall Risk Screen:  STEADI Fall Risk Assessment has not been completed.    Health Habits and Functional and Cognitive Screening:  Functional & Cognitive Status 5/14/2020   Do you have difficulty preparing food and eating? No   Do you have difficulty bathing yourself, getting dressed or grooming yourself? No   Do you have difficulty using the toilet? No   Do you have difficulty moving around from place to place? No   Do you have trouble with steps or getting out of a bed or a chair? No   Current Diet Well Balanced Diet   Dental Exam Up to date   Eye Exam Up to date   Exercise (times per week) 5 times per week   Current Exercise Activities Include Walking   Do you need help using the phone?  No   Are you deaf or do you have serious difficulty hearing?  No   Do you need help with transportation? No   Do you need help shopping? No   Do you need help preparing meals?  No   Do you need help with housework?  No   Do you need help with laundry? No   Do you need help taking your  medications? No   Do you need help managing money? No   Do you ever drive or ride in a car without wearing a seat belt? No   Have you felt unusual stress, anger or loneliness in the last month? No   Who do you live with? Child   If you need help, do you have trouble finding someone available to you? -   Have you been bothered in the last four weeks by sexual problems? -   Do you have difficulty concentrating, remembering or making decisions? No         Does the patient have evidence of cognitive impairment? No    Asprin use counseling:Does not need ASA (and currently is not on it)    Age-appropriate Screening Schedule:  Refer to the list below for future screening recommendations based on patient's age, sex and/or medical conditions. Orders for these recommended tests are listed in the plan section. The patient has been provided with a written plan.    Health Maintenance   Topic Date Due   • COLONOSCOPY  01/31/2017   • DXA SCAN  02/23/2019   • ZOSTER VACCINE (1 of 2) 06/11/2020 (Originally 6/13/2000)   • INFLUENZA VACCINE  08/01/2020   • MAMMOGRAM  02/15/2021   • LIPID PANEL  02/21/2021   • TDAP/TD VACCINES (2 - Td) 08/25/2026          The following portions of the patient's history were reviewed and updated as appropriate: allergies, current medications, past family history, past medical history, past social history, past surgical history and problem list.    Outpatient Medications Prior to Visit   Medication Sig Dispense Refill   • alendronate (FOSAMAX) 70 MG tablet TAKE 1 TABLET BY MOUTH EVERY 7 DAYS 12 tablet 1   • apixaban (ELIQUIS) 5 MG tablet tablet Take 1 tablet by mouth Every 12 (Twelve) Hours. 60 tablet 1   • calcium carbonate (OS-SHANNAN) 600 MG tablet Take 600 mg by mouth Daily.     • flecainide (TAMBOCOR) 50 MG tablet Take 1 tablet by mouth every 12 hours. 180 tablet 3   • fluticasone (FLONASE) 50 MCG/ACT nasal spray 2 sprays into each nostril Daily. (Patient taking differently: 2 sprays into the nostril(s)  as directed by provider Daily As Needed.) 16 g 3   • metoprolol tartrate (LOPRESSOR) 25 MG tablet Take 1 tablet by mouth Every 12 (Twelve) Hours. 180 tablet 2   • Multiple Vitamin (MULTIVITAMIN) capsule Take  by mouth.     • atorvastatin (LIPITOR) 40 MG tablet TAKE 1 TABLET BY MOUTH DAILY 90 tablet 1   • lisinopril (PRINIVIL,ZESTRIL) 20 MG tablet TAKE 1 TABLET BY MOUTH DAILY 90 tablet 1     No facility-administered medications prior to visit.        Patient Active Problem List   Diagnosis   • Gastroesophageal reflux disease   • Bradycardia   • Hypercholesterolemia   • Essential hypertension   • Osteopenia   • Rectal polyp   • History of endometrial cancer   • Carpal tunnel syndrome   • MVP (mitral valve prolapse)   • Osteoporosis   • Bilateral carotid artery stenosis   • Paroxysmal atrial fibrillation (CMS/HCC)   • Vertigo   • MVP (mitral valve prolapse)       Advanced Care Planning:  ACP discussion was held with the patient during this visit. Patient has an advance directive (not in EMR), copy requested.    Review of Systems    Compared to one year ago, the patient feels her physical health is the same.  Compared to one year ago, the patient feels her mental health is the same.    Reviewed chart for potential of high risk medication in the elderly: yes  Reviewed chart for potential of harmful drug interactions in the elderly:yes    Objective       There were no vitals filed for this visit.    There is no height or weight on file to calculate BMI.  Discussed the patient's BMI with her. The BMI is elevated, exercise indicated. .    Physical Exam    Lab Results   Component Value Date    GLU 76 02/21/2020    CHLPL 194 02/21/2020    TRIG 203 (H) 02/21/2020    HDL 52 02/21/2020     (H) 02/21/2020    VLDL 40.6 02/21/2020        Assessment/Plan   Medicare Risks and Personalized Health Plan  CMS Preventative Services Quick Reference  Immunizations Discussed/Encouraged (specific immunizations; Shingrix )   Patient  declined colonoscopy.  Her Cologuard was 3 years ago and negative.  I did recommend a colonoscopy.  She wants to think about it more in the future.    The above risks/problems have been discussed with the patient.  Pertinent information has been shared with the patient in the After Visit Summary.  Follow up plans and orders are seen below in the Assessment/Plan Section.    Diagnoses and all orders for this visit:    1. Medicare annual wellness visit, subsequent (Primary)    2. Essential hypertension    3. Hypercholesterolemia  -     atorvastatin (LIPITOR) 40 MG tablet; Take 1 tablet by mouth Daily.  Dispense: 90 tablet; Refill: 1    4. Paroxysmal atrial fibrillation (CMS/HCC)    Other orders  -     lisinopril (PRINIVIL,ZESTRIL) 20 MG tablet; Take 1 tablet by mouth Daily.  Dispense: 90 tablet; Refill: 1      Follow Up:  No follow-ups on file.     An After Visit Summary and PPPS were given to the patient.

## 2020-05-14 NOTE — PROGRESS NOTES
Subjective   Ariadne Telles is a 69 y.o. female.     Chief Complaint   Patient presents with   • Medicare Wellness-subsequent   Vertigo.    History of Present Illness    Coronavirus pandemic telehealth visit.  Good audio connection.  Patient gave informed consent through the [employer] check in process.  The video would not work.  She was on the , but every time she hit the video button it would not work.    Paroxysmal atrial fibrillation.  Also followed by cardiology.  She continues to have issues affording her Eliquis.  She has about 2 weeks of samples.  From a cardiovascular standpoint she feels fine.  But she is concerned about not being able to afford the medication.  She is also been in contact with her cardiologist recently.    Vertigo.  Continues to be an issue.  She was hospitalized for this at one point.  Seen by neurology.  Thought to be vestibulopathy.  She had both a CT scan and MRI that was unremarkable overall.  No cause of vertigo found.  No strokes.  She has seen a vestibular physical therapist and the diagnosis of paroxysmal benign positional vertigo was likely.  Since the pandemic she has not been back to her physical therapist.  She describes a dizzy feeling that occurs in bed or when she stands up or when she is walking or when she is sitting.  If she turns her head a certain way it makes it worse.  She has had no orthostatic symptoms.  No palpitations.  No black vision.  No feeling like she is going to pass out.  She did fall twice in the last few weeks from the vertigo.  She has not injured herself.    Hypertension.  Controlled.  She continues lisinopril and metoprolol.  Her blood pressure today is 139/90 with a pulse of 65.  Previously blood pressures have been normal.    Hyperlipidemia.  She continues atorvastatin.        The following portions of the patient's history were reviewed and updated as appropriate: allergies, current medications, past family history, past medical history,  past social history, past surgical history and problem list.          Review of Systems   Constitutional: Negative.    Respiratory: Negative.    Cardiovascular: Negative.    Musculoskeletal: Negative.    Neurological: Positive for dizziness.   Psychiatric/Behavioral: Negative.        Objective   not currently breastfeeding.  Physical Exam   Constitutional: She is oriented to person, place, and time.   Patient is well-known to me.  She sounds in no acute distress.   Pulmonary/Chest: Effort normal. No respiratory distress.   Neurological: She is alert and oriented to person, place, and time.   Psychiatric: She has a normal mood and affect. Her behavior is normal.       Assessment/Plan   Ariadne was seen today for medicare wellness-subsequent.    Diagnoses and all orders for this visit:    Medicare annual wellness visit, subsequent    Essential hypertension    Hypercholesterolemia  -     atorvastatin (LIPITOR) 40 MG tablet; Take 1 tablet by mouth Daily.    Paroxysmal atrial fibrillation (CMS/HCC)    Vertigo    Other orders  -     lisinopril (PRINIVIL,ZESTRIL) 20 MG tablet; Take 1 tablet by mouth Daily.        Hypertension.  Overall well controlled.  Diastolic blood pressure slightly elevated today.  We will continue to monitor.    Hyperlipidemia.  Continue atorvastatin.  Refill given.  I will see her back in 6 months for recheck with lab work prior.    Paroxysmal atrial fibrillation.  On a direct acting oral anticoagulant.  Eliquis.  She cannot afford it.  I been in contact with her cardiology nurse practitioner.  They are going to contact the patient today.  To consider switching her to warfarin.    Benign paroxysmal positional vertigo.  I have asked her to contact her vestibular physical therapist.  She has had 2 falls in recent weeks related to the vertigo symptoms.  The patient does have the instructions to do the Epley maneuvers at home but she is not sure she can do them.  Hopefully can get back to the physical  therapist soon.  I am concerned about fall risk.  However all things equal given her atrial fibrillation, the benefits of anticoagulation at this point likely outweighs risk.    Length of visit 35 minutes.

## 2020-05-14 NOTE — TELEPHONE ENCOUNTER
Lety---this patient is on apixaban but can not afford it---she reached out to Dr. Ariza and he sent me a message about switching her to warfarin, which is fine---will just need to get her setup with the [facility] clin---can you call and talk with her, be sure that is what she wants to do and find out exactly how much apixaban she has left so can refer to [facility] clinic ---just let me know and I can put the order in

## 2020-05-15 NOTE — TELEPHONE ENCOUNTER
Order is in for referral to coag clinic    Octavio Adams et all,    She is on apixaban for PAF--she can not afford it and wants to switch to warfarin---I think she has about 2 weeks of apixaban left---can you all reach out to her and get her transitioned to warfarin please.Let me know if you need me to do anything else.     Thanks,  Anuradha

## 2020-05-15 NOTE — TELEPHONE ENCOUNTER
BRADY she does want to switch to warfarin. Please place the order in epic for the coag clinic...Lety

## 2020-05-19 ENCOUNTER — TELEPHONE (OUTPATIENT)
Dept: PHARMACY | Facility: HOSPITAL | Age: 70
End: 2020-05-19

## 2020-05-19 NOTE — TELEPHONE ENCOUNTER
Left verbal message to have patient call us.  We will follow up to determine when to start warfarin.

## 2020-05-19 NOTE — TELEPHONE ENCOUNTER
Left verbal message to have patient call us in the next few days at [phone].  She is currently working until 3:30pm.

## 2020-05-20 DIAGNOSIS — R42 VERTIGO: Primary | ICD-10-CM

## 2020-05-22 NOTE — TELEPHONE ENCOUNTER
Continuing to try to speak with patient to facilitate switch from Eliquis to warfarin.  She has not returned my calls yet.

## 2020-05-22 NOTE — TELEPHONE ENCOUNTER
Ariadne called back and stated she is working on getting Eliquis through assistance program so she would not have to change to warfarin. She has about 2 weeks of Eliquis left (was not at home to double check) so she will call us later next week and we can determine if she still needs to start warfarin.

## 2020-05-28 ENCOUNTER — TREATMENT (OUTPATIENT)
Dept: PHYSICAL THERAPY | Facility: CLINIC | Age: 70
End: 2020-05-28

## 2020-05-28 DIAGNOSIS — R42 VERTIGO: ICD-10-CM

## 2020-05-28 DIAGNOSIS — H81.13 BPPV (BENIGN PAROXYSMAL POSITIONAL VERTIGO), BILATERAL: Primary | ICD-10-CM

## 2020-05-28 PROCEDURE — 97161 PT EVAL LOW COMPLEX 20 MIN: CPT | Performed by: PHYSICAL THERAPIST

## 2020-05-28 PROCEDURE — 95992 CANALITH REPOSITIONING PROC: CPT | Performed by: PHYSICAL THERAPIST

## 2020-05-28 NOTE — PROGRESS NOTES
Physical Therapy Initial Evaluation-  Vestibular Therapy    Patient Name: Ariadne Telles       Patient MRN: [id number]  : 1950  Physician:Nick Ariza MD  Date: 2020  Encounter Diagnoses   Name Primary?   • Vertigo Yes   • BPPV (benign paroxysmal positional vertigo), bilateral      Objective Testing:  Positional Testing  Positional Testing: With infrared goggles  Vertebrobasilar Artery Screen - Right: Negative  Vertebrobasilar Artery Screen - Left: Negative  [place]-Hallpike Right: (NA)  [place]-Hallpike Left: Upbeat, left rotatory nystagmus  [place]-Hallpike Left Onset Time : 3 sec  Arjun-Hallpike Left Duration Time : 25 sec  Horizontal Roll Test Right: (NA)  Horizontal Roll Test Left: (NA)        VOR with Occulomotor Exam Fixation Absent   Spontaneous Nystagmus: Absent   DHI: 32/100, low perception of handicap    THERAPY ASSESSMENT: Patient is a 69 y.o. female. Patient presents to physical therapy due to complaints of episodes of dizziness with spinning lasting seconds with transitional movement. This started mid 2020 after washing her car. Patient has a hx of BPPV that was successfully treated back in 2020. She was taught CRP treatments but admits not trying them independently. Signs and symptoms are consistent with L PC canalithiasis BPPV. Testing of all other canals was deferred to treat the L PC with CRP.   Patient is a good candidate for physical therapy to address the following:    Functional Limitations: Walking, Difficulty moving, Decreased ability to perform ADL's   Length of Therapy: 1 month   PT Frequency: PT 2x week   PT Interventions: Home Exercise Program, CRP   Patient Agrees with Plan of Care: Yes    REHAB POTENTIAL: excellent            SHORT TERM GOALS: To be met in 2 weeks:  1. Patient is independent with HEP.  2. Patient will report at least 30% improvement in overall condition.  LONG TERM GOALS:To be met in 4 weeks:  1. Patient will report decreased disequilibrium/dizziness  by at least 90%.  2. Patient will report no loss of balance with ADLs to demonstrate improved functional balance.  3. Patient denies dizziness with daily activity.  4. DHI score is less than 10.     Other Procedure CPT 44346 minutes 0    Timed Code Treatment: 0   Minutes     Total Treatment Time: 45      Minutes      PT SIGNATURE: Irais Canada PT, DPT, CHT   [place] license #[id number]  DATE TREATMENT INITIATED: 5/28/2020     Medicare Initial Certification  Certification Period: 8/26/2020  I certify that the therapy services are furnished while this patient is under my care.  The services outlined above are required by this patient, and will be reviewed every 90 days.     PHYSICIAN: Nick Ariza MD      DATE:     Please sign and return via fax to [phone].. Thank you, [facility].

## 2020-06-04 ENCOUNTER — TELEPHONE (OUTPATIENT)
Dept: PHARMACY | Facility: HOSPITAL | Age: 70
End: 2020-06-04

## 2020-06-05 ENCOUNTER — TREATMENT (OUTPATIENT)
Dept: PHYSICAL THERAPY | Facility: CLINIC | Age: 70
End: 2020-06-05

## 2020-06-05 DIAGNOSIS — R42 VERTIGO: Primary | ICD-10-CM

## 2020-06-05 DIAGNOSIS — H81.13 BPPV (BENIGN PAROXYSMAL POSITIONAL VERTIGO), BILATERAL: ICD-10-CM

## 2020-06-05 PROCEDURE — 95992 CANALITH REPOSITIONING PROC: CPT | Performed by: PHYSICAL THERAPIST

## 2020-06-05 NOTE — PROGRESS NOTES
Vestibular Daily Progress Note    Visit#:2  Subjective   I am better but I am still having some dizziness.  Objective         Positional Testing  Positional Testing: With infrared goggles  [place]-Hallpike Right: Upbeat, left rotatory nystagmus  [place]-Hallpike Right Onset Time : (10 sc)  [place]-Hallpike Right Duration Time : > 1 min  Arjun-Hallpike Left: No nystagmus  Horizontal Roll Test Right: No nystagmus  Horizontal Roll Test Left: No nystagmus          PROCEDURES AND MODALITIES:  Paraffin:    Moist Heat:    Ice:    E-Stim:    Ultrasound:    Ionto:   Traction:    See Exercise, Manual, and Modality Logs for complete treatment.   Other Procedure CPT 09499 minutes 0       Timed Code Treatment: 0 Minutes  Total Treatment Time: 30 Minutes    Assessment/Plan   L side PC is clear of BPPV with testing today. She did present with L PC cupulolithiasis. Tolerated CRP well. No increased dizziness or nausea with CRP. Making good progress towards STGs/LTGs.    Progress per Plan of Care    Irais Canada, PT, DPT, CHT  Physical Therapist

## 2020-06-11 ENCOUNTER — TREATMENT (OUTPATIENT)
Dept: PHYSICAL THERAPY | Facility: CLINIC | Age: 70
End: 2020-06-11

## 2020-06-11 ENCOUNTER — ANTICOAGULATION VISIT (OUTPATIENT)
Dept: PHARMACY | Facility: HOSPITAL | Age: 70
End: 2020-06-11

## 2020-06-11 DIAGNOSIS — I48.0 PAROXYSMAL ATRIAL FIBRILLATION (HCC): ICD-10-CM

## 2020-06-11 DIAGNOSIS — R42 VERTIGO: ICD-10-CM

## 2020-06-11 DIAGNOSIS — H81.13 BPPV (BENIGN PAROXYSMAL POSITIONAL VERTIGO), BILATERAL: Primary | ICD-10-CM

## 2020-06-11 PROCEDURE — 95992 CANALITH REPOSITIONING PROC: CPT | Performed by: PHYSICAL THERAPIST

## 2020-06-11 NOTE — PROGRESS NOTES
Vestibular Daily Progress Note    Visit#:3  Subjective   I am not having any dizziness but I have not done much to cause it to happen. I feel like the last treatment helped.   Objective         Positional Testing  Positional Testing: With infrared goggles  [place]-Hallpike Right: Upbeat, right rotatory nystagmus  Arjun-Hallpike Right Duration Time : > 1 min  [place]-Hallpike Left: ( NA)  Horizontal Roll Test Right: (NA)  Horizontal Roll Test Left: (NA)          PROCEDURES AND MODALITIES:  Paraffin:    Moist Heat:    Ice:    E-Stim:    Ultrasound:    Ionto:   Traction:    See Exercise, Manual, and Modality Logs for complete treatment.   Other Procedure CPT 26912 minutes 0       Timed Code Treatment: 0 Minutes  Total Treatment Time: 30 Minutes    Assessment/Plan   Patient continues to present with L PC cupulolithiasis BPPV. We were not able to completely clear this when I saw her in Jan. Will attempt to clear in 1-2 more visits then DC if no further improvement. She is reporting resolution in spinning sensations.     Progress per Plan of Care    Irais Canada, PT, DPT, CHT  Physical Therapist

## 2020-06-11 NOTE — PROGRESS NOTES
This visit is for documentation purposes only: Patient is going to continue on Eliquis and we will not be following in the [facility].

## 2020-06-15 ENCOUNTER — HOSPITAL ENCOUNTER (OUTPATIENT)
Dept: GENERAL RADIOLOGY | Facility: HOSPITAL | Age: 70
Discharge: HOME | End: 2020-06-15
Admitting: FAMILY MEDICINE

## 2020-06-15 ENCOUNTER — OFFICE VISIT (OUTPATIENT)
Dept: FAMILY MEDICINE CLINIC | Facility: CLINIC | Age: 70
End: 2020-06-15

## 2020-06-15 VITALS
SYSTOLIC BLOOD PRESSURE: 136 MMHG | HEART RATE: 56 BPM | OXYGEN SATURATION: 96 % | TEMPERATURE: 97.1 F | WEIGHT: 161.4 LBS | BODY MASS INDEX: 30.5 KG/M2 | DIASTOLIC BLOOD PRESSURE: 80 MMHG

## 2020-06-15 DIAGNOSIS — I48.0 PAROXYSMAL ATRIAL FIBRILLATION (HCC): ICD-10-CM

## 2020-06-15 DIAGNOSIS — M81.0 OSTEOPOROSIS, UNSPECIFIED OSTEOPOROSIS TYPE, UNSPECIFIED PATHOLOGICAL FRACTURE PRESENCE: ICD-10-CM

## 2020-06-15 DIAGNOSIS — S32.000A COMPRESSION FRACTURE OF LUMBAR VERTEBRA, INITIAL ENCOUNTER, UNSPECIFIED LUMBAR VERTEBRAL LEVEL: Primary | ICD-10-CM

## 2020-06-15 PROCEDURE — 72110 X-RAY EXAM L-2 SPINE 4/>VWS: CPT

## 2020-06-15 PROCEDURE — 99214 OFFICE O/P EST MOD 30 MIN: CPT | Performed by: FAMILY MEDICINE

## 2020-06-15 RX ORDER — HYDROCODONE BITARTRATE AND ACETAMINOPHEN 5; 325 MG/1; MG/1
1 TABLET ORAL EVERY 6 HOURS PRN
Qty: 20 TABLET | Refills: 0 | Status: SHIPPED | OUTPATIENT
Start: 2020-06-15 | End: 2020-06-15 | Stop reason: SDUPTHER

## 2020-06-15 RX ORDER — HYDROCODONE BITARTRATE AND ACETAMINOPHEN 5; 325 MG/1; MG/1
1 TABLET ORAL EVERY 6 HOURS PRN
Qty: 20 TABLET | Refills: 0 | Status: SHIPPED | OUTPATIENT
Start: 2020-06-15 | End: 2020-06-22

## 2020-06-15 NOTE — PROGRESS NOTES
Please call patient ........there is a questionable vertebral compression fracture at number T12 at the lower part of your back.  This may or may not be causing your pain.  If not better in 1 week I want to do an MRI.  Continue medicine as is and current plan.

## 2020-06-15 NOTE — PROGRESS NOTES
Subjective   Ariadne Telles is a 70 y.o. female.     Chief Complaint   Patient presents with   • Back Pain     f/u for [facility] after fall on 6/11, waas given rx for pain but not helping        History of Present Illness    Back pain.  Fall.  Fell while reaching for a medicine bottle high up on a shelf on Friday.  Hit the floor.  Did not hit her head.  Went to an urgent care center.  She had a thoracic spine film done which was reportedly negative.  We do not have the report.  But she states she received notification last night this everything was normal.  She was given tramadol which has not helped her pain.  She describes no headache or neck pain.  He is having trouble walking due to the pain.  The pain continues to bother her.  Moderate to severe.  Hurts when she rolls over in bed.  She has osteoporosis.  On Fosamax.  Also taking Eliquis for atrial fibrillation. No blood in the urine.  No radiculopathy symptoms.      The following portions of the patient's history were reviewed and updated as appropriate: allergies, current medications, past family history, past medical history, past social history, past surgical history and problem list.          Review of Systems   Constitutional: Negative.    Respiratory: Negative.    Cardiovascular: Negative.    Musculoskeletal: Positive for back pain.   Neurological: Negative for weakness and numbness.       Objective   Blood pressure 136/80, pulse 56, temperature 97.1 °F (36.2 °C), temperature source Temporal, weight 73.2 kg (161 lb 6.4 oz), SpO2 96 %, not currently breastfeeding.  Physical Exam   Constitutional: She appears well-developed and well-nourished. No distress.   HENT:   Head: Atraumatic.   Neck: No thyromegaly present.   Cardiovascular: Normal rate.   Pulmonary/Chest: Effort normal and breath sounds normal.   Musculoskeletal:   She has pain to palpation along the thoracic spine and lumbar midline spine.  Especially the left lumbar area.  There is no  ecchymosis.  Good strength in lower extremities.   Skin: Skin is warm and dry.   Psychiatric: She has a normal mood and affect. Her behavior is normal. Judgment and thought content normal.   Nursing note and vitals reviewed.      Assessment/Plan   Ariadne was seen today for back pain.    Diagnoses and all orders for this visit:    Compression fracture of lumbar vertebra, initial encounter, unspecified lumbar vertebral level (CMS/HCC)  -     XR Spine Lumbar Complete 4+VW    Osteoporosis, unspecified osteoporosis type, unspecified pathological fracture presence    Paroxysmal atrial fibrillation (CMS/HCC)    Other orders  -     HYDROcodone-acetaminophen (NORCO) 5-325 MG per tablet; Take 1 tablet by mouth Every 6 (Six) Hours As Needed for Severe Pain  (back pain from fall).      Suspicious for vertebral compression fracture of the lumbar spine.  Stopping the tramadol.  Starting hydrocodone.  Patient is aware this is a controlled substance.  Watch out for constipation.  Do not take while driving.  Checking x-ray of the lumbar spine.  If compression fracture apparent, recommending orthopedic consultation.  Reportedly the thoracic spine film at the urgent care center on Friday was negative.  But we do not have the report officially.  Patient states she was contacted.  We have asked for records release.    Osteoporosis.  At risk for vertebral compression fracture.  See above.    Paroxysmal atrial fibrillation.  Fall while on Eliquis, anticoagulant.  No head injury.  I do not see any ecchymosis.  She has no hematuria.  If pain is worsening, to consider further investigation.  Especially if x-ray is negative.  She will let us know how she is doing in 1 week.

## 2020-06-22 ENCOUNTER — OFFICE VISIT (OUTPATIENT)
Dept: FAMILY MEDICINE CLINIC | Facility: CLINIC | Age: 70
End: 2020-06-22

## 2020-06-22 DIAGNOSIS — S32.000A COMPRESSION FRACTURE OF LUMBAR VERTEBRA, INITIAL ENCOUNTER, UNSPECIFIED LUMBAR VERTEBRAL LEVEL: Primary | ICD-10-CM

## 2020-06-22 PROCEDURE — 99443 PR PHYS/QHP TELEPHONE EVALUATION 21-30 MIN: CPT | Performed by: FAMILY MEDICINE

## 2020-06-22 RX ORDER — TRAMADOL HYDROCHLORIDE 50 MG/1
50 TABLET ORAL EVERY 8 HOURS PRN
Qty: 20 TABLET | Refills: 1 | Status: SHIPPED | OUTPATIENT
Start: 2020-06-22 | End: 2020-07-28

## 2020-06-22 NOTE — PROGRESS NOTES
Subjective   Ariadne Telles is a 70 y.o. female.     Chief Complaint   Patient presents with   • Back Pain        History of Present Illness    You have chosen to receive care through a telephone visit. Do you consent to use a telephone visit for your medical care today? Yes    Follow-up probable T12 vertebral compression fracture.  She fell on or about June 12.  I saw her 2 days later.  At the urgent care the thoracic spine films did not show any vertebral compression fracture.  However 3 days later when she had x-rays through us, there is loss of height at T12.  She was prescribed hydrocodone.    The pain continues to be a problem for her.  Lumbar spine area to the right.  No radicular symptoms.  The pain gets very severe at times and keeps her up at nighttime.  She took one half of the hydrocodone.  She was afraid to make her sleepy and she stopped taking more.  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  She is taking Tylenol which seems to help.  She not had pain like this before.  Continuing pain from last week.  Not improving.      The following portions of the patient's history were reviewed and updated as appropriate: allergies, current medications, past family history, past medical history, past social history, past surgical history and problem list.          Review of Systems   Constitutional: Negative.    Musculoskeletal: Positive for back pain.       Objective   not currently breastfeeding.  Physical Exam   Constitutional: She is oriented to person, place, and time.   Patient sounds tired but nontoxic   Pulmonary/Chest: No respiratory distress.   Neurological: She is alert and oriented to person, place, and time.   Psychiatric: She has a normal mood and affect.       Assessment/Plan   Ariadne was seen today for back pain.    Diagnoses and all orders for this visit:    Compression fracture of lumbar vertebra, initial encounter, unspecified lumbar vertebral level (CMS/HCC)  -     MRI thoracic spine wo  contrast; Future  -     Ambulatory Referral to Orthopedic Surgery  -     MRI Lumbar Spine Without Contrast; Future  -     traMADol (ULTRAM) 50 MG tablet; Take 1 tablet by mouth Every 8 (Eight) Hours As Needed (back pain).      Compression fracture of T12, old versus new.  Low back pain, may or may not be related to the compression fracture.  I am concerned about a lumbar vertebral compression fracture given the intensity of her pain.  She is going to discontinue the hydrocodone which she is not taking.  She has a previous prescription for Ultram she is good to start.  I gave her some refills.  I want to check MRIs of the thoracic and lumbar spine.  I am also referring to orthopedics.  If there is a vertebral compression fracture verified by MRI, may do well with kyphoplasty.  She will call with questions.    Duration of service 15 minutes.

## 2020-07-01 RX ORDER — APIXABAN 5 MG/1
TABLET, FILM COATED ORAL
Qty: 60 TABLET | Refills: 0 | OUTPATIENT
Start: 2020-07-01

## 2020-07-02 ENCOUNTER — HOSPITAL ENCOUNTER (OUTPATIENT)
Dept: MRI IMAGING | Facility: HOSPITAL | Age: 70
Discharge: HOME | End: 2020-07-02
Admitting: FAMILY MEDICINE

## 2020-07-02 ENCOUNTER — HOSPITAL ENCOUNTER (OUTPATIENT)
Dept: MRI IMAGING | Facility: HOSPITAL | Age: 70
Discharge: HOME | End: 2020-07-02

## 2020-07-02 DIAGNOSIS — S32.000A COMPRESSION FRACTURE OF LUMBAR VERTEBRA, INITIAL ENCOUNTER, UNSPECIFIED LUMBAR VERTEBRAL LEVEL: ICD-10-CM

## 2020-07-02 PROCEDURE — 72146 MRI CHEST SPINE W/O DYE: CPT

## 2020-07-02 PROCEDURE — 72148 MRI LUMBAR SPINE W/O DYE: CPT

## 2020-07-02 NOTE — PROGRESS NOTES
I called patient.  ............Per our phone conversation, the MRI demonstrates a definite vertebral compression fracture at T12.  In addition there is a probable rib fracture at the right 12th.  Continue the tramadol at nighttime.  Continue activity as tolerated.  Keep appointment with orthopedics later this month.  If getting much worse in the next week please let us know.

## 2020-07-17 ENCOUNTER — TELEPHONE (OUTPATIENT)
Dept: CARDIOLOGY | Facility: CLINIC | Age: 70
End: 2020-07-17

## 2020-07-17 NOTE — TELEPHONE ENCOUNTER
Received letter from [employer] with denial of Patient Assistance Program for Eliquis.  The reason for the denial is  Product Covered By Insurance. Please advise. / ATUL

## 2020-07-28 ENCOUNTER — OFFICE VISIT (OUTPATIENT)
Dept: ORTHOPEDIC SURGERY | Facility: CLINIC | Age: 70
End: 2020-07-28

## 2020-07-28 VITALS — TEMPERATURE: 97.7 F | WEIGHT: 164.2 LBS | BODY MASS INDEX: 31 KG/M2 | HEIGHT: 61 IN

## 2020-07-28 DIAGNOSIS — M54.50 LUMBAR SPINE PAIN: Primary | ICD-10-CM

## 2020-07-28 DIAGNOSIS — S22.080A COMPRESSION FRACTURE OF T12 VERTEBRA, INITIAL ENCOUNTER (HCC): ICD-10-CM

## 2020-07-28 PROCEDURE — 72100 X-RAY EXAM L-S SPINE 2/3 VWS: CPT | Performed by: ORTHOPAEDIC SURGERY

## 2020-07-28 PROCEDURE — 99203 OFFICE O/P NEW LOW 30 MIN: CPT | Performed by: ORTHOPAEDIC SURGERY

## 2020-07-28 NOTE — PROGRESS NOTES
New patient or new problem visit    Chief Complaint   Patient presents with   • Lumbar Spine - Establish Care       HPI: She complains of history since June 11 of a fall in which she injured her back and ribs.  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  She denies leg pain numbness tingling weakness.  No prior history of fractures.  She had a DEXA scan in 2017.  She has been on multivitamins.    PFSH: See chart- reviewed    Review of Systems   Cardiovascular: Negative for chest pain.   Gastrointestinal: Negative for abdominal pain.   Genitourinary: Negative for dysuria.   Musculoskeletal: Positive for back pain.   Neurological: Positive for dizziness. Negative for headaches.       PE: Constitutional: Vital signs above-noted.  Awake, alert and oriented    Psychiatric: Affect and insight do not appear grossly disturbed.    Pulmonary: Breathing is unlabored, color is good.    Skin: Warm, dry and normal turgor    Cardiac: Pedal pulses intact.  No edema.    Eyesight and hearing appear adequate for examination purposes      Musculoskeletal:  There is minimal tenderness to percussion and palpation of the thoracolumbar spine. Motion appears somewhat stiff.  Posture is unremarkable to coronal and sagittal inspection.    The skin about the area is intact.  There is no palpable or visible deformity.  There is no local spasm.       Neurologic:   Reflexes are 2+ and symmetrical in the patellae and untested in the Achilles.   Motor function is undisturbed in quadriceps, EHL, and gastrocnemius   sensation appears symmetrically intact to light touch.  In the bilateral lower extremities there is no evidence of atrophy.   Clonus is absent..  Gait appears undisturbed.  SLR test negative      MEDICAL DECISION MAKING    XRAY: Plain film x-rays of the lumbar spine obtained in my office today show symmetric compression of 25 to 30% of the T12 vertebra.  Overall well-maintained  lordosis quite excellent indeed and no other fractures noted.  Recent MRI of the lumbar and thoracic spine demonstrated new fracture at T12 with a similar amount of compression is noted on films today.  The index injury film showed a very subtle endplate compression of T12 which he might have missed otherwise.  DEXA scan from 2017 demonstrated osteopenia    Other: n/a    Impression: T12 osteo-parotic compression fracture    Plan: She is healing nicely and no intervention is needed but I do think she should stay off work for 4 more weeks and avoid heavy lifting.  I will see her back at that time for final x-ray of the lumbar spine aim high enough to see T12.  She will talk to Dr. Ramos about a repeat DEXA at some point certainly no hurry.  Answers for HPI/ROS submitted by the patient on 7/21/2020   Back pain  What is the primary reason for your visit?: Back Pain  Chronicity: new  Onset: more than 1 month ago  Frequency: daily  Progression since onset: gradually improving  Pain location: sacro-iliac  Pain quality: aching, shooting, stabbing  Radiates to: right foot  Pain - numeric: 10/10  Pain is: worse during the night  Aggravated by: position, lying down, sitting, standing, twisting  Stiffness is present: at night  bladder incontinence: No  leg pain: No  paresis: No  paresthesias: No  perianal numbness: No  tingling: Yes  weight loss: No  Risk factors: history of osteoporosis, lack of exercise, recent trauma

## 2020-08-03 DIAGNOSIS — M81.0 OSTEOPOROSIS, UNSPECIFIED OSTEOPOROSIS TYPE, UNSPECIFIED PATHOLOGICAL FRACTURE PRESENCE: Primary | ICD-10-CM

## 2020-08-25 ENCOUNTER — OFFICE VISIT (OUTPATIENT)
Dept: ORTHOPEDIC SURGERY | Facility: CLINIC | Age: 70
End: 2020-08-25

## 2020-08-25 VITALS — BODY MASS INDEX: 30.96 KG/M2 | WEIGHT: 164 LBS | TEMPERATURE: 97.5 F | HEIGHT: 61 IN

## 2020-08-25 DIAGNOSIS — S22.080A COMPRESSION FRACTURE OF T12 VERTEBRA, INITIAL ENCOUNTER (HCC): ICD-10-CM

## 2020-08-25 DIAGNOSIS — M54.50 LUMBAR SPINE PAIN: Primary | ICD-10-CM

## 2020-08-25 PROCEDURE — 99213 OFFICE O/P EST LOW 20 MIN: CPT | Performed by: ORTHOPAEDIC SURGERY

## 2020-08-25 PROCEDURE — 72100 X-RAY EXAM L-S SPINE 2/3 VWS: CPT | Performed by: ORTHOPAEDIC SURGERY

## 2020-08-25 NOTE — PROGRESS NOTES
She complains of persistent pain in the back which is slowly improving and just general weakness in the legs.  No bowel or bladder complaints no balance difficulties.  She wants to return to work.  She has a T12 fracture from a fall which occurred June 11.  MRI at that time showed signal change consistent with a Cook acute fracture and no other abnormalities except for an L5-S1 spondylolisthesis with minimal stenosis.  On exam today she has intact strength and sensation and straight leg raise is negative.  The back is nontender.  2 view x-rays of the lumbar spine show no change in position of the 25 to 30% compression fracture of T12.  This time I am going to let her work it but see her back in a month for a final x-ray.  If the weakness persists then it is possible further evaluation may be needed but I think she is deconditioned and asked her to talk to her family doctor as well about the weakness.

## 2020-09-23 ENCOUNTER — OFFICE VISIT (OUTPATIENT)
Dept: ORTHOPEDIC SURGERY | Facility: CLINIC | Age: 70
End: 2020-09-23

## 2020-09-23 VITALS — WEIGHT: 162 LBS | TEMPERATURE: 97.8 F | BODY MASS INDEX: 30.58 KG/M2 | HEIGHT: 61 IN

## 2020-09-23 DIAGNOSIS — M54.50 LUMBAR SPINE PAIN: Primary | ICD-10-CM

## 2020-09-23 DIAGNOSIS — S22.080D COMPRESSION FRACTURE OF T12 VERTEBRA WITH ROUTINE HEALING, SUBSEQUENT ENCOUNTER: ICD-10-CM

## 2020-09-23 PROCEDURE — 99213 OFFICE O/P EST LOW 20 MIN: CPT | Performed by: ORTHOPAEDIC SURGERY

## 2020-09-23 PROCEDURE — 72100 X-RAY EXAM L-S SPINE 2/3 VWS: CPT | Performed by: ORTHOPAEDIC SURGERY

## 2020-09-23 NOTE — PROGRESS NOTES
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  On exam mild thoracolumbar tenderness good strength in the legs no lumbar tenderness 2 view x-rays lumbar spine suggest a T12 fracture is unchanged from prior visits and presumably healed overall doing well I will plan on seeing her back as needed.

## 2020-10-01 ENCOUNTER — HOSPITAL ENCOUNTER (OUTPATIENT)
Dept: BONE DENSITY | Facility: HOSPITAL | Age: 70
Discharge: HOME | End: 2020-10-01
Admitting: FAMILY MEDICINE

## 2020-10-01 PROCEDURE — 77080 DXA BONE DENSITY AXIAL: CPT

## 2020-10-16 ENCOUNTER — TELEPHONE (OUTPATIENT)
Dept: FAMILY MEDICINE CLINIC | Facility: CLINIC | Age: 70
End: 2020-10-16

## 2020-10-16 NOTE — TELEPHONE ENCOUNTER
Patient is calling to request a 90 day RX with refills of the following.     alendronate (FOSAMAX) 70 MG tablet     [facility] [address] [place] [address] RD. - [phone]  - [phone]   [phone]    Patient call back [phone].    Patient has been out for 3 months

## 2020-10-23 ENCOUNTER — OFFICE VISIT (OUTPATIENT)
Dept: FAMILY MEDICINE CLINIC | Facility: CLINIC | Age: 70
End: 2020-10-23

## 2020-10-23 VITALS
RESPIRATION RATE: 16 BRPM | SYSTOLIC BLOOD PRESSURE: 126 MMHG | OXYGEN SATURATION: 99 % | HEART RATE: 69 BPM | BODY MASS INDEX: 31.49 KG/M2 | WEIGHT: 166.8 LBS | DIASTOLIC BLOOD PRESSURE: 82 MMHG | TEMPERATURE: 96.8 F | HEIGHT: 61 IN

## 2020-10-23 DIAGNOSIS — M85.80 OSTEOPENIA, UNSPECIFIED LOCATION: ICD-10-CM

## 2020-10-23 DIAGNOSIS — I10 ESSENTIAL HYPERTENSION: Primary | Chronic | ICD-10-CM

## 2020-10-23 DIAGNOSIS — I48.0 PAROXYSMAL ATRIAL FIBRILLATION (HCC): ICD-10-CM

## 2020-10-23 DIAGNOSIS — E78.00 HYPERCHOLESTEROLEMIA: Chronic | ICD-10-CM

## 2020-10-23 DIAGNOSIS — S32.000A COMPRESSION FRACTURE OF LUMBAR VERTEBRA, INITIAL ENCOUNTER, UNSPECIFIED LUMBAR VERTEBRAL LEVEL: ICD-10-CM

## 2020-10-23 PROCEDURE — 99214 OFFICE O/P EST MOD 30 MIN: CPT | Performed by: FAMILY MEDICINE

## 2020-10-23 RX ORDER — ALENDRONATE SODIUM 70 MG/1
70 TABLET ORAL
Qty: 12 TABLET | Refills: 1 | Status: SHIPPED | OUTPATIENT
Start: 2020-10-23 | End: 2021-05-05

## 2020-10-23 NOTE — TELEPHONE ENCOUNTER
Left voicemail for patient to call back to confirm what she has decided to do about Eliquis since patient assistance was declined.

## 2020-10-23 NOTE — TELEPHONE ENCOUNTER
Last seen by Anuradha 4/17/20, currently no f/u scheduled.    Cannot find pharmacy in system. Will have to print rx and fax to pharmacy at     [facility]  [address] [place] [address]. [place] [address]   PH # [phone]  FAX # [phone]

## 2020-10-23 NOTE — PROGRESS NOTES
"Subjective   Ariadne Telles is a 70 y.o. female.     Hypertension    History of Present Illness     Osteoporosis follow-up.  She is taking Fosamax for least for 5 years.  She is 900% sure.  She is been off it for about 6 months.  Her recent DEXA scan demonstrated worsening osteoporosis.  She is taking calcium once a day.    Hypertension follow up. Doing well with current medication which she is taking as directed. No known high or low blood pressure episodes. No cardiovascular or neurological symptoms. Today's BP: 126/82.      Hyperlipidemia follow up. She is taking statin medication without complaint. No myopathy symptoms.     Lab Results   Component Value Date    CHOL 174 12/16/2019    CHLPL 194 02/21/2020    TRIG 203 (H) 02/21/2020    HDL 52 02/21/2020     (H) 02/21/2020     Paroxysmal atrial fibrillation.  She continues on Eliquis and flecainide.  Also continues the beta-blocker.    Low back pain.  Combination of her T12 compression fracture and lumbar spondylolisthesis.  She has no typical spinal stenosis pain.  She is followed by her back doctor.  Tylenol is helping the pain.    The following portions of the patient's history were reviewed and updated as appropriate: allergies, current medications, past family history, past medical history, past social history, past surgical history and problem list.      Review of Systems   Constitutional: Negative.    Respiratory: Negative.    Cardiovascular: Negative.    Musculoskeletal: Positive for back pain.       Objective   Blood pressure 126/82, pulse 69, temperature 96.8 °F (36 °C), temperature source Temporal, resp. rate 16, height 154.9 cm (61\"), weight 75.7 kg (166 lb 12.8 oz), SpO2 99 %, not currently breastfeeding.  Physical Exam  Vitals signs and nursing note reviewed.   Constitutional:       General: She is not in acute distress.     Appearance: She is well-developed.   HENT:      Head: Atraumatic.   Neck:      Musculoskeletal: Normal range of " motion and neck supple.      Thyroid: No thyromegaly.      Comments: Thyroid unremarkable  Cardiovascular:      Rate and Rhythm: Normal rate and regular rhythm.      Pulses: Normal pulses.      Heart sounds: Normal heart sounds.      Comments: Appears to be in sinus rhythm today  Pulmonary:      Effort: Pulmonary effort is normal.      Breath sounds: Normal breath sounds.   Lymphadenopathy:      Cervical: No cervical adenopathy.   Skin:     General: Skin is warm and dry.   Psychiatric:         Mood and Affect: Mood normal.         Behavior: Behavior normal.         Thought Content: Thought content normal.         Judgment: Judgment normal.         Assessment/Plan   Diagnoses and all orders for this visit:    1. Essential hypertension (Primary)    2. Hypercholesterolemia  -     Comprehensive Metabolic Panel; Future  -     Lipid Panel; Future    3. Paroxysmal atrial fibrillation (CMS/HCC)    4. Osteopenia, unspecified location  -     alendronate (FOSAMAX) 70 MG tablet; Take 1 tablet by mouth Every 7 (Seven) Days.  Dispense: 12 tablet; Refill: 1    5. Compression fracture of lumbar vertebra, initial encounter, unspecified lumbar vertebral level (CMS/HCC)        Osteoporosis of lumbar and thoracic spine with compression fracture.  Restart Fosamax weekly.  Instructions given.  Take once a week, empty stomach, big glass water, do not lie down for 1 hour.  She is aware of this.  Restart calcium twice a day with vitamin D.  See me in 6 months for follow-up.    Hyperlipidemia.  Continue atorvastatin 40 mg a day.    Hypertension.  Controlled.  Continue lisinopril and metoprolol.    Paroxysmal atrial fibrillation.  She continues Eliquis without bleeding episodes.  She continues flecainide.  She is followed by her cardiologist.    Vertebral compression fracture T12 along with lumbar spondylolisthesis.  No evidence of spinal stenosis.  She is doing well with Tylenol for back pain.  She is not taking NSAIDs that she knows not  to.

## 2020-10-26 NOTE — TELEPHONE ENCOUNTER
I spoke with pt.  She has had a 90 day RX of Eliquis through the [place] program.  I called her pharmacy and was informed that she just needs refills.  I spoke to the pharmacist [place] and gave her refills.  Noni

## 2021-01-25 RX ORDER — FLECAINIDE ACETATE 50 MG/1
TABLET ORAL
Qty: 180 TABLET | Refills: 2 | Status: SHIPPED | OUTPATIENT
Start: 2021-01-25 | End: 2021-11-23 | Stop reason: SDUPTHER

## 2021-01-27 ENCOUNTER — TELEPHONE (OUTPATIENT)
Dept: CARDIOLOGY | Facility: CLINIC | Age: 71
End: 2021-01-27

## 2021-01-27 DIAGNOSIS — I48.0 PAROXYSMAL ATRIAL FIBRILLATION (HCC): Primary | ICD-10-CM

## 2021-01-27 NOTE — TELEPHONE ENCOUNTER
Patient is calling stating insurance will no longer cover Eliquis, and she is not eligible for assistance. She states she only has enough to get her through Friday.... we are currently out of samples....  Wanting to know what you recommend? Please advise

## 2021-01-28 ENCOUNTER — OFFICE VISIT (OUTPATIENT)
Dept: SPORTS MEDICINE | Facility: CLINIC | Age: 71
End: 2021-01-28

## 2021-01-28 VITALS
BODY MASS INDEX: 30.58 KG/M2 | HEIGHT: 61 IN | WEIGHT: 162 LBS | DIASTOLIC BLOOD PRESSURE: 70 MMHG | OXYGEN SATURATION: 97 % | SYSTOLIC BLOOD PRESSURE: 140 MMHG | HEART RATE: 67 BPM

## 2021-01-28 DIAGNOSIS — M75.42 IMPINGEMENT SYNDROME OF LEFT SHOULDER: ICD-10-CM

## 2021-01-28 DIAGNOSIS — M25.512 CHRONIC LEFT SHOULDER PAIN: Primary | ICD-10-CM

## 2021-01-28 DIAGNOSIS — G89.29 CHRONIC LEFT SHOULDER PAIN: Primary | ICD-10-CM

## 2021-01-28 PROCEDURE — 99214 OFFICE O/P EST MOD 30 MIN: CPT | Performed by: FAMILY MEDICINE

## 2021-01-28 PROCEDURE — 73030 X-RAY EXAM OF SHOULDER: CPT | Performed by: FAMILY MEDICINE

## 2021-01-28 PROCEDURE — 20610 DRAIN/INJ JOINT/BURSA W/O US: CPT | Performed by: FAMILY MEDICINE

## 2021-01-28 RX ORDER — TRIAMCINOLONE ACETONIDE 40 MG/ML
40 INJECTION, SUSPENSION INTRA-ARTICULAR; INTRAMUSCULAR ONCE
Status: COMPLETED | OUTPATIENT
Start: 2021-01-28 | End: 2021-01-28

## 2021-01-28 RX ADMIN — TRIAMCINOLONE ACETONIDE 40 MG: 40 INJECTION, SUSPENSION INTRA-ARTICULAR; INTRAMUSCULAR at 14:58

## 2021-01-28 NOTE — TELEPHONE ENCOUNTER
Spoke with Ms. Redholger per her phone call to the office today. She will be given samples today for Eliquis left at the  in the main office for her and she has been instructed to get set up with the PT [facility] as soon as possible.     6 boxes of Eliquis 2.5 mg.patient instructed to take 2 pills per day, as she is currently on 5 mg and we do not have 5mg samples available.    Lot number: [id number]  Exp. Apr 2022

## 2021-01-28 NOTE — PROGRESS NOTES
"Ariadne is a 70 y.o. year old female     Chief Complaint   Patient presents with   • Shoulder Injury     left shoulder; penetrating pain that is constant and thinks she has arthritis in it       History of Present Illness  HPI   Here today for left shoulder pain worsening for a few months. PMH OA and impingement. NKI. Trying to use heat with some relief.       Review of Systems   Constitutional: Negative.    Neurological: Negative.        /70 (BP Location: Left arm, Patient Position: Sitting, Cuff Size: Adult)   Pulse 67   Ht 154.9 cm (60.98\")   Wt 73.5 kg (162 lb) Comment: pt verbalized  LMP  (LMP Unknown) Comment: NO HRT  SpO2 97%   BMI 30.63 kg/m²    There were no vitals filed for this visit.     Physical Exam    Vital signs reviewed.   General: No acute distress.      MSK Exam:  Left Shoulder Exam     Tenderness   Left shoulder tenderness location: subacromial space.    Range of Motion   The patient has normal left shoulder ROM.  Left shoulder active abduction: painful.   Left shoulder internal rotation 0 degrees: painful.     Muscle Strength   The patient has normal left shoulder strength (pain with EC and IR).    Tests   Carver test: positive  Cross arm: positive  Impingement: positive  Drop arm: negative    Other   Erythema: absent  Sensation: normal     Comments:  No crepitus          Left Shoulder X-Ray  Indication: Pain  Views: AP Internal and External Rotation    Findings:  Mild acromioclavicular and glenohumeral arthritis.  Otherwise normal.  No significant change compared to previous films.    Shoulder Injection Procedure Note    Left shoulder subacromial bursa injection was discussed with the patient in detail, including indication, risks, benefits, and alternatives. Verbal consent was given for the procedure. Injection was performed by MD.  Injection site was identified by physical examination and cleaned with Betadine and alcohol swabs. Prior to needle insertion, ethyl chloride spray was " "used for surface anesthesia. Sterile technique was used.  A 25-gauge, 1.5\" needle was directed to the joint from a posterior approach. Injectate was passed into the subacromial bursa without difficulty. The needle was removed and a simple bandage was applied. The procedure was tolerated well without difficulty.    Injection mixture:  1% lidocaine without epinephrine: 2 mL  40 mg/mL triamcinolone acetonide: 1 mL     Diagnoses and all orders for this visit:    Chronic left shoulder pain  -     XR Shoulder 2+ View Left    Impingement syndrome of left shoulder  -     triamcinolone acetonide (KENALOG-40) injection 40 mg    Other orders  -     Cancel: XR Shoulder 2+ View Bilateral    Appears most consistent with recurrent subacromial impingement.  Discussed treatment options and agreed on repeat injection which was performed and tolerated well.  Follow-up as needed.      EMR Dragon/Transcription disclaimer:    Much of this encounter note is an electronic transcription/translation of spoken language to printed text.  The electronic translation of spoken language may permit erroneous, or at times, nonsensical words or phrases to be inadvertently transcribed.  Although I have reviewed the note for such errors some may still exist.    "

## 2021-02-02 RX ORDER — LISINOPRIL 20 MG/1
TABLET ORAL
Qty: 90 TABLET | Refills: 1 | Status: SHIPPED | OUTPATIENT
Start: 2021-02-02 | End: 2021-08-13

## 2021-02-10 ENCOUNTER — ANTICOAGULATION VISIT (OUTPATIENT)
Dept: PHARMACY | Facility: HOSPITAL | Age: 71
End: 2021-02-10

## 2021-02-10 DIAGNOSIS — I48.0 PAROXYSMAL ATRIAL FIBRILLATION (HCC): ICD-10-CM

## 2021-02-10 RX ORDER — WARFARIN SODIUM 5 MG/1
TABLET ORAL
Qty: 30 TABLET | Refills: 0 | Status: SHIPPED | OUTPATIENT
Start: 2021-02-10 | End: 2021-03-01 | Stop reason: SDUPTHER

## 2021-02-10 NOTE — PROGRESS NOTES
Anticoagulation Clinic Progress Note    Anticoagulation Summary  As of 2/10/2021    INR goal:  2.0-3.0   TTR:  --   INR used for dosing:  No new INR was available at the time of this encounter.   Warfarin maintenance plan:  5 mg every day   Weekly warfarin total:  35 mg   Plan last modified:  Octavio Garnett [facility] (2/10/2021)   Next INR check:  2/22/2021   Target end date:      Indications    Paroxysmal atrial fibrillation (CMS/HCC) [I48.0]             Anticoagulation Episode Summary     INR check location:      Preferred lab:      Send INR reminders to:   [facility]    Comments:  Reports her apixaban supply will end after dose on 2/19/21. INITIATE warfarin 5 mg daily on 2/18/21. 1st INR check 2/22/21      Anticoagulation Care Providers     Provider Role Specialty Phone number    Francesco Riojas MD Referring Cardiology [phone]          Clinic Interview:  Patient Findings     Positives:  Other complaints    Negatives:  Signs/symptoms of thrombosis, Signs/symptoms of bleeding,   Laboratory test error suspected, Change in health, Change in alcohol use,   Change in activity, Upcoming invasive procedure, Emergency department   visit, Upcoming dental procedure, Missed doses, Extra doses, Change in   medications, Change in diet/appetite, Hospital admission, Bruising    Comments:  Apixaban is too costly.       Clinical Outcomes     Negatives:  Major bleeding event, Thromboembolic event,   Anticoagulation-related hospital admission, Anticoagulation-related ED   visit, Anticoagulation-related fatality    Comments:  Apixaban is too costly.         INR History:  Anticoagulation Monitoring 6/11/2020 2/10/2021   INR Goal 2.0-3.0 2.0-3.0   Last Week Total 0 mg 0 mg   Next Week Total 0 mg 0 mg   Sun - 5 mg   Mon - -   Tue - -   Wed - -   Thu - 5 mg   Fri - 5 mg   Sat - 5 mg   Visit Report - -       Plan:  1. INR is unknown today- see above in Anticoagulation Summary.   Will instruct Ariadne Telles to  INITIATE warfarin 5 mg daily on 2/18/21 - see above in Anticoagulation Summary. DISCONTINUE apixaban after finishing supply on 2/19/21.   2. Follow up on 2/22/21, at which time warfarin education will be performed.   3. They have been instructed to call if any changes in medications, doses, concerns, etc. Patient expresses understanding and has no further questions at this time.    Octavio Garnett [facility]

## 2021-02-22 ENCOUNTER — ANTICOAGULATION VISIT (OUTPATIENT)
Dept: PHARMACY | Facility: HOSPITAL | Age: 71
End: 2021-02-22

## 2021-02-22 DIAGNOSIS — I48.0 PAROXYSMAL ATRIAL FIBRILLATION (HCC): ICD-10-CM

## 2021-02-22 LAB
INR PPP: 4.1 (ref 0.91–1.09)
PROTHROMBIN TIME: 49.7 SECONDS (ref 10–13.8)

## 2021-02-22 PROCEDURE — G0463 HOSPITAL OUTPT CLINIC VISIT: HCPCS

## 2021-02-22 PROCEDURE — 36416 COLLJ CAPILLARY BLOOD SPEC: CPT

## 2021-02-22 PROCEDURE — 85610 PROTHROMBIN TIME: CPT

## 2021-02-22 NOTE — PROGRESS NOTES
Anticoagulation Clinic Progress Note  Anticoagulation Summary  As of 2021    INR goal:  2.0-3.0   TTR:  --   INR used for dosin.1 (2021)   Warfarin maintenance plan:  2.5 mg every day   Weekly warfarin total:  17.5 mg   Plan last modified:  Octavio Garnett RPH (2021)   Next INR check:  2021   Target end date:      Indications    Paroxysmal atrial fibrillation (CMS/HCC) [I48.0]             Anticoagulation Episode Summary     INR check location:      Preferred lab:      Send INR reminders to:  [facility]    Comments:  Reports her apixaban supply will end after dose on 21. INITIATE warfarin 5 mg daily on 21. 1st INR check 21      Anticoagulation Care Providers     Provider Role Specialty Phone number    Francesco Riojas MD Referring Cardiology [phone]          Clinic Interview:  Patient Findings     Positives:  Change in medications, Other complaints    Negatives:  Signs/symptoms of thrombosis, Signs/symptoms of bleeding,   Laboratory test error suspected, Change in health, Change in alcohol use,   Change in activity, Upcoming invasive procedure, Emergency department   visit, Upcoming dental procedure, Missed doses, Extra doses, Change in   diet/appetite, Hospital admission, Bruising    Comments:  Reports she had more apixaban than she thought, ultimately   finishing her supply after yesterday morning's dose rather than on   21.      Clinical Outcomes     Negatives:  Major bleeding event, Thromboembolic event,   Anticoagulation-related hospital admission, Anticoagulation-related ED   visit, Anticoagulation-related fatality    Comments:  Reports she had more apixaban than she thought, ultimately   finishing her supply after yesterday morning's dose rather than on   21.        Education:  Ariadne CASSIDY Elfego is a new start in the [facility]. We discussed the followin) Warfarin's indication, mechanism, and dosing  2)  Enforced the importance of taking warfarin as instructed and at the same time every day, preferably in the evening so that we can make dose adjustments more easily following subsequent clinic visits  3) What she should do about a missed dose; pts can take missed doses within about 12 hours of their usual scheduled dose, but she was instructed on the importance of not doubling up on doses unless told to do so by the [facility]  4) Explained possible side effects of warfarin therapy, including increased risk of bleeding, s/sx of bleeding and s/sx of any additional clots/PE/CVA.   5) Discussed monitoring of warfarin, the INR, goal INR range, and the frequency of monitoring  6) Reviewed drug/food/tobacco/EtOH interactions and provided written information covering these topics in more detail, explaining that green, leafy vegetables interact most heavily with warfarin  7) Instructed the pt not to take or discontinue any medications without informing her physician/pharmacist and reminded her to inform us of any dietary changes, as well  8) Explained that she would be coming into the clinic more frequently in these first few weeks of therapy as we try to adjust her dose and achieve a therapeutic INR x 2 consecutive readings. Once that is achieved, patient will follow up in clinic every 4 weeks, on average.    She stated no problems with transportation or scheduling clinic appts in this manner. she expressed understanding of the information provided and has no additional questions at this time.    Ariadne Telles was presented with a copy of the Patients Rights and Responsibilities. she expressed verbal consent and agreement to receive care in the [facility] under the current collaborative care agreement with [facility].       INR History:  2/22/21 4.1 - Started warfarin 5 mg daily on 2/18/21    Plan:  1. INR is Supratherapeutic today- see above in Anticoagulation Summary.    Will instruct Ariadne Telles to Change their warfarin regimen- see above in Anticoagulation Summary.  2. Follow up in 3 days  3. Patient declines warfarin refills.  4. Verbal and written information provided. Patient expresses understanding and has no further questions at this time.    Octavio Garnett [facility]

## 2021-02-24 ENCOUNTER — ANTICOAGULATION VISIT (OUTPATIENT)
Dept: PHARMACY | Facility: HOSPITAL | Age: 71
End: 2021-02-24

## 2021-02-24 DIAGNOSIS — I48.0 PAROXYSMAL ATRIAL FIBRILLATION (HCC): ICD-10-CM

## 2021-02-24 NOTE — PROGRESS NOTES
Anticoagulation Clinic Progress Note    Anticoagulation Summary  As of 2/24/2021    INR goal:  2.0-3.0   TTR:  --   INR used for dosing:  No new INR was available at the time of this encounter.   Warfarin maintenance plan:  2.5 mg every day   Weekly warfarin total:  17.5 mg   Plan last modified:  Octavio Garnett [facility] (2/22/2021)   Next INR check:  2/25/2021   Target end date:      Indications    Paroxysmal atrial fibrillation (CMS/HCC) [I48.0]             Anticoagulation Episode Summary     INR check location:      Preferred lab:      Send INR reminders to:  [facility] CLINICAL POOL    Comments:  Previously apixaban (cost)      Anticoagulation Care Providers     Provider Role Specialty Phone number    Francesco Riojas MD Referring Cardiology [phone]          Clinic Interview:  Patient Findings     Positives:  Extra doses    Negatives:  Signs/symptoms of thrombosis, Signs/symptoms of bleeding,   Laboratory test error suspected, Change in health, Change in alcohol use,   Change in activity, Upcoming invasive procedure, Emergency department   visit, Upcoming dental procedure, Missed doses, Change in medications,   Change in diet/appetite, Hospital admission, Bruising, Other complaints    Comments:  Pt called to report she accidentally took 5 mg last night   rather than 2.5 mg.       Clinical Outcomes     Negatives:  Major bleeding event, Thromboembolic event,   Anticoagulation-related hospital admission, Anticoagulation-related ED   visit, Anticoagulation-related fatality    Comments:  Pt called to report she accidentally took 5 mg last night   rather than 2.5 mg.         INR History:  Anticoagulation Monitoring 2/10/2021 2/22/2021 2/24/2021   INR - 4.1 -   INR Date - 2/22/2021 -   INR Goal 2.0-3.0 2.0-3.0 2.0-3.0   Trend - Down Same   Last Week Total 0 mg 20 mg 25 mg   Next Week Total 0 mg 15 mg 15 mg   Sun 5 mg - -   Mon - Hold (2/22) -   Tue - 2.5 mg -   Wed - 2.5 mg Hold (2/24)   Thu 5 mg - -   Fri 5 mg  - -   Sat 5 mg - -   Visit Report - - -   Some recent data might be hidden       Plan:  1. INR is unknown today- see above in Anticoagulation Summary.   Will instruct Ariadne Telles to Change their warfarin regimen (HOLD today) - see above in Anticoagulation Summary.  2. Follow up in 1 day as previously scheduled  3. They have been instructed to call if any changes in medications, doses, concerns, etc. Patient expresses understanding and has no further questions at this time.    Octavio Garnett, [facility]

## 2021-02-25 ENCOUNTER — ANTICOAGULATION VISIT (OUTPATIENT)
Dept: PHARMACY | Facility: HOSPITAL | Age: 71
End: 2021-02-25

## 2021-02-25 DIAGNOSIS — I48.0 PAROXYSMAL ATRIAL FIBRILLATION (HCC): ICD-10-CM

## 2021-02-25 LAB
INR PPP: 3.4 (ref 0.91–1.09)
PROTHROMBIN TIME: 41.2 SECONDS (ref 10–13.8)

## 2021-02-25 PROCEDURE — 85610 PROTHROMBIN TIME: CPT

## 2021-02-25 PROCEDURE — 36416 COLLJ CAPILLARY BLOOD SPEC: CPT

## 2021-02-25 PROCEDURE — G0463 HOSPITAL OUTPT CLINIC VISIT: HCPCS

## 2021-02-25 NOTE — PROGRESS NOTES
Anticoagulation Clinic Progress Note    Anticoagulation Summary  As of 2/25/2021    INR goal:  2.0-3.0   TTR:  0.0 % (2 d)   INR used for dosing:  3.4 (2/25/2021)   Warfarin maintenance plan:  2.5 mg every day   Weekly warfarin total:  17.5 mg   No change documented:  Octavio Garnett RPH   Plan last modified:  Octavio Garnett RPH (2/22/2021)   Next INR check:  3/1/2021   Target end date:      Indications    Paroxysmal atrial fibrillation (CMS/HCC) [I48.0]             Anticoagulation Episode Summary     INR check location:      Preferred lab:      Send INR reminders to:   [facility]ICAL POOL    Comments:  Previously apixaban (cost)      Anticoagulation Care Providers     Provider Role Specialty Phone number    Francesco Riojas MD Referring Cardiology [phone]          Clinic Interview:  Patient Findings     Positives:  Missed doses, Extra doses    Negatives:  Signs/symptoms of thrombosis, Signs/symptoms of bleeding,   Laboratory test error suspected, Change in health, Change in alcohol use,   Change in activity, Upcoming invasive procedure, Emergency department   visit, Upcoming dental procedure, Change in medications, Change in   diet/appetite, Hospital admission, Bruising, Other complaints    Comments:  As noted during yesterday's phone call, pt accidentally took 5   mg on 2/23/21 rather than 2.5 mg, so we had advised her to hold her   warfarin yesterday.       Clinical Outcomes     Negatives:  Major bleeding event, Thromboembolic event,   Anticoagulation-related hospital admission, Anticoagulation-related ED   visit, Anticoagulation-related fatality    Comments:  As noted during yesterday's phone call, pt accidentally took 5   mg on 2/23/21 rather than 2.5 mg, so we had advised her to hold her   warfarin yesterday.         INR History:  Anticoagulation Monitoring 2/22/2021 2/24/2021 2/25/2021   INR 4.1 - 3.4   INR Date 2/22/2021 - 2/25/2021   INR Goal 2.0-3.0 2.0-3.0 2.0-3.0   Trend Down Same Same    Last Week Total 20 mg 25 mg 25 mg   Next Week Total 15 mg 15 mg 17.5 mg   Sun - - 2.5 mg   Mon Hold (2/22) - -   Tue 2.5 mg - -   Wed 2.5 mg Hold (2/24) -   Thu - - 2.5 mg   Fri - - 2.5 mg   Sat - - 2.5 mg   Visit Report - - -   Some recent data might be hidden       Plan:  1. INR is Supratherapeutic today- see above in Anticoagulation Summary.  Will instruct Ariadne CASSIDY Nedaholger to Change warfarin regimen to 2.5 mg daily - see above in Anticoagulation Summary.  2. Follow up in 4 days  3. Patient declines warfarin refills.  4. Verbal and written information provided. Patient expresses understanding and has no further questions at this time.    Octavio Garnett [facility]

## 2021-03-01 ENCOUNTER — ANTICOAGULATION VISIT (OUTPATIENT)
Dept: PHARMACY | Facility: HOSPITAL | Age: 71
End: 2021-03-01

## 2021-03-01 DIAGNOSIS — I48.0 PAROXYSMAL ATRIAL FIBRILLATION (HCC): ICD-10-CM

## 2021-03-01 LAB
INR PPP: 3.4 (ref 0.91–1.09)
PROTHROMBIN TIME: 40.8 SECONDS (ref 10–13.8)

## 2021-03-01 PROCEDURE — 36416 COLLJ CAPILLARY BLOOD SPEC: CPT

## 2021-03-01 PROCEDURE — G0463 HOSPITAL OUTPT CLINIC VISIT: HCPCS

## 2021-03-01 PROCEDURE — 85610 PROTHROMBIN TIME: CPT

## 2021-03-01 RX ORDER — WARFARIN SODIUM 2 MG/1
TABLET ORAL
Qty: 30 TABLET | Refills: 1 | Status: SHIPPED | OUTPATIENT
Start: 2021-03-01 | End: 2021-03-15 | Stop reason: SDUPTHER

## 2021-03-01 NOTE — PROGRESS NOTES
Anticoagulation Clinic Progress Note    Anticoagulation Summary  As of 3/1/2021    INR goal:  2.0-3.0   TTR:  0.0 % (6 d)   INR used for dosing:  3.4 (3/1/2021)   Warfarin maintenance plan:  2 mg every day   Weekly warfarin total:  14 mg   Plan last modified:  Octavio Garnett RPH (3/1/2021)   Next INR check:  3/5/2021   Target end date:      Indications    Paroxysmal atrial fibrillation (CMS/HCC) [I48.0]             Anticoagulation Episode Summary     INR check location:      Preferred lab:      Send INR reminders to:  [facility] CLINICAL POOL    Comments:  Previously apixaban (cost)      Anticoagulation Care Providers     Provider Role Specialty Phone number    Francesco Riojas MD Referring Cardiology [phone]          Clinic Interview:  Patient Findings     Positives:  Change in diet/appetite    Negatives:  Signs/symptoms of thrombosis, Signs/symptoms of bleeding,   Laboratory test error suspected, Change in health, Change in alcohol use,   Change in activity, Upcoming invasive procedure, Emergency department   visit, Upcoming dental procedure, Missed doses, Extra doses, Change in   medications, Hospital admission, Bruising, Other complaints    Comments:  Reports avoiding vit k foods. Reports taking MVI daily that   does contain vit k (long-term).      Clinical Outcomes     Negatives:  Major bleeding event, Thromboembolic event,   Anticoagulation-related hospital admission, Anticoagulation-related ED   visit, Anticoagulation-related fatality    Comments:  Reports avoiding vit k foods. Reports taking MVI daily that   does contain vit k (long-term).        INR History:  Anticoagulation Monitoring 2/24/2021 2/25/2021 3/1/2021   INR - 3.4 3.4   INR Date - 2/25/2021 3/1/2021   INR Goal 2.0-3.0 2.0-3.0 2.0-3.0   Trend Same Same Down   Last Week Total 25 mg 25 mg 15 mg   Next Week Total 15 mg 17.5 mg 14 mg   Sun - 2.5 mg -   Mon - - 2 mg   Tue - - 2 mg   Wed Hold (2/24) - 2 mg   Thu - 2.5 mg 2 mg   Fri - 2.5  mg -   Sat - 2.5 mg -   Visit Report - - -   Some recent data might be hidden       Plan:  1. INR is Supratherapeutic today- see above in Anticoagulation Summary.  Will instruct Ariadne Telles to Change their warfarin regimen- see above in Anticoagulation Summary.  2. Follow up in 4 days  3. Patient desires warfarin refills - 2-mg tablets.  4. Verbal and written information provided. Patient expresses understanding and has no further questions at this time.    Octavio Garnett [facility]

## 2021-03-02 DIAGNOSIS — Z23 IMMUNIZATION DUE: ICD-10-CM

## 2021-03-05 ENCOUNTER — APPOINTMENT (OUTPATIENT)
Dept: PHARMACY | Facility: HOSPITAL | Age: 71
End: 2021-03-05

## 2021-03-08 ENCOUNTER — ANTICOAGULATION VISIT (OUTPATIENT)
Dept: PHARMACY | Facility: HOSPITAL | Age: 71
End: 2021-03-08

## 2021-03-08 DIAGNOSIS — I48.0 PAROXYSMAL ATRIAL FIBRILLATION (HCC): ICD-10-CM

## 2021-03-08 LAB
INR PPP: 3 (ref 0.91–1.09)
PROTHROMBIN TIME: 35.7 SECONDS (ref 10–13.8)

## 2021-03-08 PROCEDURE — 85610 PROTHROMBIN TIME: CPT

## 2021-03-08 PROCEDURE — 36416 COLLJ CAPILLARY BLOOD SPEC: CPT

## 2021-03-08 NOTE — PROGRESS NOTES
Anticoagulation Clinic Progress Note    Anticoagulation Summary  As of 3/8/2021    INR goal:  2.0-3.0   TTR:  0.0 % (2 wk)   INR used for dosing:  3.0 (3/8/2021)   Warfarin maintenance plan:  2 mg every day   Weekly warfarin total:  14 mg   No change documented:  Stacey Morris   Plan last modified:  Octavio Garnett RPH (3/1/2021)   Next INR check:  3/15/2021   Target end date:      Indications    Paroxysmal atrial fibrillation (CMS/HCC) [I48.0]             Anticoagulation Episode Summary     INR check location:      Preferred lab:      [facility]    Comments:  Previously apixaban (cost)      Anticoagulation Care Providers     Provider Role Specialty Phone number    Francesco Riojas MD Referring Cardiology [phone]          Clinic Interview:  Patient Findings     Negatives:  Signs/symptoms of thrombosis, Signs/symptoms of bleeding,   Laboratory test error suspected, Change in health, Change in alcohol use,   Change in activity, Upcoming invasive procedure, Emergency department   visit, Upcoming dental procedure, Missed doses, Extra doses, Change in   medications, Change in diet/appetite, Hospital admission, Bruising, Other   complaints      Clinical Outcomes     Negatives:  Major bleeding event, Thromboembolic event,   Anticoagulation-related hospital admission, Anticoagulation-related ED   visit, Anticoagulation-related fatality        INR History:  Anticoagulation Monitoring 2/25/2021 3/1/2021 3/8/2021   INR 3.4 3.4 3.0   INR Date 2/25/2021 3/1/2021 3/8/2021   INR Goal 2.0-3.0 2.0-3.0 2.0-3.0   Trend Same Down Same   Last Week Total 25 mg 15 mg 14 mg   Next Week Total 17.5 mg 14 mg 14 mg   Sun 2.5 mg - 2 mg   Mon - 2 mg 2 mg   Tue - 2 mg 2 mg   Wed - 2 mg 2 mg   Thu 2.5 mg 2 mg 2 mg   Fri 2.5 mg - 2 mg   Sat 2.5 mg - 2 mg   Visit Report - - -   Some recent data might be hidden       Plan:  1. INR is therapeutic today- see above in Anticoagulation Summary.   Will  instruct Ariadne KHANH Telles to continue their warfarin regimen- see above in Anticoagulation Summary.  2. Follow up in 1 weeks.  3. Patient declines warfarin refills.  4. Verbal and written information provided. Patient expresses understanding and has no further questions at this time.    Stacey Morris

## 2021-03-15 ENCOUNTER — ANTICOAGULATION VISIT (OUTPATIENT)
Dept: PHARMACY | Facility: HOSPITAL | Age: 71
End: 2021-03-15

## 2021-03-15 DIAGNOSIS — I48.0 PAROXYSMAL ATRIAL FIBRILLATION (HCC): ICD-10-CM

## 2021-03-15 LAB
INR PPP: 2 (ref 0.91–1.09)
PROTHROMBIN TIME: 24.1 SECONDS (ref 10–13.8)

## 2021-03-15 PROCEDURE — 85610 PROTHROMBIN TIME: CPT

## 2021-03-15 PROCEDURE — 36416 COLLJ CAPILLARY BLOOD SPEC: CPT

## 2021-03-15 PROCEDURE — G0463 HOSPITAL OUTPT CLINIC VISIT: HCPCS

## 2021-03-15 RX ORDER — WARFARIN SODIUM 2 MG/1
TABLET ORAL
Qty: 100 TABLET | Refills: 1 | Status: SHIPPED | OUTPATIENT
Start: 2021-03-15 | End: 2021-10-18

## 2021-03-15 NOTE — PROGRESS NOTES
Anticoagulation Clinic Progress Note    Anticoagulation Summary  As of 3/15/2021    INR goal:  2.0-3.0   TTR:  33.3 % (3 wk)   INR used for dosin.0 (3/15/2021)   Warfarin maintenance plan:  3 mg every Mon; 2 mg all other days   Weekly warfarin total:  15 mg   Plan last modified:  Octavio Garnett RP (3/15/2021)   Next INR check:  3/30/2021   Target end date:      Indications    Paroxysmal atrial fibrillation (CMS/HCC) [I48.0]             Anticoagulation Episode Summary     INR check location:      Preferred lab:      Send INR reminders to:  [facility] CLINICAL [place]    Comments:  Previously apixaban (cost)      Anticoagulation Care Providers     Provider Role Specialty Phone number    Francesco Riojas MD Referring Cardiology [phone]          Clinic Interview:  Patient Findings     Negatives:  Signs/symptoms of thrombosis, Signs/symptoms of bleeding,   Laboratory test error suspected, Change in health, Change in alcohol use,   Change in activity, Upcoming invasive procedure, Emergency department   visit, Upcoming dental procedure, Missed doses, Extra doses, Change in   medications, Change in diet/appetite, Hospital admission, Bruising, Other   complaints      Clinical Outcomes     Negatives:  Major bleeding event, Thromboembolic event,   Anticoagulation-related hospital admission, Anticoagulation-related ED   visit, Anticoagulation-related fatality        INR History:  Anticoagulation Monitoring 3/1/2021 3/8/2021 3/15/2021   INR 3.4 3.0 2.0   INR Date 3/1/2021 3/8/2021 3/15/2021   INR Goal 2.0-3.0 2.0-3.0 2.0-3.0   Trend Down Same Up   Last Week Total 15 mg 14 mg 14 mg   Next Week Total 14 mg 14 mg 15 mg   Sun - 2 mg 2 mg   Mon 2 mg 2 mg 3 mg   Tue 2 mg 2 mg 2 mg   Wed 2 mg 2 mg 2 mg   Thu 2 mg 2 mg 2 mg   Fri - 2 mg 2 mg   Sat - 2 mg 2 mg   Visit Report - - -   Some recent data might be hidden       Plan:  1. INR is Therapeutic today- see above in Anticoagulation Summary.  Will instruct Ariadne CASSIDY  Elfego to Change their warfarin regimen- see above in Anticoagulation Summary.  2. Follow up in 2 weeks  3. Patient desires warfarin refills.  4. Verbal and written information provided. Patient expresses understanding and has no further questions at this time.    Octavio Garnett [facility]

## 2021-03-30 ENCOUNTER — TRANSCRIBE ORDERS (OUTPATIENT)
Dept: CARDIOLOGY | Facility: CLINIC | Age: 71
End: 2021-03-30

## 2021-03-30 ENCOUNTER — ANTICOAGULATION VISIT (OUTPATIENT)
Dept: PHARMACY | Facility: HOSPITAL | Age: 71
End: 2021-03-30

## 2021-03-30 ENCOUNTER — OFFICE VISIT (OUTPATIENT)
Dept: CARDIOLOGY | Facility: CLINIC | Age: 71
End: 2021-03-30

## 2021-03-30 VITALS
HEART RATE: 50 BPM | BODY MASS INDEX: 31.45 KG/M2 | HEIGHT: 61 IN | DIASTOLIC BLOOD PRESSURE: 82 MMHG | WEIGHT: 166.6 LBS | SYSTOLIC BLOOD PRESSURE: 138 MMHG

## 2021-03-30 DIAGNOSIS — I48.0 AF (PAROXYSMAL ATRIAL FIBRILLATION) (HCC): Primary | ICD-10-CM

## 2021-03-30 DIAGNOSIS — Z13.6 SCREENING FOR CARDIOVASCULAR CONDITION: Primary | ICD-10-CM

## 2021-03-30 DIAGNOSIS — Z01.810 PREPROCEDURAL CARDIOVASCULAR EXAMINATION: ICD-10-CM

## 2021-03-30 DIAGNOSIS — I48.0 PAROXYSMAL ATRIAL FIBRILLATION (HCC): ICD-10-CM

## 2021-03-30 LAB
INR PPP: 2.4 (ref 0.91–1.09)
PROTHROMBIN TIME: 29 SECONDS (ref 10–13.8)

## 2021-03-30 PROCEDURE — 93000 ELECTROCARDIOGRAM COMPLETE: CPT | Performed by: INTERNAL MEDICINE

## 2021-03-30 PROCEDURE — 85610 PROTHROMBIN TIME: CPT

## 2021-03-30 PROCEDURE — 36416 COLLJ CAPILLARY BLOOD SPEC: CPT

## 2021-03-30 PROCEDURE — 99214 OFFICE O/P EST MOD 30 MIN: CPT | Performed by: INTERNAL MEDICINE

## 2021-03-30 NOTE — PROGRESS NOTES
Anticoagulation Clinic Progress Note    Anticoagulation Summary  As of 3/30/2021    INR goal:  2.0-3.0   TTR:  60.9 % (1.2 mo)   INR used for dosin.4 (3/30/2021)   Warfarin maintenance plan:  3 mg every Mon; 2 mg all other days   Weekly warfarin total:  15 mg   No change documented:  Willa Katz, Pharmacy Intern   Plan last modified:  Octavio Garnett RP (3/15/2021)   Next INR check:  2021   Target end date:      Indications    Paroxysmal atrial fibrillation (CMS/HCC) [I48.0]             Anticoagulation Episode Summary     INR check location:      Preferred lab:      Send INR reminders to:  [facility] CLINICAL POOL    Comments:  Previously apixaban (cost)      Anticoagulation Care Providers     Provider Role Specialty Phone number    Francesco Riojas MD Referring Cardiology [phone]          Clinic Interview:  Patient Findings     Negatives:  Signs/symptoms of thrombosis, Signs/symptoms of bleeding,   Laboratory test error suspected, Change in health, Change in alcohol use,   Change in activity, Upcoming invasive procedure, Emergency department   visit, Upcoming dental procedure, Missed doses, Extra doses, Change in   medications, Change in diet/appetite, Hospital admission, Bruising, Other   complaints      Clinical Outcomes     Negatives:  Major bleeding event, Thromboembolic event,   Anticoagulation-related hospital admission, Anticoagulation-related ED   visit, Anticoagulation-related fatality        INR History:  Anticoagulation Monitoring 3/8/2021 3/15/2021 3/30/2021   INR 3.0 2.0 2.4   INR Date 3/8/2021 3/15/2021 3/30/2021   INR Goal 2.0-3.0 2.0-3.0 2.0-3.0   Trend Same Up Same   Last Week Total 14 mg 14 mg 15 mg   Next Week Total 14 mg 15 mg 15 mg   Sun 2 mg 2 mg 2 mg   Mon 2 mg 3 mg 3 mg   Tue 2 mg 2 mg 2 mg   Wed 2 mg 2 mg 2 mg   Thu 2 mg 2 mg 2 mg   Fri 2 mg 2 mg 2 mg   Sat 2 mg 2 mg 2 mg   Visit Report - - -   Some recent data might be hidden       Plan:  1. INR is Therapeutic  today- see above in Anticoagulation Summary.  Will instruct Ariadne Telles to Continue their warfarin regimen- see above in Anticoagulation Summary.  2. Follow up in 2 weeks  3. Patient declines warfarin refills.  4. Verbal and written information provided. Patient expresses understanding and has no further questions at this time.    Willa Katz, Pharmacy Intern

## 2021-03-30 NOTE — PROGRESS NOTES
Date of Office Visit: 2021  Encounter Provider: Francesco Riojas MD  Place of Service: [facility] CARDIOLOGY  Patient Name: Ariadne Telles  :1950    Chief Complaint   Patient presents with   • Atrial Fibrillation   :     HPI: Ariadne Telles is a 70 y.o. female who presents today for paroxsymal atrial fibrillation.     She reports 2-3 episodes, perhaps more of atrial fibrillation the past year.  She can't identify any triggers.   Episodes usually last a few hours, and then pass.    She worries about her atrial fibrillation. She has a lot of worry about having a stroke.      She feels the atrial fibrillation is having a big impact on her quality of life.     She doesn't like warfarin, but can't really afford Eliquis, which she had been on previously.           Past Medical History:   Diagnosis Date   • Atrial fibrillation with RVR (CMS/HCC)    • Bradycardia 2016   • Carpal tunnel syndrome 2018   • Essential hypertension 2016   • Gastroesophageal reflux disease 2016   • History of endometrial cancer 2017, S/P ANGEL, BSO.  Dr. Catracho Alejo.   • Hx of bone density study 2017    2017, DEXA scan: Osteoporosis in L1, severe osteopenia in both femoral necks.  T-scores: L1, -2.5; L2, -1.9; L3, -0.7; L4, +0.3.  Right femoral neck, -2.1; total -1.8.  Left femoral neck, -2.3; total, -1.8.   • Hypercholesterolemia 2016   • Hyperlipidemia    • Kidney stone    • MVP (mitral valve prolapse)     last 2 echocardiograms showed no MVP in 2018 and 2019    • Nausea & vomiting    • Osteopenia 2016    2017, DEXA scan: Severe osteopenia in both femoral necks, T score= -2.1 in the right femoral neck, -2.3 in the left femoral neck.   • Osteoporosis 2017    DEXA scan, T score L1= -2.5   • PAF (paroxysmal atrial fibrillation) (CMS/HCC)    • Pituitary adenoma (CMS/HCC) 2018    Thought to have a pituitary microadenoma on previous studies but MRI of  "the pituitary with and without contrast, February 10, 2017: \"No convincing evidence to suggest a pituitary adenoma on this MRI study\".  \"Incidentally noted relatively mild changes of chronic small vessel ischemia phenomena.\"   • RBBB (right bundle branch block)    • Rectal polyp 2016   • Surgical menopause     ANGEL, BSO, for endometrial carcinoma.   • Vaginal delivery     x1  NITO, (and 2 C-sections, one stillbirth).   • Vertigo        Past Surgical History:   Procedure Laterality Date   • CARPAL TUNNEL RELEASE WITH CUBITAL TUNNEL RELEASE  2018    right   • CATARACT EXTRACTION Right 2016   •  SECTION      x2   one stillborn Ariadne Isbell   • COLONOSCOPY  2007    polyps  Dr. Rueda    • TOTAL ABDOMINAL HYSTERECTOMY WITH SALPINGO OOPHORECTOMY      Dr. Alejo       Social History     Socioeconomic History   • Marital status:      Spouse name:    • Number of children: 2   • Years of education: Not on file   • Highest education level: Not on file   Tobacco Use   • Smoking status: Never Smoker   • Smokeless tobacco: Never Used   Vaping Use   • Vaping Use: Never used   Substance and Sexual Activity   • Alcohol use: No     Comment: caffeine use seldom   • Drug use: No   • Sexual activity: Never     Birth control/protection: Surgical     Comment:        Family History   Problem Relation Age of Onset   • Heart disease Mother    • Heart attack Mother 67   • COPD Father    • Parkinsonism Father         ?   • Heart defect Sister          at 18   • Alcohol abuse Brother    • Diabetes Brother    • Heart disease Brother    • Hypertension Brother    • Hyperlipidemia Brother    • Other Daughter         stillborn   • Sleep apnea Son    • No Known Problems Maternal Grandmother    • No Known Problems Paternal Grandmother    • Breast cancer Maternal Aunt 75   • No Known Problems Paternal Aunt    • No Known Problems Maternal Grandfather    • No Known Problems Paternal " Grandfather    • Kidney cancer Sister    • No Known Problems Son    • BRCA 1/2 Neg Hx    • Colon cancer Neg Hx    • Endometrial cancer Neg Hx    • Ovarian cancer Neg Hx        Review of Systems   Constitutional: Positive for malaise/fatigue.   HENT: Negative.    Eyes: Negative.    Cardiovascular: Positive for palpitations. Negative for chest pain, dyspnea on exertion, leg swelling and near-syncope.   Respiratory: Positive for shortness of breath. Negative for cough.    Endocrine: Negative.    Hematologic/Lymphatic: Negative.    Skin: Negative.    Musculoskeletal: Negative.    Gastrointestinal: Negative.    Genitourinary: Negative.    Neurological: Negative.  Negative for weakness.   Psychiatric/Behavioral: Negative.    Allergic/Immunologic: Negative.        Allergies   Allergen Reactions   • Naproxen Hives and Swelling   • Paroxetine Other (See Comments)     TREMORS         Current Outpatient Medications:   •  alendronate (FOSAMAX) 70 MG tablet, Take 1 tablet by mouth Every 7 (Seven) Days., Disp: 12 tablet, Rfl: 1  •  atorvastatin (LIPITOR) 40 MG tablet, Take 1 tablet by mouth Daily., Disp: 90 tablet, Rfl: 1  •  calcium carbonate-cholecalciferol 500-400 MG-UNIT tablet tablet, Take  by mouth Daily., Disp: , Rfl:   •  flecainide (TAMBOCOR) 50 MG tablet, TAKE ONE TABLET BY MOUTH EVERY 12 HOURS, Disp: 180 tablet, Rfl: 2  •  fluticasone (FLONASE) 50 MCG/ACT nasal spray, 2 sprays into each nostril Daily. (Patient taking differently: 2 sprays into the nostril(s) as directed by provider Daily As Needed.), Disp: 16 g, Rfl: 3  •  lisinopril (PRINIVIL,ZESTRIL) 20 MG tablet, TAKE ONE TABLET BY MOUTH DAILY, Disp: 90 tablet, Rfl: 1  •  metoprolol tartrate (LOPRESSOR) 25 MG tablet, TAKE ONE TABLET BY MOUTH EVERY 12 HOURS, Disp: 180 tablet, Rfl: 1  •  Multiple Vitamin (MULTIVITAMIN) capsule, Take  by mouth., Disp: , Rfl:   •  warfarin (COUMADIN) 2 MG tablet, Take one and one-half tablets (3 mg) by mouth on Mondays, and take one  "tablet (2 mg) by mouth all other days or as directed., Disp: 100 tablet, Rfl: 1      Objective:     Vitals:    03/30/21 1010   BP: 138/82   Pulse: 50   Weight: 75.6 kg (166 lb 9.6 oz)   Height: 154.9 cm (61\")     Body mass index is 31.48 kg/m².    PHYSICAL EXAM:    Vitals and nursing note reviewed.   Constitutional:       Appearance: Healthy appearance.   Pulmonary:      Effort: Pulmonary effort is normal.   Cardiovascular:      Normal rate. Regular rhythm.   Edema:     Peripheral edema absent.   Skin:     General: Skin is warm.   Neurological:      Mental Status: Alert, oriented to person, place, and time and oriented to person, place and time.   Psychiatric:         Attention and Perception: Attention and perception normal.         Mood and Affect: Mood normal.         Behavior: Behavior is cooperative.             ECG 12 Lead    Date/Time: 3/30/2021 10:38 AM  Performed by: Francesco Riojas MD  Authorized by: Francesco Riojas MD   Comparison: compared with previous ECG from 1/14/2020  Similar to previous ECG  Rhythm: sinus bradycardia  Conduction: right bundle branch block              Assessment:       Diagnosis Plan   1. AF (paroxysmal atrial fibrillation) (CMS/[facility])  Case Request EP Lab: Ablation atrial fibrillation          Plan:       She has continued to have symptomatic atrial fibrillation despite flecainide.  I think she has had more atrial fibrillation that she wants to admit, and it is affecting her quality of life because she is worried about it.  We discussed the superior efficacy of ablation in reducing atrial fibrillation burden.  We discussed the risk of stroke, cardiac tamponade, and injury to the esophagus, phrenic nerve, and pulmonary veins.  She wishes to proceed with ablation.  She will need INR 2-3 for 3 weeks prior to procedure, continue warfarin through procedure.  We will plan on cryoablation, possible same day discharge.     As always, it has been a pleasure to participate in your " patient's care.      Sincerely,         Francesco Riojas MD

## 2021-03-31 NOTE — PROGRESS NOTES
I have supervised and reviewed the notes, assessments, and/or procedures performed by our Pharmacy Intern. The documented assessment and plan were developed cooperatively, and the plan was implemented in my presence. I concur with the documentation of this patient encounter.    Octavio Garnett [facility]

## 2021-04-06 DIAGNOSIS — E78.00 HYPERCHOLESTEROLEMIA: Chronic | ICD-10-CM

## 2021-04-07 RX ORDER — ATORVASTATIN CALCIUM 40 MG/1
TABLET, FILM COATED ORAL
Qty: 90 TABLET | Refills: 1 | Status: SHIPPED | OUTPATIENT
Start: 2021-04-07 | End: 2021-10-15

## 2021-04-14 ENCOUNTER — ANTICOAGULATION VISIT (OUTPATIENT)
Dept: PHARMACY | Facility: HOSPITAL | Age: 71
End: 2021-04-14

## 2021-04-14 ENCOUNTER — OFFICE VISIT (OUTPATIENT)
Dept: FAMILY MEDICINE CLINIC | Facility: CLINIC | Age: 71
End: 2021-04-14

## 2021-04-14 VITALS
HEIGHT: 61 IN | WEIGHT: 170.2 LBS | OXYGEN SATURATION: 99 % | SYSTOLIC BLOOD PRESSURE: 147 MMHG | BODY MASS INDEX: 32.13 KG/M2 | TEMPERATURE: 96.9 F | DIASTOLIC BLOOD PRESSURE: 82 MMHG | HEART RATE: 58 BPM

## 2021-04-14 DIAGNOSIS — M85.80 OSTEOPENIA, UNSPECIFIED LOCATION: Chronic | ICD-10-CM

## 2021-04-14 DIAGNOSIS — I48.0 PAF (PAROXYSMAL ATRIAL FIBRILLATION) (HCC): Chronic | ICD-10-CM

## 2021-04-14 DIAGNOSIS — I48.0 PAF (PAROXYSMAL ATRIAL FIBRILLATION) (HCC): Primary | ICD-10-CM

## 2021-04-14 DIAGNOSIS — E78.00 HYPERCHOLESTEROLEMIA: Chronic | ICD-10-CM

## 2021-04-14 DIAGNOSIS — I10 ESSENTIAL HYPERTENSION: Primary | Chronic | ICD-10-CM

## 2021-04-14 LAB
INR PPP: 3.9 (ref 0.91–1.09)
INR PPP: 4 (ref 0.91–1.09)
PROTHROMBIN TIME: 47.1 SECONDS (ref 10–13.8)
PROTHROMBIN TIME: 48.5 SECONDS (ref 10–13.8)

## 2021-04-14 PROCEDURE — 36416 COLLJ CAPILLARY BLOOD SPEC: CPT

## 2021-04-14 PROCEDURE — 99214 OFFICE O/P EST MOD 30 MIN: CPT | Performed by: FAMILY MEDICINE

## 2021-04-14 PROCEDURE — 85610 PROTHROMBIN TIME: CPT

## 2021-04-14 PROCEDURE — G0463 HOSPITAL OUTPT CLINIC VISIT: HCPCS

## 2021-04-14 NOTE — PROGRESS NOTES
Anticoagulation Clinic Progress Note    Anticoagulation Summary  As of 2021    INR goal:  2.0-3.0   TTR:  54.0 % (1.7 mo)   INR used for dosin.0 (2021)   Warfarin maintenance plan:  2 mg every day   Weekly warfarin total:  14 mg   Plan last modified:  Nasreen Alejandra RPH (2021)   Next INR check:  2021   Target end date:      Indications    PAF (paroxysmal atrial fibrillation) (CMS/HCC) [I48.0]             Anticoagulation Episode Summary     INR check location:      Preferred lab:      [facility]    Comments:  Previously apixaban (cost)      Anticoagulation Care Providers     Provider Role Specialty Phone number    Francesco Riojas MD Referring Cardiology [phone]          Clinic Interview:  Patient Findings     Positives:  Change in diet/appetite    Negatives:  Signs/symptoms of thrombosis, Signs/symptoms of bleeding,   Laboratory test error suspected, Change in health, Change in alcohol use,   Change in activity, Upcoming invasive procedure, Emergency department   visit, Upcoming dental procedure, Missed doses, Extra doses, Change in   medications, Hospital admission, Bruising, Other complaints    Comments:  Has eliminated pop and has weight loss goal of 10 lbs by .        Clinical Outcomes     Negatives:  Major bleeding event, Thromboembolic event,   Anticoagulation-related hospital admission, Anticoagulation-related ED   visit, Anticoagulation-related fatality    Comments:  Has eliminated pop and has weight loss goal of 10 lbs by .          INR History:  Anticoagulation Monitoring 3/15/2021 3/30/2021 2021   INR 2.0 2.4 4.0   INR Date 3/15/2021 3/30/2021 2021   INR Goal 2.0-3.0 2.0-3.0 2.0-3.0   Trend Up Same Down   Last Week Total 14 mg 15 mg 15 mg   Next Week Total 15 mg 15 mg 12 mg   Sun 2 mg 2 mg 2 mg   Mon 3 mg 3 mg 2 mg   Tue 2 mg 2 mg 2 mg   Wed 2 mg 2 mg Hold ()   Thu 2 mg 2 mg 2 mg   Fri 2 mg 2 mg 2 mg   Sat  2 mg 2 mg 2 mg   Visit Report - - -   Some recent data might be hidden       Plan:  1. INR is Supratherapeutic today- see above in Anticoagulation Summary.   Will instruct Ariadne Telles to Change their warfarin regimen- see above in Anticoagulation Summary.  2. Follow up in 1 week.  3. They have been instructed to call if any changes in medications, doses, concerns, etc. Patient expresses understanding and has no further questions at this time.    Nasreen Alejandra, [facility]

## 2021-04-14 NOTE — PROGRESS NOTES
"Chief Complaint  Hypertension (follow up )    Subjective          Ariadne Telles presents to [facility]  History of Present Illness    Follow-up hypertension.  She continues lisinopril 20 mg a day.  Potassium creatinine normal.  She also continues metoprolol.  Blood pressure slightly elevated today.  She is not checking outside of ear.    Paroxysmal atrial fibrillation.  She continues on flecainide.  She is on warfarin for anticoagulation.  Monitored by cardiology.  They are considering doing an ablation soon.  She has been followed closely by her cardiologist.    Hyperlipidemia.  Her lipid panel is improved with a higher HDL.  She continues on atorvastatin 40 mg a day.  No side effects.    Osteoporosis.  We restarted the Fosamax some weeks ago.  He is tolerating the Fosamax 70 mg weekly.    Objective   Vital Signs:   /82   Pulse 58   Temp 96.9 °F (36.1 °C) (Temporal)   Ht 154.9 cm (60.98\")   Wt 77.2 kg (170 lb 3.2 oz)   SpO2 99%   BMI 32.18 kg/m²     Physical Exam  Vitals and nursing note reviewed.   Constitutional:       General: She is not in acute distress.     Appearance: She is well-developed.   Neck:      Thyroid: No thyromegaly.   Cardiovascular:      Rate and Rhythm: Normal rate and regular rhythm.      Heart sounds: Normal heart sounds.      Comments: Sinus rhythm today likely  Pulmonary:      Effort: Pulmonary effort is normal.      Breath sounds: Normal breath sounds.   Skin:     General: Skin is warm and dry.   Psychiatric:         Behavior: Behavior normal.         Thought Content: Thought content normal.         Judgment: Judgment normal.        Result Review :   The following data was reviewed by: Nick Ariza MD on 04/14/2021:  Common labs    Common Labsle 4/7/21 4/7/21    0816 0816   Glucose 81    BUN 20    Creatinine 0.80    eGFR Non  Am 71    eGFR  Am 86    Sodium 143    Potassium 4.5    Chloride 105    Calcium 9.5    Total Protein " 6.5    Albumin 4.30    Total Bilirubin 0.6    Alkaline Phosphatase 142 (A)    AST (SGOT) 17    ALT (SGPT) 14    Total Cholesterol  169   Triglycerides  96   HDL Cholesterol  66 (A)   LDL Cholesterol   86   (A) Abnormal value       Comments are available for some flowsheets but are not being displayed.                     Assessment and Plan    Diagnoses and all orders for this visit:    1. Essential hypertension (Primary)    2. Hypercholesterolemia  -     Comprehensive Metabolic Panel; Future  -     Lipid Panel; Future    3. PAF (paroxysmal atrial fibrillation) (CMS/HCC)  -     TSH Rfx On Abnormal To Free T4; Future    4. Osteopenia, unspecified location      Hypertension.  Fair control.  I recommend she check her blood pressure at home.  I will see her in 6 months for annual wellness visit and recheck.    Hyperlipidemia.  Cholesterol panel overall improved with a higher HDL.  Continue atorvastatin as is.    Paroxysmal atrial fibrillation.  Appears to be in sinus rhythm today.  She continues warfarin and flecainide and rate control with metoprolol.  They may be doing an ablation soon.    COVID-19 vaccination.  Patient is hesitant.  Counseling given.  Instructions given.    Osteopenia.  She continues on Fosamax.  Higher fracture risk.      Follow Up   No follow-ups on file.  Patient was given instructions and counseling regarding her condition or for health maintenance advice. Please see specific information pulled into the AVS if appropriate.

## 2021-04-21 ENCOUNTER — ANTICOAGULATION VISIT (OUTPATIENT)
Dept: PHARMACY | Facility: HOSPITAL | Age: 71
End: 2021-04-21

## 2021-04-21 DIAGNOSIS — I48.0 PAF (PAROXYSMAL ATRIAL FIBRILLATION) (HCC): Primary | ICD-10-CM

## 2021-04-21 LAB
INR PPP: 2.5 (ref 0.91–1.09)
PROTHROMBIN TIME: 30.3 SECONDS (ref 10–13.8)

## 2021-04-21 PROCEDURE — 36416 COLLJ CAPILLARY BLOOD SPEC: CPT

## 2021-04-21 PROCEDURE — 85610 PROTHROMBIN TIME: CPT

## 2021-04-21 NOTE — PROGRESS NOTES
Anticoagulation Clinic Progress Note    Anticoagulation Summary  As of 2021    INR goal:  2.0-3.0   TTR:  51.4 % (1.9 mo)   INR used for dosin.5 (2021)   Warfarin maintenance plan:  2 mg every day   Weekly warfarin total:  14 mg   No change documented:  Robyn Silveira   Plan last modified:  Nasreen Alejandra [facility] (2021)   Next INR check:  2021   Priority:  Maintenance   Target end date:      Indications    PAF (paroxysmal atrial fibrillation) (CMS/HCC) [I48.0]             Anticoagulation Episode Summary     INR check location:      Preferred lab:      Send INR reminders to:  [facility] CLINICAL POOL    Comments:  Previously apixaban (cost)      Anticoagulation Care Providers     Provider Role Specialty Phone number    Francesco Riojas MD Referring Cardiology [phone]          Clinic Interview:      INR History:  Anticoagulation Monitoring 3/30/2021 2021 2021   INR 2.4 4.0 2.5   INR Date 3/30/2021 2021 2021   INR Goal 2.0-3.0 2.0-3.0 2.0-3.0   Trend Same Down Same   Last Week Total 15 mg 15 mg 12 mg   Next Week Total 15 mg 12 mg 14 mg   Sun 2 mg 2 mg 2 mg   Mon 3 mg 2 mg 2 mg   Tue 2 mg 2 mg 2 mg   Wed 2 mg Hold () 2 mg   Thu 2 mg 2 mg 2 mg   Fri 2 mg 2 mg 2 mg   Sat 2 mg 2 mg 2 mg   Visit Report - - -   Some recent data might be hidden       Plan:  1. INR is therapeutic today- see above in Anticoagulation Summary.   Will instruct Ariadne Telles to continue their warfarin regimen- see above in Anticoagulation Summary.  2. Follow up in 2 weeks.  3. Patient declines warfarin refills.  4. Verbal and written information provided. Patient expresses understanding and has no further questions at this time.    Robyn Silveira

## 2021-05-05 ENCOUNTER — ANTICOAGULATION VISIT (OUTPATIENT)
Dept: PHARMACY | Facility: HOSPITAL | Age: 71
End: 2021-05-05

## 2021-05-05 DIAGNOSIS — M85.80 OSTEOPENIA, UNSPECIFIED LOCATION: ICD-10-CM

## 2021-05-05 DIAGNOSIS — I48.0 PAF (PAROXYSMAL ATRIAL FIBRILLATION) (HCC): Primary | ICD-10-CM

## 2021-05-05 LAB
INR PPP: 2.2 (ref 0.91–1.09)
PROTHROMBIN TIME: 26.2 SECONDS (ref 10–13.8)

## 2021-05-05 PROCEDURE — 85610 PROTHROMBIN TIME: CPT

## 2021-05-05 PROCEDURE — 36416 COLLJ CAPILLARY BLOOD SPEC: CPT

## 2021-05-05 RX ORDER — ALENDRONATE SODIUM 70 MG/1
TABLET ORAL
Qty: 12 TABLET | Refills: 1 | Status: SHIPPED | OUTPATIENT
Start: 2021-05-05 | End: 2022-09-30

## 2021-05-26 ENCOUNTER — TRANSCRIBE ORDERS (OUTPATIENT)
Dept: SLEEP MEDICINE | Facility: HOSPITAL | Age: 71
End: 2021-05-26

## 2021-05-26 DIAGNOSIS — Z01.818 OTHER SPECIFIED PRE-OPERATIVE EXAMINATION: Primary | ICD-10-CM

## 2021-06-03 ENCOUNTER — ANTICOAGULATION VISIT (OUTPATIENT)
Dept: PHARMACY | Facility: HOSPITAL | Age: 71
End: 2021-06-03

## 2021-06-03 DIAGNOSIS — I48.0 PAF (PAROXYSMAL ATRIAL FIBRILLATION) (HCC): Primary | ICD-10-CM

## 2021-06-03 LAB
INR PPP: 1.9 (ref 0.91–1.09)
PROTHROMBIN TIME: 22.6 SECONDS (ref 10–13.8)

## 2021-06-03 PROCEDURE — G0463 HOSPITAL OUTPT CLINIC VISIT: HCPCS

## 2021-06-03 PROCEDURE — 85610 PROTHROMBIN TIME: CPT

## 2021-06-03 PROCEDURE — 36416 COLLJ CAPILLARY BLOOD SPEC: CPT

## 2021-06-03 NOTE — PROGRESS NOTES
Anticoagulation Clinic Progress Note    Anticoagulation Summary  As of 6/3/2021    INR goal:  2.0-3.0   TTR:  62.4 % (3.3 mo)   INR used for dosin.9 (6/3/2021)   Warfarin maintenance plan:  2 mg every day   Weekly warfarin total:  14 mg   Plan last modified:  Nasreen Alejandra RPH (2021)   Next INR check:  6/10/2021   Priority:  Maintenance   Target end date:      Indications    PAF (paroxysmal atrial fibrillation) (CMS/[facility]) [I48.0]             Anticoagulation Episode Summary     INR check location:      Preferred lab:      Send INR reminders to:   [facility] CLIN[facility] POOL    Comments:  Previously apixaban (cost)      Anticoagulation Care Providers     Provider Role Specialty Phone number    Francesco Riojas MD Referring Cardiology [phone]          Clinic Interview:  Patient Findings     Negatives:  Signs/symptoms of thrombosis, Signs/symptoms of bleeding,   Laboratory test error suspected, Change in health, Change in alcohol use,   Change in activity, Upcoming invasive procedure, Emergency department   visit, Upcoming dental procedure, Missed doses, Extra doses, Change in   medications, Change in diet/appetite, Hospital admission, Bruising, Other   complaints    Comments:  Patient has cryoablation scheduled on 21 and will need   therapeutic INRs for the next three weeks leading up to the procedure. No   identifiable cause of subtherapeutic INR today, patient denies missed   doses and reports sticking to a consistent diet.       Clinical Outcomes     Negatives:  Major bleeding event, Thromboembolic event,   Anticoagulation-related hospital admission, Anticoagulation-related ED   visit, Anticoagulation-related fatality    Comments:  Patient has cryoablation scheduled on 21 and will need   therapeutic INRs for the next three weeks leading up to the procedure. No   identifiable cause of subtherapeutic INR today, patient denies missed   doses and reports sticking to a consistent diet.          INR History:  Anticoagulation Monitoring 4/21/2021 5/5/2021 6/3/2021   INR 2.5 2.2 1.9   INR Date 4/21/2021 5/5/2021 6/3/2021   INR Goal 2.0-3.0 2.0-3.0 2.0-3.0   Trend Same Same Same   Last Week Total 12 mg 14 mg 14 mg   Next Week Total 14 mg 14 mg 15 mg   Sun 2 mg 2 mg 2 mg   Mon 2 mg 2 mg 2 mg   Tue 2 mg 2 mg 2 mg   Wed 2 mg 2 mg 2 mg   Thu 2 mg 2 mg 3 mg (6/3)   Fri 2 mg 2 mg 2 mg   Sat 2 mg 2 mg 2 mg   Visit Report - - -   Some recent data might be hidden     · Patient reported having fear of eating her favorite foods due to not knowing the effect it will have on her INR. Provided her written information that gives examples of high, moderate, low vitamin-K containing foods. Encouraged her to keep her intake of vitamin K consistent from day to day by avoiding consuming large amounts of vitamin K one day and low amounts of vitamin K the next day.     Plan:  1. INR is Subtherapeutic today- see above in Anticoagulation Summary.  Will instruct Ariadne Telles to Change their warfarin regimen- see above in Anticoagulation Summary. Boost with 3 mg today only (otherwise 2 mg) then resume 2 mg daily until next clinic visit  2. Follow up in 1 week  3. Patient declines warfarin refills.  4. Verbal and written information provided. Patient expresses understanding and has no further questions at this time.    Diamond Garcia, [facility]

## 2021-06-10 ENCOUNTER — ANTICOAGULATION VISIT (OUTPATIENT)
Dept: PHARMACY | Facility: HOSPITAL | Age: 71
End: 2021-06-10

## 2021-06-10 DIAGNOSIS — I48.0 PAF (PAROXYSMAL ATRIAL FIBRILLATION) (HCC): Primary | ICD-10-CM

## 2021-06-10 LAB
INR PPP: 1.7 (ref 0.91–1.09)
PROTHROMBIN TIME: 20.2 SECONDS (ref 10–13.8)

## 2021-06-10 PROCEDURE — 85610 PROTHROMBIN TIME: CPT

## 2021-06-10 PROCEDURE — G0463 HOSPITAL OUTPT CLINIC VISIT: HCPCS

## 2021-06-10 PROCEDURE — 36416 COLLJ CAPILLARY BLOOD SPEC: CPT

## 2021-06-10 NOTE — PROGRESS NOTES
Anticoagulation Clinic Progress Note    Anticoagulation Summary  As of 6/10/2021    INR goal:  2.0-3.0   TTR:  58.2 % (3.6 mo)   INR used for dosin.7 (6/10/2021)   Warfarin maintenance plan:  2 mg every day   Weekly warfarin total:  14 mg   Plan last modified:  Nasreen Alejandra RPH (2021)   Next INR check:  2021   Priority:  Maintenance   Target end date:      Indications    PAF (paroxysmal atrial fibrillation) (CMS/[facility]) [I48.0]             Anticoagulation Episode Summary     INR check location:      Preferred lab:      Send INR reminders to:   [facility]    Comments:  Previously apixaban (cost)      Anticoagulation Care Providers     Provider Role Specialty Phone number    Francesco Riojas MD Referring Cardiology [phone]          Clinic Interview:  Patient Findings     Positives:  Change in diet/appetite    Negatives:  Signs/symptoms of thrombosis, Signs/symptoms of bleeding,   Laboratory test error suspected, Change in health, Change in alcohol use,   Change in activity, Upcoming invasive procedure, Emergency department   visit, Upcoming dental procedure, Missed doses, Extra doses, Change in   medications, Hospital admission, Bruising, Other complaints    Comments:  Actually reports less vit k than usual to help INR rise.      Clinical Outcomes     Negatives:  Major bleeding event, Thromboembolic event,   Anticoagulation-related hospital admission, Anticoagulation-related ED   visit, Anticoagulation-related fatality    Comments:  Actually reports less vit k than usual to help INR rise.        INR History:  Anticoagulation Monitoring 2021 6/3/2021 6/10/2021   INR 2.2 1.9 1.7   INR Date 2021 6/3/2021 6/10/2021   INR Goal 2.0-3.0 2.0-3.0 2.0-3.0   Trend Same Same Same   Last Week Total 14 mg 14 mg 15 mg   Next Week Total 14 mg 15 mg 16 mg   Sun 2 mg 2 mg 2 mg   Mon 2 mg 2 mg -   Tue 2 mg 2 mg -   Wed 2 mg 2 mg -   Thu 2 mg 3 mg (6/3) 4 mg (6/10)   Fri 2 mg 2 mg 2 mg    Sat 2 mg 2 mg 2 mg   Visit Report - - -   Some recent data might be hidden       Plan:  1. INR is Subtherapeutic today- see above in Anticoagulation Summary.  Will instruct Ariadne Telles to Change their warfarin regimen- see above in Anticoagulation Summary.  2. Follow up in 4 days  3. Patient declines warfarin refills.  4. Verbal and written information provided. Patient expresses understanding and has no further questions at this time.    Octavio Garnett RP

## 2021-06-14 ENCOUNTER — ANTICOAGULATION VISIT (OUTPATIENT)
Dept: PHARMACY | Facility: HOSPITAL | Age: 71
End: 2021-06-14

## 2021-06-14 DIAGNOSIS — I48.0 PAF (PAROXYSMAL ATRIAL FIBRILLATION) (HCC): Primary | ICD-10-CM

## 2021-06-14 LAB
INR PPP: 2.3 (ref 0.91–1.09)
PROTHROMBIN TIME: 27.2 SECONDS (ref 10–13.8)

## 2021-06-14 PROCEDURE — G0463 HOSPITAL OUTPT CLINIC VISIT: HCPCS

## 2021-06-14 PROCEDURE — 85610 PROTHROMBIN TIME: CPT

## 2021-06-14 PROCEDURE — 36416 COLLJ CAPILLARY BLOOD SPEC: CPT

## 2021-06-14 NOTE — PROGRESS NOTES
Anticoagulation Clinic Progress Note    Anticoagulation Summary  As of 2021    INR goal:  2.0-3.0   TTR:  57.9 % (3.7 mo)   INR used for dosin.3 (2021)   Warfarin maintenance plan:  3 mg every Mon, Fri; 2 mg all other days   Weekly warfarin total:  16 mg   Plan last modified:  Octavio Garnett RPH (2021)   Next INR check:  2021   Priority:  Maintenance   Target end date:      Indications    PAF (paroxysmal atrial fibrillation) (CMS/HCC) [I48.0]             Anticoagulation Episode Summary     INR check location:      Preferred lab:      Send INR reminders to:   [facility]    Comments:  Previously apixaban (cost)      Anticoagulation Care Providers     Provider Role Specialty Phone number    Francesco Riojas MD Referring Cardiology [phone]          Clinic Interview:  Patient Findings     Negatives:  Signs/symptoms of thrombosis, Signs/symptoms of bleeding,   Laboratory test error suspected, Change in health, Change in alcohol use,   Change in activity, Upcoming invasive procedure, Emergency department   visit, Upcoming dental procedure, Missed doses, Extra doses, Change in   medications, Change in diet/appetite, Hospital admission, Bruising, Other   complaints      Clinical Outcomes     Negatives:  Major bleeding event, Thromboembolic event,   Anticoagulation-related hospital admission, Anticoagulation-related ED   visit, Anticoagulation-related fatality        INR History:  Anticoagulation Monitoring 6/3/2021 6/10/2021 2021   INR 1.9 1.7 2.3   INR Date 6/3/2021 6/10/2021 2021   INR Goal 2.0-3.0 2.0-3.0 2.0-3.0   Trend Same Same Up   Last Week Total 14 mg 15 mg 16 mg   Next Week Total 15 mg 16 mg 16 mg   Sun 2 mg 2 mg -   Mon 2 mg - 3 mg   Tue 2 mg - 2 mg   Wed 2 mg - 2 mg   Thu 3 mg (6/3) 4 mg (6/10) -   Fri 2 mg 2 mg -   Sat 2 mg 2 mg -   Visit Report - - -   Some recent data might be hidden       Plan:  1. INR is Therapeutic today- see above in  Anticoagulation Summary.  Will instruct Ariadne Telles to Change their warfarin regimen- see above in Anticoagulation Summary.  2. Follow up in 3 days  3. Patient declines warfarin refills.  4. Verbal and written information provided. Patient expresses understanding and has no further questions at this time.    Octavio Garnett [facility]

## 2021-06-17 ENCOUNTER — ANTICOAGULATION VISIT (OUTPATIENT)
Dept: PHARMACY | Facility: HOSPITAL | Age: 71
End: 2021-06-17

## 2021-06-17 DIAGNOSIS — I48.0 PAF (PAROXYSMAL ATRIAL FIBRILLATION) (HCC): Primary | ICD-10-CM

## 2021-06-17 LAB
INR PPP: 2.9 (ref 0.91–1.09)
PROTHROMBIN TIME: 34.6 SECONDS (ref 10–13.8)

## 2021-06-17 PROCEDURE — 85610 PROTHROMBIN TIME: CPT

## 2021-06-17 PROCEDURE — 36416 COLLJ CAPILLARY BLOOD SPEC: CPT

## 2021-06-17 NOTE — PROGRESS NOTES
Anticoagulation Clinic Progress Note    Anticoagulation Summary  As of 2021    INR goal:  2.0-3.0   TTR:  59.0 % (3.8 mo)   INR used for dosin.9 (2021)   Warfarin maintenance plan:  3 mg every Mon, Fri; 2 mg all other days   Weekly warfarin total:  16 mg   No change documented:  Robyn Silveira   Plan last modified:  Octavio Garnett [facility] (2021)   Next INR check:  2021   Priority:  High   Target end date:      Indications    PAF (paroxysmal atrial fibrillation) (CMS/HCC) [I48.0]             Anticoagulation Episode Summary     INR check location:      Preferred lab:      Send INR reminders to:  [facility] [place]    Comments:  Previously apixaban (cost)      Anticoagulation Care Providers     Provider Role Specialty Phone number    Francesco Riojas MD Referring Cardiology [phone]          Clinic Interview:      INR History:  Anticoagulation Monitoring 6/10/2021 2021 2021   INR 1.7 2.3 2.9   INR Date 6/10/2021 2021 2021   INR Goal 2.0-3.0 2.0-3.0 2.0-3.0   Trend Same Up Same   Last Week Total 15 mg 16 mg 18 mg   Next Week Total 16 mg 16 mg 16 mg   Sun 2 mg - 2 mg   Mon - 3 mg 3 mg   Tue - 2 mg 2 mg   Wed - 2 mg 2 mg   Thu 4 mg (6/10) - 2 mg   Fri 2 mg - 3 mg   Sat 2 mg - 2 mg   Visit Report - - -   Some recent data might be hidden       Plan:  1. INR is therapeutic today- see above in Anticoagulation Summary.   Will instruct Ariadne Telles to continue their warfarin regimen- see above in Anticoagulation Summary.  2. Follow up in 1 week.  3. Patient declines warfarin refills.  4. Verbal and written information provided. Patient expresses understanding and has no further questions at this time.    Robyn Silveira  
Atrial fibrillation    HTN (hypertension)    Hypothyroid    PVD (peripheral vascular disease)

## 2021-06-25 ENCOUNTER — ANTICOAGULATION VISIT (OUTPATIENT)
Dept: PHARMACY | Facility: HOSPITAL | Age: 71
End: 2021-06-25

## 2021-06-25 ENCOUNTER — TELEPHONE (OUTPATIENT)
Dept: CARDIOLOGY | Facility: CLINIC | Age: 71
End: 2021-06-25

## 2021-06-25 ENCOUNTER — LAB (OUTPATIENT)
Dept: LAB | Facility: HOSPITAL | Age: 71
End: 2021-06-25

## 2021-06-25 DIAGNOSIS — Z13.6 SCREENING FOR CARDIOVASCULAR CONDITION: ICD-10-CM

## 2021-06-25 DIAGNOSIS — I48.0 PAF (PAROXYSMAL ATRIAL FIBRILLATION) (HCC): ICD-10-CM

## 2021-06-25 DIAGNOSIS — Z01.810 PREPROCEDURAL CARDIOVASCULAR EXAMINATION: ICD-10-CM

## 2021-06-25 DIAGNOSIS — I48.0 PAROXYSMAL ATRIAL FIBRILLATION (HCC): ICD-10-CM

## 2021-06-25 DIAGNOSIS — I48.0 PAF (PAROXYSMAL ATRIAL FIBRILLATION) (HCC): Primary | ICD-10-CM

## 2021-06-25 LAB
BASOPHILS # BLD AUTO: 0.11 10*3/MM3 (ref 0–0.2)
BASOPHILS NFR BLD AUTO: 1.4 % (ref 0–1.5)
DEPRECATED RDW RBC AUTO: 42.9 FL (ref 37–54)
EOSINOPHIL # BLD AUTO: 0.39 10*3/MM3 (ref 0–0.4)
EOSINOPHIL NFR BLD AUTO: 5 % (ref 0.3–6.2)
ERYTHROCYTE [DISTWIDTH] IN BLOOD BY AUTOMATED COUNT: 12.2 % (ref 12.3–15.4)
HCT VFR BLD AUTO: 44 % (ref 34–46.6)
HGB BLD-MCNC: 15.2 G/DL (ref 12–15.9)
IMM GRANULOCYTES # BLD AUTO: 0.02 10*3/MM3 (ref 0–0.05)
IMM GRANULOCYTES NFR BLD AUTO: 0.3 % (ref 0–0.5)
INR PPP: 2.43 (ref 0.9–1.1)
LYMPHOCYTES # BLD AUTO: 1.21 10*3/MM3 (ref 0.7–3.1)
LYMPHOCYTES NFR BLD AUTO: 15.6 % (ref 19.6–45.3)
MCH RBC QN AUTO: 32.5 PG (ref 26.6–33)
MCHC RBC AUTO-ENTMCNC: 34.5 G/DL (ref 31.5–35.7)
MCV RBC AUTO: 94.2 FL (ref 79–97)
MONOCYTES # BLD AUTO: 0.73 10*3/MM3 (ref 0.1–0.9)
MONOCYTES NFR BLD AUTO: 9.4 % (ref 5–12)
NEUTROPHILS NFR BLD AUTO: 5.3 10*3/MM3 (ref 1.7–7)
NEUTROPHILS NFR BLD AUTO: 68.3 % (ref 42.7–76)
NRBC BLD AUTO-RTO: 0 /100 WBC (ref 0–0.2)
PLATELET # BLD AUTO: 293 10*3/MM3 (ref 140–450)
PMV BLD AUTO: 9.1 FL (ref 6–12)
PROTHROMBIN TIME: 26.1 SECONDS (ref 11.7–14.2)
RBC # BLD AUTO: 4.67 10*6/MM3 (ref 3.77–5.28)
SARS-COV-2 ORF1AB RESP QL NAA+PROBE: NOT DETECTED
WBC # BLD AUTO: 7.76 10*3/MM3 (ref 3.4–10.8)

## 2021-06-25 PROCEDURE — 85610 PROTHROMBIN TIME: CPT

## 2021-06-25 PROCEDURE — C9803 HOPD COVID-19 SPEC COLLECT: HCPCS

## 2021-06-25 PROCEDURE — 85025 COMPLETE CBC W/AUTO DIFF WBC: CPT

## 2021-06-25 PROCEDURE — U0005 INFEC AGEN DETEC AMPLI PROBE: HCPCS

## 2021-06-25 PROCEDURE — 36415 COLL VENOUS BLD VENIPUNCTURE: CPT

## 2021-06-25 PROCEDURE — U0004 COV-19 TEST NON-CDC HGH THRU: HCPCS

## 2021-06-25 NOTE — PROGRESS NOTES
Anticoagulation Clinic Progress Note    Anticoagulation Summary  As of 2021    INR goal:  2.0-3.0   TTR:  61.6 % (4.1 mo)   INR used for dosin.43 (2021)   Warfarin maintenance plan:  3 mg every Mon, Fri; 2 mg all other days   Weekly warfarin total:  16 mg   No change documented:  Diamond Garcia, Ronn   Plan last modified:  Octavio Garnett RP (2021)   Next INR check:  2021   Priority:  High   Target end date:      Indications    PAF (paroxysmal atrial fibrillation) (CMS/[facility]) [I48.0]             Anticoagulation Episode Summary     INR check location:      Preferred lab:      Send INR reminders to:   [facility]    Comments:  Previously apixaban (cost)      Anticoagulation Care Providers     Provider Role Specialty Phone number    Francesco Riojas MD Referring Cardiology [phone]          Clinic Interview:  Patient Findings     Negatives:  Signs/symptoms of thrombosis, Signs/symptoms of bleeding,   Laboratory test error suspected, Change in health, Change in alcohol use,   Change in activity, Upcoming invasive procedure, Emergency department   visit, Upcoming dental procedure, Missed doses, Extra doses, Change in   medications, Change in diet/appetite, Hospital admission, Bruising, Other   complaints    Comments:  Patient has A.fib ablation scheduled for 21.   Advised patient that she would need weekly INR checks for 3-4 weeks after   the ablation to ensure she remains in therapeutic range.       Clinical Outcomes     Negatives:  Major bleeding event, Thromboembolic event,   Anticoagulation-related hospital admission, Anticoagulation-related ED   visit, Anticoagulation-related fatality    Comments:  Patient has A.fib ablation scheduled for 21.   Advised patient that she would need weekly INR checks for 3-4 weeks after   the ablation to ensure she remains in therapeutic range.         INR History:  Anticoagulation Monitoring 2021  6/25/2021   INR 2.3 2.9 2.43   INR Date 6/14/2021 6/17/2021 6/25/2021   INR Goal 2.0-3.0 2.0-3.0 2.0-3.0   Trend Up Same Same   Last Week Total 16 mg 18 mg 16 mg   Next Week Total 16 mg 16 mg 16 mg   Sun - 2 mg 2 mg   Mon 3 mg 3 mg 3 mg   Tue 2 mg 2 mg 2 mg   Wed 2 mg 2 mg 2 mg   Thu - 2 mg 2 mg   Fri - 3 mg 3 mg   Sat - 2 mg 2 mg   Visit Report - - -   Some recent data might be hidden       Plan:  1. INR is Therapeutic today- see above in Anticoagulation Summary.   Will instruct Ariadne CASSIDY Nedaholger to Continue their warfarin regimen- see above in Anticoagulation Summary.  2. Follow up in 1 week  3. They have been instructed to call if any changes in medications, doses, concerns, etc. Patient expresses understanding and has no further questions at this time.    Diamond Garcia, MiriamD

## 2021-06-25 NOTE — TELEPHONE ENCOUNTER
Patient scheduled 6/28 for atrial fibrillation ablation.    INR'S:    6.3= 1.9  6.10= 1.7  6.14= 2.3  6.25= 2.4    Thank you    Denice FREEMAN

## 2021-06-28 ENCOUNTER — ANESTHESIA (OUTPATIENT)
Dept: CARDIOLOGY | Facility: HOSPITAL | Age: 71
End: 2021-06-28

## 2021-06-28 ENCOUNTER — HOSPITAL ENCOUNTER (OUTPATIENT)
Facility: HOSPITAL | Age: 71
Discharge: HOME | End: 2021-06-29
Attending: INTERNAL MEDICINE | Admitting: INTERNAL MEDICINE

## 2021-06-28 ENCOUNTER — ANESTHESIA EVENT (OUTPATIENT)
Dept: CARDIOLOGY | Facility: HOSPITAL | Age: 71
End: 2021-06-28

## 2021-06-28 DIAGNOSIS — I48.0 AF (PAROXYSMAL ATRIAL FIBRILLATION) (HCC): ICD-10-CM

## 2021-06-28 LAB
ANION GAP SERPL CALCULATED.3IONS-SCNC: 9.3 MMOL/L (ref 5–15)
BUN SERPL-MCNC: 19 MG/DL (ref 8–23)
BUN/CREAT SERPL: 24.4 (ref 7–25)
CALCIUM SPEC-SCNC: 9.6 MG/DL (ref 8.6–10.5)
CHLORIDE SERPL-SCNC: 106 MMOL/L (ref 98–107)
CO2 SERPL-SCNC: 27.7 MMOL/L (ref 22–29)
CREAT SERPL-MCNC: 0.78 MG/DL (ref 0.57–1)
GFR SERPL CREATININE-BSD FRML MDRD: 73 ML/MIN/1.73
GLUCOSE SERPL-MCNC: 102 MG/DL (ref 65–99)
INR PPP: 2.3 (ref 0.8–1.2)
POTASSIUM SERPL-SCNC: 4.6 MMOL/L (ref 3.5–5.2)
PROTHROMBIN TIME: 26.8 SECONDS (ref 12.8–15.2)
QT INTERVAL: 470 MS
QT INTERVAL: 475 MS
SODIUM SERPL-SCNC: 143 MMOL/L (ref 136–145)

## 2021-06-28 PROCEDURE — 63710000001 FLECAINIDE 50 MG TABLET: Performed by: NURSE PRACTITIONER

## 2021-06-28 PROCEDURE — 25010000002 PROTAMINE SULFATE PER 10 MG: Performed by: INTERNAL MEDICINE

## 2021-06-28 PROCEDURE — C1733 CATH, EP, OTHR THAN COOL-TIP: HCPCS | Performed by: INTERNAL MEDICINE

## 2021-06-28 PROCEDURE — C1894 INTRO/SHEATH, NON-LASER: HCPCS | Performed by: INTERNAL MEDICINE

## 2021-06-28 PROCEDURE — 93623 PRGRMD STIMJ&PACG IV RX NFS: CPT | Performed by: INTERNAL MEDICINE

## 2021-06-28 PROCEDURE — 93662 INTRACARDIAC ECG (ICE): CPT | Performed by: INTERNAL MEDICINE

## 2021-06-28 PROCEDURE — 25010000002 PROPOFOL 10 MG/ML EMULSION: Performed by: ANESTHESIOLOGY

## 2021-06-28 PROCEDURE — 85347 COAGULATION TIME ACTIVATED: CPT

## 2021-06-28 PROCEDURE — 93656 COMPRE EP EVAL ABLTJ ATR FIB: CPT | Performed by: INTERNAL MEDICINE

## 2021-06-28 PROCEDURE — 25010000002 DEXAMETHASONE PER 1 MG: Performed by: ANESTHESIOLOGY

## 2021-06-28 PROCEDURE — 25010000002 PHENYLEPHRINE PER 1 ML: Performed by: ANESTHESIOLOGY

## 2021-06-28 PROCEDURE — A9270 NON-COVERED ITEM OR SERVICE: HCPCS | Performed by: NURSE PRACTITIONER

## 2021-06-28 PROCEDURE — 25010000002 FENTANYL CITRATE (PF) 50 MCG/ML SOLUTION: Performed by: INTERNAL MEDICINE

## 2021-06-28 PROCEDURE — 25010000002 MIDAZOLAM PER 1 MG: Performed by: ANESTHESIOLOGY

## 2021-06-28 PROCEDURE — 63710000001 METOPROLOL TARTRATE 25 MG TABLET: Performed by: NURSE PRACTITIONER

## 2021-06-28 PROCEDURE — 93613 INTRACARDIAC EPHYS 3D MAPG: CPT | Performed by: INTERNAL MEDICINE

## 2021-06-28 PROCEDURE — 25010000002 NEOSTIGMINE 5 MG/10ML SOLUTION: Performed by: NURSE ANESTHETIST, CERTIFIED REGISTERED

## 2021-06-28 PROCEDURE — 80048 BASIC METABOLIC PNL TOTAL CA: CPT | Performed by: INTERNAL MEDICINE

## 2021-06-28 PROCEDURE — 0 IOPAMIDOL PER 1 ML: Performed by: INTERNAL MEDICINE

## 2021-06-28 PROCEDURE — C1760 CLOSURE DEV, VASC: HCPCS | Performed by: INTERNAL MEDICINE

## 2021-06-28 PROCEDURE — 93005 ELECTROCARDIOGRAM TRACING: CPT | Performed by: INTERNAL MEDICINE

## 2021-06-28 PROCEDURE — 63710000001 ACETAMINOPHEN 325 MG TABLET: Performed by: NURSE PRACTITIONER

## 2021-06-28 PROCEDURE — C1766 INTRO/SHEATH,STRBLE,NON-PEEL: HCPCS | Performed by: INTERNAL MEDICINE

## 2021-06-28 PROCEDURE — 93010 ELECTROCARDIOGRAM REPORT: CPT | Performed by: INTERNAL MEDICINE

## 2021-06-28 PROCEDURE — G0378 HOSPITAL OBSERVATION PER HR: HCPCS

## 2021-06-28 PROCEDURE — C1732 CATH, EP, DIAG/ABL, 3D/VECT: HCPCS | Performed by: INTERNAL MEDICINE

## 2021-06-28 PROCEDURE — 25010000002 ONDANSETRON PER 1 MG: Performed by: NURSE ANESTHETIST, CERTIFIED REGISTERED

## 2021-06-28 PROCEDURE — 63710000001 WARFARIN 3 MG TABLET: Performed by: NURSE PRACTITIONER

## 2021-06-28 PROCEDURE — 25010000002 HEPARIN (PORCINE) PER 1000 UNITS: Performed by: INTERNAL MEDICINE

## 2021-06-28 PROCEDURE — C1759 CATH, INTRA ECHOCARDIOGRAPHY: HCPCS | Performed by: INTERNAL MEDICINE

## 2021-06-28 PROCEDURE — 85610 PROTHROMBIN TIME: CPT

## 2021-06-28 RX ORDER — NEOSTIGMINE METHYLSULFATE 0.5 MG/ML
INJECTION, SOLUTION INTRAVENOUS AS NEEDED
Status: DISCONTINUED | OUTPATIENT
Start: 2021-06-28 | End: 2021-06-28 | Stop reason: SURG

## 2021-06-28 RX ORDER — HYDROMORPHONE HYDROCHLORIDE 1 MG/ML
0.25 INJECTION, SOLUTION INTRAMUSCULAR; INTRAVENOUS; SUBCUTANEOUS
Status: DISCONTINUED | OUTPATIENT
Start: 2021-06-28 | End: 2021-06-28 | Stop reason: HOSPADM

## 2021-06-28 RX ORDER — OXYCODONE HYDROCHLORIDE AND ACETAMINOPHEN 5; 325 MG/1; MG/1
1 TABLET ORAL ONCE AS NEEDED
Status: DISCONTINUED | OUTPATIENT
Start: 2021-06-28 | End: 2021-06-28 | Stop reason: HOSPADM

## 2021-06-28 RX ORDER — DIPHENHYDRAMINE HYDROCHLORIDE 50 MG/ML
6.25 INJECTION INTRAMUSCULAR; INTRAVENOUS
Status: DISCONTINUED | OUTPATIENT
Start: 2021-06-28 | End: 2021-06-28 | Stop reason: HOSPADM

## 2021-06-28 RX ORDER — MIDAZOLAM HYDROCHLORIDE 1 MG/ML
0.5 INJECTION INTRAMUSCULAR; INTRAVENOUS
Status: DISCONTINUED | OUTPATIENT
Start: 2021-06-28 | End: 2021-06-28 | Stop reason: HOSPADM

## 2021-06-28 RX ORDER — SODIUM CHLORIDE 0.9 % (FLUSH) 0.9 %
10 SYRINGE (ML) INJECTION AS NEEDED
Status: DISCONTINUED | OUTPATIENT
Start: 2021-06-28 | End: 2021-06-28 | Stop reason: HOSPADM

## 2021-06-28 RX ORDER — HEPARIN SODIUM 1000 [USP'U]/ML
INJECTION, SOLUTION INTRAVENOUS; SUBCUTANEOUS AS NEEDED
Status: DISCONTINUED | OUTPATIENT
Start: 2021-06-28 | End: 2021-06-28 | Stop reason: HOSPADM

## 2021-06-28 RX ORDER — ROCURONIUM BROMIDE 10 MG/ML
INJECTION, SOLUTION INTRAVENOUS AS NEEDED
Status: DISCONTINUED | OUTPATIENT
Start: 2021-06-28 | End: 2021-06-28 | Stop reason: SURG

## 2021-06-28 RX ORDER — SODIUM CHLORIDE, SODIUM LACTATE, POTASSIUM CHLORIDE, CALCIUM CHLORIDE 600; 310; 30; 20 MG/100ML; MG/100ML; MG/100ML; MG/100ML
INJECTION, SOLUTION INTRAVENOUS CONTINUOUS PRN
Status: DISCONTINUED | OUTPATIENT
Start: 2021-06-28 | End: 2021-06-28 | Stop reason: SURG

## 2021-06-28 RX ORDER — SODIUM CHLORIDE 0.9 % (FLUSH) 0.9 %
10 SYRINGE (ML) INJECTION EVERY 12 HOURS SCHEDULED
Status: DISCONTINUED | OUTPATIENT
Start: 2021-06-28 | End: 2021-06-28 | Stop reason: HOSPADM

## 2021-06-28 RX ORDER — HYDRALAZINE HYDROCHLORIDE 20 MG/ML
10 INJECTION INTRAMUSCULAR; INTRAVENOUS
Status: DISCONTINUED | OUTPATIENT
Start: 2021-06-28 | End: 2021-06-28 | Stop reason: HOSPADM

## 2021-06-28 RX ORDER — LISINOPRIL 20 MG/1
20 TABLET ORAL DAILY
Status: DISCONTINUED | OUTPATIENT
Start: 2021-06-29 | End: 2021-06-29 | Stop reason: HOSPADM

## 2021-06-28 RX ORDER — DEXAMETHASONE SODIUM PHOSPHATE 10 MG/ML
INJECTION INTRAMUSCULAR; INTRAVENOUS AS NEEDED
Status: DISCONTINUED | OUTPATIENT
Start: 2021-06-28 | End: 2021-06-28 | Stop reason: SURG

## 2021-06-28 RX ORDER — NALOXONE HCL 0.4 MG/ML
0.4 VIAL (ML) INJECTION AS NEEDED
Status: DISCONTINUED | OUTPATIENT
Start: 2021-06-28 | End: 2021-06-28 | Stop reason: HOSPADM

## 2021-06-28 RX ORDER — PROTAMINE SULFATE 10 MG/ML
INJECTION, SOLUTION INTRAVENOUS AS NEEDED
Status: DISCONTINUED | OUTPATIENT
Start: 2021-06-28 | End: 2021-06-28 | Stop reason: HOSPADM

## 2021-06-28 RX ORDER — HEPARIN SODIUM 10000 [USP'U]/100ML
INJECTION, SOLUTION INTRAVENOUS CONTINUOUS PRN
Status: DISCONTINUED | OUTPATIENT
Start: 2021-06-28 | End: 2021-06-28 | Stop reason: HOSPADM

## 2021-06-28 RX ORDER — EPHEDRINE SULFATE 50 MG/ML
5 INJECTION, SOLUTION INTRAVENOUS ONCE AS NEEDED
Status: DISCONTINUED | OUTPATIENT
Start: 2021-06-28 | End: 2021-06-28 | Stop reason: HOSPADM

## 2021-06-28 RX ORDER — FLECAINIDE ACETATE 50 MG/1
50 TABLET ORAL EVERY 12 HOURS
Status: DISCONTINUED | OUTPATIENT
Start: 2021-06-28 | End: 2021-06-29 | Stop reason: HOSPADM

## 2021-06-28 RX ORDER — HYDROMORPHONE HYDROCHLORIDE 1 MG/ML
0.5 INJECTION, SOLUTION INTRAMUSCULAR; INTRAVENOUS; SUBCUTANEOUS
Status: DISCONTINUED | OUTPATIENT
Start: 2021-06-28 | End: 2021-06-29 | Stop reason: HOSPADM

## 2021-06-28 RX ORDER — SODIUM CHLORIDE 9 MG/ML
75 INJECTION, SOLUTION INTRAVENOUS CONTINUOUS
Status: DISCONTINUED | OUTPATIENT
Start: 2021-06-28 | End: 2021-06-28

## 2021-06-28 RX ORDER — ATORVASTATIN CALCIUM 20 MG/1
40 TABLET, FILM COATED ORAL DAILY
Status: DISCONTINUED | OUTPATIENT
Start: 2021-06-29 | End: 2021-06-29 | Stop reason: HOSPADM

## 2021-06-28 RX ORDER — NALOXONE HCL 0.4 MG/ML
0.4 VIAL (ML) INJECTION
Status: DISCONTINUED | OUTPATIENT
Start: 2021-06-28 | End: 2021-06-29 | Stop reason: HOSPADM

## 2021-06-28 RX ORDER — GLYCOPYRROLATE 0.2 MG/ML
INJECTION INTRAMUSCULAR; INTRAVENOUS AS NEEDED
Status: DISCONTINUED | OUTPATIENT
Start: 2021-06-28 | End: 2021-06-28 | Stop reason: SURG

## 2021-06-28 RX ORDER — WARFARIN SODIUM 3 MG/1
3 TABLET ORAL
Status: COMPLETED | OUTPATIENT
Start: 2021-06-28 | End: 2021-06-28

## 2021-06-28 RX ORDER — ACETAMINOPHEN 650 MG/1
650 SUPPOSITORY RECTAL EVERY 4 HOURS PRN
Status: DISCONTINUED | OUTPATIENT
Start: 2021-06-28 | End: 2021-06-29 | Stop reason: HOSPADM

## 2021-06-28 RX ORDER — PROPOFOL 10 MG/ML
VIAL (ML) INTRAVENOUS AS NEEDED
Status: DISCONTINUED | OUTPATIENT
Start: 2021-06-28 | End: 2021-06-28 | Stop reason: SURG

## 2021-06-28 RX ORDER — FENTANYL CITRATE 50 UG/ML
INJECTION, SOLUTION INTRAMUSCULAR; INTRAVENOUS AS NEEDED
Status: DISCONTINUED | OUTPATIENT
Start: 2021-06-28 | End: 2021-06-28 | Stop reason: HOSPADM

## 2021-06-28 RX ORDER — SODIUM CHLORIDE 0.9 % (FLUSH) 0.9 %
3 SYRINGE (ML) INJECTION EVERY 12 HOURS SCHEDULED
Status: DISCONTINUED | OUTPATIENT
Start: 2021-06-28 | End: 2021-06-28 | Stop reason: HOSPADM

## 2021-06-28 RX ORDER — HYDROMORPHONE HYDROCHLORIDE 1 MG/ML
0.5 INJECTION, SOLUTION INTRAMUSCULAR; INTRAVENOUS; SUBCUTANEOUS
Status: DISCONTINUED | OUTPATIENT
Start: 2021-06-28 | End: 2021-06-28 | Stop reason: HOSPADM

## 2021-06-28 RX ORDER — ONDANSETRON 2 MG/ML
4 INJECTION INTRAMUSCULAR; INTRAVENOUS ONCE AS NEEDED
Status: DISCONTINUED | OUTPATIENT
Start: 2021-06-28 | End: 2021-06-28 | Stop reason: HOSPADM

## 2021-06-28 RX ORDER — ACETAMINOPHEN 325 MG/1
650 TABLET ORAL EVERY 4 HOURS PRN
Status: DISCONTINUED | OUTPATIENT
Start: 2021-06-28 | End: 2021-06-29 | Stop reason: HOSPADM

## 2021-06-28 RX ORDER — ONDANSETRON 2 MG/ML
INJECTION INTRAMUSCULAR; INTRAVENOUS AS NEEDED
Status: DISCONTINUED | OUTPATIENT
Start: 2021-06-28 | End: 2021-06-28 | Stop reason: SURG

## 2021-06-28 RX ORDER — LABETALOL HYDROCHLORIDE 5 MG/ML
5 INJECTION, SOLUTION INTRAVENOUS
Status: DISCONTINUED | OUTPATIENT
Start: 2021-06-28 | End: 2021-06-28 | Stop reason: HOSPADM

## 2021-06-28 RX ORDER — LIDOCAINE HYDROCHLORIDE 10 MG/ML
0.5 INJECTION, SOLUTION EPIDURAL; INFILTRATION; INTRACAUDAL; PERINEURAL ONCE AS NEEDED
Status: DISCONTINUED | OUTPATIENT
Start: 2021-06-28 | End: 2021-06-28 | Stop reason: HOSPADM

## 2021-06-28 RX ORDER — LIDOCAINE HYDROCHLORIDE 20 MG/ML
INJECTION, SOLUTION INFILTRATION; PERINEURAL AS NEEDED
Status: DISCONTINUED | OUTPATIENT
Start: 2021-06-28 | End: 2021-06-28 | Stop reason: SURG

## 2021-06-28 RX ORDER — SODIUM CHLORIDE, SODIUM LACTATE, POTASSIUM CHLORIDE, CALCIUM CHLORIDE 600; 310; 30; 20 MG/100ML; MG/100ML; MG/100ML; MG/100ML
9 INJECTION, SOLUTION INTRAVENOUS CONTINUOUS
Status: DISCONTINUED | OUTPATIENT
Start: 2021-06-28 | End: 2021-06-28

## 2021-06-28 RX ORDER — FLUMAZENIL 0.1 MG/ML
0.2 INJECTION INTRAVENOUS AS NEEDED
Status: DISCONTINUED | OUTPATIENT
Start: 2021-06-28 | End: 2021-06-28 | Stop reason: HOSPADM

## 2021-06-28 RX ORDER — HYDROCODONE BITARTRATE AND ACETAMINOPHEN 5; 325 MG/1; MG/1
1 TABLET ORAL ONCE AS NEEDED
Status: DISCONTINUED | OUTPATIENT
Start: 2021-06-28 | End: 2021-06-28 | Stop reason: HOSPADM

## 2021-06-28 RX ADMIN — SODIUM CHLORIDE, POTASSIUM CHLORIDE, SODIUM LACTATE AND CALCIUM CHLORIDE: 600; 310; 30; 20 INJECTION, SOLUTION INTRAVENOUS at 08:15

## 2021-06-28 RX ADMIN — MIDAZOLAM 0.5 MG: 1 INJECTION INTRAMUSCULAR; INTRAVENOUS at 08:07

## 2021-06-28 RX ADMIN — ROCURONIUM BROMIDE 4 MG: 50 INJECTION INTRAVENOUS at 08:20

## 2021-06-28 RX ADMIN — SODIUM CHLORIDE 75 ML/HR: 9 INJECTION, SOLUTION INTRAVENOUS at 07:26

## 2021-06-28 RX ADMIN — PHENYLEPHRINE HYDROCHLORIDE 25 MCG: 10 INJECTION INTRAVENOUS at 09:43

## 2021-06-28 RX ADMIN — ONDANSETRON 4 MG: 2 INJECTION INTRAMUSCULAR; INTRAVENOUS at 10:23

## 2021-06-28 RX ADMIN — PROPOFOL 50 MG: 10 INJECTION, EMULSION INTRAVENOUS at 08:28

## 2021-06-28 RX ADMIN — METOPROLOL TARTRATE 25 MG: 25 TABLET, FILM COATED ORAL at 22:01

## 2021-06-28 RX ADMIN — SODIUM CHLORIDE 75 ML/HR: 9 INJECTION, SOLUTION INTRAVENOUS at 15:08

## 2021-06-28 RX ADMIN — ACETAMINOPHEN 650 MG: 325 TABLET, FILM COATED ORAL at 19:45

## 2021-06-28 RX ADMIN — WARFARIN 3 MG: 3 TABLET ORAL at 22:01

## 2021-06-28 RX ADMIN — LIDOCAINE HYDROCHLORIDE 60 MG: 20 INJECTION, SOLUTION INFILTRATION; PERINEURAL at 08:20

## 2021-06-28 RX ADMIN — GLYCOPYRROLATE 0.4 MG: 0.2 INJECTION INTRAMUSCULAR; INTRAVENOUS at 10:34

## 2021-06-28 RX ADMIN — DEXAMETHASONE SODIUM PHOSPHATE 8 MG: 10 INJECTION INTRAMUSCULAR; INTRAVENOUS at 08:33

## 2021-06-28 RX ADMIN — FLECAINIDE ACETATE TABLET 50 MG: 50 TABLET ORAL at 22:58

## 2021-06-28 RX ADMIN — PROPOFOL 150 MG: 10 INJECTION, EMULSION INTRAVENOUS at 08:20

## 2021-06-28 RX ADMIN — NEOSTIGMINE METHYLSULFATE 3 MG: 0.5 INJECTION INTRAVENOUS at 10:34

## 2021-06-28 NOTE — ANESTHESIA PROCEDURE NOTES
Airway  Urgency: elective    Date/Time: 6/28/2021 8:27 AM    General Information and Staff    Patient location during procedure: OR  Anesthesiologist: Render, Adrian Ray, MD    Indications and Patient Condition  Indications for airway management: airway protection    Preoxygenated: yes  MILS maintained throughout  Mask difficulty assessment: 1 - vent by mask    Final Airway Details  Final airway type: endotracheal airway      Successful airway: ETT    Successful intubation technique: direct laryngoscopy  Blade: Castro  Blade size: 3  ETT size (mm): 7.0  Placement verified by: chest auscultation and capnometry   Number of attempts at approach: 1

## 2021-06-28 NOTE — ANESTHESIA POSTPROCEDURE EVALUATION
"Patient: Ariadne Telles    Procedure Summary     Date: 06/28/21 Room / Location: [facility] /  [facility]    Anesthesia Start: 0815 Anesthesia Stop: 1110    Procedures:       Ablation atrial fibrillation cryo (N/A )      3D MAPPING CARTO EP (N/A ) Diagnosis:       AF (paroxysmal atrial fibrillation) (CMS/HCC)      (atrial fibrillation)    Providers: Francesco Riojas MD Provider: dArian Birch MD    Anesthesia Type: general ASA Status: 3          Anesthesia Type: general    Vitals  Vitals Value Taken Time   /77 06/28/21 1320   Temp 36.4 °C (97.5 °F) 06/28/21 1115   Pulse 80 06/28/21 1324   Resp 16 06/28/21 1320   SpO2 96 % 06/28/21 1324   Vitals shown include unvalidated device data.        Post Anesthesia Care and Evaluation    Patient location during evaluation: bedside  Patient participation: complete - patient participated  Level of consciousness: awake  Pain management: adequate  Airway patency: patent  Anesthetic complications: No anesthetic complications    Cardiovascular status: acceptable  Respiratory status: acceptable  Hydration status: acceptable    Comments: */77   Pulse 79   Temp 36.4 °C (97.5 °F) (Oral)   Resp 16   Ht 154.9 cm (61\")   Wt 75.3 kg (166 lb)   LMP  (LMP Unknown) Comment: NO HRT  SpO2 95%   BMI 31.37 kg/m²         "

## 2021-06-28 NOTE — ANESTHESIA PREPROCEDURE EVALUATION
Anesthesia Evaluation     no history of anesthetic complications:  NPO Solid Status: > 8 hours             Airway   Mallampati: II  TM distance: >3 FB  Neck ROM: full  No difficulty expected  Dental - normal exam     Pulmonary - normal exam   (-) not a smoker  Cardiovascular   Exercise tolerance: good (4-7 METS)    ECG reviewed  Rhythm: regular    (+) hypertension, dysrhythmias Paroxysmal Atrial Fib, hyperlipidemia,       Neuro/Psych  GI/Hepatic/Renal/Endo    (+)   renal disease stones,     Musculoskeletal     Abdominal    Substance History      OB/GYN          Other                        Anesthesia Plan    ASA 3     general   (  D/W R&B of GA including but not limited to: heart, lung, liver, kidney, neurologic problems, positioning injuries, dental damage, corneal abrasion and TMJ.  .)  intravenous induction     Anesthetic plan, all risks, benefits, and alternatives have been provided, discussed and informed consent has been obtained with: patient.

## 2021-06-29 ENCOUNTER — TELEPHONE (OUTPATIENT)
Dept: PHARMACY | Facility: HOSPITAL | Age: 71
End: 2021-06-29

## 2021-06-29 VITALS
OXYGEN SATURATION: 90 % | HEART RATE: 80 BPM | SYSTOLIC BLOOD PRESSURE: 120 MMHG | BODY MASS INDEX: 31.34 KG/M2 | HEIGHT: 61 IN | TEMPERATURE: 98.5 F | DIASTOLIC BLOOD PRESSURE: 64 MMHG | WEIGHT: 166 LBS | RESPIRATION RATE: 16 BRPM

## 2021-06-29 LAB
ACT BLD: 307 SECONDS (ref 82–152)
ACT BLD: 329 SECONDS (ref 82–152)
ACT BLD: 384 SECONDS (ref 82–152)
ACT BLD: 389 SECONDS (ref 82–152)
INR PPP: 2.9 (ref 0.9–1.1)
PROTHROMBIN TIME: 30.1 SECONDS (ref 11.7–14.2)

## 2021-06-29 PROCEDURE — 63710000001 METOPROLOL TARTRATE 25 MG TABLET: Performed by: NURSE PRACTITIONER

## 2021-06-29 PROCEDURE — 63710000001 LISINOPRIL 20 MG TABLET: Performed by: NURSE PRACTITIONER

## 2021-06-29 PROCEDURE — A9270 NON-COVERED ITEM OR SERVICE: HCPCS | Performed by: NURSE PRACTITIONER

## 2021-06-29 PROCEDURE — 99217 PR OBSERVATION CARE DISCHARGE MANAGEMENT: CPT | Performed by: NURSE PRACTITIONER

## 2021-06-29 PROCEDURE — 63710000001 FLECAINIDE 50 MG TABLET: Performed by: NURSE PRACTITIONER

## 2021-06-29 PROCEDURE — 63710000001 ATORVASTATIN 20 MG TABLET: Performed by: NURSE PRACTITIONER

## 2021-06-29 PROCEDURE — 85610 PROTHROMBIN TIME: CPT | Performed by: NURSE PRACTITIONER

## 2021-06-29 PROCEDURE — S0260 H&P FOR SURGERY: HCPCS | Performed by: INTERNAL MEDICINE

## 2021-06-29 RX ORDER — OMEPRAZOLE 20 MG/1
20 TABLET, DELAYED RELEASE ORAL DAILY
Start: 2021-06-29 | End: 2021-08-03 | Stop reason: ALTCHOICE

## 2021-06-29 RX ADMIN — LISINOPRIL 20 MG: 20 TABLET ORAL at 08:29

## 2021-06-29 RX ADMIN — FLECAINIDE ACETATE TABLET 50 MG: 50 TABLET ORAL at 08:29

## 2021-06-29 RX ADMIN — METOPROLOL TARTRATE 25 MG: 25 TABLET, FILM COATED ORAL at 08:29

## 2021-06-29 RX ADMIN — ATORVASTATIN CALCIUM 40 MG: 20 TABLET, FILM COATED ORAL at 08:29

## 2021-07-02 ENCOUNTER — ANTICOAGULATION VISIT (OUTPATIENT)
Dept: PHARMACY | Facility: HOSPITAL | Age: 71
End: 2021-07-02

## 2021-07-02 DIAGNOSIS — I48.0 PAF (PAROXYSMAL ATRIAL FIBRILLATION) (HCC): Primary | ICD-10-CM

## 2021-07-02 LAB
INR PPP: 4.1 (ref 0.91–1.09)
PROTHROMBIN TIME: 48.9 SECONDS (ref 10–13.8)

## 2021-07-02 PROCEDURE — G0463 HOSPITAL OUTPT CLINIC VISIT: HCPCS

## 2021-07-02 PROCEDURE — 36416 COLLJ CAPILLARY BLOOD SPEC: CPT

## 2021-07-02 PROCEDURE — 85610 PROTHROMBIN TIME: CPT

## 2021-07-07 ENCOUNTER — ANTICOAGULATION VISIT (OUTPATIENT)
Dept: PHARMACY | Facility: HOSPITAL | Age: 71
End: 2021-07-07

## 2021-07-07 DIAGNOSIS — I48.0 PAF (PAROXYSMAL ATRIAL FIBRILLATION) (HCC): Primary | ICD-10-CM

## 2021-07-07 LAB
INR PPP: 2 (ref 0.91–1.09)
PROTHROMBIN TIME: 24.4 SECONDS (ref 10–13.8)

## 2021-07-07 PROCEDURE — 36416 COLLJ CAPILLARY BLOOD SPEC: CPT

## 2021-07-07 PROCEDURE — 85610 PROTHROMBIN TIME: CPT

## 2021-07-07 NOTE — PROGRESS NOTES
Anticoagulation Clinic Progress Note    Anticoagulation Summary  As of 2021    INR goal:  2.0-3.0   TTR:  60.9 % (4.5 mo)   INR used for dosin.0 (2021)   Warfarin maintenance plan:  3 mg every Mon, Fri; 2 mg all other days   Weekly warfarin total:  16 mg   No change documented:  Octavio Garnett RPH   Plan last modified:  Octavio Garnett RPH (2021)   Next INR check:  7/15/2021   Priority:  High   Target end date:      Indications    PAF (paroxysmal atrial fibrillation) (CMS/HCC) [I48.0]             Anticoagulation Episode Summary     INR check location:      Preferred lab:      [facility] [place]    Comments:  Previously apixaban (cost)      Anticoagulation Care Providers     Provider Role Specialty Phone number    Francesco Riojas MD Referring Cardiology [phone]          Clinic Interview:  Patient Findings     Negatives:  Signs/symptoms of thrombosis, Signs/symptoms of bleeding,   Laboratory test error suspected, Change in health, Change in alcohol use,   Change in activity, Upcoming invasive procedure, Emergency department   visit, Upcoming dental procedure, Missed doses, Extra doses, Change in   medications, Change in diet/appetite, Hospital admission, Bruising, Other   complaints      Clinical Outcomes     Negatives:  Major bleeding event, Thromboembolic event,   Anticoagulation-related hospital admission, Anticoagulation-related ED   visit, Anticoagulation-related fatality        INR History:  Anticoagulation Monitoring 2021   INR 2.43 4.1 2.0   INR Date 2021   INR Goal 2.0-3.0 2.0-3.0 2.0-3.0   Trend Same Same Same   Last Week Total 16 mg 16 mg 13 mg   Next Week Total 16 mg 13 mg 16 mg   Sun 2 mg 2 mg 2 mg   Mon 3 mg 3 mg 3 mg   Tue 2 mg 2 mg 2 mg   Wed 2 mg - 2 mg   Thu 2 mg - 2 mg   Fri 3 mg Hold () 3 mg   Sat 2 mg 2 mg 2 mg   Visit Report - - -   Some recent data might be hidden       Plan:  1. INR is  Therapeutic today- see above in Anticoagulation Summary.  Will instruct Ariadne Telles to Continue their warfarin regimen- see above in Anticoagulation Summary.  2. Follow up in 1 week  3. Patient declines warfarin refills.  4. Verbal and written information provided. Patient expresses understanding and has no further questions at this time.    Octavio Garnett [facility]

## 2021-07-15 ENCOUNTER — ANTICOAGULATION VISIT (OUTPATIENT)
Dept: PHARMACY | Facility: HOSPITAL | Age: 71
End: 2021-07-15

## 2021-07-15 DIAGNOSIS — I48.0 PAF (PAROXYSMAL ATRIAL FIBRILLATION) (HCC): Primary | ICD-10-CM

## 2021-07-15 LAB
INR PPP: 2.7 (ref 0.91–1.09)
PROTHROMBIN TIME: 32.1 SECONDS (ref 10–13.8)

## 2021-07-15 PROCEDURE — 36416 COLLJ CAPILLARY BLOOD SPEC: CPT

## 2021-07-15 PROCEDURE — 85610 PROTHROMBIN TIME: CPT

## 2021-07-15 NOTE — PROGRESS NOTES
Anticoagulation Clinic Progress Note    Anticoagulation Summary  As of 7/15/2021    INR goal:  2.0-3.0   TTR:  63.1 % (4.7 mo)   INR used for dosin.7 (7/15/2021)   Warfarin maintenance plan:  3 mg every Mon, Fri; 2 mg all other days   Weekly warfarin total:  16 mg   No change documented:  Tita Martinez   Plan last modified:  Octavio Garnett [facility] (2021)   Next INR check:  2021   Priority:  High   Target end date:      Indications    PAF (paroxysmal atrial fibrillation) (CMS/[facility]) [I48.0]             Anticoagulation Episode Summary     INR check location:      Preferred lab:      [facility]    Comments:  Previously apixaban (cost)      Anticoagulation Care Providers     Provider Role Specialty Phone number    Francesco Riojas MD Referring Cardiology [phone]          Clinic Interview:  Patient Findings     Negatives:  Signs/symptoms of thrombosis, Signs/symptoms of bleeding,   Laboratory test error suspected, Change in health, Change in alcohol use,   Change in activity, Upcoming invasive procedure, Emergency department   visit, Upcoming dental procedure, Missed doses, Extra doses, Change in   medications, Change in diet/appetite, Hospital admission, Bruising, Other   complaints      Clinical Outcomes     Negatives:  Major bleeding event, Thromboembolic event,   Anticoagulation-related hospital admission, Anticoagulation-related ED   visit, Anticoagulation-related fatality        INR History:  Anticoagulation Monitoring 2021 2021 7/15/2021   INR 4.1 2.0 2.7   INR Date 2021 2021 7/15/2021   INR Goal 2.0-3.0 2.0-3.0 2.0-3.0   Trend Same Same Same   Last Week Total 16 mg 13 mg 16 mg   Next Week Total 13 mg 16 mg 16 mg   Sun 2 mg 2 mg 2 mg   Mon 3 mg 3 mg 3 mg   Tue 2 mg 2 mg 2 mg   Wed - 2 mg -   Thu - 2 mg 2 mg   Fri Hold () 3 mg 3 mg   Sat 2 mg 2 mg 2 mg   Visit Report - - -   Some recent data might be hidden       Plan:  1.  INR is therapeutic today- see above in Anticoagulation Summary.   Will instruct Ariadne Telles to continue their warfarin regimen- see above in Anticoagulation Summary.  2. Follow up in 1 weeks.  3. Patient declines warfarin refills.  4. Verbal and written information provided. Patient expresses understanding and has no further questions at this time.    Tita Martinez

## 2021-07-21 ENCOUNTER — ANTICOAGULATION VISIT (OUTPATIENT)
Dept: PHARMACY | Facility: HOSPITAL | Age: 71
End: 2021-07-21

## 2021-07-21 DIAGNOSIS — I48.0 PAF (PAROXYSMAL ATRIAL FIBRILLATION) (HCC): Primary | ICD-10-CM

## 2021-07-21 LAB
INR PPP: 2.3 (ref 0.91–1.09)
PROTHROMBIN TIME: 27.1 SECONDS (ref 10–13.8)

## 2021-07-21 PROCEDURE — 85610 PROTHROMBIN TIME: CPT

## 2021-07-21 PROCEDURE — 36416 COLLJ CAPILLARY BLOOD SPEC: CPT

## 2021-07-21 NOTE — PROGRESS NOTES
Anticoagulation Clinic Progress Note    Anticoagulation Summary  As of 2021    INR goal:  2.0-3.0   TTR:  64.5 % (4.9 mo)   INR used for dosin.3 (2021)   Warfarin maintenance plan:  3 mg every Mon, Fri; 2 mg all other days   Weekly warfarin total:  16 mg   No change documented:  Robyn Silveira   Plan last modified:  Octavio Garnett [facility] (2021)   Next INR check:  8/3/2021   Priority:  High   Target end date:      Indications    PAF (paroxysmal atrial fibrillation) (CMS/HCC) [I48.0]             Anticoagulation Episode Summary     INR check location:      Preferred lab:      Send INR reminders to:  [facility] CLINICAL [place]    Comments:  Previously apixaban (cost)      Anticoagulation Care Providers     Provider Role Specialty Phone number    Francesco Riojas MD Referring Cardiology [phone]          Clinic Interview:      INR History:  Anticoagulation Monitoring 2021 7/15/2021 2021   INR 2.0 2.7 2.3   INR Date 2021 7/15/2021 2021   INR Goal 2.0-3.0 2.0-3.0 2.0-3.0   Trend Same Same Same   Last Week Total 13 mg 16 mg 16 mg   Next Week Total 16 mg 16 mg 16 mg   Sun 2 mg 2 mg 2 mg   Mon 3 mg 3 mg 3 mg   Tue 2 mg 2 mg 2 mg   Wed 2 mg - 2 mg   Thu 2 mg 2 mg 2 mg   Fri 3 mg 3 mg 3 mg   Sat 2 mg 2 mg 2 mg   Visit Report - - -   Some recent data might be hidden       Plan:  1. INR is therapeutic today- see above in Anticoagulation Summary.   Will instruct Ariadne Telles to continue their warfarin regimen- see above in Anticoagulation Summary.  2. Follow up in 2 weeks.  3. Patient declines warfarin refills.  4. Verbal and written information provided. Patient expresses understanding and has no further questions at this time.    Robyn Silveira

## 2021-07-27 ENCOUNTER — OFFICE VISIT (OUTPATIENT)
Dept: FAMILY MEDICINE CLINIC | Facility: CLINIC | Age: 71
End: 2021-07-27

## 2021-07-27 VITALS
HEART RATE: 75 BPM | HEIGHT: 61 IN | SYSTOLIC BLOOD PRESSURE: 158 MMHG | WEIGHT: 172.4 LBS | DIASTOLIC BLOOD PRESSURE: 87 MMHG | OXYGEN SATURATION: 97 % | TEMPERATURE: 97.3 F | BODY MASS INDEX: 32.55 KG/M2

## 2021-07-27 DIAGNOSIS — H81.01 MENIERE'S DISEASE (COCHLEAR HYDROPS), RIGHT: ICD-10-CM

## 2021-07-27 DIAGNOSIS — R42 VERTIGO: Primary | ICD-10-CM

## 2021-07-27 PROCEDURE — 99214 OFFICE O/P EST MOD 30 MIN: CPT | Performed by: FAMILY MEDICINE

## 2021-07-27 RX ORDER — ONDANSETRON 4 MG/1
4 TABLET, FILM COATED ORAL EVERY 8 HOURS PRN
Qty: 10 TABLET | Refills: 0 | Status: SHIPPED | OUTPATIENT
Start: 2021-07-27 | End: 2021-08-03 | Stop reason: ALTCHOICE

## 2021-07-27 RX ORDER — MECLIZINE HCL 12.5 MG/1
12.5 TABLET ORAL 3 TIMES DAILY PRN
Qty: 3 TABLET | Refills: 1 | Status: SHIPPED | OUTPATIENT
Start: 2021-07-27 | End: 2021-10-14

## 2021-07-27 RX ORDER — HYDROCHLOROTHIAZIDE 12.5 MG/1
12.5 TABLET ORAL DAILY
Qty: 30 TABLET | Refills: 1 | Status: SHIPPED | OUTPATIENT
Start: 2021-07-27 | End: 2021-07-27

## 2021-07-27 NOTE — PATIENT INSTRUCTIONS
Ménière's Disease    Ménière's disease is an inner ear disorder. It causes attacks of a spinning sensation (vertigo), and ringing in the ear (tinnitus). It also causes hearing loss and a feeling of fullness or pressure in the ear. It typically affects one ear but can affect both. This is a lifelong condition, and it may get worse over time. There are treatment options to help manage the symptoms of Ménière's disease.  What are the causes?  This condition is caused by having too much of the fluid that is in the inner ear (endolymph). When fluid builds up in the inner ear, it affects the nerves that control balance and hearing. The reason for the fluid buildup is not known. Possible causes include:  · Allergies.  · An abnormal reaction of the body's defense system (autoimmune disease).  · Viral infection of the inner ear.  · Head injury.  What increases the risk?  The following factors may make you more likely to develop this condition:  · Being between 40 and 60 years old.  · Having a family history of Ménière's disease.  · Having a history of autoimmune disease.  · Having an injury to the head (head trauma).  What are the signs or symptoms?  Symptoms of this condition include:  · Fullness and pressure in your ear.  · Roaring or ringing in your ear (tinnitus).  · Vertigo and loss of balance.  · Decreased hearing.  · Nausea and vomiting. This happens only sometimes.  Symptoms of this condition can come and go and may last for up to 4 or more hours at a time. Symptoms usually start in one ear. They may become more frequent and eventually involve both ears.  How is this diagnosed?  This condition is diagnosed based on a physical exam and tests. Tests may include:  · A hearing test (audiogram).  · An electronystagmogram (ENG). This tests your balance nerve (vestibular nerve).  · Imaging studies of your inner ear and hearing nerve, such as an MRI.  · Other balance tests, such as rotational or balance platform tests.  How  is this treated?  There is no cure for this condition, but treatment can help to manage your symptoms. Treatment may include:  · A low-salt (low-sodium) diet. This may help to reduce fluid in the body and relieve symptoms.  · Oral or injected medicines to reduce or control:  ? Vertigo.  ? Nausea.  ? Fluid retention.  · Use of an air pressure pulse generator. This is a machine that sends small pressure pulses into your ear canal.  · Hearing aids.  · Inner ear surgery. This is rare.  Follow these instructions at home:  Eating and drinking  · Avoid caffeine.  · Do not drink alcohol.  · Drink enough fluid to keep your urine pale yellow.  · Limit the sodium in your diet as told by your health care provider. This is usually no more than 1,500-2,000 mg per day. Check ingredients and nutrition facts on packaged foods and beverages.  General instructions  · Take over-the-counter and prescription medicines only as told by your health care provider.  · Do not drive if you have vertigo or dizziness.  · Do not use any products that contain nicotine or tobacco, such as cigarettes, e-cigarettes, and chewing tobacco. If you need help quitting, ask your health care provider.  · Keep all follow-up visits as told by your health care provider. This is important.  Contact a health care provider if:  · You have symptoms that last longer than 4 hours.  · You have new or worse symptoms.  Get help right away if:  · You have been vomiting for 24 hours.  · You cannot keep fluids down.  · You have chest pain or trouble breathing.  These symptoms may represent a serious problem that is an emergency. Do not wait to see if the symptoms will go away. Get medical help right away. Call your local emergency services (911 in the [place] Do not drive yourself to the hospital.  Summary  · Ménière's disease is an inner ear disorder.  · This condition causes attacks of a spinning sensation (vertigo), ringing in the ear (tinnitus), hearing loss, and ear  fullness.  · Symptoms of this condition can come and go and may last for up to 4 or more hours at a time.  · Ménière's disease can be treated with a low-sodium diet, medicines, and sometimes, surgery.  This information is not intended to replace advice given to you by your health care provider. Make sure you discuss any questions you have with your health care provider.  Document Revised: 09/28/2020 Document Reviewed: 09/28/2020  [employer]vier Patient Education © 2021 [employer]

## 2021-07-27 NOTE — PROGRESS NOTES
"Chief Complaint  Dizziness and Nausea    Subjective          Ariadne Telles presents to [facility] PRIMARY CARE  History of Present Illness     Exacerbation of longstanding intermittent vertigo symptoms.  Since the middle of June, about 6 weeks ago, she is had multiple episodes of severe vertigo.  The last the better part of a day.  It will start in the morning.  She will turn her head a certain way and the nausea in the dizziness will start.  She has had no diplopia.  No focal neurological deficits.  She has been unsteady on her feet when this is happened.  She is a very afraid she is going to fall and hurt her self.  She is on warfarin for atrial fibrillation.  She is had no known atrial fibrillation issues recently.  She continues also flecainide.    No headache.  She will get right ear pain along with this dizziness.  She has no tinnitus.  No headaches.    She continues her blood pressure medication including lisinopril and metoprolol.  In the past she has been on hydrochlorothiazide and it caused a precipitous drop in blood pressure.  She has not been checking her blood pressure at home.  Slightly elevated today.      Objective   Vital Signs:   /87   Pulse 75   Temp 97.3 °F (36.3 °C) (Temporal)   Ht 154.9 cm (60.98\")   Wt 78.2 kg (172 lb 6.4 oz)   SpO2 97%   BMI 32.59 kg/m²     Physical Exam  Vitals and nursing note reviewed.   Constitutional:       General: She is not in acute distress.     Appearance: She is well-developed.   HENT:      Head: Atraumatic.      Right Ear: Tympanic membrane, ear canal and external ear normal.      Left Ear: Tympanic membrane, ear canal and external ear normal.      Mouth/Throat:      Mouth: Mucous membranes are moist.      Pharynx: Oropharynx is clear. No oropharyngeal exudate or posterior oropharyngeal erythema.   Eyes:      Extraocular Movements: Extraocular movements intact.      Pupils: Pupils are equal, round, and reactive to light. "   Cardiovascular:      Rate and Rhythm: Normal rate and regular rhythm.      Heart sounds: Normal heart sounds.   Pulmonary:      Effort: Pulmonary effort is normal.      Breath sounds: Normal breath sounds.   Musculoskeletal:      Cervical back: Normal range of motion and neck supple. No rigidity.   Skin:     General: Skin is warm and dry.   Neurological:      Comments: Pupils are equal and reactive to light.  Extraocular muscle intact all directions.  Gait is tenuous.  No ataxia however.  Negative Arjun-Hallpike but she is very guarded about her maneuvering.  She is afraid she is going to have dizziness.  No nystagmus was noted however.   Psychiatric:         Behavior: Behavior normal.         Thought Content: Thought content normal.         Judgment: Judgment normal.        Result Review :                 Assessment and Plan    Diagnoses and all orders for this visit:    1. Vertigo (Primary)  -     MRI angiogram head w contrast; Future  -     Ambulatory Referral to Physical Therapy Evaluate and treat, Vestibular    2. Meniere's disease (cochlear hydrops), right  -     Ambulatory Referral to Physical Therapy Evaluate and treat, Vestibular    Other orders  -     Discontinue: hydroCHLOROthiazide (HYDRODIURIL) 12.5 MG tablet; Take 1 tablet by mouth Daily.  Dispense: 30 tablet; Refill: 1  -     meclizine (ANTIVERT) 12.5 MG tablet; Take 1 tablet by mouth 3 (Three) Times a Day As Needed for Dizziness.  Dispense: 3 tablet; Refill: 1  -     ondansetron (Zofran) 4 MG tablet; Take 1 tablet by mouth Every 8 (Eight) Hours As Needed for Nausea or Vomiting.  Dispense: 10 tablet; Refill: 0      Vertigo.  Subacute to chronic.  Suspicious for Ménière syndrome, especially given the vertigo and associated right ear pain.  However I cannot completely exclude central vertigo causes.  Recommend MR angiogram on elective basis.  Lower clinical suspicion so no absolute need for emergent evaluation.  She will continue the warfarin for now.   She appears to be in sinus rhythm today.    I am also recommending physical therapy.  Also did consider prescribing hydrochlorothiazide, however she has had a precipitous drop in blood pressure with this before.  I am prescribing Antivert and Zofran as needed.  I will see her back in about 3 weeks.  She will seek medical attention with severe symptoms.      Follow Up   No follow-ups on file.  Patient was given instructions and counseling regarding her condition or for health maintenance advice. Please see specific information pulled into the AVS if appropriate.

## 2021-08-02 RX ORDER — ONDANSETRON 4 MG/1
4 TABLET, FILM COATED ORAL EVERY 8 HOURS PRN
Qty: 10 TABLET | Refills: 0 | OUTPATIENT
Start: 2021-08-02

## 2021-08-03 ENCOUNTER — OFFICE VISIT (OUTPATIENT)
Dept: CARDIOLOGY | Facility: CLINIC | Age: 71
End: 2021-08-03

## 2021-08-03 ENCOUNTER — ANTICOAGULATION VISIT (OUTPATIENT)
Dept: PHARMACY | Facility: HOSPITAL | Age: 71
End: 2021-08-03

## 2021-08-03 VITALS
DIASTOLIC BLOOD PRESSURE: 94 MMHG | HEART RATE: 63 BPM | BODY MASS INDEX: 31.91 KG/M2 | WEIGHT: 169 LBS | SYSTOLIC BLOOD PRESSURE: 158 MMHG | HEIGHT: 61 IN

## 2021-08-03 DIAGNOSIS — I48.0 PAF (PAROXYSMAL ATRIAL FIBRILLATION) (HCC): Primary | ICD-10-CM

## 2021-08-03 DIAGNOSIS — I48.0 PAF (PAROXYSMAL ATRIAL FIBRILLATION) (HCC): Primary | Chronic | ICD-10-CM

## 2021-08-03 LAB
INR PPP: 3.3 (ref 0.91–1.09)
PROTHROMBIN TIME: 39.2 SECONDS (ref 10–13.8)

## 2021-08-03 PROCEDURE — 93000 ELECTROCARDIOGRAM COMPLETE: CPT | Performed by: INTERNAL MEDICINE

## 2021-08-03 PROCEDURE — 36416 COLLJ CAPILLARY BLOOD SPEC: CPT

## 2021-08-03 PROCEDURE — G0463 HOSPITAL OUTPT CLINIC VISIT: HCPCS

## 2021-08-03 PROCEDURE — 99213 OFFICE O/P EST LOW 20 MIN: CPT | Performed by: INTERNAL MEDICINE

## 2021-08-03 PROCEDURE — 85610 PROTHROMBIN TIME: CPT

## 2021-08-03 NOTE — PROGRESS NOTES
Date of Office Visit: 2021  Encounter Provider: Francesco Riojas MD  Place of Service: [facility] CARDIOLOGY  Patient Name: Ariadne Telles  :1950    Chief Complaint   Patient presents with   • Paroxysmal Atrial Filbrillation   • S/P AFib Ablation   :     HPI: Ariadne Telles is a 71 y.o. female who presents today for paroxysmal atrial fibrillation.    She has a history of paroxysmal atrial fibrillation managed on flecainide with breakthrough episodes.  She underwent ablation on 2021.  She had typical anatomy cryoablation.    She is not had any episodes of atrial fibrillation that she is aware of.  She feels well.    She is having some vertigo, which is also been a longstanding issue.  She has an MRI planned          Past Medical History:   Diagnosis Date   • Atrial fibrillation with RVR (CMS/HCC)    • Bradycardia 2016   • Carpal tunnel syndrome 2018   • Essential hypertension 2016   • Gastroesophageal reflux disease 2016   • History of endometrial cancer 2017, S/P ANGEL, LUIS ENRIQUEO.  Dr. Catracho Alejo.   • Hx of bone density study 2017    , DEXA scan: Osteoporosis in L1, severe osteopenia in both femoral necks.  T-scores: L1, -2.5; L2, -1.9; L3, -0.7; L4, +0.3.  Right femoral neck, -2.1; total -1.8.  Left femoral neck, -2.3; total, -1.8.   • Hypercholesterolemia 2016   • Hyperlipidemia    • Kidney stone    • MVP (mitral valve prolapse)     last 2 echocardiograms showed no MVP in 2018 and     • Nausea & vomiting    • Osteopenia 2016    2017, DEXA scan: Severe osteopenia in both femoral necks, T score= -2.1 in the right femoral neck, -2.3 in the left femoral neck.   • Osteoporosis 2017    DEXA scan, T score L1= -2.5   • PAF (paroxysmal atrial fibrillation) (CMS/HCC)    • Pituitary adenoma (CMS/HCC) 2018    Thought to have a pituitary microadenoma on previous studies but MRI of the pituitary with and without  "contrast, February 10, 2017: \"No convincing evidence to suggest a pituitary adenoma on this MRI study\".  \"Incidentally noted relatively mild changes of chronic small vessel ischemia phenomena.\"   • RBBB (right bundle branch block)    • Rectal polyp 2016   • Surgical menopause     ANGEL, BSO, for endometrial carcinoma.   • Vaginal delivery     x1  NITO, (and 2 C-sections, one stillbirth).   • Vertigo        Past Surgical History:   Procedure Laterality Date   • CARDIAC ELECTROPHYSIOLOGY PROCEDURE N/A 2021    Procedure: Ablation atrial fibrillation cryo;  Surgeon: Francesco Riojas MD;  Location: [facility] INVASIVE LOCATION;  Service: Cardiovascular;  Laterality: N/A;   • CARPAL TUNNEL RELEASE WITH CUBITAL TUNNEL RELEASE  2018    right   • CATARACT EXTRACTION Right 2016   •  SECTION      x2   one stillborn Ariadne Isbell   • COLONOSCOPY  2007    polyps  Dr. Rueda    • TOTAL ABDOMINAL HYSTERECTOMY WITH SALPINGO OOPHORECTOMY      Dr. Alejo       Social History     Socioeconomic History   • Marital status:      Spouse name:    • Number of children: 2   • Years of education: Not on file   • Highest education level: Not on file   Tobacco Use   • Smoking status: Never Smoker   • Smokeless tobacco: Never Used   Vaping Use   • Vaping Use: Never used   Substance and Sexual Activity   • Alcohol use: No     Comment: caffeine use seldom   • Drug use: No   • Sexual activity: Never     Birth control/protection: Surgical     Comment:        Family History   Problem Relation Age of Onset   • Heart disease Mother    • Heart attack Mother 67   • COPD Father    • Parkinsonism Father         ?   • Heart defect Sister          at 18   • Alcohol abuse Brother    • Diabetes Brother    • Heart disease Brother    • Hypertension Brother    • Hyperlipidemia Brother    • Other Daughter         stillborn   • Sleep apnea Son    • No Known Problems Maternal " Grandmother    • No Known Problems Paternal Grandmother    • Breast cancer Maternal Aunt 75   • No Known Problems Paternal Aunt    • No Known Problems Maternal Grandfather    • No Known Problems Paternal Grandfather    • Kidney cancer Sister    • No Known Problems Son    • BRCA 1/2 Neg Hx    • Colon cancer Neg Hx    • Endometrial cancer Neg Hx    • Ovarian cancer Neg Hx        Review of Systems   Constitutional: Negative.   Cardiovascular: Negative.    Respiratory: Negative.    Gastrointestinal: Negative.        Allergies   Allergen Reactions   • Naproxen Hives and Swelling   • Paroxetine Other (See Comments)     TREMORS         Current Outpatient Medications:   •  alendronate (FOSAMAX) 70 MG tablet, TAKE 1 TABLET BY MOUTH ONCE WEEKLY ON AN EMPTY STOMACH BEFORE BREAKFAST. REMAIN UPRIGHT FOR 30 MINUTES & TAKE WITH 8 OUNCES OF WATER, Disp: 12 tablet, Rfl: 1  •  atorvastatin (LIPITOR) 40 MG tablet, TAKE ONE TABLET BY MOUTH DAILY, Disp: 90 tablet, Rfl: 1  •  calcium carbonate-cholecalciferol 500-400 MG-UNIT tablet tablet, Take  by mouth Daily., Disp: , Rfl:   •  flecainide (TAMBOCOR) 50 MG tablet, TAKE ONE TABLET BY MOUTH EVERY 12 HOURS, Disp: 180 tablet, Rfl: 2  •  fluticasone (FLONASE) 50 MCG/ACT nasal spray, 2 sprays into each nostril Daily. (Patient taking differently: 2 sprays into the nostril(s) as directed by provider Daily As Needed.), Disp: 16 g, Rfl: 3  •  lisinopril (PRINIVIL,ZESTRIL) 20 MG tablet, TAKE ONE TABLET BY MOUTH DAILY, Disp: 90 tablet, Rfl: 1  •  meclizine (ANTIVERT) 12.5 MG tablet, Take 1 tablet by mouth 3 (Three) Times a Day As Needed for Dizziness., Disp: 3 tablet, Rfl: 1  •  metoprolol tartrate (LOPRESSOR) 25 MG tablet, Take 1 tablet by mouth Every 12 (Twelve) Hours., Disp: 180 tablet, Rfl: 1  •  Multiple Vitamin (MULTIVITAMIN) capsule, Take  by mouth., Disp: , Rfl:   •  warfarin (COUMADIN) 2 MG tablet, Take one and one-half tablets (3 mg) by mouth on Mondays, and take one tablet (2 mg) by  "mouth all other days or as directed., Disp: 100 tablet, Rfl: 1      Objective:     Vitals:    08/03/21 0926   BP: 158/94   Pulse: 63   Weight: 76.7 kg (169 lb)   Height: 154.9 cm (61\")     Body mass index is 31.93 kg/m².    PHYSICAL EXAM:    Vitals and nursing note reviewed.   Constitutional:       Appearance: Healthy appearance.   HENT:      Head: Normocephalic and atraumatic.   Pulmonary:      Effort: Pulmonary effort is normal.   Cardiovascular:      Normal rate. Regular rhythm.   Edema:     Peripheral edema absent.   Skin:     General: Skin is warm.   Neurological:      Mental Status: Alert, oriented to person, place, and time and oriented to person, place and time.   Psychiatric:         Attention and Perception: Attention and perception normal.         Behavior: Behavior is cooperative.             ECG 12 Lead    Date/Time: 8/3/2021 9:59 AM  Performed by: Francesco Riojas MD  Authorized by: Francesco Riojas MD   Comparison: compared with previous ECG from 6/28/2021  Rhythm: sinus rhythm  Conduction: right bundle branch block              Assessment:       Diagnosis Plan   1. PAF (paroxysmal atrial fibrillation) (CMS/[facility])            Plan:       Paroxysmal atrial fibrillation with improved control following ablation.  She is only 1 month out from ablation.  She remains on flecainide.  We will follow-up in 2 months, consider DC flecainide.  She is anticoagulated with warfarin.  She dislikes the testing, but also has not been able to afford Eliquis in the past.  Her INRs have been well controlled, no stressed importance of continue with anticoagulation.    As always, it has been a pleasure to participate in your patient's care.      Sincerely,         Francesco Riojas MD  "

## 2021-08-03 NOTE — PROGRESS NOTES
Anticoagulation Clinic Progress Note    Anticoagulation Summary  As of 8/3/2021    INR goal:  2.0-3.0   TTR:  65.0 % (5.4 mo)   INR used for dosing:  3.3 (8/3/2021)   Warfarin maintenance plan:  3 mg every Mon, Fri; 2 mg all other days   Weekly warfarin total:  16 mg   Plan last modified:  Octavio Garnett, PharmD (6/14/2021)   Next INR check:  8/11/2021   Priority:  High   Target end date:      Indications    PAF (paroxysmal atrial fibrillation) (CMS/HCC) [I48.0]             Anticoagulation Episode Summary     INR check location:      Preferred lab:      Send INR reminders to:   [facility]    Comments:  Previously apixaban (cost)      Anticoagulation Care Providers     Provider Role Specialty Phone number    Francesco Riojas MD Referring Cardiology [phone]          Clinic Interview:  Patient Findings     Negatives:  Signs/symptoms of thrombosis, Signs/symptoms of bleeding,   Laboratory test error suspected, Change in health, Change in alcohol use,   Change in activity, Upcoming invasive procedure, Emergency department   visit, Upcoming dental procedure, Missed doses, Extra doses, Change in   medications, Change in diet/appetite, Hospital admission, Bruising, Other   complaints    Comments:  Patient has increased ankle swelling   Patient was prescribed meclizine prn (hasn't taken yet)- no interactions   with warfarin       Clinical Outcomes     Negatives:  Major bleeding event, Thromboembolic event,   Anticoagulation-related hospital admission, Anticoagulation-related ED   visit, Anticoagulation-related fatality    Comments:  Patient has increased ankle swelling   Patient was prescribed meclizine prn (hasn't taken yet)- no interactions   with warfarin         INR History:  Anticoagulation Monitoring 7/15/2021 7/21/2021 8/3/2021   INR 2.7 2.3 3.3   INR Date 7/15/2021 7/21/2021 8/3/2021   INR Goal 2.0-3.0 2.0-3.0 2.0-3.0   Trend Same Same Same   Last Week Total 16 mg 16 mg 16 mg   Next Week  Total 16 mg 16 mg 15 mg   Sun 2 mg 2 mg 2 mg   Mon 3 mg 3 mg 3 mg   Tue 2 mg 2 mg 1 mg (8/3); Otherwise 2 mg   Wed - 2 mg 2 mg   Thu 2 mg 2 mg 2 mg   Fri 3 mg 3 mg 3 mg   Sat 2 mg 2 mg 2 mg   Visit Report - - -   Some recent data might be hidden       Plan:  1. INR is Supratherapeutic today- see above in Anticoagulation Summary.  Will instruct Ariadne CASSIDY Elfego to Change their warfarin regimen- see above in Anticoagulation Summary.  2. Follow up in 1 week  3. Patient declines warfarin refills.  4. Verbal and written information provided. Patient expresses understanding and has no further questions at this time.    Radha Carver [facility]

## 2021-08-12 ENCOUNTER — ANTICOAGULATION VISIT (OUTPATIENT)
Dept: PHARMACY | Facility: HOSPITAL | Age: 71
End: 2021-08-12

## 2021-08-12 DIAGNOSIS — I48.0 PAF (PAROXYSMAL ATRIAL FIBRILLATION) (HCC): Primary | ICD-10-CM

## 2021-08-12 LAB
INR PPP: 2.5 (ref 0.91–1.09)
PROTHROMBIN TIME: 30 SECONDS (ref 10–13.8)

## 2021-08-12 PROCEDURE — 36416 COLLJ CAPILLARY BLOOD SPEC: CPT

## 2021-08-12 PROCEDURE — 85610 PROTHROMBIN TIME: CPT

## 2021-08-12 NOTE — PROGRESS NOTES
I have supervised and reviewed the notes, assessments, and/or procedures performed by our Pharmacy Intern. The documented assessment and plan were developed cooperatively. I concur with the documentation of this patient encounter.    Octavio Garnett, PharmD

## 2021-08-12 NOTE — PROGRESS NOTES
Anticoagulation Clinic Progress Note    Anticoagulation Summary  As of 2021    INR goal:  2.0-3.0   TTR:  64.9 % (5.7 mo)   INR used for dosin.5 (2021)   Warfarin maintenance plan:  3 mg every Mon, Fri; 2 mg all other days   Weekly warfarin total:  16 mg   No change documented:  Pipkin, Tamyah, Pharmacy Intern   Plan last modified:  Octavio Garnett, PharmD (2021)   Next INR check:  2021   Priority:  High   Target end date:      Indications    PAF (paroxysmal atrial fibrillation) (CMS/HCC) [I48.0]             Anticoagulation Episode Summary     INR check location:      Preferred lab:      Send INR reminders to:  [facility] [place]    Comments:  Previously apixaban (cost)      Anticoagulation Care Providers     Provider Role Specialty Phone number    Francesco Riojas MD Referring Cardiology [phone]          Clinic Interview:      INR History:  Anticoagulation Monitoring 2021 8/3/2021 2021   INR 2.3 3.3 2.5   INR Date 2021 8/3/2021 2021   INR Goal 2.0-3.0 2.0-3.0 2.0-3.0   Trend Same Same Same   Last Week Total 16 mg 16 mg 16 mg   Next Week Total 16 mg 15 mg 16 mg   Sun 2 mg 2 mg 2 mg   Mon 3 mg 3 mg 3 mg   Tue 2 mg 1 mg (8/3); Otherwise 2 mg 2 mg   Wed 2 mg 2 mg 2 mg   Thu 2 mg 2 mg 2 mg   Fri 3 mg 3 mg 3 mg   Sat 2 mg 2 mg 2 mg   Visit Report - - -   Some recent data might be hidden       Plan:  1. INR is Therapeutic today- see above in Anticoagulation Summary.  Will instruct Ariadne Telles to Continue their warfarin regimen- see above in Anticoagulation Summary.  2. Follow up in 2 weeks  3. Patient declines warfarin refills.  4. Verbal and written information provided. Patient expresses understanding and has no further questions at this time.    Tamya Pipkin, Pharmacy Intern

## 2021-08-13 RX ORDER — LISINOPRIL 20 MG/1
TABLET ORAL
Qty: 90 TABLET | Refills: 1 | Status: SHIPPED | OUTPATIENT
Start: 2021-08-13 | End: 2021-12-28

## 2021-08-21 ENCOUNTER — HOSPITAL ENCOUNTER (OUTPATIENT)
Dept: MRI IMAGING | Facility: HOSPITAL | Age: 71
Discharge: HOME | End: 2021-08-21
Admitting: FAMILY MEDICINE

## 2021-08-21 DIAGNOSIS — R42 VERTIGO: ICD-10-CM

## 2021-08-21 PROCEDURE — 70544 MR ANGIOGRAPHY HEAD W/O DYE: CPT

## 2021-08-26 ENCOUNTER — ANTICOAGULATION VISIT (OUTPATIENT)
Dept: PHARMACY | Facility: HOSPITAL | Age: 71
End: 2021-08-26

## 2021-08-26 ENCOUNTER — APPOINTMENT (OUTPATIENT)
Dept: PHARMACY | Facility: HOSPITAL | Age: 71
End: 2021-08-26

## 2021-08-26 DIAGNOSIS — I48.0 PAF (PAROXYSMAL ATRIAL FIBRILLATION) (HCC): Primary | ICD-10-CM

## 2021-08-26 LAB
INR PPP: 4.2 (ref 0.91–1.09)
PROTHROMBIN TIME: 50.7 SECONDS (ref 10–13.8)

## 2021-08-26 PROCEDURE — G0463 HOSPITAL OUTPT CLINIC VISIT: HCPCS

## 2021-08-26 PROCEDURE — 85610 PROTHROMBIN TIME: CPT

## 2021-08-26 PROCEDURE — 36416 COLLJ CAPILLARY BLOOD SPEC: CPT

## 2021-08-26 NOTE — PROGRESS NOTES
I have supervised and reviewed the notes, assessments, and/or procedures performed by our Pharmacy Intern. The documented assessment and plan were developed cooperatively, and the plan was implemented in my presence. I concur with the documentation of this patient encounter.    Danisha Costello PharmD     Anticoagulation Clinic Progress Note    Anticoagulation Summary  As of 2021    INR goal:  2.0-3.0   TTR:  62.2 % (6.1 mo)   INR used for dosin.2 (2021)   Warfarin maintenance plan:  3 mg every Mon, Fri; 2 mg all other days   Weekly warfarin total:  16 mg   Plan last modified:  Octavio Garnett PharmD (2021)   Next INR check:  2021   Priority:  High   Target end date:      Indications    PAF (paroxysmal atrial fibrillation) (CMS/[facility]) [I48.0]             Anticoagulation Episode Summary     INR check location:      Preferred lab:      Send INR reminders to:  [facility]    Comments:  Previously apixaban (cost)      Anticoagulation Care Providers     Provider Role Specialty Phone number    [place] MD Referring Cardiology [phone]          Clinic Interview:  Patient Findings     Positives:  Change in health, Change in medications    Negatives:  Signs/symptoms of thrombosis, Signs/symptoms of bleeding,   Laboratory test error suspected, Change in alcohol use, Change in   activity, Upcoming invasive procedure, Emergency department visit,   Upcoming dental procedure, Missed doses, Extra doses, Change in   diet/appetite, Hospital admission, Bruising, Other complaints      Comments:  Reports taking 650mg of tylenol this morning for back pain.   Reports lower extremity edema that is still present and is now slightly   painful.      Clinical Outcomes     Negatives:  Major bleeding event, Thromboembolic event,   Anticoagulation-related hospital admission, Anticoagulation-related ED   visit, Anticoagulation-related fatality    Comments:  Reports taking 650mg of tylenol  this morning for back pain.   Reports lower extremity edema that is still present and is now slightly   painful.        INR History:  Anticoagulation Monitoring 8/3/2021 8/12/2021 8/26/2021   INR 3.3 2.5 4.2   INR Date 8/3/2021 8/12/2021 8/26/2021   INR Goal 2.0-3.0 2.0-3.0 2.0-3.0   Trend Same Same Same   Last Week Total 16 mg 16 mg 16 mg   Next Week Total 15 mg 16 mg 14 mg   Sun 2 mg 2 mg 2 mg   Mon 3 mg 3 mg 3 mg   Tue 1 mg (8/3); Otherwise 2 mg 2 mg -   Wed 2 mg 2 mg -   Thu 2 mg 2 mg 1 mg (8/26)   Fri 3 mg 3 mg 2 mg (8/27)   Sat 2 mg 2 mg 2 mg   Visit Report - - -   Some recent data might be hidden       Plan:  1. INR is Supratherapeutic today- see above in Anticoagulation Summary.  Will instruct Ariadne KHANH Telles to Change their warfarin regimen- see above in Anticoagulation Summary.  2. Follow up in 5 days  3. Patient declines warfarin refills.  4. Verbal and written information provided. Patient expresses understanding and has no further questions at this time.    Tamya Pipkin, Pharmacy Intern

## 2021-08-31 ENCOUNTER — ANTICOAGULATION VISIT (OUTPATIENT)
Dept: PHARMACY | Facility: HOSPITAL | Age: 71
End: 2021-08-31

## 2021-08-31 DIAGNOSIS — I48.0 PAF (PAROXYSMAL ATRIAL FIBRILLATION) (HCC): Primary | ICD-10-CM

## 2021-08-31 LAB
INR PPP: 3.1 (ref 0.91–1.09)
PROTHROMBIN TIME: 37.3 SECONDS (ref 10–13.8)

## 2021-08-31 PROCEDURE — 85610 PROTHROMBIN TIME: CPT

## 2021-08-31 PROCEDURE — G0463 HOSPITAL OUTPT CLINIC VISIT: HCPCS

## 2021-08-31 PROCEDURE — 36416 COLLJ CAPILLARY BLOOD SPEC: CPT

## 2021-08-31 NOTE — PROGRESS NOTES
Anticoagulation Clinic Progress Note    Anticoagulation Summary  As of 8/31/2021    INR goal:  2.0-3.0   TTR:  60.5 % (6.3 mo)   INR used for dosing:  3.1 (8/31/2021)   Warfarin maintenance plan:  3 mg every Mon, Fri; 2 mg all other days   Weekly warfarin total:  16 mg   Plan last modified:  Octavio Garnett, PharmD (6/14/2021)   Next INR check:  9/14/2021   Priority:  High   Target end date:      Indications    PAF (paroxysmal atrial fibrillation) (CMS/HCC) [I48.0]             Anticoagulation Episode Summary     INR check location:      Preferred lab:      Send INR reminders to:   [facility]    Comments:  Previously apixaban (cost)      Anticoagulation Care Providers     Provider Role Specialty Phone number    Francesco Riojas MD Referring Cardiology [phone]          Clinic Interview:  Patient Findings     Positives:  Change in health    Negatives:  Signs/symptoms of thrombosis, Signs/symptoms of bleeding,   Laboratory test error suspected, Change in alcohol use, Change in   activity, Upcoming invasive procedure, Emergency department visit,   Upcoming dental procedure, Missed doses, Extra doses, Change in   medications, Change in diet/appetite, Hospital admission, Bruising, Other   complaints    Comments:  Reports ankle pain/swelling is still present      Clinical Outcomes     Negatives:  Major bleeding event, Thromboembolic event,   Anticoagulation-related hospital admission, Anticoagulation-related ED   visit, Anticoagulation-related fatality    Comments:  Reports ankle pain/swelling is still present        INR History:  Anticoagulation Monitoring 8/12/2021 8/26/2021 8/31/2021   INR 2.5 4.2 3.1   INR Date 8/12/2021 8/26/2021 8/31/2021   INR Goal 2.0-3.0 2.0-3.0 2.0-3.0   Trend Same Same Same   Last Week Total 16 mg 16 mg 14 mg   Next Week Total 16 mg 14 mg 15 mg   Sun 2 mg 2 mg 2 mg   Mon 3 mg 3 mg 3 mg   Tue 2 mg - 1 mg (8/31); Otherwise 2 mg   Wed 2 mg - 2 mg   Thu 2 mg 1 mg (8/26) 2 mg    Fri 3 mg 2 mg (8/27) 3 mg   Sat 2 mg 2 mg 2 mg   Visit Report - - -   Some recent data might be hidden       Plan:  1. INR is Supratherapeutic today- see above in Anticoagulation Summary.  Will instruct Ariadne Telles to Change their warfarin regimen. Of note, Ms. Telles still endorses ankle swelling of unknown origin and has been taking two tylenol 325 mg tablets daily for the last couple of months for back pain. She reports that she has not had the ankle examined yet. We will continue to monitor. - see above in Anticoagulation Summary.  2. Follow up in 2 weeks  3. Patient declines warfarin refills.  4. Verbal and written information provided. Patient expresses understanding and has no further questions at this time.    Tamya Pipkin, Pharmacy Intern

## 2021-08-31 NOTE — PROGRESS NOTES
I have supervised and reviewed the notes, assessments, and/or procedures performed by our Pharmacy Intern. The documented assessment and plan were developed cooperatively, and the plan was implemented in my presence. I concur with the documentation of this patient encounter.

## 2021-09-14 ENCOUNTER — ANTICOAGULATION VISIT (OUTPATIENT)
Dept: PHARMACY | Facility: HOSPITAL | Age: 71
End: 2021-09-14

## 2021-09-14 DIAGNOSIS — I48.0 PAF (PAROXYSMAL ATRIAL FIBRILLATION) (HCC): Primary | ICD-10-CM

## 2021-09-14 LAB
INR PPP: 2.7 (ref 0.91–1.09)
PROTHROMBIN TIME: 32 SECONDS (ref 10–13.8)

## 2021-09-14 PROCEDURE — 36416 COLLJ CAPILLARY BLOOD SPEC: CPT

## 2021-09-14 PROCEDURE — 85610 PROTHROMBIN TIME: CPT

## 2021-09-15 NOTE — PROGRESS NOTES
Anticoagulation Clinic Progress Note    Anticoagulation Summary  As of 2021    INR goal:  2.0-3.0   TTR:  61.5 % (6.8 mo)   INR used for dosin.7 (2021)   Warfarin maintenance plan:  3 mg every Mon, Fri; 2 mg all other days   Weekly warfarin total:  16 mg   No change documented:  Stacey Morris   Plan last modified:  Octavio Garnett, PharmD (2021)   Next INR check:  2021   Priority:  High   Target end date:      Indications    PAF (paroxysmal atrial fibrillation) (CMS/[facility]) [I48.0]             Anticoagulation Episode Summary     INR check location:      Preferred lab:      Send INR reminders to:   [facility]    Comments:  Previously apixaban (cost)      Anticoagulation Care Providers     Provider Role Specialty Phone number    Francesco Riojas MD Referring Cardiology [phone]          Clinic Interview:  Patient Findings     Negatives:  Signs/symptoms of thrombosis, Signs/symptoms of bleeding,   Laboratory test error suspected, Change in health, Change in alcohol use,   Change in activity, Upcoming invasive procedure, Emergency department   visit, Upcoming dental procedure, Missed doses, Extra doses, Change in   medications, Change in diet/appetite, Hospital admission, Bruising, Other   complaints      Clinical Outcomes     Negatives:  Major bleeding event, Thromboembolic event,   Anticoagulation-related hospital admission, Anticoagulation-related ED   visit, Anticoagulation-related fatality        INR History:  Anticoagulation Monitoring 2021   INR 4.2 3.1 2.7   INR Date 2021   INR Goal 2.0-3.0 2.0-3.0 2.0-3.0   Trend Same Same Same   Last Week Total 16 mg 14 mg 16 mg   Next Week Total 14 mg 15 mg 16 mg   Sun 2 mg 2 mg 2 mg   Mon 3 mg 3 mg 3 mg   Tue - 1 mg (); Otherwise 2 mg 2 mg   Wed - 2 mg 2 mg   Thu 1 mg () 2 mg 2 mg   Fri 2 mg () 3 mg 3 mg   Sat 2 mg 2 mg 2 mg   Visit Report - - -   Some recent data  might be hidden       Plan:  1. INR is therapeutic today- see above in Anticoagulation Summary.   Will instruct Ariadne Telles to continue their warfarin regimen- see above in Anticoagulation Summary.  2. Follow up in 2 weeks.  3. Patient declines warfarin refills.  4. Verbal and written information provided. Patient expresses understanding and has no further questions at this time.    Stacey Morris

## 2021-10-06 DIAGNOSIS — E78.00 HYPERCHOLESTEROLEMIA: Primary | ICD-10-CM

## 2021-10-06 DIAGNOSIS — Z00.00 HEALTHCARE MAINTENANCE: ICD-10-CM

## 2021-10-07 DIAGNOSIS — Z00.00 HEALTHCARE MAINTENANCE: ICD-10-CM

## 2021-10-07 DIAGNOSIS — E78.00 HYPERCHOLESTEROLEMIA: ICD-10-CM

## 2021-10-09 LAB
ALBUMIN SERPL-MCNC: 4.5 G/DL (ref 3.5–5.2)
ALBUMIN/GLOB SERPL: 1.8 G/DL
ALP SERPL-CCNC: 175 U/L (ref 39–117)
ALT SERPL-CCNC: 14 U/L (ref 1–33)
AST SERPL-CCNC: 19 U/L (ref 1–32)
BILIRUB SERPL-MCNC: 0.6 MG/DL (ref 0–1.2)
BUN SERPL-MCNC: 19 MG/DL (ref 8–23)
BUN/CREAT SERPL: 24.1 (ref 7–25)
CALCIUM SERPL-MCNC: 9.6 MG/DL (ref 8.6–10.5)
CHLORIDE SERPL-SCNC: 101 MMOL/L (ref 98–107)
CHOLEST SERPL-MCNC: 218 MG/DL (ref 0–200)
CO2 SERPL-SCNC: 25.4 MMOL/L (ref 22–29)
CREAT SERPL-MCNC: 0.79 MG/DL (ref 0.57–1)
GLOBULIN SER CALC-MCNC: 2.5 GM/DL
GLUCOSE SERPL-MCNC: 83 MG/DL (ref 65–99)
HCV AB S/CO SERPL IA: <0.1 S/CO RATIO (ref 0–0.9)
HDLC SERPL-MCNC: 50 MG/DL (ref 40–60)
LDLC SERPL CALC-MCNC: 129 MG/DL (ref 0–100)
POTASSIUM SERPL-SCNC: 4 MMOL/L (ref 3.5–5.2)
PROT SERPL-MCNC: 7 G/DL (ref 6–8.5)
SODIUM SERPL-SCNC: 139 MMOL/L (ref 136–145)
TRIGL SERPL-MCNC: 218 MG/DL (ref 0–150)
VLDLC SERPL CALC-MCNC: 39 MG/DL (ref 5–40)

## 2021-10-13 ENCOUNTER — ANTICOAGULATION VISIT (OUTPATIENT)
Dept: PHARMACY | Facility: HOSPITAL | Age: 71
End: 2021-10-13

## 2021-10-13 DIAGNOSIS — I48.0 PAF (PAROXYSMAL ATRIAL FIBRILLATION) (HCC): Primary | ICD-10-CM

## 2021-10-13 LAB
INR PPP: 2.2 (ref 0.91–1.09)
PROTHROMBIN TIME: 26.1 SECONDS (ref 10–13.8)

## 2021-10-13 PROCEDURE — 36416 COLLJ CAPILLARY BLOOD SPEC: CPT

## 2021-10-13 PROCEDURE — 85610 PROTHROMBIN TIME: CPT

## 2021-10-13 NOTE — PROGRESS NOTES
Anticoagulation Clinic Progress Note    Anticoagulation Summary  As of 10/13/2021    INR goal:  2.0-3.0   TTR:  66.3 % (7.7 mo)   INR used for dosin.2 (10/13/2021)   Warfarin maintenance plan:  3 mg every Mon, Fri; 2 mg all other days   Weekly warfarin total:  16 mg   Plan last modified:  Octavio Garnett, PharmD (2021)   Next INR check:  11/10/2021   Priority:  High   Target end date:      Indications    PAF (paroxysmal atrial fibrillation) (HCC) [I48.0]             Anticoagulation Episode Summary     INR check location:      Preferred lab:      Send INR reminders to:   [facility]    Comments:  Previously apixaban (cost)      Anticoagulation Care Providers     Provider Role Specialty Phone number    Francesco Riojas MD Referring Cardiology [phone]          Clinic Interview:  Patient Findings     Negatives:  Signs/symptoms of thrombosis, Signs/symptoms of bleeding,   Laboratory test error suspected, Change in health, Change in alcohol use,   Change in activity, Upcoming invasive procedure, Emergency department   visit, Upcoming dental procedure, Missed doses, Extra doses, Change in   medications, Change in diet/appetite, Hospital admission, Bruising, Other   complaints      Clinical Outcomes     Negatives:  Major bleeding event, Thromboembolic event,   Anticoagulation-related hospital admission, Anticoagulation-related ED   visit, Anticoagulation-related fatality        INR History:  Anticoagulation Monitoring 2021 2021 10/13/2021   INR 3.1 2.7 2.2   INR Date 2021 2021 10/13/2021   INR Goal 2.0-3.0 2.0-3.0 2.0-3.0   Trend Same Same Same   Last Week Total 14 mg 16 mg 16 mg   Next Week Total 15 mg 16 mg 16 mg   Sun 2 mg 2 mg 2 mg   Mon 3 mg 3 mg 3 mg   Tue 1 mg (); Otherwise 2 mg 2 mg 2 mg   Wed 2 mg 2 mg 2 mg   Thu 2 mg 2 mg 2 mg   Fri 3 mg 3 mg 3 mg   Sat 2 mg 2 mg 2 mg   Visit Report - - -   Some recent data might be hidden       Plan:  1. INR is Therapeutic  today- see above in Anticoagulation Summary.  Will instruct Ariadne Telles to Continue their warfarin regimen- see above in Anticoagulation Summary.  2. Follow up in 4 weeks  3. Patient declines warfarin refills.  4. Verbal and written information provided. Patient expresses understanding and has no further questions at this time.    Nicolas Oliver, PharmD  Pharmacy Resident

## 2021-10-14 ENCOUNTER — OFFICE VISIT (OUTPATIENT)
Dept: FAMILY MEDICINE CLINIC | Facility: CLINIC | Age: 71
End: 2021-10-14

## 2021-10-14 VITALS
OXYGEN SATURATION: 94 % | DIASTOLIC BLOOD PRESSURE: 78 MMHG | BODY MASS INDEX: 31.64 KG/M2 | HEIGHT: 61 IN | HEART RATE: 73 BPM | SYSTOLIC BLOOD PRESSURE: 123 MMHG | WEIGHT: 167.6 LBS | TEMPERATURE: 97.8 F

## 2021-10-14 DIAGNOSIS — Z00.00 MEDICARE ANNUAL WELLNESS VISIT, SUBSEQUENT: Primary | ICD-10-CM

## 2021-10-14 DIAGNOSIS — Z12.11 ENCOUNTER FOR SCREENING COLONOSCOPY: ICD-10-CM

## 2021-10-14 DIAGNOSIS — H81.01 MENIERE'S DISEASE (COCHLEAR HYDROPS), RIGHT: ICD-10-CM

## 2021-10-14 DIAGNOSIS — E78.00 HYPERCHOLESTEROLEMIA: Chronic | ICD-10-CM

## 2021-10-14 DIAGNOSIS — I48.0 PAF (PAROXYSMAL ATRIAL FIBRILLATION) (HCC): Chronic | ICD-10-CM

## 2021-10-14 DIAGNOSIS — Z12.31 VISIT FOR SCREENING MAMMOGRAM: ICD-10-CM

## 2021-10-14 DIAGNOSIS — M81.0 OSTEOPOROSIS, UNSPECIFIED OSTEOPOROSIS TYPE, UNSPECIFIED PATHOLOGICAL FRACTURE PRESENCE: ICD-10-CM

## 2021-10-14 DIAGNOSIS — I10 ESSENTIAL HYPERTENSION: Chronic | ICD-10-CM

## 2021-10-14 PROCEDURE — 1170F FXNL STATUS ASSESSED: CPT | Performed by: FAMILY MEDICINE

## 2021-10-14 PROCEDURE — 99214 OFFICE O/P EST MOD 30 MIN: CPT | Performed by: FAMILY MEDICINE

## 2021-10-14 PROCEDURE — G0439 PPPS, SUBSEQ VISIT: HCPCS | Performed by: FAMILY MEDICINE

## 2021-10-14 PROCEDURE — 1160F RVW MEDS BY RX/DR IN RCRD: CPT | Performed by: FAMILY MEDICINE

## 2021-10-14 RX ORDER — MECLIZINE HCL 12.5 MG/1
12.5 TABLET ORAL 3 TIMES DAILY PRN
Qty: 30 TABLET | Refills: 1 | Status: SHIPPED | OUTPATIENT
Start: 2021-10-14

## 2021-10-14 NOTE — PROGRESS NOTES
"Chief Complaint  Medicare Wellness-subsequent    Subjective          Ariadne Telles presents to [facility]  History of Present Illness    Hypertension.  Patient continues lisinopril 20 mg a day and metoprolol.  Recent creatinine potassium normal.  She is not checking her blood pressure at home.  Reasonably controlled here.  No cardiovascular symptoms.    Paroxysmal atrial fibrillation.  She continues flecainide and warfarin.  INR monitored by cardiology.  She states has had no A. fib symptoms.    Vertigo.  Right ear discomfort.  No tinnitus.  She states her hearing is not as good as it used to be.  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  Negative MRA and MRI earlier this year.  She went to vestibular physical therapy last year.  It helped a little bit.  She states the symptoms occur for about half a day.  Often keep her in bed.  Denies precipitated by stressors.  Otherwise no change in symptoms.  Not much worse not much better.  Meclizine helps somewhat.    Hyperlipidemia.  Patient continues atorvastatin.  Lipid panel shows LDL slightly higher.  Patient states she is not been eating well.        Objective   Vital Signs:   /78   Pulse 73   Temp 97.8 °F (36.6 °C) (Temporal)   Ht 154.9 cm (60.98\")   Wt 76 kg (167 lb 9.6 oz)   SpO2 94%   BMI 31.68 kg/m²     Physical Exam  Vitals and nursing note reviewed.   Constitutional:       General: She is not in acute distress.     Appearance: She is well-developed.   Eyes:      Pupils: Pupils are equal, round, and reactive to light.   Cardiovascular:      Rate and Rhythm: Normal rate and regular rhythm.      Heart sounds: Normal heart sounds.      Comments: Likely sinus rhythm today  Pulmonary:      Effort: Pulmonary effort is normal.      Breath sounds: Normal breath sounds.   Skin:     General: Skin is warm and dry.   Neurological:      Coordination: Coordination normal.      Gait: Gait normal.   Psychiatric:         Behavior: Behavior normal. "         Thought Content: Thought content normal.         Judgment: Judgment normal.        Result Review :   The following data was reviewed by: Nick Ariza MD on 10/14/2021:  Common labs    Common Labsle 6/25/21 6/28/21 10/8/21 10/8/21      1008 1008   Glucose  102 (A)     Glucose    83   BUN  19  19   Creatinine  0.78  0.79   eGFR Non  Am  73  72   eGFR African Am    87   Sodium  143  139   Potassium  4.6  4.0   Chloride  106  101   Calcium  9.6  9.6   Total Protein    7.0   Albumin    4.50   Total Bilirubin    0.6   Alkaline Phosphatase    175 (A)   AST (SGOT)    19   ALT (SGPT)    14   WBC 7.76      Hemoglobin 15.2      Hematocrit 44.0      Platelets 293      Total Cholesterol   218 (A)    Triglycerides   218 (A)    HDL Cholesterol   50    LDL Cholesterol    129 (A)    (A) Abnormal value       Comments are available for some flowsheets but are not being displayed.                     Assessment and Plan    Diagnoses and all orders for this visit:    1. Medicare annual wellness visit, subsequent (Primary)    2. Essential hypertension    3. Hypercholesterolemia    4. PAF (paroxysmal atrial fibrillation) (HCC)    5. Osteoporosis, unspecified osteoporosis type, unspecified pathological fracture presence    6. Meniere's disease (cochlear hydrops), right  -     Ambulatory Referral to Audiology    7. Visit for screening mammogram  -     Mammo Screening Bilateral With CAD; Future    8. Encounter for screening colonoscopy  -     Cologuard - Stool, Per Rectum; Future    Other orders  -     meclizine (ANTIVERT) 12.5 MG tablet; Take 1 tablet by mouth 3 (Three) Times a Day As Needed for Dizziness.  Dispense: 30 tablet; Refill: 1      Hypertension.  Controlled.    Hyperlipidemia.  Continue atorvastatin.    Paroxysmal atrial fibrillation.  Anticoagulated.  Followed by cardiology.    Osteoporosis.  She is been taking Fosamax on and off for at least 3 or 4 years.  But she has had some gaps in between.  Osteoporosis  on DEXA scan last year.  I recommend 2 or 3 years more of therapy.  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  Referral to audiology for hearing testing.  Use meclizine as needed.  I offered referral back to therapy but she wants to hold off.  Reilly are manageable at this time.  If getting much worse she will let us know.  I will see her in 6 months sooner as needed.      Follow Up   No follow-ups on file.  Patient was given instructions and counseling regarding her condition or for health maintenance advice. Please see specific information pulled into the AVS if appropriate.

## 2021-10-14 NOTE — PROGRESS NOTES
The ABCs of the Annual Wellness Visit  Subsequent Medicare Wellness Visit    Chief Complaint   Patient presents with   • Medicare Wellness-subsequent      Subjective    History of Present Illness:  Ariadne Telles is a 71 y.o. female who presents for a Subsequent Medicare Wellness Visit.    The following portions of the patient's history were reviewed and   updated as appropriate: allergies, current medications, past family history, past medical history, past social history, past surgical history and problem list.    Compared to one year ago, the patient feels her physical   health is the same.    Compared to one year ago, the patient feels her mental   health is the same.    Recent Hospitalizations:  She was not admitted to the hospital during the last year.       Current Medical Providers:  Patient Care Team:  Nick Ariza MD as PCP - General  Angie Lomeli RPH as Pharmacist (Pharmacy)  Octavio Garnett PharmD as Pharmacist (Pharmacy)    Outpatient Medications Prior to Visit   Medication Sig Dispense Refill   • alendronate (FOSAMAX) 70 MG tablet TAKE 1 TABLET BY MOUTH ONCE WEEKLY ON AN EMPTY STOMACH BEFORE BREAKFAST. REMAIN UPRIGHT FOR 30 MINUTES & TAKE WITH 8 OUNCES OF WATER 12 tablet 1   • atorvastatin (LIPITOR) 40 MG tablet TAKE ONE TABLET BY MOUTH DAILY 90 tablet 1   • calcium carbonate-cholecalciferol 500-400 MG-UNIT tablet tablet Take  by mouth Daily.     • flecainide (TAMBOCOR) 50 MG tablet TAKE ONE TABLET BY MOUTH EVERY 12 HOURS 180 tablet 2   • fluticasone (FLONASE) 50 MCG/ACT nasal spray 2 sprays into each nostril Daily. (Patient taking differently: 2 sprays into the nostril(s) as directed by provider Daily As Needed.) 16 g 3   • lisinopril (PRINIVIL,ZESTRIL) 20 MG tablet TAKE ONE TABLET BY MOUTH DAILY 90 tablet 1   • meclizine (ANTIVERT) 12.5 MG tablet Take 1 tablet by mouth 3 (Three) Times a Day As Needed for Dizziness. 3 tablet 1   • metoprolol tartrate (LOPRESSOR) 25 MG tablet Take 1 tablet by  "mouth Every 12 (Twelve) Hours. 180 tablet 1   • Multiple Vitamin (MULTIVITAMIN) capsule Take  by mouth.     • warfarin (COUMADIN) 2 MG tablet Take one and one-half tablets (3 mg) by mouth on Mondays, and take one tablet (2 mg) by mouth all other days or as directed. 100 tablet 1     No facility-administered medications prior to visit.       No opioid medication identified on active medication list. I have reviewed chart for other potential  high risk medication/s and harmful drug interactions in the elderly.          Aspirin is not on active medication list.  Aspirin use is not indicated based on review of current medical condition/s. Risk of harm outweighs potential benefits.  .    Patient Active Problem List   Diagnosis   • Gastroesophageal reflux disease   • Bradycardia   • Hypercholesterolemia   • Essential hypertension   • Rectal polyp   • History of endometrial cancer   • Carpal tunnel syndrome   • MVP (mitral valve prolapse)   • Osteoporosis   • Bilateral carotid artery stenosis   • PAF (paroxysmal atrial fibrillation) ([facility])   • Vertigo   • MVP (mitral valve prolapse)   • AF (paroxysmal atrial fibrillation) ([facility])     Advance Care Planning  Advance Directive is on file.  ACP discussion was held with the patient during this visit. Patient has an advance directive in EMR which is still valid.           Objective    Vitals:    10/14/21 0754   BP: 123/78   Pulse: 73   Temp: 97.8 °F (36.6 °C)   TempSrc: Temporal   SpO2: 94%   Weight: 76 kg (167 lb 9.6 oz)   Height: 154.9 cm (60.98\")     BMI Readings from Last 1 Encounters:   10/14/21 31.68 kg/m²   BMI is above normal parameters. Recommendations include: exercise counseling    Does the patient have evidence of cognitive impairment? No    Physical Exam  Lab Results   Component Value Date    GLU 83 10/08/2021    CHLPL 218 (H) 10/08/2021    TRIG 218 (H) 10/08/2021    HDL 50 10/08/2021     (H) 10/08/2021    VLDL 39 10/08/2021            HEALTH RISK " ASSESSMENT    Smoking Status:  Social History     Tobacco Use   Smoking Status Never Smoker   Smokeless Tobacco Never Used     Alcohol Consumption:  Social History     Substance and Sexual Activity   Alcohol Use No    Comment: caffeine use seldom     Fall Risk Screen:    STEADI Fall Risk Assessment was completed, and patient is at LOW risk for falls.Assessment completed on:10/14/2021    Depression Screening:  PHQ-2/PHQ-9 Depression Screening 10/14/2021   Little interest or pleasure in doing things 0   Feeling down, depressed, or hopeless 0   Total Score 0       Health Habits and Functional and Cognitive Screening:  Functional & Cognitive Status 10/14/2021   Do you have difficulty preparing food and eating? No   Do you have difficulty bathing yourself, getting dressed or grooming yourself? No   Do you have difficulty using the toilet? No   Do you have difficulty moving around from place to place? No   Do you have trouble with steps or getting out of a bed or a chair? No   Current Diet Unhealthy Diet   Dental Exam Up to date   Eye Exam Up to date   Exercise (times per week) 0 times per week   Current Exercises Include No Regular Exercise   Current Exercise Activities Include -   Do you need help using the phone?  No   Are you deaf or do you have serious difficulty hearing?  Yes   Do you need help with transportation? No   Do you need help shopping? No   Do you need help preparing meals?  No   Do you need help with housework?  No   Do you need help with laundry? No   Do you need help taking your medications? No   Do you need help managing money? No   Do you ever drive or ride in a car without wearing a seat belt? No   Have you felt unusual stress, anger or loneliness in the last month? No   Who do you live with? Alone   If you need help, do you have trouble finding someone available to you? No   Have you been bothered in the last four weeks by sexual problems? No   Do you have difficulty concentrating, remembering or  making decisions? No       Age-appropriate Screening Schedule:  Refer to the list below for future screening recommendations based on patient's age, sex and/or medical conditions. Orders for these recommended tests are listed in the plan section. The patient has been provided with a written plan.    Health Maintenance   Topic Date Due   • ZOSTER VACCINE (1 of 2) Never done   • MAMMOGRAM  02/15/2021   • INFLUENZA VACCINE  Never done   • PAP SMEAR  11/30/2021   • DXA SCAN  10/01/2022   • LIPID PANEL  10/08/2022   • TDAP/TD VACCINES (3 - Td or Tdap) 08/25/2026              Assessment/Plan   CMS Preventative Services Quick Reference  Risk Factors Identified During Encounter  Fall Risk-High or Moderate   Cologuard ordered  Mammogram ordered  Patient states she is going to get a Covid vaccine soon.    The above risks/problems have been discussed with the patient.  Follow up actions/plans if indicated are seen below in the Assessment/Plan Section.  Pertinent information has been shared with the patient in the After Visit Summary.    Diagnoses and all orders for this visit:    1. Medicare annual wellness visit, subsequent (Primary)    2. Essential hypertension    3. Hypercholesterolemia    4. PAF (paroxysmal atrial fibrillation) (HCC)    5. Osteoporosis, unspecified osteoporosis type, unspecified pathological fracture presence          Follow Up:   No follow-ups on file.     An After Visit Summary and PPPS were made available to the patient.

## 2021-10-15 DIAGNOSIS — E78.00 HYPERCHOLESTEROLEMIA: Chronic | ICD-10-CM

## 2021-10-15 RX ORDER — ATORVASTATIN CALCIUM 40 MG/1
TABLET, FILM COATED ORAL
Qty: 90 TABLET | Refills: 1 | Status: SHIPPED | OUTPATIENT
Start: 2021-10-15 | End: 2022-05-02

## 2021-10-15 NOTE — TELEPHONE ENCOUNTER
Rx Refill Note  Requested Prescriptions     Pending Prescriptions Disp Refills   • atorvastatin (LIPITOR) 40 MG tablet [Pharmacy Med Name: ATORVASTATIN 40 MG TABLET] 90 tablet 1     Sig: TAKE ONE TABLET BY MOUTH DAILY      Last office visit with prescribing clinician: 10/14/2021      Next office visit with prescribing clinician: 4/14/2022            Milena Melissa MA  10/15/21, 08:18 EDT

## 2021-10-18 RX ORDER — WARFARIN SODIUM 2 MG/1
TABLET ORAL
Qty: 105 TABLET | Refills: 1 | Status: SHIPPED | OUTPATIENT
Start: 2021-10-18 | End: 2021-10-19 | Stop reason: SDUPTHER

## 2021-10-19 ENCOUNTER — OFFICE VISIT (OUTPATIENT)
Dept: CARDIOLOGY | Facility: CLINIC | Age: 71
End: 2021-10-19

## 2021-10-19 VITALS
DIASTOLIC BLOOD PRESSURE: 88 MMHG | SYSTOLIC BLOOD PRESSURE: 136 MMHG | WEIGHT: 167 LBS | HEART RATE: 65 BPM | HEIGHT: 61 IN | BODY MASS INDEX: 31.53 KG/M2

## 2021-10-19 DIAGNOSIS — I48.0 PAROXYSMAL ATRIAL FIBRILLATION (HCC): Primary | ICD-10-CM

## 2021-10-19 PROCEDURE — 99213 OFFICE O/P EST LOW 20 MIN: CPT | Performed by: INTERNAL MEDICINE

## 2021-10-19 RX ORDER — WARFARIN SODIUM 2 MG/1
TABLET ORAL
Qty: 105 TABLET | Refills: 1 | Status: SHIPPED | OUTPATIENT
Start: 2021-10-19 | End: 2022-06-17 | Stop reason: SDUPTHER

## 2021-10-21 PROCEDURE — 93000 ELECTROCARDIOGRAM COMPLETE: CPT | Performed by: INTERNAL MEDICINE

## 2021-11-10 ENCOUNTER — ANTICOAGULATION VISIT (OUTPATIENT)
Dept: PHARMACY | Facility: HOSPITAL | Age: 71
End: 2021-11-10

## 2021-11-10 DIAGNOSIS — I48.0 PAF (PAROXYSMAL ATRIAL FIBRILLATION) (HCC): Primary | ICD-10-CM

## 2021-11-10 LAB
INR PPP: 2 (ref 0.91–1.09)
PROTHROMBIN TIME: 24.2 SECONDS (ref 10–13.8)

## 2021-11-10 PROCEDURE — 36416 COLLJ CAPILLARY BLOOD SPEC: CPT

## 2021-11-10 PROCEDURE — 85610 PROTHROMBIN TIME: CPT

## 2021-11-10 NOTE — PROGRESS NOTES
Anticoagulation Clinic Progress Note    Anticoagulation Summary  As of 11/10/2021    INR goal:  2.0-3.0   TTR:  69.9 % (8.7 mo)   INR used for dosin.0 (11/10/2021)   Warfarin maintenance plan:  3 mg every Mon, Fri; 2 mg all other days   Weekly warfarin total:  16 mg   No change documented:  Tita Martinez   Plan last modified:  Octavio Garnett, PharmD (2021)   Next INR check:  2021   Priority:  High   Target end date:      Indications    PAF (paroxysmal atrial fibrillation) (HCC) [I48.0]             Anticoagulation Episode Summary     INR check location:      Preferred lab:      Send INR reminders to:   [facility]    Comments:  Previously apixaban (cost)      Anticoagulation Care Providers     Provider Role Specialty Phone number    Francesco Riojas MD Referring Cardiology [phone]          Clinic Interview:  Patient Findings     Negatives:  Signs/symptoms of thrombosis, Signs/symptoms of bleeding,   Laboratory test error suspected, Change in health, Change in alcohol use,   Change in activity, Upcoming invasive procedure, Emergency department   visit, Upcoming dental procedure, Missed doses, Extra doses, Change in   medications, Change in diet/appetite, Hospital admission, Bruising, Other   complaints      Clinical Outcomes     Negatives:  Major bleeding event, Thromboembolic event,   Anticoagulation-related hospital admission, Anticoagulation-related ED   visit, Anticoagulation-related fatality        INR History:  Anticoagulation Monitoring 2021 10/13/2021 11/10/2021   INR 2.7 2.2 2.0   INR Date 2021 10/13/2021 11/10/2021   INR Goal 2.0-3.0 2.0-3.0 2.0-3.0   Trend Same Same Same   Last Week Total 16 mg 16 mg 16 mg   Next Week Total 16 mg 16 mg 16 mg   Sun 2 mg 2 mg 2 mg   Mon 3 mg 3 mg 3 mg   Tue 2 mg 2 mg 2 mg   Wed 2 mg 2 mg 2 mg   Thu 2 mg 2 mg 2 mg   Fri 3 mg 3 mg 3 mg   Sat 2 mg 2 mg 2 mg   Visit Report - - -   Some recent data might be hidden        Plan:  1. INR is therapeutic today- see above in Anticoagulation Summary.   Will instruct Ariadne Telles to continue their warfarin regimen- see above in Anticoagulation Summary.  2. Follow up in 4 weeks.  3. Patient declines warfarin refills.  4. Verbal and written information provided. Patient expresses understanding and has no further questions at this time.    Tita Martinez

## 2021-11-23 RX ORDER — FLECAINIDE ACETATE 50 MG/1
50 TABLET ORAL EVERY 12 HOURS
Qty: 180 TABLET | Refills: 1 | Status: SHIPPED | OUTPATIENT
Start: 2021-11-23 | End: 2022-03-24

## 2021-12-08 ENCOUNTER — ANTICOAGULATION VISIT (OUTPATIENT)
Dept: PHARMACY | Facility: HOSPITAL | Age: 71
End: 2021-12-08

## 2021-12-08 DIAGNOSIS — I48.0 PAF (PAROXYSMAL ATRIAL FIBRILLATION) (HCC): Primary | ICD-10-CM

## 2021-12-08 LAB
INR PPP: 1.7 (ref 0.91–1.09)
PROTHROMBIN TIME: 20.9 SECONDS (ref 10–13.8)

## 2021-12-08 PROCEDURE — 36416 COLLJ CAPILLARY BLOOD SPEC: CPT

## 2021-12-08 PROCEDURE — G0463 HOSPITAL OUTPT CLINIC VISIT: HCPCS

## 2021-12-08 PROCEDURE — 85610 PROTHROMBIN TIME: CPT

## 2021-12-08 NOTE — PROGRESS NOTES
Anticoagulation Clinic Progress Note    Anticoagulation Summary  As of 2021    INR goal:  2.0-3.0   TTR:  63.2 % (9.6 mo)   INR used for dosin.7 (2021)   Warfarin maintenance plan:  3 mg every Mon, Fri; 2 mg all other days   Weekly warfarin total:  16 mg   Plan last modified:  Octavio Garnett, PharmD (2021)   Next INR check:  2021   Priority:  High   Target end date:      Indications    PAF (paroxysmal atrial fibrillation) (HCC) [I48.0]             Anticoagulation Episode Summary     INR check location:      Preferred lab:      Send INR reminders to:   [facility]    Comments:  Previously apixaban (cost)      Anticoagulation Care Providers     Provider Role Specialty Phone number    Francesco Riojas MD Referring Cardiology [phone]          Clinic Interview:  Patient Findings     Negatives:  Signs/symptoms of thrombosis, Signs/symptoms of bleeding,   Laboratory test error suspected, Change in health, Change in alcohol use,   Change in activity, Upcoming invasive procedure, Emergency department   visit, Upcoming dental procedure, Missed doses, Extra doses, Change in   medications, Change in diet/appetite, Hospital admission, Bruising, Other   complaints      Clinical Outcomes     Negatives:  Major bleeding event, Thromboembolic event,   Anticoagulation-related hospital admission, Anticoagulation-related ED   visit, Anticoagulation-related fatality        INR History:  Anticoagulation Monitoring 10/13/2021 11/10/2021 2021   INR 2.2 2.0 1.7   INR Date 10/13/2021 11/10/2021 2021   INR Goal 2.0-3.0 2.0-3.0 2.0-3.0   Trend Same Same Same   Last Week Total 16 mg 16 mg 16 mg   Next Week Total 16 mg 16 mg 17 mg   Sun 2 mg 2 mg 2 mg   Mon 3 mg 3 mg 3 mg   Tue 2 mg 2 mg 2 mg   Wed 2 mg 2 mg 3 mg (); Otherwise 2 mg   Thu 2 mg 2 mg 2 mg   Fri 3 mg 3 mg 3 mg   Sat 2 mg 2 mg 2 mg   Visit Report - - -   Some recent data might be hidden       Plan:  1. INR is  Subtherapeutic today- see above in Anticoagulation Summary.  Will instruct Ariadne Telles to Change their warfarin regimen- see above in Anticoagulation Summary.  2. Follow up in 2 weeks at [place] to coordinate with mammogram there that day. Entered standing INR order.   3. Patient declines warfarin refills.  4. Verbal and written information provided. Patient expresses understanding and has no further questions at this time.    Octavio Garnett, PharmD

## 2021-12-22 ENCOUNTER — ANTICOAGULATION VISIT (OUTPATIENT)
Dept: PHARMACY | Facility: HOSPITAL | Age: 71
End: 2021-12-22

## 2021-12-22 ENCOUNTER — LAB (OUTPATIENT)
Dept: LAB | Facility: HOSPITAL | Age: 71
End: 2021-12-22

## 2021-12-22 DIAGNOSIS — I48.0 PAF (PAROXYSMAL ATRIAL FIBRILLATION) (HCC): Primary | ICD-10-CM

## 2021-12-22 NOTE — PROGRESS NOTES
Anticoagulation Clinic Progress Note    Anticoagulation Summary  As of 2021    INR goal:  2.0-3.0   TTR:  63.6 % (10.1 mo)   INR used for dosin.8 (2021)   Warfarin maintenance plan:  3 mg every Mon, Fri; 2 mg all other days   Weekly warfarin total:  16 mg   No change documented:  Radha Carver RPH   Plan last modified:  Octavio Garnett, PharmD (2021)   Next INR check:  2022   Priority:  High   Target end date:      Indications    PAF (paroxysmal atrial fibrillation) (HCC) [I48.0]             Anticoagulation Episode Summary     INR check location:      Preferred lab:      Send INR reminders to:   [facility]    Comments:  Previously apixaban (cost)      Anticoagulation Care Providers     Provider Role Specialty Phone number    Francesco Riojas MD Referring Cardiology [phone]          Clinic Interview:  Patient Findings     Negatives:  Signs/symptoms of thrombosis, Signs/symptoms of bleeding,   Laboratory test error suspected, Change in health, Change in alcohol use,   Change in activity, Upcoming invasive procedure, Emergency department   visit, Upcoming dental procedure, Missed doses, Extra doses, Change in   medications, Change in diet/appetite, Hospital admission, Bruising, Other   complaints      Clinical Outcomes     Negatives:  Major bleeding event, Thromboembolic event,   Anticoagulation-related hospital admission, Anticoagulation-related ED   visit, Anticoagulation-related fatality        INR History:  Anticoagulation Monitoring 11/10/2021 2021 2021   INR 2.0 1.7 2.8   INR Date 11/10/2021 2021 2021   INR Goal 2.0-3.0 2.0-3.0 2.0-3.0   Trend Same Same Same   Last Week Total 16 mg 16 mg 16 mg   Next Week Total 16 mg 17 mg 16 mg   Sun 2 mg 2 mg 2 mg   Mon 3 mg 3 mg 3 mg   Tue 2 mg 2 mg 2 mg   Wed 2 mg 3 mg (); Otherwise 2 mg 2 mg   Thu 2 mg 2 mg 2 mg   Fri 3 mg 3 mg 3 mg   Sat 2 mg 2 mg 2 mg   Visit Report - - -   Some recent data  might be hidden       Plan:  1. INR is Therapeutic today- see above in Anticoagulation Summary.   Will instruct Ariadne Telles to Continue their warfarin regimen- see above in Anticoagulation Summary.  2. Follow up in 4 weeks in clinic   3. They have been instructed to call if any changes in medications, doses, concerns, etc. Patient expresses understanding and has no further questions at this time.    Radha Carver, [facility]

## 2021-12-28 RX ORDER — LISINOPRIL 20 MG/1
TABLET ORAL
Qty: 90 TABLET | Refills: 1 | Status: SHIPPED | OUTPATIENT
Start: 2021-12-28 | End: 2022-08-02

## 2021-12-28 NOTE — TELEPHONE ENCOUNTER
Rx Refill Note  Requested Prescriptions     Pending Prescriptions Disp Refills   • lisinopril (PRINIVIL,ZESTRIL) 20 MG tablet [Pharmacy Med Name: LISINOPRIL 20 MG TABLET] 90 tablet 1     Sig: TAKE ONE TABLET BY MOUTH DAILY      Last office visit with prescribing clinician: 10/14/2021      Next office visit with prescribing clinician: 4/14/2022            Milena Melissa MA  12/28/21, 08:08 EST

## 2022-01-19 ENCOUNTER — ANTICOAGULATION VISIT (OUTPATIENT)
Dept: PHARMACY | Facility: HOSPITAL | Age: 72
End: 2022-01-19

## 2022-01-19 DIAGNOSIS — I48.0 PAF (PAROXYSMAL ATRIAL FIBRILLATION): Primary | ICD-10-CM

## 2022-01-19 LAB
INR PPP: 3.5 (ref 0.91–1.09)
PROTHROMBIN TIME: 41.7 SECONDS (ref 10–13.8)

## 2022-01-19 PROCEDURE — G0463 HOSPITAL OUTPT CLINIC VISIT: HCPCS

## 2022-01-19 PROCEDURE — 36416 COLLJ CAPILLARY BLOOD SPEC: CPT

## 2022-01-19 PROCEDURE — 85610 PROTHROMBIN TIME: CPT

## 2022-01-19 NOTE — PROGRESS NOTES
Anticoagulation Clinic Progress Note    Anticoagulation Summary  As of 1/19/2022    INR goal:  2.0-3.0   TTR:  60.7 % (11 mo)   INR used for dosing:  3.5 (1/19/2022)   Warfarin maintenance plan:  3 mg every Mon, Fri; 2 mg all other days   Weekly warfarin total:  16 mg   Plan last modified:  Octavio Garnett, PharmD (6/14/2021)   Next INR check:  2/2/2022   Priority:  High   Target end date:      Indications    PAF (paroxysmal atrial fibrillation) (HCC) [I48.0]             Anticoagulation Episode Summary     INR check location:      Preferred lab:      Send INR reminders to:   [facility]    Comments:  Previously apixaban (cost)      Anticoagulation Care Providers     Provider Role Specialty Phone number    Francesco Riojas MD Referring Cardiology [phone]          Clinic Interview:  Patient Findings     Negatives:  Signs/symptoms of thrombosis, Signs/symptoms of bleeding,   Laboratory test error suspected, Change in health, Change in alcohol use,   Change in activity, Upcoming invasive procedure, Emergency department   visit, Upcoming dental procedure, Missed doses, Extra doses, Change in   medications, Change in diet/appetite, Hospital admission, Bruising, Other   complaints      Clinical Outcomes     Negatives:  Major bleeding event, Thromboembolic event,   Anticoagulation-related hospital admission, Anticoagulation-related ED   visit, Anticoagulation-related fatality        INR History:  Anticoagulation Monitoring 12/8/2021 12/22/2021 1/19/2022   INR 1.7 2.8 3.5   INR Date 12/8/2021 12/22/2021 1/19/2022   INR Goal 2.0-3.0 2.0-3.0 2.0-3.0   Trend Same Same Same   Last Week Total 16 mg 16 mg 16 mg   Next Week Total 17 mg 16 mg 15 mg   Sun 2 mg 2 mg 2 mg   Mon 3 mg 3 mg 3 mg   Tue 2 mg 2 mg 2 mg   Wed 3 mg (12/8); Otherwise 2 mg 2 mg 1 mg (1/19); Otherwise 2 mg   Thu 2 mg 2 mg 2 mg   Fri 3 mg 3 mg 3 mg   Sat 2 mg 2 mg 2 mg   Visit Report - - -   Some recent data might be hidden       Plan:  1.  INR is Supratherapeutic today- see above in Anticoagulation Summary.  Will instruct Ariadne Telles to Change their warfarin regimen- see above in Anticoagulation Summary.  2. Follow up in 2 weeks  3. Patient declines warfarin refills.  4. Verbal and written information provided. Patient expresses understanding and has no further questions at this time.    Octavio Garnett, PharmD

## 2022-02-02 ENCOUNTER — ANTICOAGULATION VISIT (OUTPATIENT)
Dept: PHARMACY | Facility: HOSPITAL | Age: 72
End: 2022-02-02

## 2022-02-02 DIAGNOSIS — I48.0 PAF (PAROXYSMAL ATRIAL FIBRILLATION): Primary | ICD-10-CM

## 2022-02-02 LAB
INR PPP: 3.1 (ref 0.91–1.09)
PROTHROMBIN TIME: 37 SECONDS (ref 10–13.8)

## 2022-02-02 PROCEDURE — G0463 HOSPITAL OUTPT CLINIC VISIT: HCPCS

## 2022-02-02 PROCEDURE — 36416 COLLJ CAPILLARY BLOOD SPEC: CPT

## 2022-02-02 PROCEDURE — 85610 PROTHROMBIN TIME: CPT

## 2022-02-02 NOTE — PROGRESS NOTES
Anticoagulation Clinic Progress Note    Anticoagulation Summary  As of 2/2/2022    INR goal:  2.0-3.0   TTR:  58.2 % (11.5 mo)   INR used for dosing:  3.1 (2/2/2022)   Warfarin maintenance plan:  3 mg every Mon; 2 mg all other days   Weekly warfarin total:  15 mg   Plan last modified:  Radha Carver RPH (2/2/2022)   Next INR check:  2/16/2022   Priority:  High   Target end date:      Indications    PAF (paroxysmal atrial fibrillation) (HCC) [I48.0]             Anticoagulation Episode Summary     INR check location:      Preferred lab:      Send INR reminders to:   [facility]    Comments:  Previously apixaban (cost)      Anticoagulation Care Providers     Provider Role Specialty Phone number    Francesco Riojas MD Referring Cardiology [phone]          Clinic Interview:  Patient Findings     Negatives:  Signs/symptoms of thrombosis, Signs/symptoms of bleeding,   Laboratory test error suspected, Change in health, Change in alcohol use,   Change in activity, Upcoming invasive procedure, Emergency department   visit, Upcoming dental procedure, Missed doses, Extra doses, Change in   medications, Change in diet/appetite, Hospital admission, Bruising, Other   complaints      Clinical Outcomes     Negatives:  Major bleeding event, Thromboembolic event,   Anticoagulation-related hospital admission, Anticoagulation-related ED   visit, Anticoagulation-related fatality        INR History:  Anticoagulation Monitoring 12/22/2021 1/19/2022 2/2/2022   INR 2.8 3.5 3.1   INR Date 12/22/2021 1/19/2022 2/2/2022   INR Goal 2.0-3.0 2.0-3.0 2.0-3.0   Trend Same Same Down   Last Week Total 16 mg 16 mg 16 mg   Next Week Total 16 mg 15 mg 15 mg   Sun 2 mg 2 mg 2 mg   Mon 3 mg 3 mg 3 mg   Tue 2 mg 2 mg 2 mg   Wed 2 mg 1 mg (1/19); Otherwise 2 mg 2 mg   Thu 2 mg 2 mg 2 mg   Fri 3 mg 3 mg 2 mg   Sat 2 mg 2 mg 2 mg   Visit Report - - -   Some recent data might be hidden       Plan:  1. INR is Supratherapeutic today-  see above in Anticoagulation Summary.  Will instruct Ariadne Telles to Change their warfarin regimen- see above in Anticoagulation Summary.  2. Follow up in 2 weeks  3. Patient declines warfarin refills.  4. Verbal and written information provided. Patient expresses understanding and has no further questions at this time.    Radha Carver, [facility]

## 2022-03-03 ENCOUNTER — ANTICOAGULATION VISIT (OUTPATIENT)
Dept: PHARMACY | Facility: HOSPITAL | Age: 72
End: 2022-03-03

## 2022-03-03 DIAGNOSIS — I48.0 PAF (PAROXYSMAL ATRIAL FIBRILLATION): Primary | ICD-10-CM

## 2022-03-03 LAB
INR PPP: 2.2 (ref 0.91–1.09)
PROTHROMBIN TIME: 26.4 SECONDS (ref 10–13.8)

## 2022-03-03 PROCEDURE — 85610 PROTHROMBIN TIME: CPT

## 2022-03-03 PROCEDURE — 36416 COLLJ CAPILLARY BLOOD SPEC: CPT

## 2022-03-03 NOTE — PROGRESS NOTES
Anticoagulation Clinic Progress Note    Anticoagulation Summary  As of 3/3/2022    INR goal:  2.0-3.0   TTR:  60.6 % (1 y)   INR used for dosin.2 (3/3/2022)   Warfarin maintenance plan:  3 mg every Mon; 2 mg all other days   Weekly warfarin total:  15 mg   No change documented:  Adri Tracey [facility]   Plan last modified:  Radha Carver RPH (2022)   Next INR check:  3/17/2022   Priority:  High   Target end date:      Indications    PAF (paroxysmal atrial fibrillation) (HCC) [I48.0]             Anticoagulation Episode Summary     INR check location:      Preferred lab:      Send INR reminders to:  [facility] CLINICAL POOL    Comments:  Previously apixaban (cost)      Anticoagulation Care Providers     Provider Role Specialty Phone number    Francesco Riojas MD Referring Cardiology [phone]          Clinic Interview:      INR History:  Anticoagulation Monitoring 2022 2022 3/3/2022   INR 3.5 3.1 2.2   INR Date 2022 2022 3/3/2022   INR Goal 2.0-3.0 2.0-3.0 2.0-3.0   Trend Same Down Same   Last Week Total 16 mg 16 mg 15 mg   Next Week Total 15 mg 15 mg 15 mg   Sun 2 mg 2 mg 2 mg   Mon 3 mg 3 mg 3 mg   Tue 2 mg 2 mg 2 mg   Wed 1 mg (); Otherwise 2 mg 2 mg 2 mg   Thu 2 mg 2 mg 2 mg   Fri 3 mg 2 mg 2 mg   Sat 2 mg 2 mg 2 mg   Visit Report - - -   Some recent data might be hidden       Plan:  1. INR is Therapeutic today- see above in Anticoagulation Summary.  Will instruct Ariadne Telles to Continue their warfarin regimen- see above in Anticoagulation Summary.  2. Follow up in 2 weeks  3. Patient declines warfarin refills.  4. Verbal and written information provided. Patient expresses understanding and has no further questions at this time.    Adri Tracey [facility]

## 2022-03-24 ENCOUNTER — OFFICE VISIT (OUTPATIENT)
Dept: CARDIOLOGY | Facility: CLINIC | Age: 72
End: 2022-03-24

## 2022-03-24 ENCOUNTER — ANTICOAGULATION VISIT (OUTPATIENT)
Dept: PHARMACY | Facility: HOSPITAL | Age: 72
End: 2022-03-24

## 2022-03-24 VITALS
DIASTOLIC BLOOD PRESSURE: 82 MMHG | HEIGHT: 61 IN | SYSTOLIC BLOOD PRESSURE: 128 MMHG | BODY MASS INDEX: 32.47 KG/M2 | WEIGHT: 172 LBS | HEART RATE: 69 BPM

## 2022-03-24 DIAGNOSIS — I48.0 PAF (PAROXYSMAL ATRIAL FIBRILLATION): Primary | ICD-10-CM

## 2022-03-24 DIAGNOSIS — I48.0 PAF (PAROXYSMAL ATRIAL FIBRILLATION): Primary | Chronic | ICD-10-CM

## 2022-03-24 LAB
INR PPP: 2.5 (ref 0.91–1.09)
PROTHROMBIN TIME: 30.1 SECONDS (ref 10–13.8)

## 2022-03-24 PROCEDURE — 85610 PROTHROMBIN TIME: CPT

## 2022-03-24 PROCEDURE — 99213 OFFICE O/P EST LOW 20 MIN: CPT | Performed by: INTERNAL MEDICINE

## 2022-03-24 PROCEDURE — 36416 COLLJ CAPILLARY BLOOD SPEC: CPT

## 2022-03-24 PROCEDURE — 93000 ELECTROCARDIOGRAM COMPLETE: CPT | Performed by: INTERNAL MEDICINE

## 2022-03-24 NOTE — PROGRESS NOTES
Anticoagulation Clinic Progress Note    Anticoagulation Summary  As of 3/24/2022    INR goal:  2.0-3.0   TTR:  62.7 % (1.1 y)   INR used for dosin.5 (3/24/2022)   Warfarin maintenance plan:  3 mg every Mon; 2 mg all other days   Weekly warfarin total:  15 mg   No change documented:  Adri Tracey RPH   Plan last modified:  Radha Carver RPH (2022)   Next INR check:  2022   Priority:  High   Target end date:      Indications    PAF (paroxysmal atrial fibrillation) (HCC) [I48.0]             Anticoagulation Episode Summary     INR check location:      Preferred lab:      Send INR reminders to:  [facility] CLINICAL POOL    Comments:  Previously apixaban (cost)      Anticoagulation Care Providers     Provider Role Specialty Phone number    Francesco Riojas MD Referring Cardiology [phone]          Clinic Interview:      INR History:  Anticoagulation Monitoring 2022 3/3/2022 3/24/2022   INR 3.1 2.2 2.5   INR Date 2022 3/3/2022 3/24/2022   INR Goal 2.0-3.0 2.0-3.0 2.0-3.0   Trend Down Same Same   Last Week Total 16 mg 15 mg 15 mg   Next Week Total 15 mg 15 mg 15 mg   Sun 2 mg 2 mg 2 mg   Mon 3 mg 3 mg 3 mg   Tue 2 mg 2 mg 2 mg   Wed 2 mg 2 mg 2 mg   Thu 2 mg 2 mg 2 mg   Fri 2 mg 2 mg 2 mg   Sat 2 mg 2 mg 2 mg   Visit Report - - -   Some recent data might be hidden       Plan:  1. INR is Therapeutic today- see above in Anticoagulation Summary.  Will instruct Ariadne Telles to Continue their warfarin regimen- see above in Anticoagulation Summary.  2. Follow up in 1 month  3. Patient declines warfarin refills.  4. Verbal and written information provided. Patient expresses understanding and has no further questions at this time.    Adri Tracey RP

## 2022-03-24 NOTE — PROGRESS NOTES
Date of Office Visit: 2022  Encounter Provider: Francesco Riojas MD  Place of Service: [facility] CARDIOLOGY  Patient Name: Ariadne Telles  :1950    Chief Complaint   Patient presents with   • Atrial Fibrillation     6 month f/u   :     HPI: Ariadne Telles is a 71 y.o. female who presents today for follow-up from paroxysmal atrial fibrillation.    Patient felt that she was having recurrent atrial fibrillation after ablation at last visit.  A Holter monitor was ordered which showed no evidence of A. Fib.    She has felt well over the past several months except she has had some vertigo.  She is receiving physical therapy for this.    She remains on flecainide, she is also on anticoagulation with Eliquis.          Past Medical History:   Diagnosis Date   • Atrial fibrillation with RVR (HCC)    • Bradycardia 2016   • Carpal tunnel syndrome 2018   • Essential hypertension 2016   • Gastroesophageal reflux disease 2016   • History of endometrial cancer 2017, S/P LUIS ORTIZ.  Dr. Catracho Alejo.   • Hx of bone density study 2017    , DEXA scan: Osteoporosis in L1, severe osteopenia in both femoral necks.  T-scores: L1, -2.5; L2, -1.9; L3, -0.7; L4, +0.3.  Right femoral neck, -2.1; total -1.8.  Left femoral neck, -2.3; total, -1.8.   • Hypercholesterolemia 2016   • Hyperlipidemia    • Kidney stone    • MVP (mitral valve prolapse)     last 2 echocardiograms showed no MVP in 2018 and     • Nausea & vomiting    • Osteopenia 2016    2017, DEXA scan: Severe osteopenia in both femoral necks, T score= -2.1 in the right femoral neck, -2.3 in the left femoral neck.   • Osteoporosis 2017    DEXA scan, T score L1= -2.5   • PAF (paroxysmal atrial fibrillation) (HCC)    • Pituitary adenoma (HCC) 2018    Thought to have a pituitary microadenoma on previous studies but MRI of the pituitary with and without contrast, February 10, 2017:  "\"No convincing evidence to suggest a pituitary adenoma on this MRI study\".  \"Incidentally noted relatively mild changes of chronic small vessel ischemia phenomena.\"   • RBBB (right bundle branch block)    • Rectal polyp 2016   • Surgical menopause     ANGEL, BSO, for endometrial carcinoma.   • Vaginal delivery     x1  NITO, (and 2 C-sections, one stillbirth).   • Vertigo        Past Surgical History:   Procedure Laterality Date   • CARDIAC ELECTROPHYSIOLOGY PROCEDURE N/A 2021    Procedure: Ablation atrial fibrillation cryo;  Surgeon: Francesco Riojas MD;  Location: [facility] INVASIVE LOCATION;  Service: Cardiovascular;  Laterality: N/A;   • CARPAL TUNNEL RELEASE WITH CUBITAL TUNNEL RELEASE  2018    right   • CATARACT EXTRACTION Right    •  SECTION      x2   one stillborn Ariadne Isbell   • COLONOSCOPY  2007    polyps  Dr. Rueda    • TOTAL ABDOMINAL HYSTERECTOMY WITH SALPINGO OOPHORECTOMY      Dr. Alejo       Social History     Socioeconomic History   • Marital status:      Spouse name:    • Number of children: 2   Tobacco Use   • Smoking status: Never Smoker   • Smokeless tobacco: Never Used   Vaping Use   • Vaping Use: Never used   Substance and Sexual Activity   • Alcohol use: No     Comment: caffeine use seldom   • Drug use: No   • Sexual activity: Never     Birth control/protection: Surgical     Comment:        Family History   Problem Relation Age of Onset   • Heart disease Mother    • Heart attack Mother 67   • COPD Father    • Parkinsonism Father         ?   • Heart defect Sister          at 18   • Alcohol abuse Brother    • Diabetes Brother    • Heart disease Brother    • Hypertension Brother    • Hyperlipidemia Brother    • Other Daughter         stillborn   • Sleep apnea Son    • No Known Problems Maternal Grandmother    • No Known Problems Paternal Grandmother    • Breast cancer Maternal Aunt 75   • No Known Problems " "Paternal Aunt    • No Known Problems Maternal Grandfather    • No Known Problems Paternal Grandfather    • Kidney cancer Sister    • No Known Problems Son    • BRCA 1/2 Neg Hx    • Colon cancer Neg Hx    • Endometrial cancer Neg Hx    • Ovarian cancer Neg Hx        Review of Systems   Constitutional: Negative.   Cardiovascular: Negative.    Respiratory: Negative.    Gastrointestinal: Negative.        Allergies   Allergen Reactions   • Naproxen Hives and Swelling   • Paroxetine Other (See Comments)     TREMORS         Current Outpatient Medications:   •  alendronate (FOSAMAX) 70 MG tablet, TAKE 1 TABLET BY MOUTH ONCE WEEKLY ON AN EMPTY STOMACH BEFORE BREAKFAST. REMAIN UPRIGHT FOR 30 MINUTES & TAKE WITH 8 OUNCES OF WATER, Disp: 12 tablet, Rfl: 1  •  atorvastatin (LIPITOR) 40 MG tablet, TAKE ONE TABLET BY MOUTH DAILY, Disp: 90 tablet, Rfl: 1  •  calcium carbonate-cholecalciferol 500-400 MG-UNIT tablet tablet, Take  by mouth Daily., Disp: , Rfl:   •  fluticasone (FLONASE) 50 MCG/ACT nasal spray, 2 sprays into each nostril Daily. (Patient taking differently: 2 sprays into the nostril(s) as directed by provider Daily As Needed.), Disp: 16 g, Rfl: 3  •  lisinopril (PRINIVIL,ZESTRIL) 20 MG tablet, TAKE ONE TABLET BY MOUTH DAILY, Disp: 90 tablet, Rfl: 1  •  meclizine (ANTIVERT) 12.5 MG tablet, Take 1 tablet by mouth 3 (Three) Times a Day As Needed for Dizziness., Disp: 30 tablet, Rfl: 1  •  metoprolol tartrate (LOPRESSOR) 25 MG tablet, Take 1 tablet by mouth Every 12 (Twelve) Hours., Disp: 180 tablet, Rfl: 1  •  Multiple Vitamin (MULTIVITAMIN) capsule, Take  by mouth., Disp: , Rfl:   •  warfarin (COUMADIN) 2 MG tablet, Take 3mg (1 and 1/2 Tablets) on Monday and Friday; Take 2mg (1 Tablet) all other days OR as directed by the [facility]., Disp: 105 tablet, Rfl: 1      Objective:     Vitals:    03/24/22 1310   BP: 128/82   Pulse: 69   Weight: 78 kg (172 lb)   Height: 154.9 cm (61\")     Body mass index is 32.5 " kg/m².    PHYSICAL EXAM:    Vitals and nursing note reviewed.   Constitutional:       General: Not in acute distress.  Pulmonary:      Effort: Pulmonary effort is normal. No respiratory distress.   Cardiovascular:      Normal rate. Regular rhythm.   Skin:     General: Skin is warm and dry.   Neurological:      Mental Status: Alert and oriented to person, place, and time.   Psychiatric:         Behavior: Behavior normal.         Thought Content: Thought content normal.         Judgment: Judgment normal.             ECG 12 Lead    Date/Time: 3/24/2022 1:58 PM  Performed by: Francesco Riojas MD  Authorized by: Francesco Riojas MD   Comparison: compared with previous ECG from 10/19/2021  Similar to previous ECG  Rhythm: sinus rhythm              Assessment:       Diagnosis Plan   1. PAF (paroxysmal atrial fibrillation) ([facility])            Plan:       Paroxysmal atrial fibrillation.  No evidence of A. fib on 6-month Holter.  We will follow-up in 6 months at which time we will do another Holter.  We will stop flecainide today and continue on anticoagulation.    As always, it has been a pleasure to participate in your patient's care.      Sincerely,         Francesco Riojas MD

## 2022-03-30 ENCOUNTER — TELEPHONE (OUTPATIENT)
Dept: SPORTS MEDICINE | Facility: CLINIC | Age: 72
End: 2022-03-30

## 2022-03-30 ENCOUNTER — PROCEDURE VISIT (OUTPATIENT)
Dept: SPORTS MEDICINE | Facility: CLINIC | Age: 72
End: 2022-03-30

## 2022-03-30 VITALS
BODY MASS INDEX: 32.47 KG/M2 | WEIGHT: 172 LBS | HEIGHT: 61 IN | HEART RATE: 78 BPM | TEMPERATURE: 98 F | DIASTOLIC BLOOD PRESSURE: 84 MMHG | OXYGEN SATURATION: 95 % | SYSTOLIC BLOOD PRESSURE: 138 MMHG | RESPIRATION RATE: 16 BRPM

## 2022-03-30 DIAGNOSIS — M75.42 IMPINGEMENT SYNDROME OF LEFT SHOULDER: Primary | ICD-10-CM

## 2022-03-30 PROCEDURE — 20610 DRAIN/INJ JOINT/BURSA W/O US: CPT | Performed by: FAMILY MEDICINE

## 2022-03-30 RX ORDER — TRIAMCINOLONE ACETONIDE 40 MG/ML
40 INJECTION, SUSPENSION INTRA-ARTICULAR; INTRAMUSCULAR ONCE
Status: COMPLETED | OUTPATIENT
Start: 2022-03-30 | End: 2022-03-30

## 2022-03-30 RX ADMIN — TRIAMCINOLONE ACETONIDE 40 MG: 40 INJECTION, SUSPENSION INTRA-ARTICULAR; INTRAMUSCULAR at 12:59

## 2022-03-30 NOTE — TELEPHONE ENCOUNTER
Provider: ELLIOT  Caller: HATTIE  Phone Number: [phone]  Reason for Call: PATIENT IS CALLING TO GET SCHEDULED FOR LEFT SHOULDER  INJECTIONS

## 2022-03-30 NOTE — PROGRESS NOTES
"Ariadne is a 71 y.o. year old female     Chief Complaint   Patient presents with   • Injections     Would like to discuss left shoulder cortisone injection        History of Present Illness  HPI   Recurrent left shoudler pain x 6 weeks. NKI. Did well since last injection Jan 2021. Attributes to reaching overhead frequently at work       Review of Systems    /84 (BP Location: Left arm, Patient Position: Sitting, Cuff Size: Adult)   Pulse 78   Temp 98 °F (36.7 °C)   Resp 16   Ht 154.9 cm (61\")   Wt 78 kg (172 lb)   LMP  (LMP Unknown) Comment: NO HRT  SpO2 95%   BMI 32.50 kg/m²      Physical Exam    Vital signs reviewed.   General: No acute distress.      MSK Exam:  Left Shoulder Exam     Tenderness   Left shoulder tenderness location: subacromial space.    Range of Motion   The patient has normal left shoulder ROM.  Left shoulder active abduction: painful.   Left shoulder internal rotation 0 degrees: painful.     Muscle Strength   The patient has normal left shoulder strength.    Tests   Carver test: positive  Impingement: positive  Drop arm: negative    Other   Erythema: absent  Sensation: normal           Shoulder Injection Procedure Note    Left shoulder subacromial bursa injection was discussed with the patient in detail, including indication, risks, benefits, and alternatives. Verbal consent was given for the procedure. Injection was performed by MD.  Injection site was identified by physical examination and cleaned with Betadine and alcohol swabs. Prior to needle insertion, ethyl chloride spray was used for surface anesthesia. Sterile technique was used.  A 25-gauge, 1.5\" needle was directed to the joint from a posterior approach. Injectate was passed into the subacromial bursa without difficulty. The needle was removed and a simple bandage was applied. The procedure was tolerated well without difficulty.    Injection mixture:  1% lidocaine without epinephrine: 2 mL  40 mg/mL triamcinolone acetonide: " 1 mL        Diagnoses and all orders for this visit:    Impingement syndrome of left shoulder  -     triamcinolone acetonide (KENALOG-40) injection 40 mg    Review treatment options and agreed upon repeat injection.  No sign of worsening pathology.  Follow-up as needed, discussed warning signs.      EMR Dragon/Transcription disclaimer:    Much of this encounter note is an electronic transcription/translation of spoken language to printed text.  The electronic translation of spoken language may permit erroneous, or at times, nonsensical words or phrases to be inadvertently transcribed.  Although I have reviewed the note for such errors some may still exist.

## 2022-04-14 ENCOUNTER — OFFICE VISIT (OUTPATIENT)
Dept: FAMILY MEDICINE CLINIC | Facility: CLINIC | Age: 72
End: 2022-04-14

## 2022-04-14 VITALS
HEART RATE: 52 BPM | TEMPERATURE: 96.9 F | HEIGHT: 61 IN | BODY MASS INDEX: 31.72 KG/M2 | SYSTOLIC BLOOD PRESSURE: 136 MMHG | DIASTOLIC BLOOD PRESSURE: 85 MMHG | OXYGEN SATURATION: 97 % | WEIGHT: 168 LBS

## 2022-04-14 DIAGNOSIS — M81.0 OSTEOPOROSIS, UNSPECIFIED OSTEOPOROSIS TYPE, UNSPECIFIED PATHOLOGICAL FRACTURE PRESENCE: Chronic | ICD-10-CM

## 2022-04-14 DIAGNOSIS — I48.0 PAF (PAROXYSMAL ATRIAL FIBRILLATION): Chronic | ICD-10-CM

## 2022-04-14 DIAGNOSIS — E78.00 HYPERCHOLESTEROLEMIA: Chronic | ICD-10-CM

## 2022-04-14 DIAGNOSIS — I10 ESSENTIAL HYPERTENSION: Primary | Chronic | ICD-10-CM

## 2022-04-14 PROCEDURE — 99214 OFFICE O/P EST MOD 30 MIN: CPT | Performed by: FAMILY MEDICINE

## 2022-04-14 NOTE — PROGRESS NOTES
"Chief Complaint  Hypertension and Hyperlipidemia    Subjective          Ariadne Telles presents to [facility]  History of Present Illness     Hypertension follow-up.  She continues lisinopril 20 mg a day.  Blood pressure slightly elevated but overall stable.  She is has no chest pain or chest pressure.  No palpitations.  She states she gets short of breath sometimes with exertional activities.  But she also thinks it is related to anxiety sometimes.  She states she has been eating more and gaining weight.  Although she did lose weight since her last cardiology visit.  She does not feel wheezy.  And no other concerning symptoms.    Hyperlipidemia.  She continues atorvastatin 40 mg a day.  Her lipid panel looked good few months ago.    Paroxysmal atrial fibrillation.  She continues on warfarin monitored by her cardiologist.  They stopped the flecainide couple months ago and her symptoms have not returned.    Objective   Vital Signs:   /85   Pulse 52   Temp 96.9 °F (36.1 °C) (Temporal)   Ht 154.9 cm (61\")   Wt 76.2 kg (168 lb)   SpO2 97%   BMI 31.74 kg/m²     BMI is above normal parameters. Recommendations: exercise counseling/recommendations       Physical Exam  Vitals and nursing note reviewed.   Constitutional:       General: She is not in acute distress.     Appearance: She is well-developed.   Cardiovascular:      Rate and Rhythm: Normal rate and regular rhythm.      Heart sounds: Normal heart sounds.      Comments: Appears to be in sinus rhythm today.  Pulmonary:      Effort: Pulmonary effort is normal. No respiratory distress.      Breath sounds: Normal breath sounds. No stridor. No wheezing, rhonchi or rales.   Musculoskeletal:      Cervical back: Normal range of motion.   Skin:     General: Skin is warm and dry.   Psychiatric:         Mood and Affect: Mood normal.        Result Review :   The following data was reviewed by: Nick Ariza MD on " 04/14/2022:  Common labs    Common Labsle 6/25/21 6/28/21 10/8/21 10/8/21      1008 1008   Glucose  102 (A)  83   BUN  19  19   Creatinine  0.78  0.79   eGFR Non  Am  73  72   eGFR African Am    87   Sodium  143  139   Potassium  4.6  4.0   Chloride  106  101   Calcium  9.6  9.6   Total Protein    7.0   Albumin    4.50   Total Bilirubin    0.6   Alkaline Phosphatase    175 (A)   AST (SGOT)    19   ALT (SGPT)    14   WBC 7.76      Hemoglobin 15.2      Hematocrit 44.0      Platelets 293      Total Cholesterol   218 (A)    Triglycerides   218 (A)    HDL Cholesterol   50    LDL Cholesterol    129 (A)    (A) Abnormal value       Comments are available for some flowsheets but are not being displayed.                     Assessment and Plan    Diagnoses and all orders for this visit:    1. Essential hypertension (Primary)  -     Comprehensive Metabolic Panel; Future  -     Lipid Panel; Future    2. Hypercholesterolemia  -     Comprehensive Metabolic Panel; Future  -     Lipid Panel; Future    3. PAF (paroxysmal atrial fibrillation) (HCC)    4. Osteoporosis, unspecified osteoporosis type, unspecified pathological fracture presence      Hypertension.  Overall well controlled.  Continue lisinopril.  She has been having some subjective dyspnea.  It is not consistent.  No red flags.  More likely from deconditioning.  However the symptoms are persisting or worsening she is going to let us know and she will need further evaluation.    Hyperlipidemia.  She continues atorvastatin.  Checking lipid panel prior to next visit.    Paroxysmal atrial fibrillation.  Appears to be in sinus rhythm today.  She continues with her cardiologist.  INR monitored by them.    Osteoporosis.  She continues Fosamax.    Follow-up in 6 months for Medicare wellness visit with lab work prior      Follow Up   No follow-ups on file.  Patient was given instructions and counseling regarding her condition or for health maintenance advice. Please see  specific information pulled into the AVS if appropriate.

## 2022-04-27 ENCOUNTER — TELEPHONE (OUTPATIENT)
Dept: PHARMACY | Facility: HOSPITAL | Age: 72
End: 2022-04-27

## 2022-04-28 ENCOUNTER — ANTICOAGULATION VISIT (OUTPATIENT)
Dept: PHARMACY | Facility: HOSPITAL | Age: 72
End: 2022-04-28

## 2022-04-28 DIAGNOSIS — I48.0 PAF (PAROXYSMAL ATRIAL FIBRILLATION): Primary | ICD-10-CM

## 2022-04-28 DIAGNOSIS — E78.00 HYPERCHOLESTEROLEMIA: Chronic | ICD-10-CM

## 2022-04-28 LAB
INR PPP: 1.7 (ref 0.91–1.09)
PROTHROMBIN TIME: 20.3 SECONDS (ref 10–13.8)

## 2022-04-28 PROCEDURE — 85610 PROTHROMBIN TIME: CPT

## 2022-04-28 PROCEDURE — G0463 HOSPITAL OUTPT CLINIC VISIT: HCPCS

## 2022-04-28 PROCEDURE — 36416 COLLJ CAPILLARY BLOOD SPEC: CPT

## 2022-04-28 NOTE — PROGRESS NOTES
Anticoagulation Clinic Progress Note    Anticoagulation Summary  As of 2022    INR goal:  2.0-3.0   TTR:  62.7 % (1.2 y)   INR used for dosin.7 (2022)   Warfarin maintenance plan:  3 mg every Wed; 2 mg all other days   Weekly warfarin total:  15 mg   Plan last modified:  Milena Amos, Pharmacy Intern (2022)   Next INR check:  2022   Priority:  High   Target end date:      Indications    PAF (paroxysmal atrial fibrillation) (HCC) [I48.0]             Anticoagulation Episode Summary     INR check location:      Preferred lab:      Send INR reminders to:   [facility]    Comments:  Previously apixaban (cost)      Anticoagulation Care Providers     Provider Role Specialty Phone number    Francesco Riojas MD Referring Cardiology [phone]          Clinic Interview:  Patient Findings     Negatives:  Signs/symptoms of thrombosis, Signs/symptoms of bleeding,   Laboratory test error suspected, Change in health, Change in alcohol use,   Change in activity, Upcoming invasive procedure, Emergency department   visit, Upcoming dental procedure, Missed doses, Extra doses, Change in   medications, Change in diet/appetite, Hospital admission, Bruising, Other   complaints      Clinical Outcomes     Negatives:  Major bleeding event, Thromboembolic event,   Anticoagulation-related hospital admission, Anticoagulation-related ED   visit, Anticoagulation-related fatality        INR History:  Anticoagulation Monitoring 3/3/2022 3/24/2022 2022   INR 2.2 2.5 1.7   INR Date 3/3/2022 3/24/2022 2022   INR Goal 2.0-3.0 2.0-3.0 2.0-3.0   Trend Same Same Same   Last Week Total 15 mg 15 mg 15 mg   Next Week Total 15 mg 15 mg 16 mg   Sun 2 mg 2 mg 2 mg   Mon 3 mg 3 mg 2 mg   Tue 2 mg 2 mg 2 mg   Wed 2 mg 2 mg 3 mg   Thu 2 mg 2 mg 3 mg (); Otherwise 2 mg   Fri 2 mg 2 mg 2 mg   Sat 2 mg 2 mg 2 mg   Visit Report - - -   Some recent data might be hidden       Plan:  1. INR is Subtherapeutic  today- see above in Anticoagulation Summary.  Will instruct Ariadne Telles to Change their warfarin regimen- see above in Anticoagulation Summary.  2. Follow up in 2 weeks  3. Patient declines warfarin refills.  4. Verbal and written information provided. Patient expresses understanding and has no further questions at this time.    Milena Amos, Pharmacy Intern

## 2022-05-02 RX ORDER — ATORVASTATIN CALCIUM 40 MG/1
TABLET, FILM COATED ORAL
Qty: 90 TABLET | Refills: 1 | Status: SHIPPED | OUTPATIENT
Start: 2022-05-02 | End: 2022-10-20

## 2022-05-02 NOTE — TELEPHONE ENCOUNTER
Rx Refill Note  Requested Prescriptions     Pending Prescriptions Disp Refills   • atorvastatin (LIPITOR) 40 MG tablet [Pharmacy Med Name: ATORVASTATIN 40 MG TABLET] 90 tablet 1     Sig: TAKE ONE TABLET BY MOUTH DAILY      Last office visit with prescribing clinician: 4/14/2022      Next office visit with prescribing clinician: 10/20/2022            Ba Hernández  05/02/22, 12:12 EDT

## 2022-05-20 ENCOUNTER — ANTICOAGULATION VISIT (OUTPATIENT)
Dept: PHARMACY | Facility: HOSPITAL | Age: 72
End: 2022-05-20

## 2022-05-20 DIAGNOSIS — I48.0 PAF (PAROXYSMAL ATRIAL FIBRILLATION): Primary | ICD-10-CM

## 2022-05-20 LAB
INR PPP: 2.1 (ref 0.91–1.09)
PROTHROMBIN TIME: 25.3 SECONDS (ref 10–13.8)

## 2022-05-20 PROCEDURE — 85610 PROTHROMBIN TIME: CPT

## 2022-05-20 PROCEDURE — 36416 COLLJ CAPILLARY BLOOD SPEC: CPT

## 2022-05-20 NOTE — PROGRESS NOTES
Anticoagulation Clinic Progress Note    Anticoagulation Summary  As of 2022    INR goal:  2.0-3.0   TTR:  60.8 % (1.2 y)   INR used for dosin.1 (2022)   Warfarin maintenance plan:  3 mg every Wed; 2 mg all other days   Weekly warfarin total:  15 mg   No change documented:  Helena West, Pharmacy Technician   Plan last modified:  Milena Amos, Pharmacy Intern (2022)   Next INR check:  2022   Priority:  High   Target end date:      Indications    PAF (paroxysmal atrial fibrillation) (HCC) [I48.0]             Anticoagulation Episode Summary     INR check location:      Preferred lab:      Send INR reminders to:   [facility]    Comments:  Previously apixaban (cost)      Anticoagulation Care Providers     Provider Role Specialty Phone number    Francesco Riojas MD Referring Cardiology [phone]          Clinic Interview:  Patient Findings     Negatives:  Signs/symptoms of thrombosis, Signs/symptoms of bleeding,   Laboratory test error suspected, Change in health, Change in alcohol use,   Change in activity, Upcoming invasive procedure, Emergency department   visit, Upcoming dental procedure, Missed doses, Extra doses, Change in   medications, Change in diet/appetite, Hospital admission, Bruising, Other   complaints    Comments:  Pt reports eating more fruit, but not cranberries, kamran,   grapefruit      Clinical Outcomes     Negatives:  Major bleeding event, Thromboembolic event,   Anticoagulation-related hospital admission, Anticoagulation-related ED   visit, Anticoagulation-related fatality    Comments:  Pt reports eating more fruit, but not cranberries, kamran,   grapefruit        INR History:  Anticoagulation Monitoring 3/24/2022 2022 2022   INR 2.5 1.7 2.1   INR Date 3/24/2022 2022 2022   INR Goal 2.0-3.0 2.0-3.0 2.0-3.0   Trend Same Same Same   Last Week Total 15 mg 15 mg 15 mg   Next Week Total 15 mg 16 mg 15 mg   Sun 2 mg 2 mg 2 mg   Mon 3 mg  2 mg 2 mg   Tue 2 mg 2 mg 2 mg   Wed 2 mg 3 mg 3 mg   Thu 2 mg 3 mg (4/28); Otherwise 2 mg 2 mg   Fri 2 mg 2 mg 2 mg   Sat 2 mg 2 mg 2 mg   Visit Report - - -   Some recent data might be hidden       Plan:  1. INR is therapeutic today- see above in Anticoagulation Summary.   Will instruct Ariadne Telles to continue their warfarin regimen- see above in Anticoagulation Summary.  2. Follow up in 4 weeks.  3. Patient declines warfarin refills.  4. Verbal and written information provided. Patient expresses understanding and has no further questions at this time.    Helena West, Pharmacy Technician

## 2022-06-17 ENCOUNTER — ANTICOAGULATION VISIT (OUTPATIENT)
Dept: PHARMACY | Facility: HOSPITAL | Age: 72
End: 2022-06-17

## 2022-06-17 DIAGNOSIS — I48.0 PAF (PAROXYSMAL ATRIAL FIBRILLATION): Primary | ICD-10-CM

## 2022-06-17 LAB
INR PPP: 1.6 (ref 0.91–1.09)
PROTHROMBIN TIME: 19 SECONDS (ref 10–13.8)

## 2022-06-17 PROCEDURE — 85610 PROTHROMBIN TIME: CPT

## 2022-06-17 PROCEDURE — G0463 HOSPITAL OUTPT CLINIC VISIT: HCPCS

## 2022-06-17 PROCEDURE — 36416 COLLJ CAPILLARY BLOOD SPEC: CPT

## 2022-06-17 RX ORDER — WARFARIN SODIUM 2 MG/1
TABLET ORAL
Qty: 105 TABLET | Refills: 1 | Status: SHIPPED | OUTPATIENT
Start: 2022-06-17 | End: 2022-10-21 | Stop reason: SDUPTHER

## 2022-06-17 NOTE — PROGRESS NOTES
Anticoagulation Clinic Progress Note    Anticoagulation Summary  As of 2022    INR goal:  2.0-3.0   TTR:  58.4 % (1.3 y)   INR used for dosin.6 (2022)   Warfarin maintenance plan:  3 mg every Wed, Fri; 2 mg all other days   Weekly warfarin total:  16 mg   Plan last modified:  Ela Rodriguez, PharmD (2022)   Next INR check:  2022   Priority:  High   Target end date:      Indications    PAF (paroxysmal atrial fibrillation) (HCC) [I48.0]             Anticoagulation Episode Summary     INR check location:      Preferred lab:      Send INR reminders to:   [facility]    Comments:  Previously apixaban (cost)      Anticoagulation Care Providers     Provider Role Specialty Phone number    Francesco Riojas MD Referring Cardiology [phone]          Clinic Interview:  Patient Findings     Negatives:  Signs/symptoms of thrombosis, Signs/symptoms of bleeding,   Laboratory test error suspected, Change in health, Change in alcohol use,   Change in activity, Upcoming invasive procedure, Emergency department   visit, Upcoming dental procedure, Missed doses, Extra doses, Change in   medications, Change in diet/appetite, Hospital admission, Bruising, Other   complaints      Clinical Outcomes     Negatives:  Major bleeding event, Thromboembolic event,   Anticoagulation-related hospital admission, Anticoagulation-related ED   visit, Anticoagulation-related fatality        INR History:  Anticoagulation Monitoring 2022   INR 1.7 2.1 1.6   INR Date 2022   INR Goal 2.0-3.0 2.0-3.0 2.0-3.0   Trend Same Same Up   Last Week Total 15 mg 15 mg 15 mg   Next Week Total 16 mg 15 mg 16 mg   Sun 2 mg 2 mg 2 mg   Mon 2 mg 2 mg 2 mg   Tue 2 mg 2 mg 2 mg   Wed 3 mg 3 mg 3 mg   Thu 3 mg (); Otherwise 2 mg 2 mg 2 mg   Fri 2 mg 2 mg 3 mg   Sat 2 mg 2 mg 2 mg   Visit Report - - -   Some recent data might be hidden       Plan:  1. INR is Subtherapeutic  today- see above in Anticoagulation Summary.  Will instruct Ariadne Telles to Increase their warfarin regimen- see above in Anticoagulation Summary.  Take 3mg on Wednesday and Friday and 2mg on all other days.   2. Follow up in 2 weeks  3. Patient desires warfarin refills.  4. Verbal and written information provided. Patient expresses understanding and has no further questions at this time.  5. Refill sent to [facility] per patient request.     Ela Rodriguez, MiriamD

## 2022-06-23 ENCOUNTER — TRANSCRIBE ORDERS (OUTPATIENT)
Dept: ADMINISTRATIVE | Facility: HOSPITAL | Age: 72
End: 2022-06-23

## 2022-06-23 DIAGNOSIS — Z12.31 VISIT FOR SCREENING MAMMOGRAM: Primary | ICD-10-CM

## 2022-07-01 ENCOUNTER — ANTICOAGULATION VISIT (OUTPATIENT)
Dept: PHARMACY | Facility: HOSPITAL | Age: 72
End: 2022-07-01

## 2022-07-01 DIAGNOSIS — I48.0 PAF (PAROXYSMAL ATRIAL FIBRILLATION): Primary | ICD-10-CM

## 2022-07-01 LAB
INR PPP: 3.3 (ref 0.91–1.09)
PROTHROMBIN TIME: 39.9 SECONDS (ref 10–13.8)

## 2022-07-01 PROCEDURE — 36416 COLLJ CAPILLARY BLOOD SPEC: CPT

## 2022-07-01 PROCEDURE — G0463 HOSPITAL OUTPT CLINIC VISIT: HCPCS

## 2022-07-01 PROCEDURE — 85610 PROTHROMBIN TIME: CPT

## 2022-07-01 NOTE — PROGRESS NOTES
Anticoagulation Clinic Progress Note    Anticoagulation Summary  As of 7/1/2022    INR goal:  2.0-3.0   TTR:  58.4 % (1.4 y)   INR used for dosing:  3.3 (7/1/2022)   Warfarin maintenance plan:  3 mg every Wed, Fri; 2 mg all other days   Weekly warfarin total:  16 mg   Plan last modified:  Ela Rodriguez, PharmD (6/17/2022)   Next INR check:  7/15/2022   Priority:  High   Target end date:      Indications    PAF (paroxysmal atrial fibrillation) (HCC) [I48.0]             Anticoagulation Episode Summary     INR check location:      Preferred lab:      Send INR reminders to:   [facility]    Comments:  Previously apixaban (cost)      Anticoagulation Care Providers     Provider Role Specialty Phone number    Francesco Riojas MD Referring Cardiology [phone]          Clinic Interview:  Patient Findings     Negatives:  Signs/symptoms of thrombosis, Signs/symptoms of bleeding,   Laboratory test error suspected, Change in health, Change in alcohol use,   Change in activity, Upcoming invasive procedure, Emergency department   visit, Upcoming dental procedure, Missed doses, Extra doses, Change in   medications, Change in diet/appetite, Hospital admission, Bruising, Other   complaints      Clinical Outcomes     Negatives:  Major bleeding event, Thromboembolic event,   Anticoagulation-related hospital admission, Anticoagulation-related ED   visit, Anticoagulation-related fatality        INR History:  Anticoagulation Monitoring 5/20/2022 6/17/2022 7/1/2022   INR 2.1 1.6 3.3   INR Date 5/20/2022 6/17/2022 7/1/2022   INR Goal 2.0-3.0 2.0-3.0 2.0-3.0   Trend Same Up Same   Last Week Total 15 mg 15 mg 16 mg   Next Week Total 15 mg 16 mg 15 mg   Sun 2 mg 2 mg 2 mg   Mon 2 mg 2 mg 2 mg   Tue 2 mg 2 mg 2 mg   Wed 3 mg 3 mg 3 mg   Thu 2 mg 2 mg 2 mg   Fri 2 mg 3 mg 2 mg (7/1); Otherwise 3 mg   Sat 2 mg 2 mg 2 mg   Visit Report - - -   Some recent data might be hidden       Plan:  1. INR is Supratherapeutic  today- see above in Anticoagulation Summary.  Will instruct Ariadne Telles to Continue their warfarin regimen- see above in Anticoagulation Summary.  Take 2mg today then resume previous weekly dosing.  2. Follow up in 2 weeks  3. Patient declines warfarin refills.  4. Verbal and written information provided. Patient expresses understanding and has no further questions at this time.    Ela Rodriguez, PharmD

## 2022-07-29 ENCOUNTER — ANTICOAGULATION VISIT (OUTPATIENT)
Dept: PHARMACY | Facility: HOSPITAL | Age: 72
End: 2022-07-29

## 2022-07-29 DIAGNOSIS — I48.0 PAF (PAROXYSMAL ATRIAL FIBRILLATION): Primary | ICD-10-CM

## 2022-07-29 LAB
INR PPP: 4.9 (ref 0.91–1.09)
PROTHROMBIN TIME: 58.8 SECONDS (ref 10–13.8)

## 2022-07-29 PROCEDURE — 36416 COLLJ CAPILLARY BLOOD SPEC: CPT

## 2022-07-29 PROCEDURE — 85610 PROTHROMBIN TIME: CPT

## 2022-07-29 PROCEDURE — G0463 HOSPITAL OUTPT CLINIC VISIT: HCPCS

## 2022-07-29 NOTE — PROGRESS NOTES
Anticoagulation Clinic Progress Note    Anticoagulation Summary  As of 2022    INR goal:  2.0-3.0   TTR:  55.3 % (1.4 y)   INR used for dosin.9 (2022)   Warfarin maintenance plan:  2 mg every day   Weekly warfarin total:  14 mg   Plan last modified:  Radha Carver RPH (2022)   Next INR check:  2022   Priority:  High   Target end date:      Indications    PAF (paroxysmal atrial fibrillation) (HCC) [I48.0]             Anticoagulation Episode Summary     INR check location:      Preferred lab:      Send INR reminders to:  [facility] [place]    Comments:  Previously apixaban (cost)      Anticoagulation Care Providers     Provider Role Specialty Phone number    Francesco Riojas MD Referring Cardiology [phone]          Clinic Interview:  Patient Findings     Negatives:  Signs/symptoms of thrombosis, Signs/symptoms of bleeding,   Laboratory test error suspected, Change in health, Change in alcohol use,   Change in activity, Upcoming invasive procedure, Emergency department   visit, Upcoming dental procedure, Missed doses, Extra doses, Change in   medications, Change in diet/appetite, Hospital admission, Bruising, Other   complaints    Comments:  Patient reports no changes. Patient reports she has been   taking 2 mg daily since prior to last INR. She does not use a pillbox. She   uses 2 tylenols daily. Confirmed tablet color of warfarin.      Clinical Outcomes     Negatives:  Major bleeding event, Thromboembolic event,   Anticoagulation-related hospital admission, Anticoagulation-related ED   visit, Anticoagulation-related fatality    Comments:  Patient reports no changes. Patient reports she has been   taking 2 mg daily since prior to last INR. She does not use a pillbox. She   uses 2 tylenols daily. Confirmed tablet color of warfarin.        INR History:  Anticoagulation Monitoring 2022   INR 1.6 3.3 4.9   INR Date 2022   INR  Goal 2.0-3.0 2.0-3.0 2.0-3.0   Trend Up Same Down   Last Week Total 15 mg 16 mg 14 mg   Next Week Total 16 mg 15 mg 10 mg   Sun 2 mg 2 mg 2 mg   Mon 2 mg 2 mg 2 mg   Tue 2 mg 2 mg 2 mg   Wed 3 mg 3 mg 2 mg   Thu 2 mg 2 mg 2 mg   Fri 3 mg 2 mg (7/1); Otherwise 3 mg Hold (7/29)   Sat 2 mg 2 mg Hold (7/30)   Visit Report - - -   Some recent data might be hidden       Plan:  1. INR is Supratherapeutic today- see above in Anticoagulation Summary.  Will instruct Ariadne Telles to Change their warfarin regimen- see above in Anticoagulation Summary.  2. Follow up in 1 week  3. Patient declines warfarin refills.  4. Verbal and written information provided. Patient expresses understanding and has no further questions at this time.    Radha Carver [facility]

## 2022-08-02 RX ORDER — LISINOPRIL 20 MG/1
TABLET ORAL
Qty: 90 TABLET | Refills: 1 | Status: SHIPPED | OUTPATIENT
Start: 2022-08-02 | End: 2022-10-20

## 2022-08-02 NOTE — TELEPHONE ENCOUNTER
Rx Refill Note  Requested Prescriptions     Pending Prescriptions Disp Refills   • lisinopril (PRINIVIL,ZESTRIL) 20 MG tablet [Pharmacy Med Name: LISINOPRIL 20 MG TABLET] 90 tablet 1     Sig: TAKE ONE TABLET BY MOUTH DAILY      Last office visit with prescribing clinician: 4/14/2022      Next office visit with prescribing clinician: 10/20/2022            Ba Hernández  08/02/22, 07:56 EDT

## 2022-08-05 ENCOUNTER — ANTICOAGULATION VISIT (OUTPATIENT)
Dept: PHARMACY | Facility: HOSPITAL | Age: 72
End: 2022-08-05

## 2022-08-05 DIAGNOSIS — I48.0 PAF (PAROXYSMAL ATRIAL FIBRILLATION): Primary | ICD-10-CM

## 2022-08-05 LAB
INR PPP: 2.5 (ref 0.91–1.09)
PROTHROMBIN TIME: 30.3 SECONDS (ref 10–13.8)

## 2022-08-05 PROCEDURE — 85610 PROTHROMBIN TIME: CPT

## 2022-08-05 PROCEDURE — 36416 COLLJ CAPILLARY BLOOD SPEC: CPT

## 2022-08-05 NOTE — PROGRESS NOTES
Anticoagulation Clinic Progress Note    Anticoagulation Summary  As of 2022    INR goal:  2.0-3.0   TTR:  54.9 % (1.4 y)   INR used for dosin.5 (2022)   Warfarin maintenance plan:  2 mg every day   Weekly warfarin total:  14 mg   No change documented:  Radha Carver RPH   Plan last modified:  Radha Carver RPH (2022)   Next INR check:  2022   Priority:  High   Target end date:      Indications    PAF (paroxysmal atrial fibrillation) (HCC) [I48.0]             Anticoagulation Episode Summary     INR check location:      Preferred lab:      Send INR reminders to:   [facility]    Comments:  Previously apixaban (cost)      Anticoagulation Care Providers     Provider Role Specialty Phone number    Francesco Riojas MD Referring Cardiology [phone]          Clinic Interview:  Patient Findings     Negatives:  Signs/symptoms of thrombosis, Signs/symptoms of bleeding,   Laboratory test error suspected, Change in health, Change in alcohol use,   Change in activity, Upcoming invasive procedure, Emergency department   visit, Upcoming dental procedure, Missed doses, Extra doses, Change in   medications, Change in diet/appetite, Hospital admission, Bruising, Other   complaints      Clinical Outcomes     Negatives:  Major bleeding event, Thromboembolic event,   Anticoagulation-related hospital admission, Anticoagulation-related ED   visit, Anticoagulation-related fatality        INR History:  Anticoagulation Monitoring 2022   INR 3.3 4.9 2.5   INR Date 2022   INR Goal 2.0-3.0 2.0-3.0 2.0-3.0   Trend Same Down Same   Last Week Total 16 mg 14 mg 10 mg   Next Week Total 15 mg 10 mg 14 mg   Sun 2 mg 2 mg 2 mg   Mon 2 mg 2 mg 2 mg   Tue 2 mg 2 mg 2 mg   Wed 3 mg 2 mg 2 mg   Thu 2 mg 2 mg 2 mg   Fri 2 mg (); Otherwise 3 mg Hold () 2 mg   Sat 2 mg Hold () 2 mg   Visit Report - - -   Some recent data might be hidden        Plan:  1. INR is Therapeutic today- see above in Anticoagulation Summary.  Will instruct Ariadne Telles to Continue their warfarin regimen- see above in Anticoagulation Summary.  2. Follow up in 2 weeks  3. Patient declines warfarin refills.  4. Verbal and written information provided. Patient expresses understanding and has no further questions at this time.    Radha Carver, [facility]

## 2022-08-19 ENCOUNTER — APPOINTMENT (OUTPATIENT)
Dept: PHARMACY | Facility: HOSPITAL | Age: 72
End: 2022-08-19

## 2022-08-24 ENCOUNTER — ANTICOAGULATION VISIT (OUTPATIENT)
Dept: PHARMACY | Facility: HOSPITAL | Age: 72
End: 2022-08-24

## 2022-08-24 DIAGNOSIS — I48.0 PAF (PAROXYSMAL ATRIAL FIBRILLATION): Primary | ICD-10-CM

## 2022-08-24 LAB
INR PPP: 2.6 (ref 0.91–1.09)
PROTHROMBIN TIME: 30.8 SECONDS (ref 10–13.8)

## 2022-08-24 PROCEDURE — 85610 PROTHROMBIN TIME: CPT

## 2022-08-24 PROCEDURE — 36416 COLLJ CAPILLARY BLOOD SPEC: CPT

## 2022-08-24 NOTE — PROGRESS NOTES
Anticoagulation Clinic Progress Note    Anticoagulation Summary  As of 2022    INR goal:  2.0-3.0   TTR:  56.5 % (1.5 y)   INR used for dosin.6 (2022)   Warfarin maintenance plan:  2 mg every day   Weekly warfarin total:  14 mg   No change documented:  Radha Carver RPH   Plan last modified:  Radha Carver RPH (2022)   Next INR check:  2022   Priority:  High   Target end date:      Indications    PAF (paroxysmal atrial fibrillation) (HCC) [I48.0]             Anticoagulation Episode Summary     INR check location:      Preferred lab:      Send INR reminders to:   [facility]    Comments:  Previously apixaban (cost)      Anticoagulation Care Providers     Provider Role Specialty Phone number    Francesco Riojas MD Referring Cardiology [phone]          Clinic Interview:  Patient Findings     Negatives:  Signs/symptoms of thrombosis, Signs/symptoms of bleeding,   Laboratory test error suspected, Change in health, Change in alcohol use,   Change in activity, Upcoming invasive procedure, Emergency department   visit, Upcoming dental procedure, Missed doses, Extra doses, Change in   medications, Change in diet/appetite, Hospital admission, Bruising, Other   complaints      Clinical Outcomes     Negatives:  Major bleeding event, Thromboembolic event,   Anticoagulation-related hospital admission, Anticoagulation-related ED   visit, Anticoagulation-related fatality        INR History:  Anticoagulation Monitoring 2022   INR 4.9 2.5 2.6   INR Date 2022   INR Goal 2.0-3.0 2.0-3.0 2.0-3.0   Trend Down Same Same   Last Week Total 14 mg 10 mg 14 mg   Next Week Total 10 mg 14 mg 14 mg   Sun 2 mg 2 mg 2 mg   Mon 2 mg 2 mg 2 mg   Tue 2 mg 2 mg 2 mg   Wed 2 mg 2 mg 2 mg   Thu 2 mg 2 mg 2 mg   Fri Hold () 2 mg 2 mg   Sat Hold () 2 mg 2 mg   Visit Report - - -   Some recent data might be hidden       Plan:  1. INR is  Therapeutic today- see above in Anticoagulation Summary.  Will instruct Ariadne Telles to Continue their warfarin regimen- see above in Anticoagulation Summary.  2. Follow up in 1 month (stable INR x 2 readings)   3. Patient declines warfarin refills.  4. Verbal and written information provided. Patient expresses understanding and has no further questions at this time.    Radha Carver, [facility]

## 2022-09-08 ENCOUNTER — HOSPITAL ENCOUNTER (OUTPATIENT)
Dept: MAMMOGRAPHY | Facility: HOSPITAL | Age: 72
Discharge: HOME | End: 2022-09-08
Admitting: FAMILY MEDICINE

## 2022-09-08 DIAGNOSIS — Z12.31 VISIT FOR SCREENING MAMMOGRAM: ICD-10-CM

## 2022-09-08 PROCEDURE — 77063 BREAST TOMOSYNTHESIS BI: CPT

## 2022-09-08 PROCEDURE — 77067 SCR MAMMO BI INCL CAD: CPT

## 2022-09-23 ENCOUNTER — ANTICOAGULATION VISIT (OUTPATIENT)
Dept: PHARMACY | Facility: HOSPITAL | Age: 72
End: 2022-09-23

## 2022-09-23 DIAGNOSIS — I48.0 PAF (PAROXYSMAL ATRIAL FIBRILLATION): Primary | ICD-10-CM

## 2022-09-23 LAB
INR PPP: 2.7 (ref 0.91–1.09)
PROTHROMBIN TIME: 32.2 SECONDS (ref 10–13.8)

## 2022-09-23 PROCEDURE — 36416 COLLJ CAPILLARY BLOOD SPEC: CPT

## 2022-09-23 PROCEDURE — 85610 PROTHROMBIN TIME: CPT

## 2022-09-23 NOTE — PROGRESS NOTES
I have supervised and reviewed the notes, assessments, and/or procedures performed by our  pharmacy student . The documented assessment and plan were developed cooperatively, however the plan was not implemented in my presence. I concur with the documentation of this patient encounter.     John Wilkinson, PharmD  Pharmacy Resident

## 2022-09-23 NOTE — PROGRESS NOTES
Anticoagulation Clinic Progress Note    Anticoagulation Summary  As of 2022    INR goal:  2.0-3.0   TTR:  58.7 % (1.6 y)   INR used for dosin.7 (2022)   Warfarin maintenance plan:  2 mg every day   Weekly warfarin total:  14 mg   Plan last modified:  Radha Carver RPH (2022)   Next INR check:  10/21/2022   Priority:  High   Target end date:      Indications    PAF (paroxysmal atrial fibrillation) (HCC) [I48.0]             Anticoagulation Episode Summary     INR check location:      Preferred lab:      Send INR reminders to:  [facility] [place]    Comments:  Previously apixaban (cost)      Anticoagulation Care Providers     Provider Role Specialty Phone number    Francesco Riojas MD Referring Cardiology [phone]          Clinic Interview:  Patient Findings     Negatives:  Signs/symptoms of thrombosis, Signs/symptoms of bleeding,   Laboratory test error suspected, Change in health, Change in alcohol use,   Change in activity, Upcoming invasive procedure, Emergency department   visit, Upcoming dental procedure, Missed doses, Extra doses, Change in   medications, Change in diet/appetite, Hospital admission, Bruising, Other   complaints    Comments:  No changes in diet/meds. Says that pillbox has been helping   her take medicine every day and has not missed doses. No signs of bleeding   or bruising. No upcoming procedures and denies refills.         Clinical Outcomes     Negatives:  Major bleeding event, Thromboembolic event,   Anticoagulation-related hospital admission, Anticoagulation-related ED   visit, Anticoagulation-related fatality    Comments:  No changes in diet/meds. Says that pillbox has been helping   her take medicine every day and has not missed doses. No signs of bleeding   or bruising. No upcoming procedures and denies refills.           INR History:  Anticoagulation Monitoring 2022   INR 2.5 2.6 2.7   INR Date 2022    INR Goal 2.0-3.0 2.0-3.0 2.0-3.0   Trend Same Same Same   Last Week Total 10 mg 14 mg 14 mg   Next Week Total 14 mg 14 mg 14 mg   Sun 2 mg 2 mg 2 mg   Mon 2 mg 2 mg 2 mg   Tue 2 mg 2 mg 2 mg   Wed 2 mg 2 mg 2 mg   Thu 2 mg 2 mg 2 mg   Fri 2 mg 2 mg 2 mg   Sat 2 mg 2 mg 2 mg   Visit Report - - -   Some recent data might be hidden       Plan:  1. INR is Therapeutic today- see above in Anticoagulation Summary.  Will instruct Ariadne Telles to Continue their warfarin regimen- see above in Anticoagulation Summary.  2. Follow up in 4 weeks  3. Patient declines warfarin refills.  4. Verbal and written information provided. Patient expresses understanding and has no further questions at this time.    Gerson Ramirez, Pharmacy Intern

## 2022-09-30 ENCOUNTER — OFFICE VISIT (OUTPATIENT)
Dept: CARDIOLOGY | Facility: CLINIC | Age: 72
End: 2022-09-30

## 2022-09-30 VITALS
BODY MASS INDEX: 31.91 KG/M2 | HEART RATE: 87 BPM | WEIGHT: 169 LBS | SYSTOLIC BLOOD PRESSURE: 148 MMHG | HEIGHT: 61 IN | DIASTOLIC BLOOD PRESSURE: 92 MMHG

## 2022-09-30 DIAGNOSIS — R00.1 BRADYCARDIA: ICD-10-CM

## 2022-09-30 DIAGNOSIS — I34.1 MVP (MITRAL VALVE PROLAPSE): ICD-10-CM

## 2022-09-30 DIAGNOSIS — I10 ESSENTIAL HYPERTENSION: Chronic | ICD-10-CM

## 2022-09-30 DIAGNOSIS — I48.0 PAF (PAROXYSMAL ATRIAL FIBRILLATION): Primary | Chronic | ICD-10-CM

## 2022-09-30 PROCEDURE — 93000 ELECTROCARDIOGRAM COMPLETE: CPT

## 2022-09-30 PROCEDURE — 99214 OFFICE O/P EST MOD 30 MIN: CPT

## 2022-09-30 NOTE — PROGRESS NOTES
"Date of Office Visit: 2022  Encounter Provider: DALILA Johnson  Place of Service: [facility] CARDIOLOGY  Patient Name: Ariadne Telles  :1950    Chief Complaint   Patient presents with   • paroxysmal AFIB     6 month f/u   :     HPI: Ariadne Telles is a 72 y.o. female who is followed by Dr. Riojas.  She has a history of bradycardia, mitral valve prolapse, and paroxsymal atrial fibrillation----s/p PVI (2021--[facility]).     She saw Dr. Riojas In March of this year.  She was almost one year post PVI (2021).  She was complaining of palpitations which she felt like was atrial fibrillation but subsequent monitor did not show any evidence of AF.  She was receiving physical therapy for dizziness.  Her flecainide was stopped at that visit. Metoprolol and warfarin continued.  She was advised to follow up in 6 months, likely monitor at that time.                 She presents today for follow up appt.     She says that she has been doing pretty well over the past 6 months.     Continues to have episodes of vertigo but has improved. She completed physical therapy which she says \"helped a little\". She is now on meclizine for this.     She continues to complain of occasional palpitations.  She also has occasional \"skipped beats\". She denies any chest pain, dyspnea, PND, orthopnea, or fatigue associated with these episodes.     EKG today shows NSR w/RBBB.     Her BP is elevated in office today. She is on ACE and BB. Admits she has not been checking it at home.     She is on warfarin for AC.     Recently diagnosed with macular degeneration and recommended that she start vitamins.    Past Medical History:   Diagnosis Date   • Atrial fibrillation with RVR (HCC)    • Bradycardia 2016   • Carpal tunnel syndrome 2018   • Endometrial cancer (HCC)    • Essential hypertension 2016   • Gastroesophageal reflux disease 2016   • History of endometrial cancer " "2017, S/P [facility], [facility].  Dr. Catracho Alejo.   • Hx of bone density study 2017, DEXA scan: Osteoporosis in L1, severe osteopenia in both femoral necks.  T-scores: L1, -2.5; L2, -1.9; L3, -0.7; L4, +0.3.  Right femoral neck, -2.1; total -1.8.  Left femoral neck, -2.3; total, -1.8.   • Hypercholesterolemia 2016   • Hyperlipidemia    • Kidney stone    • MVP (mitral valve prolapse)     last 2 echocardiograms showed no MVP in 2018 and     • Nausea & vomiting    • Osteopenia 2016, DEXA scan: Severe osteopenia in both femoral necks, T score= -2.1 in the right femoral neck, -2.3 in the left femoral neck.   • Osteoporosis 2017    DEXA scan, T score L1= -2.5   • PAF (paroxysmal atrial fibrillation) (HCC)    • Pituitary adenoma (HCC)     Thought to have a pituitary microadenoma on previous studies but MRI of the pituitary with and without contrast, February 10, 2017: \"No convincing evidence to suggest a pituitary adenoma on this MRI study\".  \"Incidentally noted relatively mild changes of chronic small vessel ischemia phenomena.\"   • RBBB (right bundle branch block)    • Rectal polyp 2016   • Surgical menopause     ANGEL, LUIS, for endometrial carcinoma.   • Vaginal delivery     x1  NITO, (and 2 C-sections, one stillbirth).   • Vertigo        Past Surgical History:   Procedure Laterality Date   • CARDIAC ELECTROPHYSIOLOGY PROCEDURE N/A 2021    Procedure: Ablation atrial fibrillation cryo;  Surgeon: Francesco Riojas MD;  Location: [facility] INVASIVE LOCATION;  Service: Cardiovascular;  Laterality: N/A;   • CARPAL TUNNEL RELEASE WITH CUBITAL TUNNEL RELEASE  2018    right   • CATARACT EXTRACTION Right 2016   •  SECTION      x2   one stillborn Ariadne Isbell   • COLONOSCOPY  2007    polyps  Dr. Rueda    • TOTAL ABDOMINAL HYSTERECTOMY WITH SALPINGO OOPHORECTOMY      Dr. Alejo       Social History     Socioeconomic History "   • Marital status:      Spouse name:    • Number of children: 2   Tobacco Use   • Smoking status: Never Smoker   • Smokeless tobacco: Never Used   Vaping Use   • Vaping Use: Never used   Substance and Sexual Activity   • Alcohol use: No     Comment: caffeine use seldom   • Drug use: No   • Sexual activity: Never     Birth control/protection: Surgical     Comment:        Family History   Problem Relation Age of Onset   • Heart disease Mother    • Heart attack Mother 67   • COPD Father    • Parkinsonism Father         ?   • Heart defect Sister          at 18   • Alcohol abuse Brother    • Diabetes Brother    • Heart disease Brother    • Hypertension Brother    • Hyperlipidemia Brother    • Other Daughter         stillborn   • Sleep apnea Son    • No Known Problems Maternal Grandmother    • No Known Problems Paternal Grandmother    • Breast cancer Maternal Aunt 75   • No Known Problems Paternal Aunt    • No Known Problems Maternal Grandfather    • No Known Problems Paternal Grandfather    • Kidney cancer Sister    • No Known Problems Son    • BRCA 1/2 Neg Hx    • Colon cancer Neg Hx    • Endometrial cancer Neg Hx    • Ovarian cancer Neg Hx        Review of Systems   Constitutional: Negative for chills, fever and malaise/fatigue.   Cardiovascular: Positive for irregular heartbeat and palpitations. Negative for chest pain, dyspnea on exertion, leg swelling, near-syncope, orthopnea, paroxysmal nocturnal dyspnea and syncope.   Respiratory: Negative for cough and shortness of breath.    Hematologic/Lymphatic: Negative.    Musculoskeletal: Negative for joint pain, joint swelling and myalgias.   Gastrointestinal: Negative for abdominal pain, diarrhea, melena, nausea and vomiting.   Genitourinary: Negative for frequency and hematuria.   Neurological: Positive for dizziness. Negative for light-headedness, numbness, paresthesias and seizures.   Allergic/Immunologic: Negative.    All other systems reviewed  "and are negative.      Allergies   Allergen Reactions   • Naproxen Hives and Swelling   • Paroxetine Other (See Comments)     TREMORS         Current Outpatient Medications:   •  atorvastatin (LIPITOR) 40 MG tablet, TAKE ONE TABLET BY MOUTH DAILY, Disp: 90 tablet, Rfl: 1  •  calcium carbonate-cholecalciferol 500-400 MG-UNIT tablet tablet, Take  by mouth Daily., Disp: , Rfl:   •  fluticasone (FLONASE) 50 MCG/ACT nasal spray, 2 sprays into each nostril Daily. (Patient taking differently: 2 sprays into the nostril(s) as directed by provider Daily As Needed.), Disp: 16 g, Rfl: 3  •  lisinopril (PRINIVIL,ZESTRIL) 20 MG tablet, TAKE ONE TABLET BY MOUTH DAILY, Disp: 90 tablet, Rfl: 1  •  meclizine (ANTIVERT) 12.5 MG tablet, Take 1 tablet by mouth 3 (Three) Times a Day As Needed for Dizziness., Disp: 30 tablet, Rfl: 1  •  metoprolol tartrate (LOPRESSOR) 25 MG tablet, Take 1 tablet by mouth Every 12 (Twelve) Hours., Disp: 180 tablet, Rfl: 3  •  Multiple Vitamin (MULTIVITAMIN) capsule, Take  by mouth., Disp: , Rfl:   •  warfarin (COUMADIN) 2 MG tablet, Take 3mg (1 and 1/2 Tablets) on Wednesday  and Friday; Take 2mg (1 Tablet) all other days OR as directed by the [facility]., Disp: 105 tablet, Rfl: 1      Objective:     Vitals:    09/30/22 1335   BP: 148/92   Pulse: 87   Weight: 76.7 kg (169 lb)   Height: 154.9 cm (61\")     Body mass index is 31.93 kg/m².    PHYSICAL EXAM:    Vitals Reviewed.   General Appearance: No acute distress, well developed and well nourished.   Eyes: Conjunctiva and lids: No erythema, swelling, or discharge. Sclera non-icteric.   HENT: Atraumatic, normocephalic. External eyes, ears, and nose normal.   Respiratory: No signs of respiratory distress. Respiration rhythm and depth normal.   Clear to auscultation. No rales, crackles, rhonchi, or wheezing auscultated.   Cardiovascular:  Heart Rate and Rhythm: Normal, Heart Sounds: Normal S1 and S2. No S3 or S4 noted.  Gastrointestinal:  Abdomen " "soft, non-distended, non-tender.   Musculoskeletal: Normal movement of extremities  Skin: Warm and dry.   Psychiatric: Patient alert and oriented to person, place, and time. Speech and behavior appropriate. Normal mood and affect.       ECG 12 Lead    Date/Time: 9/30/2022 2:07 PM  Performed by: Surya Renee APRN  Authorized by: Surya Renee APRN   Comparison: compared with previous ECG   Similar to previous ECG  Rhythm: sinus rhythm  BPM: 87  Conduction: right bundle branch block              Assessment:       Diagnosis Plan   1. PAF (paroxysmal atrial fibrillation) (HCC)     2. Bradycardia     3. MVP (mitral valve prolapse)     4. Essential hypertension            Plan:   1-2. PAF---s/p PVI (2021--[facility])-- she has not have any clinical atrial fibrillation since her ablation last year. She continues to complain of palpitations and \"skipped beats\" which are likely PVCs. Will order Zio today to assess for AF.       3. MV regurgitation-- mild on echo in 2020. She has occasional lower extremity edema but no other symptoms.        4. HTN-- elevated in office today. She is on BB and ACE. She is going to start checking at home again and report back. I would consider adding a low dose diuretic to her regimen given her occasional edema or just go up on lisinopril.           She will follow up in 6 months with Dr. Riojas. I will call her with monitor results and further recommendations.             As always, it has been a pleasure to participate in your patient's care.      Sincerely,         DALILA Johnson  "

## 2022-10-20 ENCOUNTER — OFFICE VISIT (OUTPATIENT)
Dept: FAMILY MEDICINE CLINIC | Facility: CLINIC | Age: 72
End: 2022-10-20

## 2022-10-20 VITALS
BODY MASS INDEX: 32.3 KG/M2 | WEIGHT: 171.1 LBS | HEART RATE: 63 BPM | OXYGEN SATURATION: 99 % | SYSTOLIC BLOOD PRESSURE: 145 MMHG | TEMPERATURE: 97.1 F | HEIGHT: 61 IN | DIASTOLIC BLOOD PRESSURE: 84 MMHG

## 2022-10-20 DIAGNOSIS — I10 ESSENTIAL HYPERTENSION: ICD-10-CM

## 2022-10-20 DIAGNOSIS — E78.00 HYPERCHOLESTEROLEMIA: ICD-10-CM

## 2022-10-20 DIAGNOSIS — M81.0 OSTEOPOROSIS, UNSPECIFIED OSTEOPOROSIS TYPE, UNSPECIFIED PATHOLOGICAL FRACTURE PRESENCE: Chronic | ICD-10-CM

## 2022-10-20 DIAGNOSIS — I48.0 PAF (PAROXYSMAL ATRIAL FIBRILLATION): ICD-10-CM

## 2022-10-20 DIAGNOSIS — Z00.00 MEDICARE ANNUAL WELLNESS VISIT, SUBSEQUENT: Primary | ICD-10-CM

## 2022-10-20 PROCEDURE — G0439 PPPS, SUBSEQ VISIT: HCPCS | Performed by: FAMILY MEDICINE

## 2022-10-20 PROCEDURE — 1160F RVW MEDS BY RX/DR IN RCRD: CPT | Performed by: FAMILY MEDICINE

## 2022-10-20 PROCEDURE — 1170F FXNL STATUS ASSESSED: CPT | Performed by: FAMILY MEDICINE

## 2022-10-20 RX ORDER — ALENDRONATE SODIUM 70 MG/1
70 TABLET ORAL
Qty: 12 TABLET | Refills: 3 | Status: SHIPPED | OUTPATIENT
Start: 2022-10-20

## 2022-10-20 RX ORDER — LISINOPRIL 20 MG/1
20 TABLET ORAL DAILY
Qty: 90 TABLET | Refills: 1 | Status: SHIPPED | OUTPATIENT
Start: 2022-10-20 | End: 2023-03-29

## 2022-10-20 RX ORDER — ATORVASTATIN CALCIUM 40 MG/1
40 TABLET, FILM COATED ORAL DAILY
Qty: 90 TABLET | Refills: 1 | Status: SHIPPED | OUTPATIENT
Start: 2022-10-20 | End: 2022-10-31

## 2022-10-20 NOTE — PROGRESS NOTES
The ABCs of the Annual Wellness Visit  Subsequent Medicare Wellness Visit    Chief Complaint   Patient presents with   • Medicare Wellness-subsequent      Subjective    History of Present Illness:  Ariadne Telles is a 72 y.o. female who presents for a Subsequent Medicare Wellness Visit.    Here for Medicare wellness visit only.  She has no other complaints today.  She continues her atorvastatin for hyperlipidemia.  Lisinopril 20 mg daily for hypertension.  Blood pressure slightly elevated today but she states it runs normal low at times at home.  She has paroxysmal atrial fibrillation continues on warfarin.  Monitored by cardiology.  Previous ablation.  No palpitations lightheadedness or other issues.  She is had previous vertigo but that is improving.  She stopped taking her alendronate a number of months ago because of a reported refill issue.  She has known osteoporosis.  She was tolerating the medication taking morning to directions.  Her recent creatinine is normal as are other electrolytes.  Her lipid panel is stable.    The following portions of the patient's history were reviewed and   updated as appropriate: allergies, current medications, past family history, past medical history, past social history, past surgical history and problem list.    Compared to one year ago, the patient feels her physical   health is better.    Compared to one year ago, the patient feels her mental   health is better.    Recent Hospitalizations:  She was not admitted to the hospital during the last year.       Current Medical Providers:  Patient Care Team:  Nick Ariza MD as PCP - General  Angie Lomeli RPH as Pharmacist (Pharmacy)  Octavio Garnett PharmD as Pharmacist (Pharmacy)    Outpatient Medications Prior to Visit   Medication Sig Dispense Refill   • calcium carbonate-cholecalciferol 500-400 MG-UNIT tablet tablet Take  by mouth Daily.     • fluticasone (FLONASE) 50 MCG/ACT nasal spray 2 sprays into each nostril  Daily. (Patient taking differently: 2 sprays into the nostril(s) as directed by provider Daily As Needed.) 16 g 3   • meclizine (ANTIVERT) 12.5 MG tablet Take 1 tablet by mouth 3 (Three) Times a Day As Needed for Dizziness. 30 tablet 1   • metoprolol tartrate (LOPRESSOR) 25 MG tablet Take 1 tablet by mouth Every 12 (Twelve) Hours. 180 tablet 3   • Multiple Vitamin (MULTIVITAMIN) capsule Take  by mouth.     • warfarin (COUMADIN) 2 MG tablet Take 3mg (1 and 1/2 Tablets) on Wednesday  and Friday; Take 2mg (1 Tablet) all other days OR as directed by the [facility]. 105 tablet 1   • atorvastatin (LIPITOR) 40 MG tablet TAKE ONE TABLET BY MOUTH DAILY 90 tablet 1   • lisinopril (PRINIVIL,ZESTRIL) 20 MG tablet TAKE ONE TABLET BY MOUTH DAILY 90 tablet 1     No facility-administered medications prior to visit.       No opioid medication identified on active medication list. I have reviewed chart for other potential  high risk medication/s and harmful drug interactions in the elderly.          Aspirin is not on active medication list.  Aspirin use is contraindicated for this patient due to: current use of warfarin.  .    Patient Active Problem List   Diagnosis   • Gastroesophageal reflux disease   • Bradycardia   • Hypercholesterolemia   • Essential hypertension   • Rectal polyp   • History of endometrial cancer   • Carpal tunnel syndrome   • MVP (mitral valve prolapse)   • Osteoporosis   • Bilateral carotid artery stenosis   • PAF (paroxysmal atrial fibrillation) ([facility])   • Vertigo   • MVP (mitral valve prolapse)   • AF (paroxysmal atrial fibrillation) ([facility])     Advance Care Planning  Advance Directive is on file.  ACP discussion was held with the patient during this visit. Patient has an advance directive in EMR which is still valid.           Objective    Vitals:    10/20/22 0855   BP: 145/84   Pulse: 63   Temp: 97.1 °F (36.2 °C)   TempSrc: Temporal   SpO2: 99%   Weight: 77.6 kg (171 lb 1.6 oz)   Height: 154.9 cm  "(61\")     Estimated body mass index is 32.33 kg/m² as calculated from the following:    Height as of this encounter: 154.9 cm (61\").    Weight as of this encounter: 77.6 kg (171 lb 1.6 oz).    BMI is >= 30 and <35. (Class 1 Obesity). The following options were offered after discussion;: exercise counseling/recommendations      Does the patient have evidence of cognitive impairment? No    Physical Exam  Constitutional:       Appearance: Normal appearance.   HENT:      Head: Atraumatic.      Mouth/Throat:      Pharynx: Oropharynx is clear. No oropharyngeal exudate or posterior oropharyngeal erythema.   Eyes:      Conjunctiva/sclera: Conjunctivae normal.   Cardiovascular:      Rate and Rhythm: Normal rate and regular rhythm.      Pulses: Normal pulses.      Heart sounds: Normal heart sounds.      Comments: Appears to be in sinus rhythm today  Pulmonary:      Effort: Pulmonary effort is normal.      Breath sounds: Normal breath sounds.   Abdominal:      General: Abdomen is flat. There is no distension.      Palpations: Abdomen is soft. There is no mass.      Tenderness: There is no abdominal tenderness.      Hernia: No hernia is present.   Musculoskeletal:         General: Normal range of motion.      Cervical back: Normal range of motion and neck supple. No muscular tenderness.   Lymphadenopathy:      Cervical: No cervical adenopathy.   Skin:     General: Skin is warm and dry.      Findings: No rash.   Neurological:      General: No focal deficit present.      Mental Status: She is alert and oriented to person, place, and time.   Psychiatric:         Mood and Affect: Mood normal.       Lab Results   Component Value Date    CHLPL 205 (H) 10/13/2022    TRIG 196 (H) 10/13/2022    HDL 46 10/13/2022     (H) 10/13/2022    VLDL 35 10/13/2022            HEALTH RISK ASSESSMENT    Smoking Status:  Social History     Tobacco Use   Smoking Status Never   Smokeless Tobacco Never     Alcohol Consumption:  Social History "     Substance and Sexual Activity   Alcohol Use No    Comment: caffeine use seldom     Fall Risk Screen:    STEADI Fall Risk Assessment was completed, and patient is at LOW risk for falls.Assessment completed on:10/20/2022    Depression Screening:  PHQ-2/PHQ-9 Depression Screening 3/30/2022   Retired PHQ-9 Total Score -   Retired Total Score -   Little Interest or Pleasure in Doing Things 0-->not at all   Feeling Down, Depressed or Hopeless 0-->not at all   PHQ-9: Brief Depression Severity Measure Score 0       Health Habits and Functional and Cognitive Screening:  Functional & Cognitive Status 10/20/2022   Do you have difficulty preparing food and eating? No   Do you have difficulty bathing yourself, getting dressed or grooming yourself? No   Do you have difficulty using the toilet? No   Do you have difficulty moving around from place to place? No   Do you have trouble with steps or getting out of a bed or a chair? No   Current Diet Unhealthy Diet   Dental Exam Up to date   Eye Exam Up to date   Exercise (times per week) 0 times per week   Current Exercises Include No Regular Exercise   Current Exercise Activities Include -   Do you need help using the phone?  No   Are you deaf or do you have serious difficulty hearing?  No   Do you need help with transportation? No   Do you need help shopping? No   Do you need help preparing meals?  No   Do you need help with housework?  No   Do you need help with laundry? No   Do you need help taking your medications? No   Do you need help managing money? No   Do you ever drive or ride in a car without wearing a seat belt? No   Have you felt unusual stress, anger or loneliness in the last month? No   Who do you live with? Alone   If you need help, do you have trouble finding someone available to you? No   Have you been bothered in the last four weeks by sexual problems? No   Do you have difficulty concentrating, remembering or making decisions? No       Age-appropriate Screening  Schedule:  Refer to the list below for future screening recommendations based on patient's age, sex and/or medical conditions. Orders for these recommended tests are listed in the plan section. The patient has been provided with a written plan.    Health Maintenance   Topic Date Due   • ZOSTER VACCINE (1 of 2) Never done   • PAP SMEAR  11/30/2021   • INFLUENZA VACCINE  Never done   • DXA SCAN  10/01/2022   • LIPID PANEL  10/13/2023   • MAMMOGRAM  09/08/2024   • TDAP/TD VACCINES (3 - Td or Tdap) 08/25/2026              Assessment & Plan   CMS Preventative Services Quick Reference  Risk Factors Identified During Encounter  Immunizations Discussed/Encouraged (specific Immunizations; Influenza, Shingrix and COVID19  The above risks/problems have been discussed with the patient.  Follow up actions/plans if indicated are seen below in the Assessment/Plan Section.  Pertinent information has been shared with the patient in the After Visit Summary.    Diagnoses and all orders for this visit:    1. Medicare annual wellness visit, subsequent (Primary)    2. Essential hypertension    3. Hypercholesterolemia  -     atorvastatin (LIPITOR) 40 MG tablet; Take 1 tablet by mouth Daily.  Dispense: 90 tablet; Refill: 1    4. PAF (paroxysmal atrial fibrillation) ([facility])    5. Osteoporosis, unspecified osteoporosis type, unspecified pathological fracture presence    Other orders  -     alendronate (Fosamax) 70 MG tablet; Take 1 tablet by mouth Every 7 (Seven) Days.  Dispense: 12 tablet; Refill: 3  -     lisinopril (PRINIVIL,ZESTRIL) 20 MG tablet; Take 1 tablet by mouth Daily.  Dispense: 90 tablet; Refill: 1      Here for annual Medicare wellness visit only.  Patient declined both influenza vaccine and omicron booster today.  She states she can get it at work.  We discussed exercise.  Holding off on DEXA scan, she has not been taking her medication likely her osteoporosis persists.  She is due to get back on her alendronate weekly.  She has  been taking according to directions.  Her blood pressure slightly elevated today but running normal at home.  I will see her back in 6 months.  Sooner as needed.    Follow Up:   No follow-ups on file.     An After Visit Summary and PPPS were made available to the patient.

## 2022-10-21 ENCOUNTER — ANTICOAGULATION VISIT (OUTPATIENT)
Dept: PHARMACY | Facility: HOSPITAL | Age: 72
End: 2022-10-21

## 2022-10-21 DIAGNOSIS — I48.0 PAF (PAROXYSMAL ATRIAL FIBRILLATION): Primary | Chronic | ICD-10-CM

## 2022-10-21 LAB
INR PPP: 3.6 (ref 0.91–1.09)
PROTHROMBIN TIME: 43.5 SECONDS (ref 10–13.8)

## 2022-10-21 PROCEDURE — 36416 COLLJ CAPILLARY BLOOD SPEC: CPT

## 2022-10-21 PROCEDURE — G0463 HOSPITAL OUTPT CLINIC VISIT: HCPCS

## 2022-10-21 PROCEDURE — 85610 PROTHROMBIN TIME: CPT

## 2022-10-21 RX ORDER — WARFARIN SODIUM 2 MG/1
TABLET ORAL
Qty: 90 TABLET | Refills: 1 | Status: SHIPPED | OUTPATIENT
Start: 2022-10-21 | End: 2023-03-02 | Stop reason: SDUPTHER

## 2022-10-21 NOTE — PROGRESS NOTES
Anticoagulation Clinic Progress Note    Anticoagulation Summary  As of 10/21/2022    INR goal:  2.0-3.0   TTR:  57.5 % (1.7 y)   INR used for dosing:  3.6 (10/21/2022)   Warfarin maintenance plan:  2 mg every day   Weekly warfarin total:  14 mg   Plan last modified:  Radha Carver RPH (7/29/2022)   Next INR check:  11/4/2022   Priority:  High   Target end date:      Indications    PAF (paroxysmal atrial fibrillation) (HCC) [I48.0]             Anticoagulation Episode Summary     INR check location:      Preferred lab:      Send INR reminders to:  [facility] [place]    Comments:  Previously apixaban (cost)      Anticoagulation Care Providers     Provider Role Specialty Phone number    Francesco Riojas MD Referring Cardiology [phone]          Clinic Interview:  Patient Findings     Negatives:  Signs/symptoms of thrombosis, Signs/symptoms of bleeding,   Laboratory test error suspected, Change in health, Change in alcohol use,   Change in activity, Upcoming invasive procedure, Emergency department   visit, Upcoming dental procedure, Missed doses, Extra doses, Change in   medications, Change in diet/appetite, Hospital admission, Bruising, Other   complaints      Clinical Outcomes     Negatives:  Major bleeding event, Thromboembolic event,   Anticoagulation-related hospital admission, Anticoagulation-related ED   visit, Anticoagulation-related fatality        INR History:  Anticoagulation Monitoring 8/24/2022 9/23/2022 10/21/2022   INR 2.6 2.7 3.6   INR Date 8/24/2022 9/23/2022 10/21/2022   INR Goal 2.0-3.0 2.0-3.0 2.0-3.0   Trend Same Same Same   Last Week Total 14 mg 14 mg 14 mg   Next Week Total 14 mg 14 mg 13 mg   Sun 2 mg 2 mg 2 mg   Mon 2 mg 2 mg 2 mg   Tue 2 mg 2 mg 2 mg   Wed 2 mg 2 mg 2 mg   Thu 2 mg 2 mg 2 mg   Fri 2 mg 2 mg 1 mg (10/21); Otherwise 2 mg   Sat 2 mg 2 mg 2 mg   Visit Report - - -   Some recent data might be hidden       Plan:  1. INR is Supratherapeutic today- see above in  Anticoagulation Summary.  Will instruct Ariadne Telles to Change their warfarin regimen- see above in Anticoagulation Summary.  2. Follow up in 2 weeks  3. Patient desires warfarin refills.  4. Verbal and written information provided. Patient expresses understanding and has no further questions at this time.    Octavio Garnett, PharmD

## 2022-10-29 DIAGNOSIS — E78.00 HYPERCHOLESTEROLEMIA: ICD-10-CM

## 2022-10-31 RX ORDER — ATORVASTATIN CALCIUM 40 MG/1
TABLET, FILM COATED ORAL
Qty: 90 TABLET | Refills: 1 | Status: SHIPPED | OUTPATIENT
Start: 2022-10-31 | End: 2023-03-29

## 2022-11-11 ENCOUNTER — ANTICOAGULATION VISIT (OUTPATIENT)
Dept: PHARMACY | Facility: HOSPITAL | Age: 72
End: 2022-11-11

## 2022-11-11 DIAGNOSIS — I48.0 PAF (PAROXYSMAL ATRIAL FIBRILLATION): Primary | Chronic | ICD-10-CM

## 2022-11-11 LAB
INR PPP: 2.6 (ref 0.91–1.09)
PROTHROMBIN TIME: 31.2 SECONDS (ref 10–13.8)

## 2022-11-11 PROCEDURE — 36416 COLLJ CAPILLARY BLOOD SPEC: CPT

## 2022-11-11 PROCEDURE — 85610 PROTHROMBIN TIME: CPT

## 2022-12-08 ENCOUNTER — OFFICE VISIT (OUTPATIENT)
Dept: FAMILY MEDICINE CLINIC | Facility: CLINIC | Age: 72
End: 2022-12-08

## 2022-12-08 VITALS
TEMPERATURE: 96.6 F | BODY MASS INDEX: 32 KG/M2 | OXYGEN SATURATION: 96 % | HEART RATE: 77 BPM | WEIGHT: 169.5 LBS | DIASTOLIC BLOOD PRESSURE: 87 MMHG | HEIGHT: 61 IN | SYSTOLIC BLOOD PRESSURE: 143 MMHG

## 2022-12-08 DIAGNOSIS — R05.9 COUGH, UNSPECIFIED TYPE: Primary | ICD-10-CM

## 2022-12-08 DIAGNOSIS — J40 BRONCHITIS: ICD-10-CM

## 2022-12-08 LAB
EXPIRATION DATE: NORMAL
FLUAV AG UPPER RESP QL IA.RAPID: NOT DETECTED
FLUBV AG UPPER RESP QL IA.RAPID: NOT DETECTED
INTERNAL CONTROL: NORMAL
Lab: NORMAL
SARS-COV-2 AG UPPER RESP QL IA.RAPID: NOT DETECTED

## 2022-12-08 PROCEDURE — 99213 OFFICE O/P EST LOW 20 MIN: CPT | Performed by: FAMILY MEDICINE

## 2022-12-08 PROCEDURE — 87428 SARSCOV & INF VIR A&B AG IA: CPT | Performed by: FAMILY MEDICINE

## 2022-12-08 RX ORDER — AZITHROMYCIN 250 MG/1
TABLET, FILM COATED ORAL
Qty: 6 TABLET | Refills: 0 | Status: SHIPPED | OUTPATIENT
Start: 2022-12-08 | End: 2023-03-02

## 2022-12-08 RX ORDER — AMOXICILLIN 500 MG/1
1000 CAPSULE ORAL 3 TIMES DAILY
Qty: 30 CAPSULE | Refills: 0 | Status: SHIPPED | OUTPATIENT
Start: 2022-12-08 | End: 2022-12-13

## 2022-12-08 NOTE — PROGRESS NOTES
"Chief Complaint  Cough (Cough x 3 weeks was seen in  12/7/22)    Subjective        Ariadne Telles presents to [facility]  History of Present Illness     3 weeks of coughing.  Seen in urgent care initially.  Prescribed Omnicef and steroids for 5 days.  Did not seem to help.  Got somewhat better for a while and then got worse again.  She went to urgent care yesterday.  She states she was short of breath.  They told her to go to the emergency room.  She did not go.  She states she feels little winded at times.  Her O2 saturations normal here today.  No recent fever.  No history of asthma.    Objective   Vital Signs:  /87   Pulse 77   Temp 96.6 °F (35.9 °C) (Temporal)   Ht 154.9 cm (61\")   Wt 76.9 kg (169 lb 8 oz)   SpO2 96%   BMI 32.03 kg/m²   Estimated body mass index is 32.03 kg/m² as calculated from the following:    Height as of this encounter: 154.9 cm (61\").    Weight as of this encounter: 76.9 kg (169 lb 8 oz).          Physical Exam  Constitutional:       Comments: She looks tired but nontoxic   Pulmonary:      Comments: Lungs are clear to auscultation today.  No tachypnea.  No accessory muscle use.  Good O2 sat room air.  Lymphadenopathy:      Cervical: No cervical adenopathy.        Result Review :                Assessment and Plan   Diagnoses and all orders for this visit:    1. Cough, unspecified type (Primary)  -     POCT SARS-CoV-2 Antigen PAMELA + Flu    2. Bronchitis    Other orders  -     amoxicillin (AMOXIL) 500 MG capsule; Take 2 capsules by mouth 3 (Three) Times a Day for 5 days.  Dispense: 30 capsule; Refill: 0  -     azithromycin (Zithromax Z-Michael) 250 MG tablet; Take 2 tablets the first day, then 1 tablet daily for 4 days.  Dispense: 6 tablet; Refill: 0      Bronchitis versus community-acquired pneumonia.  No respiratory distress.  Recommending amoxicillin high-dose for 5 days and azithromycin for 5 days.  She is on warfarin.  The azithromycin could " increase the warfarin effect, she is going to decrease her warfarin from 2 mg a day down to 1 mg a day for 5 days.  She will seek medical attention with severe symptoms.       Follow Up   No follow-ups on file.  Patient was given instructions and counseling regarding her condition or for health maintenance advice. Please see specific information pulled into the AVS if appropriate.

## 2022-12-29 ENCOUNTER — ANTICOAGULATION VISIT (OUTPATIENT)
Dept: PHARMACY | Facility: HOSPITAL | Age: 72
End: 2022-12-29

## 2022-12-29 DIAGNOSIS — I48.0 PAF (PAROXYSMAL ATRIAL FIBRILLATION): Primary | Chronic | ICD-10-CM

## 2022-12-29 LAB
INR PPP: 3.1 (ref 0.91–1.09)
PROTHROMBIN TIME: 36.9 SECONDS (ref 10–13.8)

## 2022-12-29 PROCEDURE — 85610 PROTHROMBIN TIME: CPT

## 2022-12-29 PROCEDURE — 36416 COLLJ CAPILLARY BLOOD SPEC: CPT

## 2022-12-29 NOTE — PROGRESS NOTES
I have supervised and reviewed the notes, assessments, and/or procedures performed by our Pharmacy Intern. The documented assessment and plan were developed cooperatively, and the plan was not implemented in my presence. I concur with the documentation of this patient encounter.    Ela Rodriguez, PharmD

## 2022-12-29 NOTE — PROGRESS NOTES
Anticoagulation Clinic Progress Note    Anticoagulation Summary  As of 12/29/2022    INR goal:  2.0-3.0   TTR:  58.5 % (1.8 y)   INR used for dosing:  3.1 (12/29/2022)   Warfarin maintenance plan:  2 mg every day; Starting 12/29/2022   Weekly warfarin total:  14 mg   No change documented:  Linsey Stafford, Pharmacy Intern   Plan last modified:  Radha Carver RPH (7/29/2022)   Next INR check:  1/26/2023   Priority:  High   Target end date:      Indications    PAF (paroxysmal atrial fibrillation) (HCC) [I48.0]             Anticoagulation Episode Summary     INR check location:      Preferred lab:      Send INR reminders to:  [facility] [place]    Comments:  Previously apixaban (cost)      Anticoagulation Care Providers     Provider Role Specialty Phone number    Francesco Riojas MD Referring Cardiology [phone]          Clinic Interview:  Patient Findings     Positives:  Change in health    Negatives:  Signs/symptoms of thrombosis, Signs/symptoms of bleeding,   Laboratory test error suspected, Change in alcohol use, Change in   activity, Upcoming invasive procedure, Emergency department visit,   Upcoming dental procedure, Missed doses, Extra doses, Change in   medications, Change in diet/appetite, Hospital admission, Bruising, Other   complaints    Comments:  Patient recently was sick with respiratory conditions for 3   weeks requiring multiple abx and steroids. No longer taking anything and   is well. Had decreased appetite, returning now.       Clinical Outcomes     Negatives:  Major bleeding event, Thromboembolic event,   Anticoagulation-related hospital admission, Anticoagulation-related ED   visit, Anticoagulation-related fatality    Comments:  Patient recently was sick with respiratory conditions for 3   weeks requiring multiple abx and steroids. No longer taking anything and   is well. Had decreased appetite, returning now.         INR History:  Anticoagulation Monitoring 10/21/2022  11/11/2022 12/29/2022   INR 3.6 2.6 3.1   INR Date 10/21/2022 11/11/2022 12/29/2022   INR Goal 2.0-3.0 2.0-3.0 2.0-3.0   Trend Same Same Same   Last Week Total 14 mg 14 mg 14 mg   Next Week Total 13 mg 14 mg 14 mg   Sun 2 mg 2 mg 2 mg   Mon 2 mg 2 mg 2 mg   Tue 2 mg 2 mg 2 mg   Wed 2 mg 2 mg 2 mg   Thu 2 mg 2 mg 2 mg   Fri 1 mg (10/21); Otherwise 2 mg 2 mg 2 mg   Sat 2 mg 2 mg 2 mg   Visit Report - - -   Some recent data might be hidden       Plan:  1. INR is Supratherapeutic today- see above in Anticoagulation Summary.  Will instruct Ariadne CASSIDY Nedaholger to Continue their warfarin regimen- see above in Anticoagulation Summary.  2. Follow up in 4 weeks  3. Patient declines warfarin refills.  4. Verbal and written information provided. Patient expresses understanding and has no further questions at this time.    Linsey Stafford, Pharmacy Intern

## 2023-01-26 ENCOUNTER — ANTICOAGULATION VISIT (OUTPATIENT)
Dept: PHARMACY | Facility: HOSPITAL | Age: 73
End: 2023-01-26
Payer: MEDICARE

## 2023-01-26 DIAGNOSIS — I48.0 PAF (PAROXYSMAL ATRIAL FIBRILLATION): Primary | Chronic | ICD-10-CM

## 2023-01-26 LAB
INR PPP: 2.8 (ref 0.91–1.09)
PROTHROMBIN TIME: 33.9 SECONDS (ref 10–13.8)

## 2023-01-26 PROCEDURE — 85610 PROTHROMBIN TIME: CPT

## 2023-01-26 PROCEDURE — 36416 COLLJ CAPILLARY BLOOD SPEC: CPT

## 2023-01-26 NOTE — PROGRESS NOTES
Anticoagulation Clinic Progress Note    Anticoagulation Summary  As of 2023    INR goal:  2.0-3.0   TTR:  58.9 % (1.9 y)   INR used for dosin.8 (2023)   Warfarin maintenance plan:  2 mg every day; Starting 2023   Weekly warfarin total:  14 mg   No change documented:  Tita Martinez   Plan last modified:  Radha Carver RPH (2022)   Next INR check:  2023   Priority:  High   Target end date:      Indications    PAF (paroxysmal atrial fibrillation) (HCC) [I48.0]             Anticoagulation Episode Summary     INR check location:      Preferred lab:      Send INR reminders to:   [facility]    Comments:  Previously apixaban (cost)      Anticoagulation Care Providers     Provider Role Specialty Phone number    Francesco Riojas MD Referring Cardiology [phone]          Clinic Interview:  Patient Findings     Negatives:  Signs/symptoms of thrombosis, Signs/symptoms of bleeding,   Laboratory test error suspected, Change in health, Change in alcohol use,   Change in activity, Upcoming invasive procedure, Emergency department   visit, Upcoming dental procedure, Missed doses, Extra doses, Change in   medications, Change in diet/appetite, Hospital admission, Bruising, Other   complaints      Clinical Outcomes     Negatives:  Major bleeding event, Thromboembolic event,   Anticoagulation-related hospital admission, Anticoagulation-related ED   visit, Anticoagulation-related fatality        INR History:  Anticoagulation Monitoring 2022   INR 2.6 3.1 2.8   INR Date 2022   INR Goal 2.0-3.0 2.0-3.0 2.0-3.0   Trend Same Same Same   Last Week Total 14 mg 14 mg 14 mg   Next Week Total 14 mg 14 mg 14 mg   Sun 2 mg 2 mg 2 mg   Mon 2 mg 2 mg 2 mg   Tue 2 mg 2 mg 2 mg   Wed 2 mg 2 mg 2 mg   Thu 2 mg 2 mg 2 mg   Fri 2 mg 2 mg 2 mg   Sat 2 mg 2 mg 2 mg   Visit Report - - -   Some recent data might be hidden       Plan:  1.  INR is therapeutic today- see above in Anticoagulation Summary.   Will instruct Ariadne Telles to continue their warfarin regimen- see above in Anticoagulation Summary.  2. Follow up in 4 weeks.  3. Patient declines warfarin refills.  4. Verbal and written information provided. Patient expresses understanding and has no further questions at this time.    Tita Martinez

## 2023-02-22 ENCOUNTER — HOSPITAL ENCOUNTER (OUTPATIENT)
Dept: GENERAL RADIOLOGY | Facility: HOSPITAL | Age: 73
Discharge: HOME | End: 2023-02-22
Admitting: FAMILY MEDICINE
Payer: MEDICARE

## 2023-02-22 ENCOUNTER — OFFICE VISIT (OUTPATIENT)
Dept: FAMILY MEDICINE CLINIC | Facility: CLINIC | Age: 73
End: 2023-02-22
Payer: MEDICARE

## 2023-02-22 VITALS
HEIGHT: 61 IN | HEART RATE: 80 BPM | DIASTOLIC BLOOD PRESSURE: 91 MMHG | SYSTOLIC BLOOD PRESSURE: 158 MMHG | TEMPERATURE: 96.6 F | WEIGHT: 168 LBS | OXYGEN SATURATION: 97 % | BODY MASS INDEX: 31.72 KG/M2

## 2023-02-22 DIAGNOSIS — S13.9XXA NECK SPRAIN, INITIAL ENCOUNTER: Primary | ICD-10-CM

## 2023-02-22 PROCEDURE — 70360 X-RAY EXAM OF NECK: CPT

## 2023-02-22 PROCEDURE — 96372 THER/PROPH/DIAG INJ SC/IM: CPT | Performed by: FAMILY MEDICINE

## 2023-02-22 PROCEDURE — 99214 OFFICE O/P EST MOD 30 MIN: CPT | Performed by: FAMILY MEDICINE

## 2023-02-22 RX ORDER — CYCLOBENZAPRINE HCL 10 MG
TABLET ORAL
Qty: 20 TABLET | Refills: 0 | Status: SHIPPED | OUTPATIENT
Start: 2023-02-22 | End: 2023-03-02

## 2023-02-22 RX ORDER — METHYLPREDNISOLONE ACETATE 40 MG/ML
40 INJECTION, SUSPENSION INTRA-ARTICULAR; INTRALESIONAL; INTRAMUSCULAR; SOFT TISSUE ONCE
Status: COMPLETED | OUTPATIENT
Start: 2023-02-22 | End: 2023-02-22

## 2023-02-22 RX ADMIN — METHYLPREDNISOLONE ACETATE 40 MG: 40 INJECTION, SUSPENSION INTRA-ARTICULAR; INTRALESIONAL; INTRAMUSCULAR; SOFT TISSUE at 16:42

## 2023-02-23 ENCOUNTER — TELEPHONE (OUTPATIENT)
Dept: FAMILY MEDICINE CLINIC | Facility: CLINIC | Age: 73
End: 2023-02-23

## 2023-02-23 RX ORDER — METHYLPREDNISOLONE 4 MG/1
TABLET ORAL
Qty: 1 EACH | Refills: 0 | Status: SHIPPED | OUTPATIENT
Start: 2023-02-23 | End: 2023-03-02

## 2023-02-23 NOTE — TELEPHONE ENCOUNTER
Caller: Ariadne Telles    Relationship: Self    Best call back number: [phone]    What is the best time to reach you: ANY     Who are you requesting to speak with (clinical staff, provider,  specific staff member): CLINICAL STAFF     What was the call regarding: SAW DR DE LEON YESTERDAY, WANTED TO SEE IF X-RAY RESULTS ARE IN. PATIENTS PAIN HAS NOT DECREASED AND IS MORE PERSISTENT TODAY.     Do you require a callback: YES

## 2023-03-02 ENCOUNTER — ANTICOAGULATION VISIT (OUTPATIENT)
Dept: PHARMACY | Facility: HOSPITAL | Age: 73
End: 2023-03-02
Payer: MEDICARE

## 2023-03-02 ENCOUNTER — OFFICE VISIT (OUTPATIENT)
Dept: FAMILY MEDICINE CLINIC | Facility: CLINIC | Age: 73
End: 2023-03-02
Payer: MEDICARE

## 2023-03-02 VITALS
BODY MASS INDEX: 32 KG/M2 | OXYGEN SATURATION: 99 % | HEIGHT: 61 IN | WEIGHT: 169.5 LBS | DIASTOLIC BLOOD PRESSURE: 97 MMHG | TEMPERATURE: 97.3 F | SYSTOLIC BLOOD PRESSURE: 164 MMHG | HEART RATE: 80 BPM

## 2023-03-02 DIAGNOSIS — I48.0 PAF (PAROXYSMAL ATRIAL FIBRILLATION): Primary | Chronic | ICD-10-CM

## 2023-03-02 DIAGNOSIS — I10 ESSENTIAL HYPERTENSION: Primary | ICD-10-CM

## 2023-03-02 LAB
INR PPP: 3.6 (ref 0.91–1.09)
PROTHROMBIN TIME: 43.1 SECONDS (ref 10–13.8)

## 2023-03-02 PROCEDURE — 85610 PROTHROMBIN TIME: CPT

## 2023-03-02 PROCEDURE — 36416 COLLJ CAPILLARY BLOOD SPEC: CPT

## 2023-03-02 PROCEDURE — G0463 HOSPITAL OUTPT CLINIC VISIT: HCPCS

## 2023-03-02 PROCEDURE — 99213 OFFICE O/P EST LOW 20 MIN: CPT | Performed by: FAMILY MEDICINE

## 2023-03-02 RX ORDER — WARFARIN SODIUM 2 MG/1
TABLET ORAL
Qty: 90 TABLET | Refills: 1 | Status: SHIPPED | OUTPATIENT
Start: 2023-03-02

## 2023-03-02 RX ORDER — AMLODIPINE BESYLATE 5 MG/1
5 TABLET ORAL DAILY
Qty: 90 TABLET | Refills: 1 | Status: SHIPPED | OUTPATIENT
Start: 2023-03-02

## 2023-03-02 NOTE — PROGRESS NOTES
"Chief Complaint  Neck Pain    Subjective        Ariadne Telles presents to [facility]  History of Present Illness     Follow-up neck pain.  Much better.  It was around the right sternocleidomastoid muscle at the insertion of the mastoid area.  She states she was looking up 1 day and strained yourself and then started hurting a couple days later.  No radiculopathy to the arms.  No weakness.  X-ray showed some mild generative joint disease but no significant abnormalities.  She had no fever or trouble swallowing.    Blood pressure is elevated today.  Not controlled.  Initial blood pressure 164/97.  Repeat blood pressure is 150/100.  No cardiovascular symptoms.  She is taking her lisinopril and her metoprolol.  She has atrial fibrillation.  She is on warfarin.    Objective   Vital Signs:  /97   Pulse 80   Temp 97.3 °F (36.3 °C) (Temporal)   Ht 154.9 cm (61\")   Wt 76.9 kg (169 lb 8 oz)   SpO2 99%   BMI 32.03 kg/m²   Estimated body mass index is 32.03 kg/m² as calculated from the following:    Height as of this encounter: 154.9 cm (61\").    Weight as of this encounter: 76.9 kg (169 lb 8 oz).             Physical Exam  Vitals and nursing note reviewed.   Constitutional:       General: She is not in acute distress.     Appearance: She is well-developed.   Cardiovascular:      Rate and Rhythm: Normal rate and regular rhythm.      Heart sounds: Normal heart sounds.   Pulmonary:      Effort: Pulmonary effort is normal.      Breath sounds: Normal breath sounds.   Musculoskeletal:      Cervical back: Normal range of motion. No tenderness.   Lymphadenopathy:      Cervical: No cervical adenopathy.   Skin:     General: Skin is warm and dry.   Psychiatric:         Mood and Affect: Mood normal.        Result Review :  The following data was reviewed by: Nick Ariza MD on 03/02/2023:  Common labs    Common Labs 10/13/22 10/13/22    0840 0840   Glucose 86    BUN 16    Creatinine 0.85 "    Sodium 142    Potassium 4.0    Chloride 103    Calcium 9.8    Total Protein 6.6    Albumin 4.20    Total Bilirubin 0.7    Alkaline Phosphatase 171 (A)    AST (SGOT) 14    ALT (SGPT) 13    Total Cholesterol  205 (A)   Triglycerides  196 (A)   HDL Cholesterol  46   LDL Cholesterol   124 (A)   (A) Abnormal value       Comments are available for some flowsheets but are not being displayed.                        Assessment and Plan   Diagnoses and all orders for this visit:    1. Essential hypertension (Primary)    Other orders  -     amLODIPine (NORVASC) 5 MG tablet; Take 1 tablet by mouth Daily.  Dispense: 90 tablet; Refill: 1    Hypertension.  Not been controlled for some time now.  Continue lisinopril metoprolol.  Adding amlodipine.  She is going to start checking her blood pressure at home.  She is not sure if she is using the machine correctly.  She is going to make a nurse visit in the next few weeks to come in and have her blood pressure monitor looked at.  She is going to keep her appointment with me in April.  If her blood pressure stays elevated, she is going to let me know.  With regards to her neck pain, likely musculoskeletal in origin and its resolved.           Follow Up   No follow-ups on file.  Patient was given instructions and counseling regarding her condition or for health maintenance advice. Please see specific information pulled into the AVS if appropriate.

## 2023-03-02 NOTE — PROGRESS NOTES
Anticoagulation Clinic Progress Note    Anticoagulation Summary  As of 3/2/2023    INR goal:  2.0-3.0   TTR:  57.4 % (2 y)   INR used for dosing:  3.6 (3/2/2023)   Warfarin maintenance plan:  2 mg every day; Starting 3/2/2023   Weekly warfarin total:  14 mg   Plan last modified:  Radha Carver RPH (7/29/2022)   Next INR check:  3/16/2023   Priority:  High   Target end date:      Indications    PAF (paroxysmal atrial fibrillation) (HCC) [I48.0]             Anticoagulation Episode Summary     INR check location:      Preferred lab:      Send INR reminders to:   [facility] [place]    Comments:  Previously apixaban (cost)      Anticoagulation Care Providers     Provider Role Specialty Phone number    Francesco Riojas MD Referring Cardiology [phone]          Clinic Interview:  Patient Findings     Positives:  Change in medications    Negatives:  Signs/symptoms of thrombosis, Signs/symptoms of bleeding,   Laboratory test error suspected, Change in health, Change in alcohol use,   Change in activity, Upcoming invasive procedure, Emergency department   visit, Upcoming dental procedure, Missed doses, Extra doses, Change in   diet/appetite, Hospital admission, Bruising, Other complaints    Comments:  Patient recently had neve pain in neck; was prescribed Medrol   2/23 (last dose 3/2) and Flexeril.      Clinical Outcomes     Negatives:  Major bleeding event, Thromboembolic event,   Anticoagulation-related hospital admission, Anticoagulation-related ED   visit, Anticoagulation-related fatality    Comments:  Patient recently had neve pain in neck; was prescribed Medrol   2/23 (last dose 3/2) and Flexeril.        INR History:  Anticoagulation Monitoring 12/29/2022 1/26/2023 3/2/2023   INR 3.1 2.8 3.6   INR Date 12/29/2022 1/26/2023 3/2/2023   INR Goal 2.0-3.0 2.0-3.0 2.0-3.0   Trend Same Same Same   Last Week Total 14 mg 14 mg 14 mg   Next Week Total 14 mg 14 mg 12 mg   Sun 2 mg 2 mg 2 mg   Mon 2 mg 2 mg 2  How Severe Is Your Rash?: moderate Is This A New Presentation, Or A Follow-Up?: Rash mg   Tue 2 mg 2 mg 2 mg   Wed 2 mg 2 mg 2 mg   Thu 2 mg 2 mg Hold (3/2); Otherwise 2 mg   Fri 2 mg 2 mg 2 mg   Sat 2 mg 2 mg 2 mg   Visit Report - - -   Some recent data might be hidden       Plan:  1. INR is Supratherapeutic today- see above in Anticoagulation Summary.  Will instruct Ariadne Telles to hold dose 3/2, then Continue their warfarin regimen- see above in Anticoagulation Summary.  2. Follow up in 2 weeks  3. Patient desires warfarin refills.  4. Verbal and written information provided. Patient expresses understanding and has no further questions at this time.    Dora Brock, PharmD

## 2023-03-02 NOTE — PROGRESS NOTES
I have supervised and reviewed the notes, assessments, and/or procedures performed by our Pharmacy Resident. The documented assessment and plan were developed cooperatively. I concur with the documentation of this patient encounter.    Octavio Garnett, PharmD

## 2023-03-03 NOTE — PROGRESS NOTES
"Chief Complaint  Neck Pain (1 week x unable to turn head)    Subjective        Ariadne Telles presents to [facility]  History of Present Illness with complaints of neck pain for 1 week.  Denies any fall.  She was looking up to try to grab something and started having pain after few days.  She is unable to turn her head.  Denies any radiation to the pain.  Denies any history of neck surgery or previous injury.  Denies any fever,    Objective   Vital Signs:  /91   Pulse 80   Temp 96.6 °F (35.9 °C) (Temporal)   Ht 154.9 cm (61\")   Wt 76.2 kg (168 lb)   SpO2 97%   BMI 31.74 kg/m²   Estimated body mass index is 31.74 kg/m² as calculated from the following:    Height as of this encounter: 154.9 cm (61\").    Weight as of this encounter: 76.2 kg (168 lb).             Physical Exam  Constitutional:       General: She is not in acute distress.     Appearance: Normal appearance. She is well-developed.   HENT:      Head: Normocephalic and atraumatic.      Right Ear: Tympanic membrane normal.      Left Ear: Tympanic membrane normal.      Mouth/Throat:      Mouth: Mucous membranes are moist.   Eyes:      General:         Right eye: No discharge.         Left eye: No discharge.      Extraocular Movements: Extraocular movements intact.      Pupils: Pupils are equal, round, and reactive to light.   Neck:      Comments: More on right.  Cardiovascular:      Rate and Rhythm: Normal rate and regular rhythm.      Pulses: Normal pulses.      Heart sounds: Normal heart sounds.   Pulmonary:      Effort: Pulmonary effort is normal.      Breath sounds: Normal breath sounds. No wheezing or rales.   Abdominal:      General: Bowel sounds are normal.      Palpations: Abdomen is soft. There is no mass.      Tenderness: There is no abdominal tenderness.   Musculoskeletal:      Cervical back: Neck supple. Pain with movement and muscular tenderness present. No spinous process tenderness. Decreased range " of motion.      Right lower leg: No edema.      Left lower leg: No edema.   Lymphadenopathy:      Cervical: No cervical adenopathy.   Neurological:      General: No focal deficit present.      Mental Status: She is alert and oriented to person, place, and time.        Result Review :                   Assessment and Plan   Diagnoses and all orders for this visit:    1. Neck sprain, initial encounter (Primary)  -     XR neck soft tissue    Other orders  -     methylPREDNISolone acetate (DEPO-medrol) injection 40 mg  -     Discontinue: cyclobenzaprine (FLEXERIL) 10 MG tablet; 1-2 po q 8 hr prn  Dispense: 20 tablet; Refill: 0  -     Discontinue: methylPREDNISolone (MEDROL) 4 MG dose pack; Take as directed on package instructions.  Dispense: 1 each; Refill: 0      Ariadne Telles  Is a 72-year-old female patient seen today for  Neck sprain, denies any neck injury.  Causes discussed with her.  No radiation to the pain.  On exam she has a  tenderness on palpation more on the right side with decreased neck movement.  Likely muscular.  I will give her Depo-Medrol 40 mg IM in the office we will also do a x-ray C-spine.  I will also refer her to physical therapy..  We will start her on muscle relaxant.  Follow-up as needed    This was my first visit with patient       Follow Up   No follow-ups on file.  Patient was given instructions and counseling regarding her condition or for health maintenance advice. Please see specific information pulled into the AVS if appropriate.

## 2023-03-04 ENCOUNTER — HOSPITAL ENCOUNTER (OUTPATIENT)
Facility: HOSPITAL | Age: 73
Discharge: HOME | End: 2023-03-05
Attending: EMERGENCY MEDICINE | Admitting: EMERGENCY MEDICINE
Payer: MEDICARE

## 2023-03-04 ENCOUNTER — APPOINTMENT (OUTPATIENT)
Dept: GENERAL RADIOLOGY | Facility: HOSPITAL | Age: 73
End: 2023-03-04
Payer: MEDICARE

## 2023-03-04 DIAGNOSIS — I48.91 ATRIAL FIBRILLATION WITH RAPID VENTRICULAR RESPONSE: Primary | ICD-10-CM

## 2023-03-04 DIAGNOSIS — E87.6 HYPOKALEMIA: ICD-10-CM

## 2023-03-04 DIAGNOSIS — E78.5 HYPERLIPIDEMIA, UNSPECIFIED HYPERLIPIDEMIA TYPE: ICD-10-CM

## 2023-03-04 DIAGNOSIS — Z79.01 ANTICOAGULATED ON COUMADIN: ICD-10-CM

## 2023-03-04 DIAGNOSIS — I10 HYPERTENSION, UNSPECIFIED TYPE: ICD-10-CM

## 2023-03-04 PROCEDURE — 36415 COLL VENOUS BLD VENIPUNCTURE: CPT

## 2023-03-04 PROCEDURE — 71045 X-RAY EXAM CHEST 1 VIEW: CPT

## 2023-03-04 PROCEDURE — 99284 EMERGENCY DEPT VISIT MOD MDM: CPT

## 2023-03-04 PROCEDURE — 85025 COMPLETE CBC W/AUTO DIFF WBC: CPT | Performed by: PHYSICIAN ASSISTANT

## 2023-03-04 PROCEDURE — 83735 ASSAY OF MAGNESIUM: CPT | Performed by: PHYSICIAN ASSISTANT

## 2023-03-04 PROCEDURE — 85610 PROTHROMBIN TIME: CPT | Performed by: PHYSICIAN ASSISTANT

## 2023-03-04 PROCEDURE — 83880 ASSAY OF NATRIURETIC PEPTIDE: CPT | Performed by: PHYSICIAN ASSISTANT

## 2023-03-04 PROCEDURE — 93005 ELECTROCARDIOGRAM TRACING: CPT | Performed by: PHYSICIAN ASSISTANT

## 2023-03-04 PROCEDURE — 96375 TX/PRO/DX INJ NEW DRUG ADDON: CPT

## 2023-03-04 PROCEDURE — 96374 THER/PROPH/DIAG INJ IV PUSH: CPT

## 2023-03-04 PROCEDURE — 96376 TX/PRO/DX INJ SAME DRUG ADON: CPT

## 2023-03-04 PROCEDURE — 84484 ASSAY OF TROPONIN QUANT: CPT | Performed by: PHYSICIAN ASSISTANT

## 2023-03-04 PROCEDURE — 80053 COMPREHEN METABOLIC PANEL: CPT | Performed by: PHYSICIAN ASSISTANT

## 2023-03-04 RX ORDER — SODIUM CHLORIDE 0.9 % (FLUSH) 0.9 %
10 SYRINGE (ML) INJECTION AS NEEDED
Status: DISCONTINUED | OUTPATIENT
Start: 2023-03-04 | End: 2023-03-05 | Stop reason: HOSPADM

## 2023-03-05 ENCOUNTER — READMISSION MANAGEMENT (OUTPATIENT)
Dept: CALL CENTER | Facility: HOSPITAL | Age: 73
End: 2023-03-05
Payer: MEDICARE

## 2023-03-05 VITALS
RESPIRATION RATE: 16 BRPM | HEIGHT: 60 IN | BODY MASS INDEX: 34.16 KG/M2 | SYSTOLIC BLOOD PRESSURE: 125 MMHG | OXYGEN SATURATION: 96 % | DIASTOLIC BLOOD PRESSURE: 74 MMHG | WEIGHT: 174 LBS | TEMPERATURE: 97.9 F | HEART RATE: 81 BPM

## 2023-03-05 PROBLEM — I48.91 ATRIAL FIBRILLATION WITH RAPID VENTRICULAR RESPONSE: Status: ACTIVE | Noted: 2023-03-05

## 2023-03-05 LAB
ALBUMIN SERPL-MCNC: 4 G/DL (ref 3.5–5.2)
ALBUMIN/GLOB SERPL: 1.3 G/DL
ALP SERPL-CCNC: 176 U/L (ref 39–117)
ALT SERPL W P-5'-P-CCNC: 10 U/L (ref 1–33)
ANION GAP SERPL CALCULATED.3IONS-SCNC: 11 MMOL/L (ref 5–15)
AST SERPL-CCNC: 10 U/L (ref 1–32)
BASOPHILS # BLD AUTO: 0.07 10*3/MM3 (ref 0–0.2)
BASOPHILS NFR BLD AUTO: 0.6 % (ref 0–1.5)
BILIRUB SERPL-MCNC: 0.4 MG/DL (ref 0–1.2)
BUN SERPL-MCNC: 20 MG/DL (ref 8–23)
BUN/CREAT SERPL: 24.7 (ref 7–25)
CALCIUM SPEC-SCNC: 9.1 MG/DL (ref 8.6–10.5)
CHLORIDE SERPL-SCNC: 104 MMOL/L (ref 98–107)
CO2 SERPL-SCNC: 28 MMOL/L (ref 22–29)
CREAT SERPL-MCNC: 0.81 MG/DL (ref 0.57–1)
DEPRECATED RDW RBC AUTO: 42.3 FL (ref 37–54)
EGFRCR SERPLBLD CKD-EPI 2021: 77.2 ML/MIN/1.73
EOSINOPHIL # BLD AUTO: 0.6 10*3/MM3 (ref 0–0.4)
EOSINOPHIL NFR BLD AUTO: 5.2 % (ref 0.3–6.2)
ERYTHROCYTE [DISTWIDTH] IN BLOOD BY AUTOMATED COUNT: 12.5 % (ref 12.3–15.4)
GEN 5 2HR TROPONIN T REFLEX: 33 NG/L
GLOBULIN UR ELPH-MCNC: 3.2 GM/DL
GLUCOSE SERPL-MCNC: 178 MG/DL (ref 65–99)
HCT VFR BLD AUTO: 42.8 % (ref 34–46.6)
HGB BLD-MCNC: 14.5 G/DL (ref 12–15.9)
IMM GRANULOCYTES # BLD AUTO: 0.11 10*3/MM3 (ref 0–0.05)
IMM GRANULOCYTES NFR BLD AUTO: 1 % (ref 0–0.5)
INR PPP: 2.13 (ref 0.9–1.1)
LYMPHOCYTES # BLD AUTO: 1.67 10*3/MM3 (ref 0.7–3.1)
LYMPHOCYTES NFR BLD AUTO: 14.6 % (ref 19.6–45.3)
MAGNESIUM SERPL-MCNC: 1.8 MG/DL (ref 1.6–2.4)
MCH RBC QN AUTO: 31.1 PG (ref 26.6–33)
MCHC RBC AUTO-ENTMCNC: 33.9 G/DL (ref 31.5–35.7)
MCV RBC AUTO: 91.8 FL (ref 79–97)
MONOCYTES # BLD AUTO: 0.83 10*3/MM3 (ref 0.1–0.9)
MONOCYTES NFR BLD AUTO: 7.2 % (ref 5–12)
NEUTROPHILS NFR BLD AUTO: 71.4 % (ref 42.7–76)
NEUTROPHILS NFR BLD AUTO: 8.18 10*3/MM3 (ref 1.7–7)
NRBC BLD AUTO-RTO: 0 /100 WBC (ref 0–0.2)
NT-PROBNP SERPL-MCNC: 140 PG/ML (ref 0–900)
PLATELET # BLD AUTO: 319 10*3/MM3 (ref 140–450)
PMV BLD AUTO: 8.6 FL (ref 6–12)
POTASSIUM SERPL-SCNC: 2.8 MMOL/L (ref 3.5–5.2)
POTASSIUM SERPL-SCNC: 4.3 MMOL/L (ref 3.5–5.2)
PROT SERPL-MCNC: 7.2 G/DL (ref 6–8.5)
PROTHROMBIN TIME: 24.1 SECONDS (ref 11.7–14.2)
QT INTERVAL: 315 MS
RBC # BLD AUTO: 4.66 10*6/MM3 (ref 3.77–5.28)
SODIUM SERPL-SCNC: 143 MMOL/L (ref 136–145)
TROPONIN T DELTA: 18 NG/L
TROPONIN T SERPL HS-MCNC: 15 NG/L
WBC NRBC COR # BLD: 11.46 10*3/MM3 (ref 3.4–10.8)

## 2023-03-05 PROCEDURE — 99204 OFFICE O/P NEW MOD 45 MIN: CPT | Performed by: INTERNAL MEDICINE

## 2023-03-05 PROCEDURE — 63710000001 ATORVASTATIN 20 MG TABLET

## 2023-03-05 PROCEDURE — A9270 NON-COVERED ITEM OR SERVICE: HCPCS

## 2023-03-05 PROCEDURE — 93010 ELECTROCARDIOGRAM REPORT: CPT | Performed by: INTERNAL MEDICINE

## 2023-03-05 PROCEDURE — G0378 HOSPITAL OBSERVATION PER HR: HCPCS

## 2023-03-05 PROCEDURE — 84132 ASSAY OF SERUM POTASSIUM: CPT

## 2023-03-05 PROCEDURE — 63710000001 AMLODIPINE 5 MG TABLET

## 2023-03-05 PROCEDURE — 63710000001 LISINOPRIL 20 MG TABLET

## 2023-03-05 PROCEDURE — 84484 ASSAY OF TROPONIN QUANT: CPT | Performed by: PHYSICIAN ASSISTANT

## 2023-03-05 PROCEDURE — 63710000001 METOPROLOL TARTRATE 25 MG TABLET

## 2023-03-05 RX ORDER — METOPROLOL TARTRATE 50 MG/1
50 TABLET, FILM COATED ORAL 2 TIMES DAILY
Qty: 60 TABLET | Refills: 0 | Status: SHIPPED | OUTPATIENT
Start: 2023-03-05

## 2023-03-05 RX ORDER — LISINOPRIL 20 MG/1
20 TABLET ORAL DAILY
Status: DISCONTINUED | OUTPATIENT
Start: 2023-03-05 | End: 2023-03-05 | Stop reason: HOSPADM

## 2023-03-05 RX ORDER — DILTIAZEM HYDROCHLORIDE 5 MG/ML
10 INJECTION INTRAVENOUS ONCE
Status: COMPLETED | OUTPATIENT
Start: 2023-03-05 | End: 2023-03-05

## 2023-03-05 RX ORDER — SODIUM CHLORIDE 0.9 % (FLUSH) 0.9 %
10 SYRINGE (ML) INJECTION AS NEEDED
Status: DISCONTINUED | OUTPATIENT
Start: 2023-03-05 | End: 2023-03-05 | Stop reason: HOSPADM

## 2023-03-05 RX ORDER — POTASSIUM CHLORIDE 750 MG/1
40 TABLET, FILM COATED, EXTENDED RELEASE ORAL AS NEEDED
Status: DISCONTINUED | OUTPATIENT
Start: 2023-03-05 | End: 2023-03-05 | Stop reason: HOSPADM

## 2023-03-05 RX ORDER — SODIUM CHLORIDE 0.9 % (FLUSH) 0.9 %
10 SYRINGE (ML) INJECTION EVERY 12 HOURS SCHEDULED
Status: DISCONTINUED | OUTPATIENT
Start: 2023-03-05 | End: 2023-03-05 | Stop reason: HOSPADM

## 2023-03-05 RX ORDER — MORPHINE SULFATE 2 MG/ML
2 INJECTION, SOLUTION INTRAMUSCULAR; INTRAVENOUS ONCE
Status: DISCONTINUED | OUTPATIENT
Start: 2023-03-05 | End: 2023-03-05

## 2023-03-05 RX ORDER — IPRATROPIUM BROMIDE AND ALBUTEROL SULFATE 2.5; .5 MG/3ML; MG/3ML
3 SOLUTION RESPIRATORY (INHALATION) ONCE
Status: DISCONTINUED | OUTPATIENT
Start: 2023-03-05 | End: 2023-03-05

## 2023-03-05 RX ORDER — POTASSIUM CHLORIDE 750 MG/1
40 TABLET, FILM COATED, EXTENDED RELEASE ORAL ONCE
Status: COMPLETED | OUTPATIENT
Start: 2023-03-05 | End: 2023-03-05

## 2023-03-05 RX ORDER — DILTIAZEM HCL IN NACL,ISO-OSM 125 MG/125
5-15 PLASTIC BAG, INJECTION (ML) INTRAVENOUS
Status: DISCONTINUED | OUTPATIENT
Start: 2023-03-05 | End: 2023-03-05 | Stop reason: CLARIF

## 2023-03-05 RX ORDER — AMLODIPINE BESYLATE 5 MG/1
5 TABLET ORAL DAILY
Status: DISCONTINUED | OUTPATIENT
Start: 2023-03-05 | End: 2023-03-05 | Stop reason: HOSPADM

## 2023-03-05 RX ORDER — WARFARIN SODIUM 2 MG/1
2 TABLET ORAL
Status: DISCONTINUED | OUTPATIENT
Start: 2023-03-05 | End: 2023-03-05 | Stop reason: HOSPADM

## 2023-03-05 RX ORDER — ATORVASTATIN CALCIUM 20 MG/1
40 TABLET, FILM COATED ORAL DAILY
Status: DISCONTINUED | OUTPATIENT
Start: 2023-03-05 | End: 2023-03-05 | Stop reason: HOSPADM

## 2023-03-05 RX ORDER — POTASSIUM CHLORIDE 1.5 G/1.77G
40 POWDER, FOR SOLUTION ORAL AS NEEDED
Status: DISCONTINUED | OUTPATIENT
Start: 2023-03-05 | End: 2023-03-05 | Stop reason: HOSPADM

## 2023-03-05 RX ORDER — SODIUM CHLORIDE 9 MG/ML
40 INJECTION, SOLUTION INTRAVENOUS AS NEEDED
Status: DISCONTINUED | OUTPATIENT
Start: 2023-03-05 | End: 2023-03-05 | Stop reason: HOSPADM

## 2023-03-05 RX ORDER — ONDANSETRON 2 MG/ML
4 INJECTION INTRAMUSCULAR; INTRAVENOUS ONCE
Status: DISCONTINUED | OUTPATIENT
Start: 2023-03-05 | End: 2023-03-05

## 2023-03-05 RX ADMIN — METOPROLOL TARTRATE 50 MG: 25 TABLET, FILM COATED ORAL at 00:58

## 2023-03-05 RX ADMIN — AMLODIPINE BESYLATE 5 MG: 5 TABLET ORAL at 10:06

## 2023-03-05 RX ADMIN — POTASSIUM CHLORIDE 40 MEQ: 750 TABLET, EXTENDED RELEASE ORAL at 02:33

## 2023-03-05 RX ADMIN — METOPROLOL TARTRATE 50 MG: 25 TABLET, FILM COATED ORAL at 10:06

## 2023-03-05 RX ADMIN — SODIUM CHLORIDE 5 MG/HR: 900 INJECTION, SOLUTION INTRAVENOUS at 04:10

## 2023-03-05 RX ADMIN — METOPROLOL TARTRATE 5 MG: 1 INJECTION, SOLUTION INTRAVENOUS at 02:29

## 2023-03-05 RX ADMIN — METOPROLOL TARTRATE 5 MG: 1 INJECTION, SOLUTION INTRAVENOUS at 00:58

## 2023-03-05 RX ADMIN — DILTIAZEM HYDROCHLORIDE 10 MG: 5 INJECTION INTRAVENOUS at 03:56

## 2023-03-05 RX ADMIN — LISINOPRIL 20 MG: 20 TABLET ORAL at 10:06

## 2023-03-05 RX ADMIN — ATORVASTATIN CALCIUM 40 MG: 20 TABLET, FILM COATED ORAL at 10:06

## 2023-03-05 RX ADMIN — Medication 10 ML: at 11:04

## 2023-03-05 NOTE — H&P
[facility]   HISTORY AND PHYSICAL    Patient Name: Ariadne Telles  : 1950  MRN: [id number]  Primary Care Physician:  Nick Ariza MD  Date of admission: 3/4/2023    Subjective   Subjective     Chief Complaint: Rapid heartbeat    History of Present Illness  Ariadne Telles is a 72-year-old female, with a past medical history of atrial fibrillation, hypertension, mitral valve prolapse, presented to the emergency department after waking up and having palpitations.  She states that her heart was beating so fast it made her chest hurt and made her have shortness of breath.  She states that she does have a history of atrial fibrillation however she was unsure of the symptoms and states that she never remembers her heart racing before.  Review of Systems   Constitutional: Negative.    HENT: Negative.    Eyes: Negative.    Respiratory: Positive for shortness of breath. Negative for cough.    Cardiovascular: Positive for chest pain and palpitations. Negative for leg swelling.   Gastrointestinal: Negative.  Negative for nausea and vomiting.   Endocrine: Negative.    Genitourinary: Negative.    Musculoskeletal: Negative.    Skin: Negative.    Allergic/Immunologic: Negative.    Neurological: Negative.    Hematological: Negative.    Psychiatric/Behavioral: Negative.         Personal History     Past Medical History:   Diagnosis Date   • Atrial fibrillation with RVR (HCC)    • Bradycardia 2016   • Carpal tunnel syndrome 2018   • Endometrial cancer (HCC)    • Essential hypertension 2016   • Gastroesophageal reflux disease 2016   • History of endometrial cancer 2017, S/P ANGEL, BSO.  Dr. Catracho Alejo.   • Hx of bone density study 2017, DEXA scan: Osteoporosis in L1, severe osteopenia in both femoral necks.  T-scores: L1, -2.5; L2, -1.9; L3, -0.7; L4, +0.3.  Right femoral neck, -2.1; total -1.8.  Left femoral neck, -2.3; total, -1.8.   • Hypercholesterolemia  "2016   • Hyperlipidemia    • Kidney stone    • MVP (mitral valve prolapse)     last 2 echocardiograms showed no MVP in 2018 and     • Nausea & vomiting    • Osteopenia 2016    2017, DEXA scan: Severe osteopenia in both femoral necks, T score= -2.1 in the right femoral neck, -2.3 in the left femoral neck.   • Osteoporosis 2017    DEXA scan, T score L1= -2.5   • PAF (paroxysmal atrial fibrillation) (HCC)    • Pituitary adenoma (HCC) 2018    Thought to have a pituitary microadenoma on previous studies but MRI of the pituitary with and without contrast, February 10, 2017: \"No convincing evidence to suggest a pituitary adenoma on this MRI study\".  \"Incidentally noted relatively mild changes of chronic small vessel ischemia phenomena.\"   • RBBB (right bundle branch block)    • Rectal polyp 2016   • Surgical menopause     ANGEL, BSO, for endometrial carcinoma.   • Vaginal delivery     x1  NITO, (and 2 C-sections, one stillbirth).   • Vertigo        Past Surgical History:   Procedure Laterality Date   • CARDIAC ELECTROPHYSIOLOGY PROCEDURE N/A 2021    Procedure: Ablation atrial fibrillation cryo;  Surgeon: Francesco Riojas MD;  Location: [facility] INVASIVE LOCATION;  Service: Cardiovascular;  Laterality: N/A;   • CARPAL TUNNEL RELEASE WITH CUBITAL TUNNEL RELEASE  2018    right   • CATARACT EXTRACTION Right 2016   •  SECTION      x2   one stillborn Ariadne Isbell   • COLONOSCOPY  2007    polyps  Dr. Rueda    • TOTAL ABDOMINAL HYSTERECTOMY WITH SALPINGO OOPHORECTOMY      Dr. Aleoj       Family History: family history includes Alcohol abuse in her brother; Breast cancer (age of onset: 75) in her maternal aunt; COPD in her father; Diabetes in her brother; Heart attack (age of onset: 67) in her mother; Heart defect in her sister; Heart disease in her brother and mother; Hyperlipidemia in her brother; Hypertension in her brother; Kidney cancer in her " sister; No Known Problems in her maternal grandfather, maternal grandmother, paternal aunt, paternal grandfather, paternal grandmother, and son; Other in her daughter; Parkinsonism in her father; Sleep apnea in her son. Otherwise pertinent FHx was reviewed and not pertinent to current issue.    Social History:  reports that she has never smoked. She has never used smokeless tobacco. She reports that she does not drink alcohol and does not use drugs.    Home Medications:  Multiple Vitamins-Minerals, alendronate, amLODIPine, atorvastatin, calcium carbonate-cholecalciferol, lisinopril, meclizine, metoprolol tartrate, multivitamin, and warfarin    Allergies:  Allergies   Allergen Reactions   • Naproxen Hives and Swelling   • Paroxetine Other (See Comments)     TREMORS       Objective    Objective     Vitals:   Temp:  [97.6 °F (36.4 °C)] 97.6 °F (36.4 °C)  Heart Rate:  [] 91  Resp:  [17-18] 17  BP: (113-162)/() 113/86    Physical Exam  Vitals and nursing note reviewed.   Constitutional:       General: She is not in acute distress.     Appearance: Normal appearance.   Cardiovascular:      Rate and Rhythm: Tachycardia present. Rhythm irregular.      Pulses: Normal pulses.      Heart sounds: Normal heart sounds.   Pulmonary:      Effort: Pulmonary effort is normal.      Breath sounds: Normal breath sounds.   Abdominal:      General: Abdomen is flat. Bowel sounds are normal.      Palpations: Abdomen is soft.   Musculoskeletal:         General: Normal range of motion.   Skin:     General: Skin is warm and dry.      Capillary Refill: Capillary refill takes less than 2 seconds.   Neurological:      General: No focal deficit present.      Mental Status: She is alert and oriented to person, place, and time.   Psychiatric:         Mood and Affect: Mood normal.         Behavior: Behavior normal.         Thought Content: Thought content normal.         Judgment: Judgment normal.         Result Review    Result Review:  I  have personally reviewed the results from the time of this admission to 3/5/2023 04:56 EST and agree with these findings:  [x]  Laboratory list / accordion  []  Microbiology  [x]  Radiology  [x]  EKG/Telemetry   []  Cardiology/Vascular   []  Pathology  []  Old records  []  Other:      Assessment & Plan   Assessment / Plan     Brief Patient Summary:  Ariadne Telles is a 72 y.o. female who was admitted to the observation unit for further evaluation and treatment of her atrial fibrillation with rapid ventricular response.    Active Hospital Problems:  Active Hospital Problems    Diagnosis    • **Atrial fibrillation with rapid ventricular response (HCC)      Plan:   A-fib with RVR  -Cardiac monitoring  -Vital signs every 4 hours  -Cardiology consult  -Lopressor 25 mg p.o. every 6 hours per Dr. Menard  -Wean off Cardizem drip    Hypertension\hyperlipidemia  -Chronic conditions  -Mild      DVT prophylaxis:  No DVT prophylaxis order currently exists.    CODE STATUS:    Code Status (Patient has no pulse and is not breathing): CPR (Attempt to Resuscitate)  Medical Interventions (Patient has pulse or is breathing): Full Support    Admission Status:  I believe this patient meets observation status.    82 minutes has been spent by [facility] providers in the care of this patient while under observation status.      DALILA Cali

## 2023-03-05 NOTE — PLAN OF CARE
Goal Outcome Evaluation:   Patient cleared for discharge per cardiology, discharge summary provided and education included, aware to follow up as indicated  and aware of changes to medication, stable at the time of discharge

## 2023-03-05 NOTE — ED PROVIDER NOTES
MD ATTESTATION NOTE    The RYAN and I have discussed this patient's history, physical exam, and treatment plan.    I provided a substantive portion of the care of this patient. I personally performed the physical exam, in its entirety. The attached note describes my personal findings.      Ariadne Telles is a 72 y.o. female who presents to the ED c/o palpitations and rapid heart beat.  States she was woken from sleep with symptoms.  States that she has some slight chest pain.  Shortness of breath with the heart racing.  States improved somewhat.  Patient has a history of atrial fibrillation.  Patient is anticoagulated on warfarin.      On exam:  GENERAL: not distressed  HENT: nares patent  EYES: no scleral icterus  CV: Tachycardic irregularly irregular  RESPIRATORY: normal effort  ABDOMEN: soft  MUSCULOSKELETAL: no deformity  NEURO: alert, moves all extremities, follows commands  SKIN: warm, dry    Labs  Recent Results (from the past 24 hour(s))   Comprehensive Metabolic Panel    Collection Time: 03/04/23 11:56 PM    Specimen: Blood   Result Value Ref Range    Glucose 178 (H) 65 - 99 mg/dL    BUN 20 8 - 23 mg/dL    Creatinine 0.81 0.57 - 1.00 mg/dL    Sodium 143 136 - 145 mmol/L    Potassium 2.8 (L) 3.5 - 5.2 mmol/L    Chloride 104 98 - 107 mmol/L    CO2 28.0 22.0 - 29.0 mmol/L    Calcium 9.1 8.6 - 10.5 mg/dL    Total Protein 7.2 6.0 - 8.5 g/dL    Albumin 4.0 3.5 - 5.2 g/dL    ALT (SGPT) 10 1 - 33 U/L    AST (SGOT) 10 1 - 32 U/L    Alkaline Phosphatase 176 (H) 39 - 117 U/L    Total Bilirubin 0.4 0.0 - 1.2 mg/dL    Globulin 3.2 gm/dL    A/G Ratio 1.3 g/dL    BUN/Creatinine Ratio 24.7 7.0 - 25.0    Anion Gap 11.0 5.0 - 15.0 mmol/L    eGFR 77.2 >60.0 mL/min/1.73   Protime-INR    Collection Time: 03/04/23 11:56 PM    Specimen: Blood   Result Value Ref Range    Protime 24.1 (H) 11.7 - 14.2 Seconds    INR 2.13 (H) 0.90 - 1.10   BNP    Collection Time: 03/04/23 11:56 PM    Specimen: Blood   Result Value Ref Range     proBNP 140.0 0.0 - 900.0 pg/mL   High Sensitivity Troponin T    Collection Time: 03/04/23 11:56 PM    Specimen: Blood   Result Value Ref Range    HS Troponin T 15 (H) <10 ng/L   Magnesium    Collection Time: 03/04/23 11:56 PM    Specimen: Blood   Result Value Ref Range    Magnesium 1.8 1.6 - 2.4 mg/dL   CBC Auto Differential    Collection Time: 03/04/23 11:56 PM    Specimen: Blood   Result Value Ref Range    WBC 11.46 (H) 3.40 - 10.80 10*3/mm3    RBC 4.66 3.77 - 5.28 10*6/mm3    Hemoglobin 14.5 12.0 - 15.9 g/dL    Hematocrit 42.8 34.0 - 46.6 %    MCV 91.8 79.0 - 97.0 fL    MCH 31.1 26.6 - 33.0 pg    MCHC 33.9 31.5 - 35.7 g/dL    RDW 12.5 12.3 - 15.4 %    RDW-SD 42.3 37.0 - 54.0 fl    MPV 8.6 6.0 - 12.0 fL    Platelets 319 140 - 450 10*3/mm3    Neutrophil % 71.4 42.7 - 76.0 %    Lymphocyte % 14.6 (L) 19.6 - 45.3 %    Monocyte % 7.2 5.0 - 12.0 %    Eosinophil % 5.2 0.3 - 6.2 %    Basophil % 0.6 0.0 - 1.5 %    Immature Grans % 1.0 (H) 0.0 - 0.5 %    Neutrophils, Absolute 8.18 (H) 1.70 - 7.00 10*3/mm3    Lymphocytes, Absolute 1.67 0.70 - 3.10 10*3/mm3    Monocytes, Absolute 0.83 0.10 - 0.90 10*3/mm3    Eosinophils, Absolute 0.60 (H) 0.00 - 0.40 10*3/mm3    Basophils, Absolute 0.07 0.00 - 0.20 10*3/mm3    Immature Grans, Absolute 0.11 (H) 0.00 - 0.05 10*3/mm3    nRBC 0.0 0.0 - 0.2 /100 WBC   ECG 12 Lead Rhythm Change    Collection Time: 03/05/23 12:34 AM   Result Value Ref Range    QT Interval 315 ms   High Sensitivity Troponin T 2Hr    Collection Time: 03/05/23  1:45 AM    Specimen: Blood   Result Value Ref Range    HS Troponin T 33 (H) <10 ng/L    Troponin T Delta 18 (C) >=-4 - <+4 ng/L       Radiology  XR Chest 1 View    Result Date: 3/5/2023  SINGLE VIEW OF THE CHEST  HISTORY: Shortness of breath  COMPARISON: 12/30/2019  FINDINGS: Cardiomegaly is present. There is no vascular congestion. There is elevation of the right hemidiaphragm. No pneumothorax is identified. No definite pleural effusion is seen. No acute  infiltrates are noted.      No acute findings.  This report was finalized on 3/5/2023 12:24 AM by Dr. Giana Ahuja M.D.        Medications given in the ED:  Medications   sodium chloride 0.9 % flush 10 mL (has no administration in time range)   dilTIAZem (CARDIZEM) 100 mg in 100 mL NS infusion (ADV) (5 mg/hr Intravenous New Bag 3/5/23 0410)   sodium chloride 0.9 % flush 10 mL (has no administration in time range)   sodium chloride 0.9 % flush 10 mL (has no administration in time range)   sodium chloride 0.9 % infusion 40 mL (has no administration in time range)   amLODIPine (NORVASC) tablet 5 mg (has no administration in time range)   atorvastatin (LIPITOR) tablet 40 mg (has no administration in time range)   lisinopril (PRINIVIL,ZESTRIL) tablet 20 mg (has no administration in time range)   warfarin (COUMADIN) tablet 2 mg (has no administration in time range)   Pharmacy to dose warfarin (has no administration in time range)   metoprolol tartrate (LOPRESSOR) tablet 50 mg (50 mg Oral Given 3/5/23 0058)   metoprolol tartrate (LOPRESSOR) injection 5 mg (5 mg Intravenous Given 3/5/23 0058)   metoprolol tartrate (LOPRESSOR) injection 5 mg (5 mg Intravenous Given 3/5/23 0229)   potassium chloride (K-DUR,KLOR-CON) ER tablet 40 mEq (40 mEq Oral Given 3/5/23 0233)   dilTIAZem (CARDIZEM) injection 10 mg (10 mg Intravenous Given 3/5/23 0356)       Orders placed during this visit:  Orders Placed This Encounter   Procedures   • XR Chest 1 View   • Comprehensive Metabolic Panel   • Protime-INR   • BNP   • High Sensitivity Troponin T   • Magnesium   • CBC Auto Differential   • High Sensitivity Troponin T 2Hr   • Protime-INR   • Diet: Cardiac Diets; Healthy Heart (2-3 Na+); Texture: Regular Texture (IDDSI 7); Fluid Consistency: Thin (IDDSI 0)   • Monitor Blood Pressure   • Vital Signs Per Hospital Policy   • Pulse Oximetry, Continuous   • Cardiac Monitoring   • Intake & Output   • Weigh Patient   • Saline Lock & Maintain IV  Access   • Code Status and Medical Interventions:   • LCG (on-call MD unless specified)   • Inpatient Cardiology Consult   • ECG 12 Lead Rhythm Change   • ECG 12 Lead Other; a fib   • Insert Peripheral IV   • Insert Peripheral IV   • Initiate Observation Status   • CBC & Differential       Medical Decision Making:  ED Course as of 03/05/23 0619   Sun Mar 05, 2023   0037 WBC(!): 11.46 [VERONIQUE]   0037 Hemoglobin: 14.5 [VERONIQUE]   0037 Hematocrit: 42.8 [VERONIQUE]   0037 Platelets: 319 [VERONIQUE]   0037 Glucose(!): 178 [VERONIQUE]   0037 BUN: 20 [VERONIQUE]   0037 Creatinine: 0.81 [VERONIQUE]   0037 Sodium: 143 [VERONIQUE]   0038 Potassium(!): 2.8 [VERONIQUE]   0038 Magnesium: 1.8 [VERONIQUE]   0038 Chloride: 104 [VERONIQUE]   0038 CO2: 28.0 [VERONIQUE]   0038 HS Troponin T(!): 15 [VERONIQUE]   0038 proBNP: 140.0 [VERONIQUE]   0038 INR(!): 2.13 [VERONIQUE]   0111 EKG          EKG time: 00:34  Rhythm/Rate: A-fib with RVR at 149 bpm  P waves and [place]: N/A  QRS, axis: RBBB  ST and T waves: Nonspecific T wave changes, no acute ST or T wave changes    Interpreted Contemporaneously by me, independently viewed  Changed compared to prior EKG of 6/28/2021, patient is no longer in sinus rhythm.   [VERONIQUE]   0113 HS Troponin T(!): 33 [VERONIQUE]   0218 Troponin T Delta(!!): 18 [VERONIQUE]   0219 Patient's potassium is 2.8, will replace.  High sensitive troponin has increased from 15-33. [VERONIQUE]   0220 Heart rate still quite variable from low 100s to 140, will order second dose of IV metoprolol. [VERONIQUE]   0345 Patient still with a heart rate in the 120s, will order Cardizem bolus and drip. [VERONIQUE]   0433 Heart rate improved but continues to have a variable rate from the 70s to low 100s. [VERONIQUE]   0437 Patient rechecked, lying in bed with no shortness of breath or chest pain.  Does complain of some mild indigestion.  Discussed plan for admission and cardiology consultation.  Patient expressed understanding and agrees with plan. [VERONIQUE]   0446 Patient history, ER presentation and evaluation as well as patient's requiring multiple doses of IV medicines for rate  control discussed with cardiology, Dr. Claire, will place in observation.  Did recommend 50 mg of metoprolol p.o. every 6 hours. [VERONIQUE]   8611 Patient history, ER presentation and evaluation as well as discussion with cardiology discussed with MONI CONNER will place in observation per Dr. Holman. [VERONIQUE]      ED Course User Index  [VERONIQUE] Jorden Kaiser PA       PPE: Both the patient and I wore a surgical mask throughout the entire patient encounter.     Diagnosis  Final diagnoses:   Atrial fibrillation with rapid ventricular response (HCC)   Anticoagulated on Coumadin   Hypokalemia   Hypertension, unspecified type   Hyperlipidemia, unspecified hyperlipidemia type        Emerson Damon MD  03/05/23 9400

## 2023-03-05 NOTE — OUTREACH NOTE
Prep Survey    Flowsheet Row Responses   [facility] patient discharged from? [facility]   Is LACE score < 7 ? Yes   Eligibility [facility]   Date of Admission 03/04/23   Date of Discharge 03/05/23   Discharge Disposition Home or Self Care   Discharge diagnosis Atrial fibrillation with rapid ventricular response    Does the patient have one of the following disease processes/diagnoses(primary or secondary)? Other   Does the patient have Home health ordered? No   Is there a DME ordered? No   Prep survey completed? Yes          Payton ARMSTRONG - Registered Nurse

## 2023-03-05 NOTE — PROGRESS NOTES
[facility] Clinical Pharmacy Services: Warfarin Dosing/Monitoring Consult    Ariadne Telles is a 72 y.o. female, estimated creatinine clearance is 57.5 mL/min (by C-G formula based on SCr of 0.81 mg/dL). weighing 76.7 kg (169 lb).    Results from last 7 days   Lab Units 03/04/23  2356 03/02/23  1038   INR  2.13* 3.6*   HEMOGLOBIN g/dL 14.5  --    HEMATOCRIT % 42.8  --    PLATELETS 10*3/mm3 319  --      Prior to admission anticoagulation: Warfarin 2 mg nightly per [facility] Anticoagulation:  Consulting provider: DALILA De La Cruz  Start date: 3/5  Indication: A Fib - requiring full anticoagulation  Target INR: 2 - 3  Expected duration: Indefinite   Bridge Therapy: No      Potential food or drug interactions:     Education complete?/Date: No; plan for follow up TBD    Assessment/Plan:  Dose: INR therapeutic.  Continue warfarin 2 mg nightly.  Monitor for any signs or symptoms of bleeding  Follow up daily INRs and dose adjustments    Pharmacy will continue to follow until discharge or discontinuation of warfarin.     Vince Mace III [facility]  Clinical Pharmacist

## 2023-03-05 NOTE — CONSULTS
[facility] Consult    Patient Name: Ariadne Telles  Age/Sex: 72 y.o. female  : 1950  MRN: [id number]    Date of Admission: 3/4/2023  Date of Encounter Visit: 23  Encounter Provider: Carolina Lyn MD  Referring Provider: Emerson Holman II,*  Place of Service: [facility] CARDIOLOGY  Patient Care Team:  Nick Ariza MD as PCP - Angie Ni RPH as Pharmacist (Pharmacy)  Octavio Garnett PharmD as Pharmacist (Pharmacy)    Subjective:     Consulted for: atrial fibrillation    Chief Complaint: palpitations, chest pain    History of Present Illness:  Ariadne Telles is a 72 y.o. female patient of Dr. Danielson with atrial fibrillation status post ablation in 2021, mitral valve prolapse, hyperlipidemia, hypertension, who presented to the emergency room with complaints of palpitations.    The patient reports that she was woken up yesterday by sudden onset of palpitations.  This was associated with some shortness of breath and squeezing chest discomfort.  Symptoms persisted at which point she opted to come to the emergency room.  She is found to be in atrial fibrillation with rapid ventricular rates following her arrival.  She was treated with diltiazem infusion which was eventually weaned off.  She was also treated with additional doses of metoprolol tartrate.  With that she remains in atrial fibrillation but rates have greatly improved.  With improvement in her rate control she reports improvement in her symptoms.  She no longer is experiencing any palpitations or associated chest discomfort and shortness of breath.      Her work-up since her hospitalization shows a therapeutic INR.  Her troponins were minimally elevated.  EKG showed atrial fibrillation, chronic right bundle branch block, and no acute ischemic changes.  BNP was normal.  Potassium was low on admission was replaced and a repeat potassium this morning was normal at  4.3.    She is followed closely by Dr. Riojas.  She underwent atrial fibrillation ablation in 6/2021 for symptomatic paroxysmal atrial fibrillation that was refractory to antiarrhythmic treatment including flecainide.  The patient was kept on flecainide, metoprolol, and warfarin for a period of time.  She has had 2 ZIO monitors placed over the last year since her initial ablation which showed no evidence of atrial fibrillation.  When she was seen last by Dr. Riojas in 3/2022 he recommended discontinuing flecainide.  She was continued on both metoprolol and warfarin for anticoagulation.  When she last was seen in the office in 9/2022 by DALILA Luther she continued to complain of some palpitations for which a second ZIO monitor was ordered and was unremarkable.  Otherwise no changes were made to her management.      Past Medical History:  Past Medical History:   Diagnosis Date   • Atrial fibrillation with RVR ([facility])    • Bradycardia 02/25/2016   • Carpal tunnel syndrome 02/22/2018   • Endometrial cancer ([facility])    • Essential hypertension 02/25/2016   • Gastroesophageal reflux disease 02/25/2016   • History of endometrial cancer 08/17/2017    1994, S/P ANGEL, LUIS ENRIQUEO.  Dr. Catracho Alejo.   • Hx of bone density study 2017    2017, DEXA scan: Osteoporosis in L1, severe osteopenia in both femoral necks.  T-scores: L1, -2.5; L2, -1.9; L3, -0.7; L4, +0.3.  Right femoral neck, -2.1; total -1.8.  Left femoral neck, -2.3; total, -1.8.   • Hypercholesterolemia 02/25/2016   • Hyperlipidemia    • Kidney stone    • MVP (mitral valve prolapse)     last 2 echocardiograms showed no MVP in 2018 and 2019    • Nausea & vomiting    • Osteopenia 02/25/2016 2017, DEXA scan: Severe osteopenia in both femoral necks, T score= -2.1 in the right femoral neck, -2.3 in the left femoral neck.   • Osteoporosis 2017    DEXA scan, T score L1= -2.5   • PAF (paroxysmal atrial fibrillation) ([facility])    • Pituitary adenoma ([facility]) 2018    Thought to have  "a pituitary microadenoma on previous studies but MRI of the pituitary with and without contrast, February 10, 2017: \"No convincing evidence to suggest a pituitary adenoma on this MRI study\".  \"Incidentally noted relatively mild changes of chronic small vessel ischemia phenomena.\"   • RBBB (right bundle branch block)    • Rectal polyp 2016   • Surgical menopause     ANGEL, BSO, for endometrial carcinoma.   • Vaginal delivery     x1  NITO, (and 2 C-sections, one stillbirth).   • Vertigo        Past Surgical History:   Procedure Laterality Date   • CARDIAC ELECTROPHYSIOLOGY PROCEDURE N/A 2021    Procedure: Ablation atrial fibrillation cryo;  Surgeon: Francesco Riojas MD;  Location: [facility] INVASIVE LOCATION;  Service: Cardiovascular;  Laterality: N/A;   • CARPAL TUNNEL RELEASE WITH CUBITAL TUNNEL RELEASE  2018    right   • CATARACT EXTRACTION Right 2016   •  SECTION      x2   one stillborn Ariadne Isbell   • COLONOSCOPY  2007    polyps  Dr. Rueda    • TOTAL ABDOMINAL HYSTERECTOMY WITH SALPINGO OOPHORECTOMY      Dr. Alejo       Home Medications:   Medications Prior to Admission   Medication Sig Dispense Refill Last Dose   • amLODIPine (NORVASC) 5 MG tablet Take 1 tablet by mouth Daily. 90 tablet 1 3/4/2023 at 0900   • atorvastatin (LIPITOR) 40 MG tablet TAKE ONE TABLET BY MOUTH DAILY 90 tablet 1 3/4/2023 at 0900   • lisinopril (PRINIVIL,ZESTRIL) 20 MG tablet Take 1 tablet by mouth Daily. 90 tablet 1 3/4/2023 at 0900   • metoprolol tartrate (LOPRESSOR) 25 MG tablet Take 1 tablet by mouth Every 12 (Twelve) Hours. 180 tablet 3 3/4/2023 at 0900   • Multiple Vitamin (MULTIVITAMIN) capsule Take  by mouth.   3/4/2023 at 0900   • Multiple Vitamins-Minerals (OCUVITE EYE HEALTH FORMULA PO) Take 1 tablet by mouth 2 (Two) Times a Day.   3/4/2023 at 0900   • alendronate (Fosamax) 70 MG tablet Take 1 tablet by mouth Every 7 (Seven) Days. 12 tablet 3 2023 at 0600 "   • calcium carbonate-cholecalciferol 500-400 MG-UNIT tablet tablet Take  by mouth Daily.      • meclizine (ANTIVERT) 12.5 MG tablet Take 1 tablet by mouth 3 (Three) Times a Day As Needed for Dizziness. 30 tablet 1 Unknown   • warfarin (COUMADIN) 2 MG tablet Take one tablet by mouth daily or as directed. 90 tablet 1 3/3/2023 at 2100       Allergies:  Allergies   Allergen Reactions   • Naproxen Hives and Swelling   • Paroxetine Other (See Comments)     TREMORS       Past Social History:  Social History     Socioeconomic History   • Marital status:      Spouse name:    • Number of children: 2   Tobacco Use   • Smoking status: Never   • Smokeless tobacco: Never   Vaping Use   • Vaping Use: Never used   Substance and Sexual Activity   • Alcohol use: No     Comment: caffeine use seldom   • Drug use: No   • Sexual activity: Never     Birth control/protection: Surgical     Comment:        Past Family History:  Family History   Problem Relation Age of Onset   • Heart disease Mother    • Heart attack Mother 67   • COPD Father    • Parkinsonism Father         ?   • Heart defect Sister          at 18   • Alcohol abuse Brother    • Diabetes Brother    • Heart disease Brother    • Hypertension Brother    • Hyperlipidemia Brother    • Other Daughter         stillborn   • Sleep apnea Son    • No Known Problems Maternal Grandmother    • No Known Problems Paternal Grandmother    • Breast cancer Maternal Aunt 75   • No Known Problems Paternal Aunt    • No Known Problems Maternal Grandfather    • No Known Problems Paternal Grandfather    • Kidney cancer Sister    • No Known Problems Son    • BRCA 1/2 Neg Hx    • Colon cancer Neg Hx    • Endometrial cancer Neg Hx    • Ovarian cancer Neg Hx        Review of Systems:   All systems reviewed. Pertinent positives identified in HPI. All other systems are negative.    Objective:   Temp:  [97.6 °F (36.4 °C)-97.9 °F (36.6 °C)] 97.9 °F (36.6 °C)  Heart Rate:  []  70  Resp:  [17-18] 18  BP: (113-162)/() 127/78   No intake or output data in the 24 hours ending 03/05/23 0710  Body mass index is 33.98 kg/m².      03/04/23  2331 03/05/23  0542   Weight: 76.7 kg (169 lb) 78.9 kg (174 lb)     Weight change:     Physical Exam:   General Appearance:    Alert, cooperative, in no acute distress   Head:    Normocephalic, without obvious abnormality, atraumatic   Eyes:            Lids and lashes normal, conjunctivae and sclerae normal, no   icterus, no pallor, corneas clear, PERRLA   Ears:    Ears appear intact with no abnormalities noted   Neck:   No adenopathy, supple, trachea midline, no thyromegaly, no   carotid bruit, no JVD   Lungs:     Clear to auscultation,respirations regular, even and unlabored    Heart:    Regular rhythm and normal rate, normal S1 and S2, no murmur, no gallop, no rub, no click   Chest Wall:    No abnormalities observed   Abdomen:     Normal bowel sounds, no masses, no organomegaly, soft        non-tender, non-distended, no guarding, no rebound  tenderness   Extremities:   Moves all extremities well, no edema, no cyanosis, no redness   Pulses:   Pulses palpable and equal bilaterally. Normal radial, carotid, femoral, dorsalis pedis and posterior tibial pulses bilaterally. Normal abdominal aorta   Skin:  Psychiatric:   No bleeding, bruising or rash    Alert and oriented x 3, normal mood and affect       Lab Review:   Results from last 7 days   Lab Units 03/04/23  2356   SODIUM mmol/L 143   POTASSIUM mmol/L 2.8*   CHLORIDE mmol/L 104   CO2 mmol/L 28.0   BUN mg/dL 20   CREATININE mg/dL 0.81   GLUCOSE mg/dL 178*   CALCIUM mg/dL 9.1   AST (SGOT) U/L 10   ALT (SGPT) U/L 10     Results from last 7 days   Lab Units 03/05/23  0145 03/04/23  2356   HSTROP T ng/L 33* 15*     Results from last 7 days   Lab Units 03/04/23  2356   WBC 10*3/mm3 11.46*   HEMOGLOBIN g/dL 14.5   HEMATOCRIT % 42.8   PLATELETS 10*3/mm3 319     Results from last 7 days   Lab Units  03/04/23 2356 03/02/23  1038   INR  2.13* 3.6*     Results from last 7 days   Lab Units 03/04/23  2356   MAGNESIUM mg/dL 1.8           Invalid input(s): LDLCALC  Results from last 7 days   Lab Units 03/04/23 2356   PROBNP pg/mL 140.0           Echo EF Estimated  Lab Results   Component Value Date    ECHOEFEST 64 01/14/2020       EKG:     Imaging:  Imaging Results (Most Recent)     Procedure Component Value Units Date/Time    XR Chest 1 View [[id number]] Collected: 03/05/23 0023     Updated: 03/05/23 0027    Narrative:      SINGLE VIEW OF THE CHEST     HISTORY: Shortness of breath     COMPARISON: 12/30/2019     FINDINGS:  Cardiomegaly is present. There is no vascular congestion. There is  elevation of the right hemidiaphragm. No pneumothorax is identified. No  definite pleural effusion is seen. No acute infiltrates are noted.       Impression:      No acute findings.     This report was finalized on 3/5/2023 12:24 AM by Dr. Giana Ahuja M.D.             I personally viewed and interpreted the patient's EKG    Assessment/Plan:     1.  Atrial fibrillation.  Recurrent following ablation in 6/2021.  She remains on metoprolol tartrate.  She is a compliant with anticoagulation with warfarin.  She was previously on flecainide which was stopped about a year ago.  She is primarily symptomatic with elevated heart rates.  Currently asymptomatic at lower rates.  2.  Abnormal troponin.  Mildly elevated.  She did report some chest discomfort with elevated heart rates but this has resolved with improvement in her rate control.  EKG shows no new ischemic changes.  Suspect her troponin elevation is due to the atrial fibrillation with rapid ventricular rates.  3.  Hypertension.  Well-controlled on her current regimen of medications.  4.  Hyperlipidemia  5.  Mitral valve regurgitation.  Mild on her last echocardiogram.    - I do not think she needs any further work-up of the indeterminate troponins at this time.  - In regards to  her persistent atrial fibrillation discussed options with the patient including having the patient stay in the hospital and be evaluated by Dr. Riojas tomorrow versus discharge home and close follow-up with Dr. Riojas in the office.  The patient prefers the latter option.  - We will have the patient increase her metoprolol to tartrate to 50 mg twice a day.  - We will have our office contact the patient regarding follow-up with Dr. Riojas.    Thank you for allowing me to participate in the care of Ariadne Telles. Feel free to contact me directly with any further questions or concerns.    Carolina Lyn MD  [facility]  03/05/23  07:10 EST

## 2023-03-05 NOTE — PROGRESS NOTES
ED OBSERVATION PROGRESS/DISCHARGE SUMMARY    Date of Admission: 3/4/2023   LOS: 0 days   PCP: Nick Ariza MD    Final Diagnosis atrial fibrillation with rapid ventricular response      Subjective     Hospital Outcome:   Patient admitted for A-fib RVR with ventricular rate in the 130s 140s she was started on a Cardizem drip and was weaned off of this after initiating oral beta-blockers.  Patient is maintained on warfarin therapy long-term and her INR this morning was 2.13.  She did have a potassium level of 2.8 this a.m. but upon recheck after 40 mill equivalents was noted to be 4.3, suspect this was secondary to hemodilution from it being a IV site draw.    Initial plan was for 50 mg of metoprolol every 6 hours but cardiology evaluated her this morning reported marked improvement of her heart rate in the 80s and 90s recommends increasing her home dose of metoprolol 25 to 50 mg twice daily and discharge home.  Patient is agreeable to plan    ROS:  General: no fevers, chills  Respiratory: no cough, dyspnea  Cardiovascular: no chest pain, palpitations  Abdomen: No abdominal pain, nausea, vomiting, or diarrhea  Neurologic: No focal weakness    Objective   Physical Exam:  I have reviewed the vital signs.  Temp:  [97.6 °F (36.4 °C)-97.9 °F (36.6 °C)] 97.9 °F (36.6 °C)  Heart Rate:  [] 81  Resp:  [16-18] 16  BP: (113-162)/() 125/74  General Appearance:    Alert, cooperative, no distress  Head:    Normocephalic, atraumatic  Eyes:    Sclerae anicteric  Neck:   Supple, no mass  Lungs: Clear to auscultation bilaterally, respirations unlabored  Heart: Irregularly irregular rhythm without tachycardia, S1 and S2 normal, no murmur, rub or gallop  Abdomen:  Soft, non-tender, bowel sounds active, nondistended  Extremities: No clubbing, cyanosis, or edema to lower extremities  Pulses:  2+ and symmetric in distal lower extremities  Skin: No rashes   Neurologic: Oriented x3, Normal strength to extremities    Results  Review:    I have reviewed the labs, radiology results and diagnostic studies.    Results from last 7 days   Lab Units 03/04/23  2356   WBC 10*3/mm3 11.46*   HEMOGLOBIN g/dL 14.5   HEMATOCRIT % 42.8   PLATELETS 10*3/mm3 319     Results from last 7 days   Lab Units 03/05/23  0756 03/04/23  2356   SODIUM mmol/L  --  143   POTASSIUM mmol/L 4.3 2.8*   CHLORIDE mmol/L  --  104   CO2 mmol/L  --  28.0   BUN mg/dL  --  20   CREATININE mg/dL  --  0.81   CALCIUM mg/dL  --  9.1   BILIRUBIN mg/dL  --  0.4   ALK PHOS U/L  --  176*   ALT (SGPT) U/L  --  10   AST (SGOT) U/L  --  10   GLUCOSE mg/dL  --  178*     Imaging Results (Last 24 Hours)     Procedure Component Value Units Date/Time    XR Chest 1 View [[id number]] Collected: 03/05/23 0023     Updated: 03/05/23 0027    Narrative:      SINGLE VIEW OF THE CHEST     HISTORY: Shortness of breath     COMPARISON: 12/30/2019     FINDINGS:  Cardiomegaly is present. There is no vascular congestion. There is  elevation of the right hemidiaphragm. No pneumothorax is identified. No  definite pleural effusion is seen. No acute infiltrates are noted.       Impression:      No acute findings.     This report was finalized on 3/5/2023 12:24 AM by Dr. Giana Ahuja M.D.             I have reviewed the medications.  ---------------------------------------------------------------------------------------------  Assessment & Plan   Assessment/Problem List    Atrial fibrillation with rapid ventricular response (HCC)      Plan:    A-fib with RVR  -Cardiology consult, cleared for discharge home  -Lopressor 50 mg twice daily     Hypertension/hyperlipidemia  -Chronic conditions  Continue home medications    Disposition: Home    Follow-up after Discharge: Cardiology    This note will serve as a discharge summary    DALILA Huff 03/05/23 09:52 EST      I have worn appropriate PPE during this patient encounter, sanitized my hands both with entering and exiting patient's room.    35 minutes has  been spent by [facility] providers in the care of this patient while under observation status

## 2023-03-05 NOTE — ED PROVIDER NOTES
EMERGENCY DEPARTMENT ENCOUNTER    Room Number:  08/08  Date of encounter:  3/5/2023  PCP: Nick Ariza MD  Patient Care Team:  Nick Ariza MD as PCP - Angie Ni RPH as Pharmacist (Pharmacy)  Octavio Garnett PharmD as Pharmacist (Pharmacy)   Independent Historians: Patient and EMS report    HPI:  Chief Complaint: Heart racing  A complete HPI/ROS/PMH/PSH/SH/FH are unobtainable due to: N/A    Chronic or social conditions impacting patient care (social determinants of health): None    Context: Ariadne Telles is a 72 y.o. female with past medical history of AF on Coumadin, HTN, and HLD who arrives to the ED with complaint of heart racing.  Patient states that she was woken from sleep with complaint of heart racing as well as shortness of breath.  Patient denies any chest pain, dizziness, or nausea.  States that she does have a history of atrial fibrillation but does not ever remember having episodes with heart racing.    Review of prior external notes (non-ED): Office visit on 3/2/2023 for neck pain and hypertension.  Patient was currently taking lisinopril and metoprolol and they added 5 mg of Norvasc daily.    Review of prior external test results outside of this encounter: Patient had a Holter monitor in September 2022 that showed occasional PACs and 1/22 run of SVT.  Most recent echo was December 2019 with an LVEF of 64%.    PAST MEDICAL HISTORY  Active Ambulatory Problems     Diagnosis Date Noted   • Gastroesophageal reflux disease 02/25/2016   • Bradycardia 02/25/2016   • Hypercholesterolemia 02/25/2016   • Essential hypertension 02/25/2016   • Rectal polyp 02/25/2016   • History of endometrial cancer 08/17/2017   • Carpal tunnel syndrome 02/22/2018   • MVP (mitral valve prolapse) 05/15/2018   • Osteoporosis 01/01/2017   • Bilateral carotid artery stenosis 03/01/2019   • PAF (paroxysmal atrial fibrillation) (HCC) 12/15/2019   • Vertigo 12/30/2019   • MVP (mitral valve prolapse)    • AF  (paroxysmal atrial fibrillation) (HCC) 2021     Resolved Ambulatory Problems     Diagnosis Date Noted   • Headache 2016   • Osteopenia 2016   • History of pituitary adenoma 2017   • Endometrial carcinoma (HCC) 2017     Past Medical History:   Diagnosis Date   • Atrial fibrillation with RVR (HCC)    • Endometrial cancer (HCC)    • Hx of bone density study 2017   • Hyperlipidemia    • Kidney stone    • Nausea & vomiting    • Pituitary adenoma (HCC)    • RBBB (right bundle branch block)    • Surgical menopause    • Vaginal delivery        The patient has started, but not completed, their COVID-19 vaccination series.    PAST SURGICAL HISTORY  Past Surgical History:   Procedure Laterality Date   • CARDIAC ELECTROPHYSIOLOGY PROCEDURE N/A 2021    Procedure: Ablation atrial fibrillation cryo;  Surgeon: Francesco Riojas MD;  Location: [facility] INVASIVE LOCATION;  Service: Cardiovascular;  Laterality: N/A;   • CARPAL TUNNEL RELEASE WITH CUBITAL TUNNEL RELEASE  2018    right   • CATARACT EXTRACTION Right 2016   •  SECTION      x2   one stillborn Joi  chela Foleyolas   • COLONOSCOPY  2007    polyps  Dr. Rueda    • TOTAL ABDOMINAL HYSTERECTOMY WITH SALPINGO OOPHORECTOMY      Dr. Alejo         FAMILY HISTORY  Family History   Problem Relation Age of Onset   • Heart disease Mother    • Heart attack Mother 67   • COPD Father    • Parkinsonism Father         ?   • Heart defect Sister          at 18   • Alcohol abuse Brother    • Diabetes Brother    • Heart disease Brother    • Hypertension Brother    • Hyperlipidemia Brother    • Other Daughter         stillborn   • Sleep apnea Son    • No Known Problems Maternal Grandmother    • No Known Problems Paternal Grandmother    • Breast cancer Maternal Aunt 75   • No Known Problems Paternal Aunt    • No Known Problems Maternal Grandfather    • No Known Problems Paternal Grandfather    • Kidney  cancer Sister    • No Known Problems Son    • BRCA 1/2 Neg Hx    • Colon cancer Neg Hx    • Endometrial cancer Neg Hx    • Ovarian cancer Neg Hx          SOCIAL HISTORY  Social History     Socioeconomic History   • Marital status:      Spouse name:    • Number of children: 2   Tobacco Use   • Smoking status: Never   • Smokeless tobacco: Never   Vaping Use   • Vaping Use: Never used   Substance and Sexual Activity   • Alcohol use: No     Comment: caffeine use seldom   • Drug use: No   • Sexual activity: Never     Birth control/protection: Surgical     Comment:          ALLERGIES  Naproxen and Paroxetine        REVIEW OF SYSTEMS  Review of Systems     All systems reviewed and negative except for those discussed in HPI.       PHYSICAL EXAM    I have reviewed the triage vital signs and nursing notes.    ED Triage Vitals [03/04/23 4279]   Temp Heart Rate Resp BP SpO2   97.6 °F (36.4 °C) (!) 143 18 (!) 162/115 95 %      Temp src Heart Rate Source Patient Position BP Location FiO2 (%)   Oral Monitor -- Right arm --       Physical Exam    GENERAL: alert and oriented x4, anxious, mildly distressed  HENT: normocephalic, atraumatic, moist mucous membranes  EYES: no scleral icterus, PERRL, EOMI  CV: irregularly irregular rhythm, tachycardic, intact distal pulses, no murmurs, rubs, or gallops  RESPIRATORY: normal effort, CTAB  ABDOMEN: soft/nontender  MUSCULOSKELETAL: no deformity  NEURO: alert, moves all extremities, no focal neuro deficits, follows commands  SKIN: warm, dry, no rash   Psych: Appropriate mood and affect      Nursing notes and vital signs reviewed      LAB RESULTS  Recent Results (from the past 24 hour(s))   Comprehensive Metabolic Panel    Collection Time: 03/04/23 11:56 PM    Specimen: Blood   Result Value Ref Range    Glucose 178 (H) 65 - 99 mg/dL    BUN 20 8 - 23 mg/dL    Creatinine 0.81 0.57 - 1.00 mg/dL    Sodium 143 136 - 145 mmol/L    Potassium 2.8 (L) 3.5 - 5.2 mmol/L    Chloride 104 98  - 107 mmol/L    CO2 28.0 22.0 - 29.0 mmol/L    Calcium 9.1 8.6 - 10.5 mg/dL    Total Protein 7.2 6.0 - 8.5 g/dL    Albumin 4.0 3.5 - 5.2 g/dL    ALT (SGPT) 10 1 - 33 U/L    AST (SGOT) 10 1 - 32 U/L    Alkaline Phosphatase 176 (H) 39 - 117 U/L    Total Bilirubin 0.4 0.0 - 1.2 mg/dL    Globulin 3.2 gm/dL    A/G Ratio 1.3 g/dL    BUN/Creatinine Ratio 24.7 7.0 - 25.0    Anion Gap 11.0 5.0 - 15.0 mmol/L    eGFR 77.2 >60.0 mL/min/1.73   Protime-INR    Collection Time: 03/04/23 11:56 PM    Specimen: Blood   Result Value Ref Range    Protime 24.1 (H) 11.7 - 14.2 Seconds    INR 2.13 (H) 0.90 - 1.10   BNP    Collection Time: 03/04/23 11:56 PM    Specimen: Blood   Result Value Ref Range    proBNP 140.0 0.0 - 900.0 pg/mL   High Sensitivity Troponin T    Collection Time: 03/04/23 11:56 PM    Specimen: Blood   Result Value Ref Range    HS Troponin T 15 (H) <10 ng/L   Magnesium    Collection Time: 03/04/23 11:56 PM    Specimen: Blood   Result Value Ref Range    Magnesium 1.8 1.6 - 2.4 mg/dL   CBC Auto Differential    Collection Time: 03/04/23 11:56 PM    Specimen: Blood   Result Value Ref Range    WBC 11.46 (H) 3.40 - 10.80 10*3/mm3    RBC 4.66 3.77 - 5.28 10*6/mm3    Hemoglobin 14.5 12.0 - 15.9 g/dL    Hematocrit 42.8 34.0 - 46.6 %    MCV 91.8 79.0 - 97.0 fL    MCH 31.1 26.6 - 33.0 pg    MCHC 33.9 31.5 - 35.7 g/dL    RDW 12.5 12.3 - 15.4 %    RDW-SD 42.3 37.0 - 54.0 fl    MPV 8.6 6.0 - 12.0 fL    Platelets 319 140 - 450 10*3/mm3    Neutrophil % 71.4 42.7 - 76.0 %    Lymphocyte % 14.6 (L) 19.6 - 45.3 %    Monocyte % 7.2 5.0 - 12.0 %    Eosinophil % 5.2 0.3 - 6.2 %    Basophil % 0.6 0.0 - 1.5 %    Immature Grans % 1.0 (H) 0.0 - 0.5 %    Neutrophils, Absolute 8.18 (H) 1.70 - 7.00 10*3/mm3    Lymphocytes, Absolute 1.67 0.70 - 3.10 10*3/mm3    Monocytes, Absolute 0.83 0.10 - 0.90 10*3/mm3    Eosinophils, Absolute 0.60 (H) 0.00 - 0.40 10*3/mm3    Basophils, Absolute 0.07 0.00 - 0.20 10*3/mm3    Immature Grans, Absolute 0.11 (H) 0.00 -  0.05 10*3/mm3    nRBC 0.0 0.0 - 0.2 /100 WBC   ECG 12 Lead Rhythm Change    Collection Time: 03/05/23 12:34 AM   Result Value Ref Range    QT Interval 315 ms   High Sensitivity Troponin T 2Hr    Collection Time: 03/05/23  1:45 AM    Specimen: Blood   Result Value Ref Range    HS Troponin T 33 (H) <10 ng/L    Troponin T Delta 18 (C) >=-4 - <+4 ng/L       Ordered the above labs and independently reviewed and interpreted the results by me.        RADIOLOGY  XR Chest 1 View    Result Date: 3/5/2023  SINGLE VIEW OF THE CHEST  HISTORY: Shortness of breath  COMPARISON: 12/30/2019  FINDINGS: Cardiomegaly is present. There is no vascular congestion. There is elevation of the right hemidiaphragm. No pneumothorax is identified. No definite pleural effusion is seen. No acute infiltrates are noted.      No acute findings.  This report was finalized on 3/5/2023 12:24 AM by Dr. Giana Ahuja M.D.        I ordered the above noted radiological studies.  These were independently interpreted and reviewed by me.  See dictation for official radiology interpretation.      PROCEDURES    Procedures      MEDICATIONS GIVEN IN ER    Medications   sodium chloride 0.9 % flush 10 mL (has no administration in time range)   dilTIAZem (CARDIZEM) 100 mg in 100 mL NS infusion (ADV) (5 mg/hr Intravenous New Bag 3/5/23 0410)   metoprolol tartrate (LOPRESSOR) tablet 50 mg (50 mg Oral Given 3/5/23 0058)   metoprolol tartrate (LOPRESSOR) injection 5 mg (5 mg Intravenous Given 3/5/23 0058)   metoprolol tartrate (LOPRESSOR) injection 5 mg (5 mg Intravenous Given 3/5/23 0229)   potassium chloride (K-DUR,KLOR-CON) ER tablet 40 mEq (40 mEq Oral Given 3/5/23 0233)   dilTIAZem (CARDIZEM) injection 10 mg (10 mg Intravenous Given 3/5/23 0356)         PROGRESS, DATA ANALYSIS, CONSULTS, AND MEDICAL DECISION MAKING    All labs have been independently reviewed by me.  All radiology studies have been reviewed by me and discussed with radiologist dictating the  report.   EKG's independently viewed and interpreted by me.  Discussion below represents my analysis of pertinent findings related to patient's condition, differential diagnosis, treatment plan and final disposition.    DDx:  Includes, but is not limited to SVT, sinus tach, A-fib with RVR, atrial flutter, PVCs, PACs, panic attack, anxiety    After my initial assessment I am concerned about arrhythmia and patient may need hospitalization due to cardiology consultation.    ED Course as of 03/05/23 0453   Sun Mar 05, 2023   0037 WBC(!): 11.46 [VERONIQUE]   0037 Hemoglobin: 14.5 [VERONIQUE]   0037 Hematocrit: 42.8 [VERONIQUE]   0037 Platelets: 319 [VERONIQUE]   0037 Glucose(!): 178 [VERONIQUE]   0037 BUN: 20 [VERONIQUE]   0037 Creatinine: 0.81 [VERONIQUE]   0037 Sodium: 143 [VERONIQUE]   0038 Potassium(!): 2.8 [VERONIQUE]   0038 Magnesium: 1.8 [VERONIQUE]   0038 Chloride: 104 [VERONIQUE]   0038 CO2: 28.0 [VERONIQUE]   0038 HS Troponin T(!): 15 [VERONIQUE]   0038 proBNP: 140.0 [VERONIQUE]   0038 INR(!): 2.13 [VERONIQUE]   0111 EKG          EKG time: 00:34  Rhythm/Rate: A-fib with RVR at 149 bpm  P waves and [place]: N/A  QRS, axis: RBBB  ST and T waves: Nonspecific T wave changes, no acute ST or T wave changes    Interpreted Contemporaneously by me, independently viewed  Changed compared to prior EKG of 6/28/2021, patient is no longer in sinus rhythm.   [VERONIQUE]   0113 HS Troponin T(!): 33 [VERONIQUE]   0218 Troponin T Delta(!!): 18 [VERONIQUE]   0219 Patient's potassium is 2.8, will replace.  High sensitive troponin has increased from 15-33. [VERONIQUE]   0220 Heart rate still quite variable from low 100s to 140, will order second dose of IV metoprolol. [VERONIQUE]   0345 Patient still with a heart rate in the 120s, will order Cardizem bolus and drip. [VERONIQUE]   0433 Heart rate improved but continues to have a variable rate from the 70s to low 100s. [VERONIQUE]   0437 Patient rechecked, lying in bed with no shortness of breath or chest pain.  Does complain of some mild indigestion.  Discussed plan for admission and cardiology consultation.  Patient expressed  understanding and agrees with plan. [VERONIQUE]   0446 Patient history, ER presentation and evaluation as well as patient's requiring multiple doses of IV medicines for rate control discussed with cardiology, Dr. Claire, will place in observation.  Did recommend 50 mg of metoprolol p.o. every 6 hours. [VERONIQUE]   0452 Patient history, ER presentation and evaluation as well as discussion with cardiology discussed with MONI CONNER will place in observation per Dr. Holman. [VERONIQUE]      ED Course User Index  [VERONIQUE] Jorden Kaiser PA       MDM: After cardiology consultation patient will be placed in observation unit for further monitoring and rate control.    PPE:  The patient wore a mask and I wore a mask and all appropriate PPE throughout the entire patient encounter.      AS OF 04:53 EST VITALS:    BP - 113/86  HR - 91  TEMP - 97.6 °F (36.4 °C) (Oral)  O2 SATS - 94%      DIAGNOSIS  Final diagnoses:   Atrial fibrillation with rapid ventricular response (HCC)   Anticoagulated on Coumadin   Hypokalemia   Hypertension, unspecified type   Hyperlipidemia, unspecified hyperlipidemia type         DISPOSITION  ADMISSION    Discussed treatment plan and reason for admission with pt/family and admitting physician.  Pt/family voiced understanding of the plan for admission for further testing/treatment as needed.         Note Disclaimer: At [facility], we believe that sharing information builds trust and better relationships. You are receiving this note because you recently visited [facility]. It is possible you will see health information before a provider has talked with you about it. This kind of information can be easy to misunderstand. To help you fully understand what it means for your health, we urge you to discuss this note with your provider.     Jorden Kaiser PA  03/05/23 0451

## 2023-03-06 ENCOUNTER — TRANSITIONAL CARE MANAGEMENT TELEPHONE ENCOUNTER (OUTPATIENT)
Dept: CALL CENTER | Facility: HOSPITAL | Age: 73
End: 2023-03-06
Payer: MEDICARE

## 2023-03-06 NOTE — OUTREACH NOTE
Call Center TCM Note    Flowsheet Row Responses   [facility] patient discharged from? Check   Does the patient have one of the following disease processes/diagnoses(primary or secondary)? Other   TCM attempt successful? Yes  [NO VR]   Call start time 1525   Call end time 1529   Discharge diagnosis Atrial fibrillation with rapid ventricular response    Meds reviewed with patient/caregiver? Yes   Is the patient having any side effects they believe may be caused by any medication additions or changes? No   Does the patient have all medications ordered at discharge? Yes   Is the patient taking all medications as directed (includes completed medication regime)? Yes   Does the patient have an appointment with their PCP within 7 days of discharge? No   Nursing Interventions Patient desires to follow up with specialty only   Psychosocial issues? No   Comments Pt c/o small amt of chest pressure in chest remains, no particular activity initiates the pressure, it resolves on own. Encouraged her to monitor BP/HR at home.Denies SOA/palpitations/dizziness   Did the patient receive a copy of their discharge instructions? Yes   Nursing interventions Reviewed instructions with patient   What is the patient's perception of their health status since discharge? Improving   Is the patient/caregiver able to teach back signs and symptoms related to disease process for when to call PCP? Yes   If the patient is a current smoker, are they able to teach back resources for cessation? Not a smoker   TCM call completed? Yes   Call end time 1529   Would this patient benefit from a Referral to Amb Social Work? No   Is the patient interested in additional calls from an ambulatory ?  NOTE:  applies to high risk patients requiring additional follow-up. No          Fawn Senior RN    3/6/2023, 15:29 EST

## 2023-03-08 ENCOUNTER — OFFICE VISIT (OUTPATIENT)
Dept: CARDIOLOGY | Facility: CLINIC | Age: 73
End: 2023-03-08
Payer: MEDICARE

## 2023-03-08 VITALS
BODY MASS INDEX: 30.78 KG/M2 | HEIGHT: 61 IN | SYSTOLIC BLOOD PRESSURE: 122 MMHG | DIASTOLIC BLOOD PRESSURE: 76 MMHG | HEART RATE: 77 BPM | WEIGHT: 163 LBS

## 2023-03-08 DIAGNOSIS — I48.0 PAF (PAROXYSMAL ATRIAL FIBRILLATION): Primary | Chronic | ICD-10-CM

## 2023-03-08 PROCEDURE — 99214 OFFICE O/P EST MOD 30 MIN: CPT

## 2023-03-08 PROCEDURE — 93000 ELECTROCARDIOGRAM COMPLETE: CPT

## 2023-03-08 NOTE — PROGRESS NOTES
Date of Office Visit: 2023  Encounter Provider: DALILA Johnson  Place of Service: [facility] CARDIOLOGY  Patient Name: Ariadne Telles  :1950    Chief Complaint   Patient presents with   • paroxysmal AFIB   • Palpitations          • Shortness of Breath          • Chest Pain   :     HPI: Ariadne Telles is a 72 y.o. female who follows with Dr. Lyn and Dr. Riojas. She has a history of paroxsymal atrial fibrillation--s/p PVI ().     I saw her in September of last year. She was doing well. Complained of occasional palpitations. We ordered a monitor which showed no evidence of AF. Continued her metoprolol and warfarin.     She presented to the ED last week with complaints of chest pressure and palpitations. She was noted to be in A fib with rates in the 140-150s. She was started on diltiazem and as her rate improved, her symptoms improved. She was discharged in A fib. Advised to follow up to discuss her A fib and further treatment.             She presents today for follow up appt.     She says that on 3 she laid down to rest and noticed that her heart was racing. She thought she was dreaming. She went and checked on her grandson who was staying with her. When she laid back down she again noted palpitations and chest pressure.     She was more nervous about her grandson being there so she called EMS.      This was the first documented episode of A fib since her ablation in . She did state that this felt somewhat different than her A fib in the past.     Now that she knows the episode was A fib. She estimates 3-4 of the episodes over the past 3 months which started in December.  She has minimal palpitations and fluttering with these.     EKG today shows NSR.     She thought she might still be in A fib today.    She works a high stress job and wonders if that could contribute.      She also started a steroid pack a week before this episode.     Currently on  "metoprolol for rate control.     Warfarin for AC. Had issue affording apixaban in the past.           Past Medical History:   Diagnosis Date   • Atrial fibrillation with RVR (HCC)    • Bradycardia 02/25/2016   • Carpal tunnel syndrome 02/22/2018   • Endometrial cancer (HCC)    • Essential hypertension 02/25/2016   • Gastroesophageal reflux disease 02/25/2016   • History of endometrial cancer 08/17/2017 1994, S/P ANGEL, BSO.  Dr. Catracho Alejo.   • Hx of bone density study 2017    2017, DEXA scan: Osteoporosis in L1, severe osteopenia in both femoral necks.  T-scores: L1, -2.5; L2, -1.9; L3, -0.7; L4, +0.3.  Right femoral neck, -2.1; total -1.8.  Left femoral neck, -2.3; total, -1.8.   • Hypercholesterolemia 02/25/2016   • Hyperlipidemia    • Kidney stone    • MVP (mitral valve prolapse)     last 2 echocardiograms showed no MVP in 2018 and 2019    • Nausea & vomiting    • Osteopenia 02/25/2016    2017, DEXA scan: Severe osteopenia in both femoral necks, T score= -2.1 in the right femoral neck, -2.3 in the left femoral neck.   • Osteoporosis 2017    DEXA scan, T score L1= -2.5   • PAF (paroxysmal atrial fibrillation) (HCC)    • Pituitary adenoma (HCC) 2018    Thought to have a pituitary microadenoma on previous studies but MRI of the pituitary with and without contrast, February 10, 2017: \"No convincing evidence to suggest a pituitary adenoma on this MRI study\".  \"Incidentally noted relatively mild changes of chronic small vessel ischemia phenomena.\"   • RBBB (right bundle branch block)    • Rectal polyp 02/25/2016   • Surgical menopause 1994    ANGEL, BSO, for endometrial carcinoma.   • Vaginal delivery     x1  NITO, (and 2 C-sections, one stillbirth).   • Vertigo        Past Surgical History:   Procedure Laterality Date   • CARDIAC ELECTROPHYSIOLOGY PROCEDURE N/A 6/28/2021    Procedure: Ablation atrial fibrillation cryo;  Surgeon: Francesco Riojas MD;  Location: [facility] INVASIVE LOCATION;  Service: " Cardiovascular;  Laterality: N/A;   • CARPAL TUNNEL RELEASE WITH CUBITAL TUNNEL RELEASE  2018    right   • CATARACT EXTRACTION Right 2016   •  SECTION      x2   one stillborn Ariadne Isbell   • COLONOSCOPY  2007    polyps  Dr. Rueda    • TOTAL ABDOMINAL HYSTERECTOMY WITH SALPINGO OOPHORECTOMY      Dr. Alejo       Social History     Socioeconomic History   • Marital status:      Spouse name:    • Number of children: 2   Tobacco Use   • Smoking status: Never   • Smokeless tobacco: Never   Vaping Use   • Vaping Use: Never used   Substance and Sexual Activity   • Alcohol use: No     Comment: caffeine use seldom   • Drug use: No   • Sexual activity: Never     Birth control/protection: Surgical     Comment:        Family History   Problem Relation Age of Onset   • Heart disease Mother    • Heart attack Mother 67   • COPD Father    • Parkinsonism Father         ?   • Heart defect Sister          at 18   • Alcohol abuse Brother    • Diabetes Brother    • Heart disease Brother    • Hypertension Brother    • Hyperlipidemia Brother    • Other Daughter         stillborn   • Sleep apnea Son    • No Known Problems Maternal Grandmother    • No Known Problems Paternal Grandmother    • Breast cancer Maternal Aunt 75   • No Known Problems Paternal Aunt    • No Known Problems Maternal Grandfather    • No Known Problems Paternal Grandfather    • Kidney cancer Sister    • No Known Problems Son    • BRCA 1/2 Neg Hx    • Colon cancer Neg Hx    • Endometrial cancer Neg Hx    • Ovarian cancer Neg Hx        Review of Systems   Constitutional: Negative for chills, fever and malaise/fatigue.   Cardiovascular: Negative for chest pain, dyspnea on exertion, leg swelling, near-syncope, orthopnea, palpitations, paroxysmal nocturnal dyspnea and syncope.   Respiratory: Negative for cough and shortness of breath.    Hematologic/Lymphatic: Negative.    Musculoskeletal: Negative for joint  "pain, joint swelling and myalgias.   Gastrointestinal: Negative for abdominal pain, diarrhea, melena, nausea and vomiting.   Genitourinary: Negative for frequency and hematuria.   Neurological: Negative for light-headedness, numbness, paresthesias and seizures.   Allergic/Immunologic: Negative.    All other systems reviewed and are negative.      Allergies   Allergen Reactions   • Naproxen Hives and Swelling   • Paroxetine Other (See Comments)     TREMORS         Current Outpatient Medications:   •  alendronate (Fosamax) 70 MG tablet, Take 1 tablet by mouth Every 7 (Seven) Days., Disp: 12 tablet, Rfl: 3  •  amLODIPine (NORVASC) 5 MG tablet, Take 1 tablet by mouth Daily., Disp: 90 tablet, Rfl: 1  •  atorvastatin (LIPITOR) 40 MG tablet, TAKE ONE TABLET BY MOUTH DAILY, Disp: 90 tablet, Rfl: 1  •  calcium carbonate-cholecalciferol 500-400 MG-UNIT tablet tablet, Take  by mouth Daily., Disp: , Rfl:   •  lisinopril (PRINIVIL,ZESTRIL) 20 MG tablet, Take 1 tablet by mouth Daily., Disp: 90 tablet, Rfl: 1  •  meclizine (ANTIVERT) 12.5 MG tablet, Take 1 tablet by mouth 3 (Three) Times a Day As Needed for Dizziness., Disp: 30 tablet, Rfl: 1  •  metoprolol tartrate (LOPRESSOR) 50 MG tablet, Take 1 tablet by mouth 2 (Two) Times a Day., Disp: 60 tablet, Rfl: 0  •  Multiple Vitamin (MULTIVITAMIN) capsule, Take  by mouth., Disp: , Rfl:   •  Multiple Vitamins-Minerals (OCUVITE EYE HEALTH FORMULA PO), Take 1 tablet by mouth 2 (Two) Times a Day., Disp: , Rfl:   •  warfarin (COUMADIN) 2 MG tablet, Take one tablet by mouth daily or as directed., Disp: 90 tablet, Rfl: 1      Objective:     Vitals:    03/08/23 1401   BP: 122/76   Pulse: 77   Weight: 73.9 kg (163 lb)   Height: 154.9 cm (61\")     Body mass index is 30.8 kg/m².    PHYSICAL EXAM:    Vitals Reviewed.   General Appearance: No acute distress, well developed and well nourished.   Eyes: Conjunctiva and lids: No erythema, swelling, or discharge. Sclera non-icteric.   HENT: " Atraumatic, normocephalic. External eyes, ears, and nose normal.   Respiratory: No signs of respiratory distress. Respiration rhythm and depth normal.   Clear to auscultation. No rales, crackles, rhonchi, or wheezing auscultated.   Cardiovascular:  Heart Rate and Rhythm: Normal, Heart Sounds: Normal S1 and S2. No S3 or S4 noted.  Gastrointestinal:  Abdomen soft, non-distended, non-tender.   Musculoskeletal: Normal movement of extremities  Skin: Warm and dry.   Psychiatric: Patient alert and oriented to person, place, and time. Speech and behavior appropriate. Normal mood and affect.       ECG 12 Lead    Date/Time: 3/8/2023 4:11 PM  Performed by: Surya Renee APRN  Authorized by: Surya Renee APRN   Comparison: compared with previous ECG   Similar to previous ECG  Rhythm: sinus rhythm  BPM: 77                Assessment:       Diagnosis Plan   1. PAF (paroxysmal atrial fibrillation) ([facility])               Plan:   1. PAF---s/p PVI (2021)---- this is her first documented episode of A fib since her ablation in 2021. She noted minimal symptoms which was different than her A fib in the past. She says she would not have called EMS but she was concerned about the safety of her grandson. She has since converted to NSR and remains in SR today. She thinks she has had 3-4 episodes in the past 3 months.       We will continue to monitor for recurrent AF. If she has more episodes then consider flecainide (tolerated in the past) vs. Follow up ablation.       She already has follow up in a month. We will discuss further. Advised her to call if she has issues or wishes to pursue rhythm control sooner.             As always, it has been a pleasure to participate in your patient's care.      Sincerely,         DALILA Johnson

## 2023-03-16 ENCOUNTER — ANTICOAGULATION VISIT (OUTPATIENT)
Dept: PHARMACY | Facility: HOSPITAL | Age: 73
End: 2023-03-16
Payer: MEDICARE

## 2023-03-16 DIAGNOSIS — I48.0 PAF (PAROXYSMAL ATRIAL FIBRILLATION): Primary | Chronic | ICD-10-CM

## 2023-03-16 LAB
INR PPP: 3.3 (ref 0.91–1.09)
PROTHROMBIN TIME: 39.1 SECONDS (ref 10–13.8)

## 2023-03-16 PROCEDURE — 85610 PROTHROMBIN TIME: CPT

## 2023-03-16 PROCEDURE — G0463 HOSPITAL OUTPT CLINIC VISIT: HCPCS

## 2023-03-16 PROCEDURE — 36416 COLLJ CAPILLARY BLOOD SPEC: CPT

## 2023-03-16 NOTE — PROGRESS NOTES
I have supervised and reviewed the notes, assessments, and/or procedures performed by our Pharmacy Resident. I concur with the documentation of this patient encounter.    Octavio Garnett, PharmD

## 2023-03-16 NOTE — PROGRESS NOTES
Anticoagulation Clinic Progress Note    Anticoagulation Summary  As of 3/16/2023    INR goal:  2.0-3.0   TTR:  57.6 % (2.1 y)   INR used for dosing:  3.3 (3/16/2023)   Warfarin maintenance plan:  2 mg every day; Starting 3/16/2023   Weekly warfarin total:  14 mg   Plan last modified:  Radha Carver RPH (7/29/2022)   Next INR check:  3/23/2023   Priority:  High   Target end date:      Indications    PAF (paroxysmal atrial fibrillation) (HCC) [I48.0]             Anticoagulation Episode Summary     INR check location:      Preferred lab:      Send INR reminders to:   [facility] [place]    Comments:  Previously apixaban (cost)      Anticoagulation Care Providers     Provider Role Specialty Phone number    Francesco Riojas MD Referring Cardiology [phone]          Clinic Interview:  Patient Findings     Positives:  Missed doses, Hospital admission    Negatives:  Signs/symptoms of thrombosis, Signs/symptoms of bleeding,   Laboratory test error suspected, Change in health, Change in alcohol use,   Change in activity, Upcoming invasive procedure, Emergency department   visit, Upcoming dental procedure, Extra doses, Change in medications,   Change in diet/appetite, Bruising, Other complaints    Comments:   Patient was hospitalized 3/4 with Afib. Did not receive   warfarin 3/4 due to hospitalization.       Clinical Outcomes     Negatives:  Major bleeding event, Thromboembolic event,   Anticoagulation-related hospital admission, Anticoagulation-related ED   visit, Anticoagulation-related fatality    Comments:   Patient was hospitalized 3/4 with Afib. Did not receive   warfarin 3/4 due to hospitalization.         INR History:  Anticoagulation Monitoring 1/26/2023 3/2/2023 3/16/2023   INR 2.8 3.6 3.3   INR Date 1/26/2023 3/2/2023 3/16/2023   INR Goal 2.0-3.0 2.0-3.0 2.0-3.0   Trend Same Same Same   Last Week Total 14 mg 14 mg 14 mg   Next Week Total 14 mg 12 mg 12 mg   Sun 2 mg 2 mg 2 mg   Mon 2 mg 2 mg 2  mg   Tue 2 mg 2 mg 2 mg   Wed 2 mg 2 mg 2 mg   Thu 2 mg Hold (3/2); Otherwise 2 mg Hold (3/16)   Fri 2 mg 2 mg 2 mg   Sat 2 mg 2 mg 2 mg   Visit Report - - -   Some recent data might be hidden       Plan:  1. INR is Supratherapeutic today- see above in Anticoagulation Summary.  Will instruct Ariadne Telles to hold 3/16, then Continue their warfarin regimen- see above in Anticoagulation Summary.  2. Follow up in 1 week  3. Patient declines warfarin refills.  4. Verbal and written information provided. Patient expresses understanding and has no further questions at this time.    Dora Brock, PharmD

## 2023-03-23 ENCOUNTER — ANTICOAGULATION VISIT (OUTPATIENT)
Dept: PHARMACY | Facility: HOSPITAL | Age: 73
End: 2023-03-23
Payer: MEDICARE

## 2023-03-23 DIAGNOSIS — I48.0 PAF (PAROXYSMAL ATRIAL FIBRILLATION): Primary | Chronic | ICD-10-CM

## 2023-03-23 LAB
INR PPP: 3.2 (ref 0.91–1.09)
PROTHROMBIN TIME: 38.1 SECONDS (ref 10–13.8)

## 2023-03-23 PROCEDURE — 36416 COLLJ CAPILLARY BLOOD SPEC: CPT

## 2023-03-23 PROCEDURE — G0463 HOSPITAL OUTPT CLINIC VISIT: HCPCS

## 2023-03-23 PROCEDURE — 85610 PROTHROMBIN TIME: CPT

## 2023-03-23 NOTE — PROGRESS NOTES
Anticoagulation Clinic Progress Note    Anticoagulation Summary  As of 3/23/2023    INR goal:  2.0-3.0   TTR:  57.0 % (2.1 y)   INR used for dosing:  3.2 (3/23/2023)   Warfarin maintenance plan:  1 mg every Mon, Fri; 2 mg all other days; Starting 3/23/2023   Weekly warfarin total:  12 mg   Plan last modified:  Ela Rodriguez, PharmD (3/23/2023)   Next INR check:  4/6/2023   Priority:  High   Target end date:      Indications    PAF (paroxysmal atrial fibrillation) (HCC) [I48.0]             Anticoagulation Episode Summary     INR check location:      Preferred lab:      [facility]    Comments:  Previously apixaban (cost)      Anticoagulation Care Providers     Provider Role Specialty Phone number    Francesco Riojas MD Referring Cardiology [phone]          Clinic Interview:  Patient Findings     Negatives:  Signs/symptoms of thrombosis, Signs/symptoms of bleeding,   Laboratory test error suspected, Change in health, Change in alcohol use,   Change in activity, Upcoming invasive procedure, Emergency department   visit, Upcoming dental procedure, Missed doses, Extra doses, Change in   medications, Change in diet/appetite, Hospital admission, Bruising, Other   complaints      Clinical Outcomes     Negatives:  Major bleeding event, Thromboembolic event,   Anticoagulation-related hospital admission, Anticoagulation-related ED   visit, Anticoagulation-related fatality        INR History:  Anticoagulation Monitoring 3/2/2023 3/16/2023 3/23/2023   INR 3.6 3.3 3.2   INR Date 3/2/2023 3/16/2023 3/23/2023   INR Goal 2.0-3.0 2.0-3.0 2.0-3.0   Trend Same Same Down   Last Week Total 14 mg 14 mg 12 mg   Next Week Total 12 mg 12 mg 12 mg   Sun 2 mg 2 mg 2 mg   Mon 2 mg 2 mg 1 mg   Tue 2 mg 2 mg 2 mg   Wed 2 mg 2 mg 2 mg   Thu Hold (3/2); Otherwise 2 mg Hold (3/16) 2 mg   Fri 2 mg 2 mg 1 mg   Sat 2 mg 2 mg 2 mg   Visit Report - - -   Some recent data might be hidden        Plan:  1. INR is Supratherapeutic today- see above in Anticoagulation Summary.  Will instruct Ariadne Telles to Change their warfarin regimen- see above in Anticoagulation Summary.  Decrease to 1 mg on Monday and Friday and 2 mg on all other days.  2. Follow up in 2 weeks  3. Patient declines warfarin refills.  4. Verbal and written information provided. Patient expresses understanding and has no further questions at this time.    Ela Rodriguez, PharmD

## 2023-03-28 DIAGNOSIS — E78.00 HYPERCHOLESTEROLEMIA: ICD-10-CM

## 2023-03-28 NOTE — TELEPHONE ENCOUNTER
Rx Refill Note  Requested Prescriptions     Pending Prescriptions Disp Refills   • atorvastatin (LIPITOR) 40 MG tablet [Pharmacy Med Name: ATORVASTATIN TAB 40MG] 90 tablet 1     Sig: TAKE 1 TABLET DAILY   • lisinopril (PRINIVIL,ZESTRIL) 20 MG tablet [Pharmacy Med Name: LISINOPRIL TAB 20MG] 90 tablet 1     Sig: TAKE 1 TABLET DAILY      Last office visit with prescribing clinician: 3/2/2023   Last telemedicine visit with prescribing clinician: 4/20/2023   Next office visit with prescribing clinician: 4/20/2023                         Would you like a call back once the refill request has been completed: [] Yes [] No    If the office needs to give you a call back, can they leave a voicemail: [] Yes [] No    Ba Hernández  03/28/23, 15:00 EDT

## 2023-03-29 RX ORDER — ATORVASTATIN CALCIUM 40 MG/1
TABLET, FILM COATED ORAL
Qty: 90 TABLET | Refills: 1 | Status: SHIPPED | OUTPATIENT
Start: 2023-03-29

## 2023-03-29 RX ORDER — LISINOPRIL 20 MG/1
TABLET ORAL
Qty: 90 TABLET | Refills: 1 | Status: SHIPPED | OUTPATIENT
Start: 2023-03-29

## 2023-04-06 ENCOUNTER — ANTICOAGULATION VISIT (OUTPATIENT)
Dept: PHARMACY | Facility: HOSPITAL | Age: 73
End: 2023-04-06
Payer: MEDICARE

## 2023-04-06 ENCOUNTER — OFFICE VISIT (OUTPATIENT)
Dept: CARDIOLOGY | Facility: CLINIC | Age: 73
End: 2023-04-06
Payer: MEDICARE

## 2023-04-06 VITALS
BODY MASS INDEX: 31.72 KG/M2 | HEART RATE: 60 BPM | WEIGHT: 168 LBS | HEIGHT: 61 IN | SYSTOLIC BLOOD PRESSURE: 130 MMHG | DIASTOLIC BLOOD PRESSURE: 84 MMHG

## 2023-04-06 DIAGNOSIS — I48.0 PAF (PAROXYSMAL ATRIAL FIBRILLATION): Primary | Chronic | ICD-10-CM

## 2023-04-06 LAB
INR PPP: 3.1 (ref 0.91–1.09)
PROTHROMBIN TIME: 37.5 SECONDS (ref 10–13.8)

## 2023-04-06 PROCEDURE — G0463 HOSPITAL OUTPT CLINIC VISIT: HCPCS

## 2023-04-06 PROCEDURE — 3075F SYST BP GE 130 - 139MM HG: CPT

## 2023-04-06 PROCEDURE — 85610 PROTHROMBIN TIME: CPT

## 2023-04-06 PROCEDURE — 36416 COLLJ CAPILLARY BLOOD SPEC: CPT

## 2023-04-06 PROCEDURE — 3079F DIAST BP 80-89 MM HG: CPT

## 2023-04-06 PROCEDURE — 99214 OFFICE O/P EST MOD 30 MIN: CPT

## 2023-04-06 PROCEDURE — 93000 ELECTROCARDIOGRAM COMPLETE: CPT

## 2023-04-06 NOTE — PROGRESS NOTES
Date of Office Visit: 2023  Encounter Provider: DALILA Johnson  Place of Service: [facility] CARDIOLOGY  Patient Name: Ariadne Telles  :1950    Chief Complaint   Patient presents with   • paroxsmal AFIB     6 month f/u   :     HPI: Ariadne Telles is a 72 y.o. female who follows with Dr. Lyn and Dr. Riojas. She has a history of paroxsymal atrial fibrillation--s/p PVI ().      I saw her in September of last year. She was doing well. Complained of occasional palpitations. We ordered a monitor which showed no evidence of AF. Continued her metoprolol and warfarin.      She presented to the ED last week with complaints of chest pressure and palpitations. She was noted to be in A fib with rates in the 140-150s. She was started on diltiazem and as her rate improved, her symptoms improved. She was discharged in A fib. Advised to follow up to discuss her A fib and further treatment.     I saw her a month later she was doing well. No recurrent AF but estimated that she had 3-4 episodes the months leading up to her admission. We discussed ablation vs. AAD but elected to monitor for now.                        She presents today for follow up appt.     Since I last saw her she estimates 2-3 episodes per week. She notes moderate symptoms including palpitations, dyspnea, and fatigue.     Feels like her AF is getting worse and becoming more frequent.     She has a lot of anxiety around her AF and feels like that makes it worse.     She has a high stress job and feels like that is contributing to her episodes.     Not on any AAD.     Had PVI in .     EKG shows NSR.     She is on metoprolol for rate control.     Warfarin for AC.         Past Medical History:   Diagnosis Date   • Atrial fibrillation with RVR    • Bradycardia 2016   • Carpal tunnel syndrome 2018   • Endometrial cancer    • Essential hypertension 2016   • Gastroesophageal reflux disease  "2016   • History of endometrial cancer 2017, S/P ANGEL, BSO.  Dr. Catracho Alejo.   • Hx of bone density study 2017, DEXA scan: Osteoporosis in L1, severe osteopenia in both femoral necks.  T-scores: L1, -2.5; L2, -1.9; L3, -0.7; L4, +0.3.  Right femoral neck, -2.1; total -1.8.  Left femoral neck, -2.3; total, -1.8.   • Hypercholesterolemia 2016   • Hyperlipidemia    • Kidney stone    • MVP (mitral valve prolapse)     last 2 echocardiograms showed no MVP in 2018 and     • Nausea & vomiting    • Osteopenia 2016, DEXA scan: Severe osteopenia in both femoral necks, T score= -2.1 in the right femoral neck, -2.3 in the left femoral neck.   • Osteoporosis 2017    DEXA scan, T score L1= -2.5   • PAF (paroxysmal atrial fibrillation)    • Pituitary adenoma 2018    Thought to have a pituitary microadenoma on previous studies but MRI of the pituitary with and without contrast, February 10, 2017: \"No convincing evidence to suggest a pituitary adenoma on this MRI study\".  \"Incidentally noted relatively mild changes of chronic small vessel ischemia phenomena.\"   • RBBB (right bundle branch block)    • Rectal polyp 2016   • Surgical menopause     ANGEL, BSO, for endometrial carcinoma.   • Vaginal delivery     x1  NITO, (and 2 C-sections, one stillbirth).   • Vertigo        Past Surgical History:   Procedure Laterality Date   • CARDIAC ELECTROPHYSIOLOGY PROCEDURE N/A 2021    Procedure: Ablation atrial fibrillation cryo;  Surgeon: Francesco Riojas MD;  Location: [facility] INVASIVE LOCATION;  Service: Cardiovascular;  Laterality: N/A;   • CARPAL TUNNEL RELEASE WITH CUBITAL TUNNEL RELEASE  2018    right   • CATARACT EXTRACTION Right 2016   •  SECTION      x2   one stillborn Ariadne Isbell   • COLONOSCOPY  2007    polyps  Dr. Rueda    • TOTAL ABDOMINAL HYSTERECTOMY WITH SALPINGO OOPHORECTOMY      Dr. Alejo       Social " History     Socioeconomic History   • Marital status:      Spouse name:    • Number of children: 2   Tobacco Use   • Smoking status: Never   • Smokeless tobacco: Never   Vaping Use   • Vaping Use: Never used   Substance and Sexual Activity   • Alcohol use: No     Comment: caffeine use seldom   • Drug use: No   • Sexual activity: Never     Birth control/protection: Surgical     Comment:        Family History   Problem Relation Age of Onset   • Heart disease Mother    • Heart attack Mother 67   • COPD Father    • Parkinsonism Father         ?   • Heart defect Sister          at 18   • Alcohol abuse Brother    • Diabetes Brother    • Heart disease Brother    • Hypertension Brother    • Hyperlipidemia Brother    • Other Daughter         stillborn   • Sleep apnea Son    • No Known Problems Maternal Grandmother    • No Known Problems Paternal Grandmother    • Breast cancer Maternal Aunt 75   • No Known Problems Paternal Aunt    • No Known Problems Maternal Grandfather    • No Known Problems Paternal Grandfather    • Kidney cancer Sister    • No Known Problems Son    • BRCA 1/2 Neg Hx    • Colon cancer Neg Hx    • Endometrial cancer Neg Hx    • Ovarian cancer Neg Hx        Review of Systems   Constitutional: Positive for malaise/fatigue. Negative for chills and fever.   Cardiovascular: Positive for dyspnea on exertion and palpitations. Negative for chest pain, leg swelling, near-syncope, orthopnea, paroxysmal nocturnal dyspnea and syncope.   Respiratory: Negative for cough and shortness of breath.    Hematologic/Lymphatic: Negative.    Musculoskeletal: Negative for joint pain, joint swelling and myalgias.   Gastrointestinal: Negative for abdominal pain, diarrhea, melena, nausea and vomiting.   Genitourinary: Negative for frequency and hematuria.   Neurological: Negative for light-headedness, numbness, paresthesias and seizures.   Allergic/Immunologic: Negative.    All other systems reviewed and are  "negative.      Allergies   Allergen Reactions   • Naproxen Hives and Swelling   • Paroxetine Other (See Comments)     TREMORS         Current Outpatient Medications:   •  alendronate (Fosamax) 70 MG tablet, Take 1 tablet by mouth Every 7 (Seven) Days., Disp: 12 tablet, Rfl: 3  •  amLODIPine (NORVASC) 5 MG tablet, Take 1 tablet by mouth Daily., Disp: 90 tablet, Rfl: 1  •  atorvastatin (LIPITOR) 40 MG tablet, TAKE 1 TABLET DAILY, Disp: 90 tablet, Rfl: 1  •  calcium carbonate-cholecalciferol 500-400 MG-UNIT tablet tablet, Take  by mouth Daily., Disp: , Rfl:   •  lisinopril (PRINIVIL,ZESTRIL) 20 MG tablet, TAKE 1 TABLET DAILY, Disp: 90 tablet, Rfl: 1  •  meclizine (ANTIVERT) 12.5 MG tablet, Take 1 tablet by mouth 3 (Three) Times a Day As Needed for Dizziness., Disp: 30 tablet, Rfl: 1  •  metoprolol tartrate (LOPRESSOR) 50 MG tablet, Take 1 tablet by mouth 2 (Two) Times a Day., Disp: 60 tablet, Rfl: 0  •  Multiple Vitamin (MULTIVITAMIN) capsule, Take  by mouth., Disp: , Rfl:   •  Multiple Vitamins-Minerals (OCUVITE EYE HEALTH FORMULA PO), Take 1 tablet by mouth 2 (Two) Times a Day., Disp: , Rfl:   •  warfarin (COUMADIN) 2 MG tablet, Take one tablet by mouth daily or as directed., Disp: 90 tablet, Rfl: 1      Objective:     Vitals:    04/06/23 0930   BP: 130/84   Pulse: 60   Weight: 76.2 kg (168 lb)   Height: 154.9 cm (61\")     Body mass index is 31.74 kg/m².    PHYSICAL EXAM:    Vitals Reviewed.   General Appearance: No acute distress, well developed and well nourished.   Eyes: Conjunctiva and lids: No erythema, swelling, or discharge. Sclera non-icteric.   HENT: Atraumatic, normocephalic. External eyes, ears, and nose normal.   Respiratory: No signs of respiratory distress. Respiration rhythm and depth normal.   Clear to auscultation. No rales, crackles, rhonchi, or wheezing auscultated.   Cardiovascular:  Heart Rate and Rhythm: Normal, Heart Sounds: Normal S1 and S2. No S3 or S4 noted.  Gastrointestinal:  Abdomen soft, " non-distended, non-tender.   Musculoskeletal: Normal movement of extremities  Skin: Warm and dry.   Psychiatric: Patient alert and oriented to person, place, and time. Speech and behavior appropriate. Normal mood and affect.       ECG 12 Lead    Date/Time: 4/6/2023 10:35 AM  Performed by: Surya Renee APRN  Authorized by: Surya Renee APRN   Comparison: compared with previous ECG   Similar to previous ECG  Rhythm: sinus rhythm  BPM: 60                Assessment:       Diagnosis Plan   1. PAF (paroxysmal atrial fibrillation)  Holter Monitor - 72 Hour Up To 15 Days             Plan:   1. PAF---s/p PVI (2021)--- she did very well without recurrent AF until last month. Now she estimates 2-3 episodes per week with moderate symptoms affecting her QOL. Given her frequency of episodes I recommended doing a monitor to better understand her AF burden and correlation with symptoms.     Will call with monitor results and further recommendations. Could consider AAD or ablation. Although she has RBBB so not sure she would tolerate 1C agent.         As always, it has been a pleasure to participate in your patient's care.      Sincerely,         DALILA Johnson   Normal rate, regular rhythm.  Heart sounds S1, S2.  No murmurs, rubs or gallops.

## 2023-04-06 NOTE — PROGRESS NOTES
Anticoagulation Clinic Progress Note    Anticoagulation Summary  As of 4/6/2023    INR goal:  2.0-3.0   TTR:  56.0 % (2.1 y)   INR used for dosing:  3.1 (4/6/2023)   Warfarin maintenance plan:  1 mg every Mon, Wed, Fri; 2 mg all other days; Starting 4/6/2023   Weekly warfarin total:  11 mg   Plan last modified:  He Gray, Pharmacy Intern (4/6/2023)   Next INR check:  4/21/2023   Priority:  High   Target end date:      Indications    PAF (paroxysmal atrial fibrillation) [I48.0]             Anticoagulation Episode Summary     INR check location:      Preferred lab:      Send [facility] reminders to:   [facility]    Comments:  Previously apixaban (cost)      Anticoagulation Care Providers     Provider Role Specialty Phone number    Francesco Riojas MD Referring Cardiology [phone]          Clinic Interview:  Patient Findings     Negatives:  Signs/symptoms of thrombosis, Signs/symptoms of bleeding,   Laboratory test error suspected, Change in health, Change in alcohol use,   Change in activity, Upcoming invasive procedure, Emergency department   visit, Upcoming dental procedure, Missed doses, Extra doses, Change in   medications, Change in diet/appetite, Hospital admission, Bruising, Other   complaints      Clinical Outcomes     Negatives:  Major bleeding event, Thromboembolic event,   Anticoagulation-related hospital admission, Anticoagulation-related ED   visit, Anticoagulation-related fatality        INR History:  Anticoagulation Monitoring 3/16/2023 3/23/2023 4/6/2023   INR 3.3 3.2 3.1   INR Date 3/16/2023 3/23/2023 4/6/2023   INR Goal 2.0-3.0 2.0-3.0 2.0-3.0   Trend Same Down Down   Last Week Total 14 mg 12 mg 12 mg   Next Week Total 12 mg 12 mg 11 mg   Sun 2 mg 2 mg 2 mg   Mon 2 mg 1 mg 1 mg   Tue 2 mg 2 mg 2 mg   Wed 2 mg 2 mg 1 mg   Thu Hold (3/16) 2 mg 2 mg   Fri 2 mg 1 mg 1 mg   Sat 2 mg 2 mg 2 mg   Visit Report - - -   Some recent data might be hidden       Plan:  1. INR is  Supratherapeutic today- see above in Anticoagulation Summary.  Will instruct Ariadne Telles to decrease their warfarin regimen to 1 mg MWF and 2 mg AOD- see above in Anticoagulation Summary.  2. Follow up in 2 weeks  3. Patient declines warfarin refills.  4. Verbal and written information provided. Patient expresses understanding and has no further questions at this time.    He Gray, Pharmacy Intern

## 2023-04-06 NOTE — PROGRESS NOTES
I have supervised and reviewed the notes, assessments, and/or procedures performed by our Pharmacy Intern. The documented assessment and plan were developed cooperatively, and the plan was implemented in my presence. I concur with the documentation of this patient encounter.    Octavio Garnett, PharmD

## 2023-04-20 ENCOUNTER — OFFICE VISIT (OUTPATIENT)
Dept: FAMILY MEDICINE CLINIC | Facility: CLINIC | Age: 73
End: 2023-04-20
Payer: MEDICARE

## 2023-04-20 VITALS
HEART RATE: 55 BPM | TEMPERATURE: 96.8 F | OXYGEN SATURATION: 98 % | HEIGHT: 61 IN | WEIGHT: 168.4 LBS | DIASTOLIC BLOOD PRESSURE: 71 MMHG | BODY MASS INDEX: 31.79 KG/M2 | SYSTOLIC BLOOD PRESSURE: 115 MMHG

## 2023-04-20 DIAGNOSIS — I10 ESSENTIAL HYPERTENSION: Primary | Chronic | ICD-10-CM

## 2023-04-20 DIAGNOSIS — E78.00 HYPERCHOLESTEROLEMIA: Chronic | ICD-10-CM

## 2023-04-20 DIAGNOSIS — M81.0 OSTEOPOROSIS, UNSPECIFIED OSTEOPOROSIS TYPE, UNSPECIFIED PATHOLOGICAL FRACTURE PRESENCE: Chronic | ICD-10-CM

## 2023-04-20 DIAGNOSIS — I48.0 PAF (PAROXYSMAL ATRIAL FIBRILLATION): Chronic | ICD-10-CM

## 2023-04-20 PROCEDURE — 3078F DIAST BP <80 MM HG: CPT | Performed by: FAMILY MEDICINE

## 2023-04-20 PROCEDURE — 1160F RVW MEDS BY RX/DR IN RCRD: CPT | Performed by: FAMILY MEDICINE

## 2023-04-20 PROCEDURE — 99213 OFFICE O/P EST LOW 20 MIN: CPT | Performed by: FAMILY MEDICINE

## 2023-04-20 PROCEDURE — 3074F SYST BP LT 130 MM HG: CPT | Performed by: FAMILY MEDICINE

## 2023-04-20 PROCEDURE — 1159F MED LIST DOCD IN RCRD: CPT | Performed by: FAMILY MEDICINE

## 2023-04-20 NOTE — PROGRESS NOTES
"Chief Complaint  Hypertension    Subjective        Ariadne Telles presents to [facility] PRIMARY [facility]ARE  History of Present Illness     Follow-up hypertension.  She continues amlodipine 5 mg a day, lisinopril 20 mg a day, and metoprolol that was recently increased by her cardiologist.  She was having some rapid A-fib symptoms reportedly.  She also continues on warfarin monitored by them and atorvastatin.  In addition continues alendronate prescribed by me.  She had tooth cracked recently.  She will be seeing her dentist soon.  I let her know that if she needs a dental implant, she may have to hold off on the alendronate for a few months.  Because of the very rare risk of jaw osteonecrosis.    Objective   Vital Signs:  /71   Pulse 55   Temp 96.8 °F (36 °C) (Temporal)   Ht 154.9 cm (61\")   Wt 76.4 kg (168 lb 6.4 oz)   SpO2 98%   BMI 31.82 kg/m²   Estimated body mass index is 31.82 kg/m² as calculated from the following:    Height as of this encounter: 154.9 cm (61\").    Weight as of this encounter: 76.4 kg (168 lb 6.4 oz).             Physical Exam  Vitals and nursing note reviewed.   Constitutional:       General: She is not in acute distress.     Appearance: She is well-developed.   Cardiovascular:      Rate and Rhythm: Normal rate and regular rhythm.      Heart sounds: Normal heart sounds.      Comments: Regular rate and rhythm today.  Pulmonary:      Effort: Pulmonary effort is normal.      Breath sounds: Normal breath sounds.   Musculoskeletal:      Cervical back: Normal range of motion.   Skin:     General: Skin is warm and dry.   Psychiatric:         Mood and Affect: Mood normal.        Result Review :  The following data was reviewed by: Nick Ariza MD on 04/20/2023:  Common labs        10/13/2022    08:40 3/4/2023    23:56 3/5/2023    07:56   Common Labs   Glucose 86   178      BUN 16   20      Creatinine 0.85   0.81      Sodium 142   143      Potassium 4.0   2.8   4.3   "   Chloride 103   104      Calcium 9.8   9.1      Total Protein 6.6       Albumin 4.20   4.0      Total Bilirubin 0.7   0.4      Alkaline Phosphatase 171   176      AST (SGOT) 14   10      ALT (SGPT) 13   10      WBC  11.46      Hemoglobin  14.5      Hematocrit  42.8      Platelets  319      Total Cholesterol 205       Triglycerides 196       HDL Cholesterol 46       LDL Cholesterol  124                      Assessment and Plan   Diagnoses and all orders for this visit:    1. Essential hypertension (Primary)  -     CBC & Differential; Future  -     Comprehensive Metabolic Panel; Future  -     Lipid Panel; Future    2. Hypercholesterolemia  -     CBC & Differential; Future  -     Comprehensive Metabolic Panel; Future  -     Lipid Panel; Future    3. PAF (paroxysmal atrial fibrillation)  -     CBC & Differential; Future  -     Comprehensive Metabolic Panel; Future  -     Lipid Panel; Future    4. Osteoporosis, unspecified osteoporosis type, unspecified pathological fracture presence      Focused visit today.  Hypertension.  Doing well.  She checks her blood pressure at home.  Not running high or low.  She otherwise feels well.  She had a brief ER visit for tachycardia.  She has a monitor on.  She otherwise feels well.  I will see her back in 6 months for annual Medicare wellness visit with lab work prior.         Follow Up   No follow-ups on file.  Patient was given instructions and counseling regarding her condition or for health maintenance advice. Please see specific information pulled into the AVS if appropriate.

## 2023-04-21 ENCOUNTER — ANTICOAGULATION VISIT (OUTPATIENT)
Dept: PHARMACY | Facility: HOSPITAL | Age: 73
End: 2023-04-21
Payer: MEDICARE

## 2023-04-21 DIAGNOSIS — I48.0 PAF (PAROXYSMAL ATRIAL FIBRILLATION): Primary | Chronic | ICD-10-CM

## 2023-04-21 LAB
INR PPP: 2 (ref 0.91–1.09)
PROTHROMBIN TIME: 23.8 SECONDS (ref 10–13.8)

## 2023-04-21 PROCEDURE — 85610 PROTHROMBIN TIME: CPT

## 2023-04-21 PROCEDURE — 36416 COLLJ CAPILLARY BLOOD SPEC: CPT

## 2023-04-21 NOTE — PROGRESS NOTES
Anticoagulation Clinic Progress Note    Anticoagulation Summary  As of 2023    INR goal:  2.0-3.0   TTR:  56.7 % (2.2 y)   INR used for dosin.0 (2023)   Warfarin maintenance plan:  1 mg every Mon, Wed, Fri; 2 mg all other days; Starting 2023   Weekly warfarin total:  11 mg   No change documented:  Tita Martinez   Plan last modified:  He Gray, Pharmacy Intern (2023)   Next INR check:  2023   Priority:  High   Target end date:      Indications    PAF (paroxysmal atrial fibrillation) [I48.0]             Anticoagulation Episode Summary     INR check location:      Preferred lab:      Send INR reminders to:  [facility]    Comments:  Previously apixaban (cost)      Anticoagulation Care Providers     Provider Role Specialty Phone number    Francesco Riojas MD Referring Cardiology [phone]          Clinic Interview:  Patient Findings     Negatives:  Signs/symptoms of thrombosis, Signs/symptoms of bleeding,   Laboratory test error suspected, Change in health, Change in alcohol use,   Change in activity, Upcoming invasive procedure, Emergency department   visit, Upcoming dental procedure, Missed doses, Extra doses, Change in   medications, Change in diet/appetite, Hospital admission, Bruising, Other   complaints      Clinical Outcomes     Negatives:  Major bleeding event, Thromboembolic event,   Anticoagulation-related hospital admission, Anticoagulation-related ED   visit, Anticoagulation-related fatality        INR History:      Latest Ref Rng & Units 2023     9:45 AM 3/2/2023    10:45 AM 3/4/2023    11:56 PM 3/16/2023     9:00 AM 3/23/2023     9:30 AM 2023    10:45 AM 2023     9:15 AM   Anticoagulation Monitoring   INR  2.8 3.6  3.3 3.2 3.1 2.0   INR Date  2023 3/2/2023  3/16/2023 3/23/2023 2023 2023   INR Goal  2.0-3.0 2.0-3.0  2.0-3.0 2.0-3.0 2.0-3.0 2.0-3.0   Trend  Same Same  Same Down Down Same   Last Week Total  14 mg  14 mg  14 mg 12 mg 12 mg 11 mg   Next Week Total  14 mg 12 mg  12 mg 12 mg 11 mg 11 mg   Sun  2 mg 2 mg  2 mg 2 mg 2 mg 2 mg   Mon  2 mg 2 mg  2 mg 1 mg 1 mg 1 mg   Tue  2 mg 2 mg  2 mg 2 mg 2 mg 2 mg   Wed  2 mg 2 mg  2 mg 2 mg 1 mg 1 mg   Thu  2 mg Hold (3/2); Otherwise 2 mg  Hold (3/16) 2 mg 2 mg 2 mg   Fri  2 mg 2 mg  2 mg 1 mg 1 mg 1 mg   Sat  2 mg 2 mg  2 mg 2 mg 2 mg 2 mg   Historical INR 0.90 - 1.10   2.13         Visit Report   Report    Report        Plan:  1. INR is therapeutic today- see above in Anticoagulation Summary.   Will instruct Ariadne Telles to continue their warfarin regimen- see above in Anticoagulation Summary.  2. Follow up in 2 weeks.  3. Patient declines warfarin refills.  4. Verbal and written information provided. Patient expresses understanding and has no further questions at this time.    Tita Martinez

## 2023-05-05 ENCOUNTER — ANTICOAGULATION VISIT (OUTPATIENT)
Dept: PHARMACY | Facility: HOSPITAL | Age: 73
End: 2023-05-05
Payer: MEDICARE

## 2023-05-05 DIAGNOSIS — I48.0 PAF (PAROXYSMAL ATRIAL FIBRILLATION): Primary | Chronic | ICD-10-CM

## 2023-05-05 LAB
INR PPP: 1.7 (ref 0.91–1.09)
PROTHROMBIN TIME: 20.6 SECONDS (ref 10–13.8)

## 2023-05-05 PROCEDURE — 85610 PROTHROMBIN TIME: CPT

## 2023-05-05 PROCEDURE — G0463 HOSPITAL OUTPT CLINIC VISIT: HCPCS

## 2023-05-05 PROCEDURE — 36416 COLLJ CAPILLARY BLOOD SPEC: CPT

## 2023-05-05 NOTE — PROGRESS NOTES
Anticoagulation Clinic Progress Note    Anticoagulation Summary  As of 2023    INR goal:  2.0-3.0   TTR:  55.7 % (2.2 y)   INR used for dosin.7 (2023)   Warfarin maintenance plan:  1 mg every Mon, Fri; 2 mg all other days; Starting 2023   Weekly warfarin total:  12 mg   Plan last modified:  Octavio Garnett, PharmD (2023)   Next INR check:  2023   Priority:  High   Target end date:      Indications    PAF (paroxysmal atrial fibrillation) [I48.0]             Anticoagulation Episode Summary     INR check location:      Preferred lab:      Send INR reminders to:   [facility]    Comments:  Previously apixaban (cost)      Anticoagulation Care Providers     Provider Role Specialty Phone number    Francesco Riojas MD Referring Cardiology [phone]          Clinic Interview:  Patient Findings     Negatives:  Signs/symptoms of thrombosis, Signs/symptoms of bleeding,   Laboratory test error suspected, Change in health, Change in alcohol use,   Change in activity, Upcoming invasive procedure, Emergency department   visit, Upcoming dental procedure, Missed doses, Extra doses, Change in   medications, Change in diet/appetite, Hospital admission, Bruising, Other   complaints      Clinical Outcomes     Negatives:  Major bleeding event, Thromboembolic event,   Anticoagulation-related hospital admission, Anticoagulation-related ED   visit, Anticoagulation-related fatality        INR History:      Latest Ref Rng & Units 3/2/2023    10:45 AM 3/4/2023    11:56 PM 3/16/2023     9:00 AM 3/23/2023     9:30 AM 2023    10:45 AM 2023     9:15 AM 2023     8:45 AM   Anticoagulation Monitoring   INR  3.6  3.3 3.2 3.1 2.0 1.7   INR Date  3/2/2023  3/16/2023 3/23/2023 2023 2023 2023   INR Goal  2.0-3.0  2.0-3.0 2.0-3.0 2.0-3.0 2.0-3.0 2.0-3.0   Trend  Same  Same Down Down Same Up   Last Week Total  14 mg  14 mg 12 mg 12 mg 11 mg 11 mg   Next Week Total  12 mg  12 mg 12 mg  11 mg 11 mg 13 mg   Sun  2 mg  2 mg 2 mg 2 mg 2 mg 2 mg   Mon  2 mg  2 mg 1 mg 1 mg 1 mg 1 mg   Tue  2 mg  2 mg 2 mg 2 mg 2 mg 2 mg   Wed  2 mg  2 mg 2 mg 1 mg 1 mg 2 mg   Thu  Hold (3/2); Otherwise 2 mg  Hold (3/16) 2 mg 2 mg 2 mg 2 mg   Fri  2 mg  2 mg 1 mg 1 mg 1 mg 2 mg (5/5); Otherwise 1 mg   Sat  2 mg  2 mg 2 mg 2 mg 2 mg 2 mg   Historical INR 0.90 - 1.10  2.13          Visit Report  Report    Report         Plan:  1. INR is Subtherapeutic today- see above in Anticoagulation Summary.  Will instruct Ariadne Telles to Increase their warfarin regimen (2 mg today, then increase to 1 mg MF, 2 mg all other days)- see above in Anticoagulation Summary.  2. Follow up in 2 weeks  3. Patient declines warfarin refills.  4. Verbal and written information provided. Patient expresses understanding and has no further questions at this time.    Octavio Garnett, PharmD

## 2023-05-12 RX ORDER — METOPROLOL TARTRATE 50 MG/1
50 TABLET, FILM COATED ORAL 2 TIMES DAILY
Qty: 180 TABLET | Refills: 3 | Status: SHIPPED | OUTPATIENT
Start: 2023-05-12

## 2023-05-19 ENCOUNTER — ANTICOAGULATION VISIT (OUTPATIENT)
Dept: PHARMACY | Facility: HOSPITAL | Age: 73
End: 2023-05-19
Payer: MEDICARE

## 2023-05-19 DIAGNOSIS — I48.0 PAF (PAROXYSMAL ATRIAL FIBRILLATION): Primary | Chronic | ICD-10-CM

## 2023-05-19 LAB
INR PPP: 2.2 (ref 0.91–1.09)
PROTHROMBIN TIME: 26.8 SECONDS (ref 10–13.8)

## 2023-05-19 PROCEDURE — 85610 PROTHROMBIN TIME: CPT

## 2023-05-19 PROCEDURE — 36416 COLLJ CAPILLARY BLOOD SPEC: CPT

## 2023-05-19 NOTE — PROGRESS NOTES
Anticoagulation Clinic Progress Note    Anticoagulation Summary  As of 2023    INR goal:  2.0-3.0   TTR:  55.4 % (2.2 y)   INR used for dosin.2 (2023)   Warfarin maintenance plan:  1 mg every Mon, Fri; 2 mg all other days; Starting 2023   Weekly warfarin total:  12 mg   No change documented:  Radha Carver RPH   Plan last modified:  Octavio Garnett, PharmD (2023)   Next INR check:  2023   Priority:  High   Target end date:      Indications    PAF (paroxysmal atrial fibrillation) [I48.0]             Anticoagulation Episode Summary     INR check location:      Preferred lab:      Send INR reminders to:   [facility] [place]    Comments:  Previously apixaban (cost)      Anticoagulation Care Providers     Provider Role Specialty Phone number    Francesco Riojas MD Referring Cardiology [phone]          Clinic Interview:  Patient Findings     Negatives:  Signs/symptoms of thrombosis, Signs/symptoms of bleeding,   Laboratory test error suspected, Change in health, Change in alcohol use,   Change in activity, Upcoming invasive procedure, Emergency department   visit, Upcoming dental procedure, Missed doses, Extra doses, Change in   medications, Change in diet/appetite, Hospital admission, Bruising, Other   complaints      Clinical Outcomes     Negatives:  Major bleeding event, Thromboembolic event,   Anticoagulation-related hospital admission, Anticoagulation-related ED   visit, Anticoagulation-related fatality        INR History:      Latest Ref Rng & Units 3/4/2023    11:56 PM 3/16/2023     9:00 AM 3/23/2023     9:30 AM 2023    10:45 AM 2023     9:15 AM 2023     8:45 AM 2023     8:45 AM   Anticoagulation Monitoring   INR   3.3 3.2 3.1 2.0 1.7 2.2   INR Date   3/16/2023 3/23/2023 2023 2023 2023 2023   INR Goal   2.0-3.0 2.0-3.0 2.0-3.0 2.0-3.0 2.0-3.0 2.0-3.0   Trend   Same Down Down Same Up Same   Last Week Total   14 mg 12 mg 12 mg 11 mg  11 mg 12 mg   Next Week Total   12 mg 12 mg 11 mg 11 mg 13 mg 12 mg   Sun   2 mg 2 mg 2 mg 2 mg 2 mg 2 mg   Mon   2 mg 1 mg 1 mg 1 mg 1 mg 1 mg   Tue   2 mg 2 mg 2 mg 2 mg 2 mg 2 mg   Wed   2 mg 2 mg 1 mg 1 mg 2 mg 2 mg   Thu   Hold (3/16) 2 mg 2 mg 2 mg 2 mg 2 mg   Fri   2 mg 1 mg 1 mg 1 mg 2 mg (5/5); Otherwise 1 mg 1 mg   Sat   2 mg 2 mg 2 mg 2 mg 2 mg 2 mg   Historical INR 0.90 - 1.10 2.13           Visit Report     Report          Plan:  1. INR is Therapeutic today- see above in Anticoagulation Summary.  Will instruct Ariadne Telles to Continue their warfarin regimen- see above in Anticoagulation Summary.  2. Follow up in 1 month  3. Patient declines warfarin refills.  4. Verbal and written information provided. Patient expresses understanding and has no further questions at this time.    Radha Carver [facility]

## 2023-06-02 ENCOUNTER — ANTICOAGULATION VISIT (OUTPATIENT)
Dept: PHARMACY | Facility: HOSPITAL | Age: 73
End: 2023-06-02

## 2023-06-02 DIAGNOSIS — I48.0 PAF (PAROXYSMAL ATRIAL FIBRILLATION): Primary | Chronic | ICD-10-CM

## 2023-06-02 LAB
INR PPP: 2.6 (ref 0.91–1.09)
PROTHROMBIN TIME: 30.8 SECONDS (ref 10–13.8)

## 2023-06-02 PROCEDURE — 85610 PROTHROMBIN TIME: CPT

## 2023-06-02 PROCEDURE — 36416 COLLJ CAPILLARY BLOOD SPEC: CPT

## 2023-06-02 NOTE — PROGRESS NOTES
Anticoagulation Clinic Progress Note    Anticoagulation Summary  As of 2023    INR goal:  2.0-3.0   TTR:  56.2 % (2.3 y)   INR used for dosin.6 (2023)   Warfarin maintenance plan:  1 mg every Mon, Fri; 2 mg all other days; Starting 2023   Weekly warfarin total:  12 mg   No change documented:  Li Montoya, Pharmacy Intern   Plan last modified:  Octavio Garnett, PharmD (2023)   Next INR check:  2023   Priority:  High   Target end date:      Indications    PAF (paroxysmal atrial fibrillation) [I48.0]             Anticoagulation Episode Summary     INR check location:      Preferred lab:      Send INR reminders to:  [facility]    Comments:  Previously apixaban (cost)      Anticoagulation Care Providers     Provider Role Specialty Phone number    Francesco Riojas MD Referring Cardiology [phone]          Clinic Interview:  Patient Findings     Negatives:  Signs/symptoms of thrombosis, Signs/symptoms of bleeding,   Laboratory test error suspected, Change in health, Change in alcohol use,   Change in activity, Upcoming invasive procedure, Emergency department   visit, Upcoming dental procedure, Missed doses, Extra doses, Change in   medications, Change in diet/appetite, Hospital admission, Bruising, Other   complaints      Clinical Outcomes     Negatives:  Major bleeding event, Thromboembolic event,   Anticoagulation-related hospital admission, Anticoagulation-related ED   visit, Anticoagulation-related fatality        INR History:      3/16/2023     9:00 AM 3/23/2023     9:30 AM 2023    10:45 AM 2023     9:15 AM 2023     8:45 AM 2023     8:45 AM 2023     9:15 AM   Anticoagulation Monitoring   INR 3.3 3.2 3.1 2.0 1.7 2.2 2.6   INR Date 3/16/2023 3/23/2023 2023 2023 2023 2023 2023   INR Goal 2.0-3.0 2.0-3.0 2.0-3.0 2.0-3.0 2.0-3.0 2.0-3.0 2.0-3.0   Trend Same Down Down Same Up Same Same   Last Week Total 14 mg 12 mg 12 mg 11 mg  11 mg 12 mg 12 mg   Next Week Total 12 mg 12 mg 11 mg 11 mg 13 mg 12 mg 12 mg   Sun 2 mg 2 mg 2 mg 2 mg 2 mg 2 mg 2 mg   Mon 2 mg 1 mg 1 mg 1 mg 1 mg 1 mg 1 mg   Tue 2 mg 2 mg 2 mg 2 mg 2 mg 2 mg 2 mg   Wed 2 mg 2 mg 1 mg 1 mg 2 mg 2 mg 2 mg   Thu Hold (3/16) 2 mg 2 mg 2 mg 2 mg 2 mg 2 mg   Fri 2 mg 1 mg 1 mg 1 mg 2 mg (5/5); Otherwise 1 mg 1 mg 1 mg   Sat 2 mg 2 mg 2 mg 2 mg 2 mg 2 mg 2 mg   Visit Report   Report           Plan:  1. INR is Therapeutic today- see above in Anticoagulation Summary.  Will instruct Ariadne Telles to Continue their warfarin regimen- see above in Anticoagulation Summary.  2. Follow up in 4 weeks  3. Patient declines warfarin refills.  4. Verbal and written information provided. Patient expresses understanding and has no further questions at this time.    Li Montoya, Pharmacy Intern

## 2023-07-10 ENCOUNTER — TELEPHONE (OUTPATIENT)
Dept: FAMILY MEDICINE CLINIC | Facility: CLINIC | Age: 73
End: 2023-07-10
Payer: MEDICARE

## 2023-07-28 ENCOUNTER — TRANSCRIBE ORDERS (OUTPATIENT)
Dept: ADMINISTRATIVE | Facility: HOSPITAL | Age: 73
End: 2023-07-28
Payer: MEDICARE

## 2023-07-28 ENCOUNTER — ANTICOAGULATION VISIT (OUTPATIENT)
Dept: PHARMACY | Facility: HOSPITAL | Age: 73
End: 2023-07-28
Payer: MEDICARE

## 2023-07-28 DIAGNOSIS — I48.0 PAF (PAROXYSMAL ATRIAL FIBRILLATION): Primary | Chronic | ICD-10-CM

## 2023-07-28 DIAGNOSIS — Z12.31 SCREENING MAMMOGRAM FOR BREAST CANCER: Primary | ICD-10-CM

## 2023-07-28 LAB
INR PPP: 3.1 (ref 0.91–1.09)
PROTHROMBIN TIME: 36.8 SECONDS (ref 10–13.8)

## 2023-07-28 PROCEDURE — 36416 COLLJ CAPILLARY BLOOD SPEC: CPT

## 2023-07-28 PROCEDURE — 85610 PROTHROMBIN TIME: CPT

## 2023-07-28 RX ORDER — WARFARIN SODIUM 2 MG/1
TABLET ORAL
Qty: 80 TABLET | Refills: 1 | Status: SHIPPED | OUTPATIENT
Start: 2023-07-28

## 2023-07-28 NOTE — PROGRESS NOTES
I have supervised and reviewed the notes, assessments, and/or procedures performed by our Pharmacy Intern. The documented assessment and plan were developed cooperatively, and the plan was implemented in my presence. I concur with the documentation of this patient encounter.    Radha Carver [facility]

## 2023-07-28 NOTE — PROGRESS NOTES
Anticoagulation Clinic Progress Note    Anticoagulation Summary  As of 7/28/2023      INR goal:  2.0-3.0   TTR:  58.6 % (2.4 y)   INR used for dosing:  3.1 (7/28/2023)   Warfarin maintenance plan:  1 mg every Mon, Fri; 2 mg all other days   Weekly warfarin total:  12 mg   No change documented:  Vanessa Wilde, Pharmacy Intern   Plan last modified:  Octavio Garnett, PharmD (5/5/2023)   Next INR check:  8/25/2023   Priority:  High   Target end date:      Indications    PAF (paroxysmal atrial fibrillation) [I48.0]                 Anticoagulation Episode Summary       INR check location:      Preferred lab:      Send INR reminders to:   [facility]    Comments:  Previously apixaban (cost)          Anticoagulation Care Providers       Provider Role Specialty Phone number    Francesco Riojas MD Referring Cardiology [phone]            Clinic Interview:  Patient Findings     Negatives:  Signs/symptoms of thrombosis, Signs/symptoms of bleeding,   Laboratory test error suspected, Change in health, Change in alcohol use,   Change in activity, Upcoming invasive procedure, Emergency department   visit, Upcoming dental procedure, Missed doses, Extra doses, Change in   medications, Change in diet/appetite, Hospital admission, Bruising, Other   complaints      Clinical Outcomes     Negatives:  Major bleeding event, Thromboembolic event,   Anticoagulation-related hospital admission, Anticoagulation-related ED   visit, Anticoagulation-related fatality        INR History:      4/6/2023    10:45 AM 4/21/2023     9:15 AM 5/5/2023     8:45 AM 5/19/2023     8:45 AM 6/2/2023     9:15 AM 6/30/2023     8:45 AM 7/28/2023     8:45 AM   Anticoagulation Monitoring   INR 3.1 2.0 1.7 2.2 2.6 2.1 3.1   INR Date 4/6/2023 4/21/2023 5/5/2023 5/19/2023 6/2/2023 6/30/2023 7/28/2023   INR Goal 2.0-3.0 2.0-3.0 2.0-3.0 2.0-3.0 2.0-3.0 2.0-3.0 2.0-3.0   Trend Down Same Up Same Same Same Same   Last Week Total 12 mg 11 mg 11 mg 12  mg 12 mg 12 mg 12 mg   Next Week Total 11 mg 11 mg 13 mg 12 mg 12 mg 12 mg 12 mg   Sun 2 mg 2 mg 2 mg 2 mg 2 mg 2 mg 2 mg   Mon 1 mg 1 mg 1 mg 1 mg 1 mg 1 mg 1 mg   Tue 2 mg 2 mg 2 mg 2 mg 2 mg 2 mg 2 mg   Wed 1 mg 1 mg 2 mg 2 mg 2 mg 2 mg 2 mg   Thu 2 mg 2 mg 2 mg 2 mg 2 mg 2 mg 2 mg   Fri 1 mg 1 mg 2 mg (5/5); Otherwise 1 mg 1 mg 1 mg 1 mg 1 mg   Sat 2 mg 2 mg 2 mg 2 mg 2 mg 2 mg 2 mg   Visit Report Report             Plan:  1. INR is Supratherapeutic today- see above in Anticoagulation Summary.  Will instruct Ariadne Sommersnbcolten to Continue their warfarin regimen- see above in Anticoagulation Summary.  2. Follow up in 4 weeks  3. Patient desires warfarin refills.  4. Verbal and written information provided. Patient expresses understanding and has no further questions at this time.    Vanessa Wilde, Pharmacy Intern

## 2023-08-25 ENCOUNTER — ANTICOAGULATION VISIT (OUTPATIENT)
Dept: PHARMACY | Facility: HOSPITAL | Age: 73
End: 2023-08-25
Payer: MEDICARE

## 2023-08-25 DIAGNOSIS — I48.0 PAF (PAROXYSMAL ATRIAL FIBRILLATION): Primary | Chronic | ICD-10-CM

## 2023-08-25 LAB
INR PPP: 2.6 (ref 0.91–1.09)
PROTHROMBIN TIME: 31 SECONDS (ref 10–13.8)

## 2023-08-25 PROCEDURE — 85610 PROTHROMBIN TIME: CPT

## 2023-08-25 PROCEDURE — 36416 COLLJ CAPILLARY BLOOD SPEC: CPT

## 2023-08-25 NOTE — PROGRESS NOTES
Anticoagulation Clinic Progress Note    Anticoagulation Summary  As of 2023      INR goal:  2.0-3.0   TTR:  59.3 % (2.5 y)   INR used for dosin.6 (2023)   Warfarin maintenance plan:  1 mg every Mon, Fri; 2 mg all other days   Weekly warfarin total:  12 mg   No change documented:  Scot Ayala, Pharmacy Intern   Plan last modified:  Octavio Garnett, PharmD (2023)   Next INR check:  2023   Priority:  High   Target end date:      Indications    PAF (paroxysmal atrial fibrillation) [I48.0]                 Anticoagulation Episode Summary       INR check location:      Preferred lab:      [facility]ers [facility]   [facility]    Comments:  Previously apixaban (cost)          Anticoagulation Care Providers       Provider Role Specialty Phone number    Francesco Riojas MD Referring Cardiology [phone]            Clinic Interview:  Patient Findings     Negatives:  Signs/symptoms of thrombosis, Signs/symptoms of bleeding,   Laboratory test error suspected, Change in health, Change in alcohol use,   Change in activity, Upcoming invasive procedure, Emergency department   visit, Upcoming dental procedure, Missed doses, Extra doses, Change in   medications, Change in diet/appetite, Hospital admission, Bruising, Other   complaints      Clinical Outcomes     Negatives:  Major bleeding event, Thromboembolic event,   Anticoagulation-related hospital admission, Anticoagulation-related ED   visit, Anticoagulation-related fatality        INR History:      2023     9:15 AM 2023     8:45 AM 2023     8:45 AM 2023     9:15 AM 2023     8:45 AM 2023     8:45 AM 2023     8:45 AM   Anticoagulation Monitoring   INR 2.0 1.7 2.2 2.6 2.1 3.1 2.6   INR Date 2023   INR Goal 2.0-3.0 2.0-3.0 2.0-3.0 2.0-3.0 2.0-3.0 2.0-3.0 2.0-3.0   Trend Same Up Same Same Same Same Same   Last Week Total 11 mg 11 mg 12 mg 12  mg 12 mg 12 mg 12 mg   Next Week Total 11 mg 13 mg 12 mg 12 mg 12 mg 12 mg 12 mg   Sun 2 mg 2 mg 2 mg 2 mg 2 mg 2 mg 2 mg   Mon 1 mg 1 mg 1 mg 1 mg 1 mg 1 mg 1 mg   Tue 2 mg 2 mg 2 mg 2 mg 2 mg 2 mg 2 mg   Wed 1 mg 2 mg 2 mg 2 mg 2 mg 2 mg 2 mg   Thu 2 mg 2 mg 2 mg 2 mg 2 mg 2 mg 2 mg   Fri 1 mg 2 mg (5/5); Otherwise 1 mg 1 mg 1 mg 1 mg 1 mg 1 mg   Sat 2 mg 2 mg 2 mg 2 mg 2 mg 2 mg 2 mg       Plan:  1. INR is therapeutic today- see above in Anticoagulation Summary.   Will instruct Ariadne Telles to continue their warfarin regimen- see above in Anticoagulation Summary.  2. Follow up in 4 weeks.  3. Patient declines warfarin refills.  4. Verbal and written information provided. Patient expresses understanding and has no further questions at this time.    Scot Ayala, Pharmacy Intern

## 2023-08-30 RX ORDER — AMLODIPINE BESYLATE 5 MG/1
TABLET ORAL
Qty: 90 TABLET | Refills: 1 | Status: SHIPPED | OUTPATIENT
Start: 2023-08-30

## 2023-08-30 NOTE — TELEPHONE ENCOUNTER
Rx Refill Note  Requested Prescriptions     Pending Prescriptions Disp Refills    amLODIPine (NORVASC) 5 MG tablet [Pharmacy Med Name: amLODIPine BESYLATE 5 MG TAB] 90 tablet 1     Sig: TAKE ONE TABLET BY MOUTH DAILY      Last office visit with prescribing clinician: 7/6/2023   Last telemedicine visit with prescribing clinician: Visit date not found   Next office visit with prescribing clinician: 11/1/2023                         Would you like a call back once the refill request has been completed: [] Yes [] No    If the office needs to give you a call back, can they leave a voicemail: [] Yes [] No    Ba Hernández  08/30/23, 08:13 EDT

## 2023-09-07 NOTE — TELEPHONE ENCOUNTER
Caller: Ariadne Telles    Relationship: Self    Best call back number: [phone]     What medication are you requesting: meclizine (ANTIVERT) 12.5 MG tablet     What are your current symptoms: PATIENT TAKES AS NEEDED. HUB COULDN'T FIND ON ACTIVE MED LIST. PATIENT IS COMPLETELY OUT.    If a prescription is needed, what is your preferred pharmacy and phone number: [facility] [id number] - [facility] [address] [place] [address] RD AT [facility] [address].  [phone] [facility] [phone]

## 2023-09-08 RX ORDER — MECLIZINE HCL 12.5 MG/1
12.5 TABLET ORAL 3 TIMES DAILY PRN
Qty: 21 TABLET | Refills: 0 | Status: SHIPPED | OUTPATIENT
Start: 2023-09-08

## 2023-09-14 ENCOUNTER — HOSPITAL ENCOUNTER (OUTPATIENT)
Dept: MAMMOGRAPHY | Facility: HOSPITAL | Age: 73
Discharge: HOME | End: 2023-09-14
Admitting: FAMILY MEDICINE
Payer: MEDICARE

## 2023-09-14 DIAGNOSIS — Z12.31 SCREENING MAMMOGRAM FOR BREAST CANCER: ICD-10-CM

## 2023-09-14 PROCEDURE — 77067 SCR MAMMO BI INCL CAD: CPT

## 2023-09-14 PROCEDURE — 77063 BREAST TOMOSYNTHESIS BI: CPT

## 2023-09-18 RX ORDER — ALENDRONATE SODIUM 70 MG/1
TABLET ORAL
Qty: 12 TABLET | Refills: 3 | Status: SHIPPED | OUTPATIENT
Start: 2023-09-18

## 2023-09-22 ENCOUNTER — ANTICOAGULATION VISIT (OUTPATIENT)
Dept: PHARMACY | Facility: HOSPITAL | Age: 73
End: 2023-09-22
Payer: MEDICARE

## 2023-09-22 DIAGNOSIS — I48.0 PAF (PAROXYSMAL ATRIAL FIBRILLATION): Primary | Chronic | ICD-10-CM

## 2023-09-22 LAB
INR PPP: 3.8 (ref 0.91–1.09)
PROTHROMBIN TIME: 45.6 SECONDS (ref 10–13.8)

## 2023-09-22 PROCEDURE — G0463 HOSPITAL OUTPT CLINIC VISIT: HCPCS

## 2023-09-22 PROCEDURE — 36416 COLLJ CAPILLARY BLOOD SPEC: CPT

## 2023-09-22 PROCEDURE — 85610 PROTHROMBIN TIME: CPT

## 2023-09-22 NOTE — PROGRESS NOTES
Anticoagulation Clinic Progress Note    Anticoagulation Summary  As of 9/22/2023      INR goal:  2.0-3.0   TTR:  58.5 % (2.6 y)   INR used for dosing:  3.8 (9/22/2023)   Warfarin maintenance plan:  1 mg every Mon, Fri; 2 mg all other days   Weekly warfarin total:  12 mg   Plan last modified:  Octavio Garnett, PharmD (5/5/2023)   Next INR check:  10/6/2023   Priority:  High   Target end date:      Indications    PAF (paroxysmal atrial fibrillation) [I48.0]                 Anticoagulation Episode Summary       INR check location:      Preferred lab:      Send INR reminders to:   [facility]    Comments:  Previously apixaban (cost)          Anticoagulation Care Providers       Provider Role Specialty Phone number    Francesco Riojas MD Referring Cardiology [phone]            Clinic Interview:  Patient Findings     Positives:  Change in medications    Negatives:  Signs/symptoms of thrombosis, Signs/symptoms of bleeding,   Laboratory test error suspected, Change in health, Change in alcohol use,   Change in activity, Upcoming invasive procedure, Emergency department   visit, Upcoming dental procedure, Missed doses, Extra doses, Change in   diet/appetite, Hospital admission, Bruising, Other complaints    Comments:  Patient stated she started taking meclizine or over the   counter bonine daily for vertigo about 3 weeks ago. Has not taken a dose   since 9/18/23.      Clinical Outcomes     Negatives:  Major bleeding event, Thromboembolic event,   Anticoagulation-related hospital admission, Anticoagulation-related ED   visit, Anticoagulation-related fatality    Comments:  Patient stated she started taking meclizine or over the   counter bonine daily for vertigo about 3 weeks ago. Has not taken a dose   since 9/18/23.        INR History:      5/5/2023     8:45 AM 5/19/2023     8:45 AM 6/2/2023     9:15 AM 6/30/2023     8:45 AM 7/28/2023     8:45 AM 8/25/2023     8:45 AM 9/22/2023     8:30 AM    Anticoagulation Monitoring   INR 1.7 2.2 2.6 2.1 3.1 2.6 3.8   INR Date 5/5/2023 5/19/2023 6/2/2023 6/30/2023 7/28/2023 8/25/2023 9/22/2023   INR Goal 2.0-3.0 2.0-3.0 2.0-3.0 2.0-3.0 2.0-3.0 2.0-3.0 2.0-3.0   Trend Up Same Same Same Same Same Same   Last Week Total 11 mg 12 mg 12 mg 12 mg 12 mg 12 mg 12 mg   Next Week Total 13 mg 12 mg 12 mg 12 mg 12 mg 12 mg 11 mg   Sun 2 mg 2 mg 2 mg 2 mg 2 mg 2 mg 2 mg   Mon 1 mg 1 mg 1 mg 1 mg 1 mg 1 mg 1 mg   Tue 2 mg 2 mg 2 mg 2 mg 2 mg 2 mg 2 mg   Wed 2 mg 2 mg 2 mg 2 mg 2 mg 2 mg 2 mg   Thu 2 mg 2 mg 2 mg 2 mg 2 mg 2 mg 2 mg   Fri 2 mg (5/5); Otherwise 1 mg 1 mg 1 mg 1 mg 1 mg 1 mg Hold (9/22); Otherwise 1 mg   Sat 2 mg 2 mg 2 mg 2 mg 2 mg 2 mg 2 mg       Plan:  1. INR is Supratherapeutic today- see above in Anticoagulation Summary.  Will instruct Ariadne Telles to Change their warfarin regimen- see above in Anticoagulation Summary.  Due to increased INR, instructed patient to hold warfarin dose today (9/22/23), restart regimen tomorrow (9/23/23).   2. Follow up in 2 weeks  3. Patient declines warfarin refills.  4. Verbal and written information provided. Patient expresses understanding and has no further questions at this time.    Regine Landry, Pharmacy Intern

## 2023-10-02 DIAGNOSIS — E78.00 HYPERCHOLESTEROLEMIA: ICD-10-CM

## 2023-10-02 RX ORDER — LISINOPRIL 20 MG/1
TABLET ORAL
Qty: 90 TABLET | Refills: 1 | Status: SHIPPED | OUTPATIENT
Start: 2023-10-02

## 2023-10-02 RX ORDER — ATORVASTATIN CALCIUM 40 MG/1
TABLET, FILM COATED ORAL
Qty: 90 TABLET | Refills: 1 | Status: SHIPPED | OUTPATIENT
Start: 2023-10-02

## 2023-10-06 ENCOUNTER — ANTICOAGULATION VISIT (OUTPATIENT)
Dept: PHARMACY | Facility: HOSPITAL | Age: 73
End: 2023-10-06
Payer: MEDICARE

## 2023-10-06 DIAGNOSIS — I48.0 PAF (PAROXYSMAL ATRIAL FIBRILLATION): Primary | Chronic | ICD-10-CM

## 2023-10-06 LAB
INR PPP: 3.4 (ref 0.91–1.09)
PROTHROMBIN TIME: 40.8 SECONDS (ref 10–13.8)

## 2023-10-06 PROCEDURE — G0463 HOSPITAL OUTPT CLINIC VISIT: HCPCS

## 2023-10-06 PROCEDURE — 36416 COLLJ CAPILLARY BLOOD SPEC: CPT

## 2023-10-06 PROCEDURE — 85610 PROTHROMBIN TIME: CPT

## 2023-10-06 NOTE — PROGRESS NOTES
Anticoagulation Clinic Progress Note    Anticoagulation Summary  As of 10/6/2023      INR goal:  2.0-3.0   TTR:  57.6 % (2.6 y)   INR used for dosing:  3.4 (10/6/2023)   Warfarin maintenance plan:  1 mg every Mon, Wed, Fri; 2 mg all other days   Weekly warfarin total:  11 mg   Plan last modified:  Regine Landry, Pharmacy Intern (10/6/2023)   Next INR check:  10/20/2023   Priority:  High   Target end date:      Indications    PAF (paroxysmal atrial fibrillation) [I48.0]                 Anticoagulation Episode Summary       INR check location:      Preferred lab:      Send INR reminders to:   [facility]    Comments:  Previously apixaban (cost)          Anticoagulation Care Providers       Provider Role Specialty Phone number    [place] Referring Cardiology [phone]            Clinic Interview:  Patient Findings     Positives:  Change in medications    Negatives:  Signs/symptoms of thrombosis, Signs/symptoms of bleeding,   Laboratory test error suspected, Change in health, Change in alcohol use,   Change in activity, Upcoming invasive procedure, Emergency department   visit, Upcoming dental procedure, Missed doses, Extra doses, Change in   diet/appetite, Hospital admission, Bruising, Other complaints    Comments:  Patient reports she has stopped meclizine/OTC bonine.      Clinical Outcomes     Negatives:  Major bleeding event, Thromboembolic event,   Anticoagulation-related hospital admission, Anticoagulation-related ED   visit, Anticoagulation-related fatality    Comments:  Patient reports she has stopped meclizine/OTC bonine.        INR History:      5/19/2023     8:45 AM 6/2/2023     9:15 AM 6/30/2023     8:45 AM 7/28/2023     8:45 AM 8/25/2023     8:45 AM 9/22/2023     8:30 AM 10/6/2023     9:00 AM   Anticoagulation Monitoring   INR 2.2 2.6 2.1 3.1 2.6 3.8 3.4   INR Date 5/19/2023 6/2/2023 6/30/2023 7/28/2023 8/25/2023 9/22/2023 10/6/2023   INR Goal 2.0-3.0 2.0-3.0 2.0-3.0  2.0-3.0 2.0-3.0 2.0-3.0 2.0-3.0   Trend Same Same Same Same Same Same Down   Last Week Total 12 mg 12 mg 12 mg 12 mg 12 mg 12 mg 12 mg   Next Week Total 12 mg 12 mg 12 mg 12 mg 12 mg 11 mg 10 mg   Sun 2 mg 2 mg 2 mg 2 mg 2 mg 2 mg 2 mg   Mon 1 mg 1 mg 1 mg 1 mg 1 mg 1 mg 1 mg   Tue 2 mg 2 mg 2 mg 2 mg 2 mg 2 mg 2 mg   Wed 2 mg 2 mg 2 mg 2 mg 2 mg 2 mg 1 mg   Thu 2 mg 2 mg 2 mg 2 mg 2 mg 2 mg 2 mg   Fri 1 mg 1 mg 1 mg 1 mg 1 mg Hold (9/22); Otherwise 1 mg Hold (10/6); Otherwise 1 mg   Sat 2 mg 2 mg 2 mg 2 mg 2 mg 2 mg 2 mg       Plan:  1. INR is Supratherapeutic today- see above in Anticoagulation Summary.  Will instruct Ariadne Telles to Change their warfarin regimen- see above in Anticoagulation Summary.  Instructed patient to hold warfarin today and change warfarin regimen to 1mg on Monday, Wednesday, and Friday. Take 2mg all other days.  2. Follow up in 2 weeks  3. Patient declines warfarin refills.  4. Verbal and written information provided. Patient expresses understanding and has no further questions at this time.    Regine Landry, Pharmacy Intern

## 2023-10-20 ENCOUNTER — ANTICOAGULATION VISIT (OUTPATIENT)
Dept: PHARMACY | Facility: HOSPITAL | Age: 73
End: 2023-10-20
Payer: MEDICARE

## 2023-10-20 DIAGNOSIS — E78.00 HYPERCHOLESTEROLEMIA: Chronic | ICD-10-CM

## 2023-10-20 DIAGNOSIS — I10 ESSENTIAL HYPERTENSION: Chronic | ICD-10-CM

## 2023-10-20 DIAGNOSIS — I48.0 PAF (PAROXYSMAL ATRIAL FIBRILLATION): Primary | Chronic | ICD-10-CM

## 2023-10-20 DIAGNOSIS — I48.0 PAF (PAROXYSMAL ATRIAL FIBRILLATION): Chronic | ICD-10-CM

## 2023-10-20 LAB
INR PPP: 2.5 (ref 0.91–1.09)
PROTHROMBIN TIME: 30.4 SECONDS (ref 10–13.8)

## 2023-10-20 PROCEDURE — 36416 COLLJ CAPILLARY BLOOD SPEC: CPT

## 2023-10-20 PROCEDURE — 85610 PROTHROMBIN TIME: CPT

## 2023-10-20 NOTE — PROGRESS NOTES
Anticoagulation Clinic Progress Note    Anticoagulation Summary  As of 10/20/2023      INR goal:  2.0-3.0   TTR:  57.6% (2.7 y)   INR used for dosin.5 (10/20/2023)   Warfarin maintenance plan:  1 mg every Mon, Wed, Fri; 2 mg all other days   Weekly warfarin total:  11 mg   No change documented:  Eunice Hill, Pharmacy Technician   Plan last modified:  Regine Landry, Pharmacy Intern (10/6/2023)   Next INR check:  11/3/2023   Priority:  High   Target end date:      Indications    PAF (paroxysmal atrial fibrillation) [I48.0]                 Anticoagulation Episode Summary       INR check location:      Preferred lab:      Send INR reminders to:   [facility]    Comments:  Previously apixaban (cost)          Anticoagulation Care Providers       Provider Role Specialty Phone number    Francesco Riojas MD Referring Cardiology [phone]            Clinic Interview:  Patient Findings     Negatives:  Signs/symptoms of thrombosis, Signs/symptoms of bleeding,   Laboratory test error suspected, Change in health, Change in alcohol use,   Change in activity, Upcoming invasive procedure, Emergency department   visit, Upcoming dental procedure, Missed doses, Extra doses, Change in   medications, Change in diet/appetite, Hospital admission, Bruising, Other   complaints      Clinical Outcomes     Negatives:  Major bleeding event, Thromboembolic event,   Anticoagulation-related hospital admission, Anticoagulation-related ED   visit, Anticoagulation-related fatality        INR History:      2023     9:15 AM 2023     8:45 AM 2023     8:45 AM 2023     8:45 AM 2023     8:30 AM 10/6/2023     9:00 AM 10/20/2023     8:30 AM   Anticoagulation Monitoring   INR 2.6 2.1 3.1 2.6 3.8 3.4 2.5   INR Date 2023 2023 2023 2023 2023 10/6/2023 10/20/2023   INR Goal 2.0-3.0 2.0-3.0 2.0-3.0 2.0-3.0 2.0-3.0 2.0-3.0 2.0-3.0   Trend Same Same Same Same Same Down Same   Last Week  Total 12 mg 12 mg 12 mg 12 mg 12 mg 12 mg 11 mg   Next Week Total 12 mg 12 mg 12 mg 12 mg 11 mg 10 mg 11 mg   Sun 2 mg 2 mg 2 mg 2 mg 2 mg 2 mg 2 mg   Mon 1 mg 1 mg 1 mg 1 mg 1 mg 1 mg 1 mg   Tue 2 mg 2 mg 2 mg 2 mg 2 mg 2 mg 2 mg   Wed 2 mg 2 mg 2 mg 2 mg 2 mg 1 mg 1 mg   Thu 2 mg 2 mg 2 mg 2 mg 2 mg 2 mg 2 mg   Fri 1 mg 1 mg 1 mg 1 mg Hold (9/22); Otherwise 1 mg Hold (10/6); Otherwise 1 mg 1 mg   Sat 2 mg 2 mg 2 mg 2 mg 2 mg 2 mg 2 mg       Plan:  1. INR is therapeutic today- see above in Anticoagulation Summary.   Will instruct Ariadne Telles to continue their warfarin regimen- see above in Anticoagulation Summary.  2. Follow up in 2 weeks.  3. Patient declines warfarin refills.  4. Verbal and written information provided. Patient expresses understanding and has no further questions at this time.    Eunice Hill, Pharmacy Technician

## 2023-11-01 ENCOUNTER — OFFICE VISIT (OUTPATIENT)
Dept: FAMILY MEDICINE CLINIC | Facility: CLINIC | Age: 73
End: 2023-11-01
Payer: MEDICARE

## 2023-11-01 VITALS
HEART RATE: 60 BPM | SYSTOLIC BLOOD PRESSURE: 125 MMHG | DIASTOLIC BLOOD PRESSURE: 75 MMHG | OXYGEN SATURATION: 97 % | TEMPERATURE: 98.4 F | BODY MASS INDEX: 32.04 KG/M2 | HEIGHT: 61 IN | WEIGHT: 169.7 LBS

## 2023-11-01 DIAGNOSIS — R42 VERTIGO: ICD-10-CM

## 2023-11-01 DIAGNOSIS — M81.0 OSTEOPOROSIS, UNSPECIFIED OSTEOPOROSIS TYPE, UNSPECIFIED PATHOLOGICAL FRACTURE PRESENCE: ICD-10-CM

## 2023-11-01 DIAGNOSIS — E78.00 HYPERCHOLESTEROLEMIA: ICD-10-CM

## 2023-11-01 DIAGNOSIS — I10 ESSENTIAL HYPERTENSION: ICD-10-CM

## 2023-11-01 DIAGNOSIS — I48.0 PAF (PAROXYSMAL ATRIAL FIBRILLATION): ICD-10-CM

## 2023-11-01 DIAGNOSIS — Z00.00 MEDICARE ANNUAL WELLNESS VISIT, SUBSEQUENT: Primary | ICD-10-CM

## 2023-11-01 RX ORDER — MECLIZINE HCL 12.5 MG/1
12.5 TABLET ORAL 3 TIMES DAILY PRN
Qty: 21 TABLET | Refills: 0 | Status: SHIPPED | OUTPATIENT
Start: 2023-11-01

## 2023-11-01 NOTE — PROGRESS NOTES
The ABCs of the Annual Wellness Visit  Subsequent Medicare Wellness Visit    Subjective    Ariadne Telles is a 73 y.o. female who presents for a Subsequent Medicare Wellness Visit.    The following portions of the patient's history were reviewed and   updated as appropriate: allergies, current medications, past family history, past medical history, past social history, past surgical history, and problem list.    Compared to one year ago, the patient feels her physical   health is worse.    Compared to one year ago, the patient feels her mental   health is the same.    Recent Hospitalizations:  She was not admitted to the hospital during the last year.       Current Medical Providers:  Patient Care Team:  Nick Ariza MD as PCP - General  Angie Lomeli RPH as Pharmacist (Pharmacy)  Octavio Garnett PharmD as Pharmacist (Pharmacy)    Outpatient Medications Prior to Visit   Medication Sig Dispense Refill    alendronate (FOSAMAX) 70 MG tablet TAKE 1 TABLET EVERY 7 DAYS 12 tablet 3    amLODIPine (NORVASC) 5 MG tablet TAKE ONE TABLET BY MOUTH DAILY 90 tablet 1    atorvastatin (LIPITOR) 40 MG tablet TAKE 1 TABLET DAILY 90 tablet 1    calcium carbonate-cholecalciferol 500-400 MG-UNIT tablet tablet Take  by mouth Daily.      lisinopril (PRINIVIL,ZESTRIL) 20 MG tablet TAKE 1 TABLET DAILY 90 tablet 1    metoprolol tartrate (LOPRESSOR) 50 MG tablet Take 1 tablet by mouth 2 (Two) Times a Day. 180 tablet 3    Multiple Vitamin (MULTIVITAMIN) capsule Take  by mouth.      Multiple Vitamins-Minerals (OCUVITE EYE HEALTH FORMULA PO) Take 1 tablet by mouth 2 (Two) Times a Day.      warfarin (COUMADIN) 2 MG tablet Take one-half of a tablet by mouth on mon and fri and take one tablet by mouth all other days or as directed 80 tablet 1    meclizine (ANTIVERT) 12.5 MG tablet Take 1 tablet by mouth 3 (Three) Times a Day As Needed for Dizziness. 21 tablet 0     No facility-administered medications prior to visit.       No opioid  "medication identified on active medication list. I have reviewed chart for other potential  high risk medication/s and harmful drug interactions in the elderly.        Aspirin is not on active medication list.  Aspirin use is not indicated based on review of current medical condition/s. Risk of harm outweighs potential benefits.  .    Patient Active Problem List   Diagnosis    Gastroesophageal reflux disease    Bradycardia    Hypercholesterolemia    Essential hypertension    Rectal polyp    History of endometrial cancer    Carpal tunnel syndrome    MVP (mitral valve prolapse)    Osteoporosis    Bilateral carotid artery stenosis    PAF (paroxysmal atrial fibrillation)    Vertigo    MVP (mitral valve prolapse)    AF (paroxysmal atrial fibrillation)    Atrial fibrillation with rapid ventricular response     Advance Care Planning   Advance Care Planning     Advance Directive is on file.  ACP discussion was held with the patient during this visit. Patient has an advance directive in EMR which is still valid.      Objective    Vitals:    11/01/23 0957   BP: 125/75   Pulse: 60   Temp: 98.4 °F (36.9 °C)   TempSrc: Temporal   SpO2: 97%   Weight: 77 kg (169 lb 11.2 oz)   Height: 154.9 cm (61\")     Estimated body mass index is 32.06 kg/m² as calculated from the following:    Height as of this encounter: 154.9 cm (61\").    Weight as of this encounter: 77 kg (169 lb 11.2 oz).    BMI is >= 30 and <35. (Class 1 Obesity). The following options were offered after discussion;: exercise counseling/recommendations      Does the patient have evidence of cognitive impairment? No          HEALTH RISK ASSESSMENT    Smoking Status:  Social History     Tobacco Use   Smoking Status Never   Smokeless Tobacco Never     Alcohol Consumption:  Social History     Substance and Sexual Activity   Alcohol Use No    Comment: caffeine use seldom     Fall Risk Screen:    STEADI Fall Risk Assessment was completed, and patient is at LOW risk for " falls.Assessment completed on:2023    Depression Screenin/1/2023     9:55 AM   PHQ-2/PHQ-9 Depression Screening   Little Interest or Pleasure in Doing Things 0-->not at all   Feeling Down, Depressed or Hopeless 0-->not at all   PHQ-9: Brief Depression Severity Measure Score 0       Health Habits and Functional and Cognitive Screenin/1/2023     9:54 AM   Functional & Cognitive Status   Do you have difficulty preparing food and eating? No   Do you have difficulty bathing yourself, getting dressed or grooming yourself? No   Do you have difficulty using the toilet? No   Do you have difficulty moving around from place to place? No   Do you have trouble with steps or getting out of a bed or a chair? No   Current Diet Unhealthy Diet   Dental Exam Up to date   Eye Exam Up to date   Exercise (times per week) 0 times per week   Current Exercises Include No Regular Exercise   Do you need help using the phone?  No   Are you deaf or do you have serious difficulty hearing?  Yes   Do you need help to go to places out of walking distance? No   Do you need help shopping? No   Do you need help preparing meals?  No   Do you need help with housework?  No   Do you need help with laundry? No   Do you need help taking your medications? No   Do you need help managing money? No   Do you ever drive or ride in a car without wearing a seat belt? No   Have you felt unusual stress, anger or loneliness in the last month? No   Who do you live with? Alone   If you need help, do you have trouble finding someone available to you? No   Have you been bothered in the last four weeks by sexual problems? No   Do you have difficulty concentrating, remembering or making decisions? No       Age-appropriate Screening Schedule:  Refer to the list below for future screening recommendations based on patient's age, sex and/or medical conditions. Orders for these recommended tests are listed in the plan section. The patient has been  provided with a written plan.    Health Maintenance   Topic Date Due    ZOSTER VACCINE (1 of 2) Never done    PAP SMEAR  11/30/2021    INFLUENZA VACCINE  Never done    COVID-19 Vaccine (3 - 2023-24 season) 09/01/2023    LIPID PANEL  10/13/2023    ANNUAL WELLNESS VISIT  10/20/2023    DXA SCAN  11/01/2023 (Originally 10/1/2022)    COLORECTAL CANCER SCREENING  11/01/2023 (Originally 1950)    BMI FOLLOWUP  11/01/2024    MAMMOGRAM  09/14/2025    TDAP/TD VACCINES (3 - Td or Tdap) 08/25/2026    HEPATITIS C SCREENING  Completed    Pneumococcal Vaccine 65+  Completed                  CMS Preventative Services Quick Reference  Risk Factors Identified During Encounter  Immunizations Discussed/Encouraged:  declined immunizations  The above risks/problems have been discussed with the patient.  Pertinent information has been shared with the patient in the After Visit Summary.  An After Visit Summary and PPPS were made available to the patient.    Follow Up:   Next Medicare Wellness visit to be scheduled in 1 year.       Additional E&M Note during same encounter follows:  Patient has multiple medical problems which are significant and separately identifiable that require additional work above and beyond the Medicare Wellness Visit.      Chief Complaint  Medicare Wellness-subsequent and Hypertension    Subjective        HPI  Ariadne Telles is also being seen today for recurrent vertigo.  First started 2019.  Thought to be benign positional vertigo.  MRI overall unremarkable, no cause of central vertigo seen on MRI in 2019.  She has had some relief in the past with meclizine 12.5 mg as needed.  She was doing well up until 2 months ago when she started having symptoms after going to a local children's entertainment center in the bright and flashing lights at her off.  She has had intermittent vertigo since.  Sometimes 3 times a week and she will need to miss work.  She will lie down and it makes it worse.  When she was at  "the dentist and lie down made it worse.  She has had so a little bit of ear pressure on the right side and may be some low humming noise that she is hearing.  No headaches.  She has atrial fibrillation.  On warfarin.  I reviewed her medication.  Continues alendronate for osteoporosis, takes weekly.  Continues amlodipine 5 mg a day and lisinopril 20 mg a day.  Also continues metoprolol.  Blood pressure normal and excellent today.  Continues on atorvastatin 40 mg daily for hyperlipidemia.         Objective   Vital Signs:  /75   Pulse 60   Temp 98.4 °F (36.9 °C) (Temporal)   Ht 154.9 cm (61\")   Wt 77 kg (169 lb 11.2 oz)   SpO2 97%   BMI 32.06 kg/m²     Physical Exam  Vitals and nursing note reviewed.   Constitutional:       General: She is not in acute distress.     Appearance: She is well-developed.   HENT:      Right Ear: Tympanic membrane and ear canal normal.      Left Ear: Tympanic membrane and ear canal normal.      Mouth/Throat:      Mouth: Mucous membranes are moist.      Pharynx: Oropharynx is clear. No oropharyngeal exudate or posterior oropharyngeal erythema.   Cardiovascular:      Rate and Rhythm: Normal rate and regular rhythm.      Heart sounds: Normal heart sounds.   Pulmonary:      Effort: Pulmonary effort is normal.      Breath sounds: Normal breath sounds.   Skin:     General: Skin is warm and dry.   Neurological:      Comments: Pupils are equal and reactive to light.  Extraocular muscle intact all directions.  Face symmetric.  Positive Arjun-Hallpike to the right side, with vertical nystagmus.  Occurred almost instantly, resolved after 2 minutes of sitting up.  Gait unremarkable.  No ataxia.  Arm movements unremarkable, no dysmetria.   Psychiatric:         Mood and Affect: Mood normal.          The following data was reviewed by: Nick Ariza MD on 11/01/2023:  Common labs          3/4/2023    23:56 3/5/2023    07:56 7/6/2023    09:26   Common Labs   Glucose 178   96    BUN 20   20  "   Creatinine 0.81   0.98    Sodium 143   143    Potassium 2.8  4.3  3.9    Chloride 104   107    Calcium 9.1   9.7    Total Protein   6.9    Albumin 4.0   4.2    Total Bilirubin 0.4   0.7    Alkaline Phosphatase 176   144    AST (SGOT) 10   17    ALT (SGPT) 10   15    WBC 11.46      Hemoglobin 14.5      Hematocrit 42.8      Platelets 319                   Assessment and Plan   Diagnoses and all orders for this visit:    1. Medicare annual wellness visit, subsequent (Primary)    2. Essential hypertension    3. Hypercholesterolemia    4. PAF (paroxysmal atrial fibrillation)    5. Osteoporosis, unspecified osteoporosis type, unspecified pathological fracture presence    6. Vertigo  -     MRI Brain Without Contrast; Future  -     Ambulatory Referral to Physical Therapy Vestibular    Other orders  -     meclizine (ANTIVERT) 12.5 MG tablet; Take 1 tablet by mouth 3 (Three) Times a Day As Needed for Dizziness.  Dispense: 21 tablet; Refill: 0      Hypertension.  Well-controlled.    Hyperlipidemia continue atorvastatin.    Paroxysmal atrial fibrillation.  Appears to be in sinus rhythm today.  Continues warfarin and metoprolol prescribed by cardiology.  Warfarin monitored by cardiology.    Vertigo.  Likely peripheral vertigo.  This is either Ménière syndrome or benign positional vertigo.  Less likely central vertigo, but given the warfarin use and the persistent but intermittent symptoms over the last 2 months, I am recommending an MRI.  Last 1 was 4 to 5 years ago.  With severe symptoms she will seek medical attention immediately.  I recommend she get back to vestibular physical therapy but she is reluctant to do so because she does not like the way she feels during therapy.  At this time I do not see a need for diuretics.  I will see her back in 6 months for follow-up of the echo is not improving she understands to see me sooner.  Continue meclizine as needed.  Typically benign positional vertigo does not respond to  meclizine but she states it does help.  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         Follow Up   No follow-ups on file.  Patient was given instructions and counseling regarding her condition or for health maintenance advice. Please see specific information pulled into the AVS if appropriate.

## 2023-11-03 ENCOUNTER — ANTICOAGULATION VISIT (OUTPATIENT)
Dept: PHARMACY | Facility: HOSPITAL | Age: 73
End: 2023-11-03
Payer: MEDICARE

## 2023-11-03 DIAGNOSIS — I48.0 PAF (PAROXYSMAL ATRIAL FIBRILLATION): Primary | Chronic | ICD-10-CM

## 2023-11-03 LAB
INR PPP: 2.7 (ref 0.91–1.09)
PROTHROMBIN TIME: 32.2 SECONDS (ref 10–13.8)

## 2023-11-03 PROCEDURE — 85610 PROTHROMBIN TIME: CPT

## 2023-11-03 PROCEDURE — 36416 COLLJ CAPILLARY BLOOD SPEC: CPT

## 2023-11-03 NOTE — PROGRESS NOTES
Anticoagulation Clinic Progress Note    Anticoagulation Summary  As of 11/3/2023      INR goal:  2.0-3.0   TTR:  58.2% (2.7 y)   INR used for dosin.7 (11/3/2023)   Warfarin maintenance plan:  1 mg every Mon, Wed, Fri; 2 mg all other days   Weekly warfarin total:  11 mg   No change documented:  Octavio Garnett, PharmD   Plan last modified:  Regine Landry, Pharmacy Intern (10/6/2023)   Next INR check:  2023   Priority:  High   Target end date:      Indications    PAF (paroxysmal atrial fibrillation) [I48.0]                 Anticoagulation Episode Summary       INR check location:      Preferred lab:      Send INR reminders to:   [facility]    Comments:  Previously apixaban (cost)          Anticoagulation Care Providers       Provider Role Specialty Phone number    Francesco Riojas MD Referring Cardiology [phone]            Clinic Interview:  Patient Findings     Negatives:  Signs/symptoms of thrombosis, Signs/symptoms of bleeding,   Laboratory test error suspected, Change in health, Change in alcohol use,   Change in activity, Upcoming invasive procedure, Emergency department   visit, Upcoming dental procedure, Missed doses, Extra doses, Change in   medications, Change in diet/appetite, Hospital admission, Bruising, Other   complaints      Clinical Outcomes     Negatives:  Major bleeding event, Thromboembolic event,   Anticoagulation-related hospital admission, Anticoagulation-related ED   visit, Anticoagulation-related fatality        INR History:      2023     8:45 AM 2023     8:45 AM 2023     8:45 AM 2023     8:30 AM 10/6/2023     9:00 AM 10/20/2023     8:30 AM 11/3/2023     8:30 AM   Anticoagulation Monitoring   INR 2.1 3.1 2.6 3.8 3.4 2.5 2.7   INR Date 2023 2023 2023 2023 10/6/2023 10/20/2023 11/3/2023   INR Goal 2.0-3.0 2.0-3.0 2.0-3.0 2.0-3.0 2.0-3.0 2.0-3.0 2.0-3.0   Trend Same Same Same Same Down Same Same   Last Week Total 12 mg 12  mg 12 mg 12 mg 12 mg 11 mg 11 mg   Next Week Total 12 mg 12 mg 12 mg 11 mg 10 mg 11 mg 11 mg   Sun 2 mg 2 mg 2 mg 2 mg 2 mg 2 mg 2 mg   Mon 1 mg 1 mg 1 mg 1 mg 1 mg 1 mg 1 mg   Tue 2 mg 2 mg 2 mg 2 mg 2 mg 2 mg 2 mg   Wed 2 mg 2 mg 2 mg 2 mg 1 mg 1 mg 1 mg   Thu 2 mg 2 mg 2 mg 2 mg 2 mg 2 mg 2 mg   Fri 1 mg 1 mg 1 mg Hold (9/22); Otherwise 1 mg Hold (10/6); Otherwise 1 mg 1 mg 1 mg   Sat 2 mg 2 mg 2 mg 2 mg 2 mg 2 mg 2 mg       Plan:  1. INR is Therapeutic today- see above in Anticoagulation Summary.  Will instruct Ariadne Telles to Continue their warfarin regimen- see above in Anticoagulation Summary.  2. Follow up in 4 weeks  3. Patient declines warfarin refills.  4. Verbal and written information provided. Patient expresses understanding and has no further questions at this time.    Octavio Garnett, PharmD

## 2023-11-24 ENCOUNTER — HOSPITAL ENCOUNTER (OUTPATIENT)
Dept: MRI IMAGING | Facility: HOSPITAL | Age: 73
Discharge: HOME | End: 2023-11-24
Admitting: FAMILY MEDICINE
Payer: MEDICARE

## 2023-11-24 DIAGNOSIS — R42 VERTIGO: ICD-10-CM

## 2023-11-24 PROCEDURE — 70551 MRI BRAIN STEM W/O DYE: CPT

## 2023-12-01 ENCOUNTER — ANTICOAGULATION VISIT (OUTPATIENT)
Dept: PHARMACY | Facility: HOSPITAL | Age: 73
End: 2023-12-01
Payer: MEDICARE

## 2023-12-01 DIAGNOSIS — I48.0 PAF (PAROXYSMAL ATRIAL FIBRILLATION): Primary | Chronic | ICD-10-CM

## 2023-12-01 LAB
INR PPP: 1.9 (ref 0.91–1.09)
PROTHROMBIN TIME: 22.9 SECONDS (ref 10–13.8)

## 2023-12-01 PROCEDURE — 36416 COLLJ CAPILLARY BLOOD SPEC: CPT

## 2023-12-01 PROCEDURE — 85610 PROTHROMBIN TIME: CPT

## 2023-12-01 NOTE — PROGRESS NOTES
Anticoagulation Clinic Progress Note    Anticoagulation Summary  As of 2023      INR goal:  2.0-3.0   TTR:  59.0% (2.8 y)   INR used for dosin.9 (2023)   Warfarin maintenance plan:  1 mg every Mon, Wed, Fri; 2 mg all other days   Weekly warfarin total:  11 mg   No change documented:  Eunice Hill, Pharmacy Technician   Plan last modified:  Regine Landry, Pharmacy Intern (10/6/2023)   Next INR check:  2023   Priority:  High   Target end date:      Indications    PAF (paroxysmal atrial fibrillation) [I48.0]                 Anticoagulation Episode Summary       INR check location:      Preferred lab:      Send INR reminders to:   [facility]    Comments:  Previously apixaban (cost)          Anticoagulation Care Providers       Provider Role Specialty Phone number    Francesco Riojas MD Referring Cardiology [phone]            Clinic Interview:  Patient Findings     Positives:  Change in diet/appetite    Negatives:  Signs/symptoms of thrombosis, Signs/symptoms of bleeding,   Laboratory test error suspected, Change in health, Change in alcohol use,   Change in activity, Upcoming invasive procedure, Emergency department   visit, Upcoming dental procedure, Missed doses, Extra doses, Change in   medications, Hospital admission, Bruising, Other complaints    Comments:  Had several servings of kale and broccoli this week which are   not typically in her diet.      Clinical Outcomes     Negatives:  Major bleeding event, Thromboembolic event,   Anticoagulation-related hospital admission, Anticoagulation-related ED   visit, Anticoagulation-related fatality    Comments:  Had several servings of kale and broccoli this week which are   not typically in her diet.        INR History:      2023     8:45 AM 2023     8:45 AM 2023     8:30 AM 10/6/2023     9:00 AM 10/20/2023     8:30 AM 11/3/2023     8:30 AM 2023     8:45 AM   Anticoagulation Monitoring   INR 3.1 2.6  3.8 3.4 2.5 2.7 1.9   INR Date 7/28/2023 8/25/2023 9/22/2023 10/6/2023 10/20/2023 11/3/2023 12/1/2023   INR Goal 2.0-3.0 2.0-3.0 2.0-3.0 2.0-3.0 2.0-3.0 2.0-3.0 2.0-3.0   Trend Same Same Same Down Same Same Same   Last Week Total 12 mg 12 mg 12 mg 12 mg 11 mg 11 mg 11 mg   Next Week Total 12 mg 12 mg 11 mg 10 mg 11 mg 11 mg 11 mg   Sun 2 mg 2 mg 2 mg 2 mg 2 mg 2 mg 2 mg   Mon 1 mg 1 mg 1 mg 1 mg 1 mg 1 mg 1 mg   Tue 2 mg 2 mg 2 mg 2 mg 2 mg 2 mg 2 mg   Wed 2 mg 2 mg 2 mg 1 mg 1 mg 1 mg 1 mg   Thu 2 mg 2 mg 2 mg 2 mg 2 mg 2 mg 2 mg   Fri 1 mg 1 mg Hold (9/22); Otherwise 1 mg Hold (10/6); Otherwise 1 mg 1 mg 1 mg 1 mg   Sat 2 mg 2 mg 2 mg 2 mg 2 mg 2 mg 2 mg       Plan:  1. INR is out of range- see above in Anticoagulation Summary.   Will instruct Ariadne Telles to Continue  their warfarin regimen- see above in Anticoagulation Summary.  2. Follow up in 4 weeks.   3. Patient declines warfarin refills.  4. Verbal and written information provided. Patient expresses understanding and has no further questions at this time.    Eunice Hill, Pharmacy Technician

## 2023-12-27 ENCOUNTER — OFFICE VISIT (OUTPATIENT)
Dept: FAMILY MEDICINE CLINIC | Facility: CLINIC | Age: 73
End: 2023-12-27
Payer: MEDICARE

## 2023-12-27 VITALS
SYSTOLIC BLOOD PRESSURE: 123 MMHG | BODY MASS INDEX: 31.28 KG/M2 | HEIGHT: 61 IN | WEIGHT: 165.7 LBS | DIASTOLIC BLOOD PRESSURE: 78 MMHG | TEMPERATURE: 97.5 F | OXYGEN SATURATION: 95 % | HEART RATE: 73 BPM

## 2023-12-27 DIAGNOSIS — R42 VERTIGO: Primary | ICD-10-CM

## 2023-12-27 PROCEDURE — 3074F SYST BP LT 130 MM HG: CPT | Performed by: FAMILY MEDICINE

## 2023-12-27 PROCEDURE — 99213 OFFICE O/P EST LOW 20 MIN: CPT | Performed by: FAMILY MEDICINE

## 2023-12-27 PROCEDURE — 3078F DIAST BP <80 MM HG: CPT | Performed by: FAMILY MEDICINE

## 2023-12-27 RX ORDER — ONDANSETRON 4 MG/1
4 TABLET, FILM COATED ORAL EVERY 8 HOURS PRN
Qty: 30 TABLET | Refills: 0 | Status: SHIPPED | OUTPATIENT
Start: 2023-12-27

## 2023-12-27 NOTE — PROGRESS NOTES
"Chief Complaint  Dizziness (Vertigo, nausea, and vomiting. Sick since thanksgiving )    Subjective        Ariadne Telles presents to [facility]  History of Present Illness    Ongoing vertigo.  Very likely peripheral vertigo.  Started about 2 months ago.  I saw her number of weeks ago.  I ordered physical therapy.  She states the physical therapist called her and she could not go because of a doctor's appointment.  But she never rescheduled.  She has been afraid about physical therapy.  In the past that has caused her nausea to get worse.  She having nausea keeps her from going to work at times.  When she turns over in bed this bothers her.  No troubles with coordination no focal neurological deficits her MRI revealed no stroke or other significant abnormality.    Sometimes bright lights will set her off.  Harsh blinking lights.  Such as Diann lights.  She has gotten anxious and depressed about this.  But she does not want to be on depression medication.    Objective   Vital Signs:  /78   Pulse 73   Temp 97.5 °F (36.4 °C) (Temporal)   Ht 154.9 cm (61\")   Wt 75.2 kg (165 lb 11.2 oz)   SpO2 95%   BMI 31.31 kg/m²   Estimated body mass index is 31.31 kg/m² as calculated from the following:    Height as of this encounter: 154.9 cm (61\").    Weight as of this encounter: 75.2 kg (165 lb 11.2 oz).               Physical Exam  Vitals and nursing note reviewed.   Constitutional:       General: She is not in acute distress.     Appearance: She is well-developed.   HENT:      Right Ear: Tympanic membrane and ear canal normal.      Left Ear: Tympanic membrane and ear canal normal.   Eyes:      Extraocular Movements: Extraocular movements intact.      Pupils: Pupils are equal, round, and reactive to light.   Cardiovascular:      Rate and Rhythm: Normal rate and regular rhythm.      Heart sounds: Normal heart sounds.   Pulmonary:      Effort: Pulmonary effort is normal.      Breath " sounds: Normal breath sounds.   Skin:     General: Skin is warm and dry.   Neurological:      Comments: Neurological exam.  Pupils equal and reactive to light.  Extra ocular muscle movements intact all directions.  Face symmetric.  Gait unremarkable.  No ataxia.  No dysmetria.  She has a positive [place]-Hallpike to the right with nystagmus.  They fatigue after about 30 seconds of sitting up.     Psychiatric:         Mood and Affect: Mood normal.        Result Review :                   Assessment and Plan   Diagnoses and all orders for this visit:    1. Vertigo (Primary)  -     Ambulatory Referral to Physical Therapy Evaluate and treat, Vestibular    Other orders  -     ondansetron (Zofran) 4 MG tablet; Take 1 tablet by mouth Every 8 (Eight) Hours As Needed for Nausea (and vertigo).  Dispense: 30 tablet; Refill: 0      Benign positional vertigo is secondary anxiety and probable depression.  The vertigo is certainly peripheral vertigo.  There is no signs or symptoms of Ménière syndrome or other peripheral cause.  And the MRI of the brain recently revealed no central cause in all likelihood.  For the nausea I am prescribing intermittent Zofran.  If this lowers her blood pressure she will know it.  If she feels lightheaded she is to stop it.  I also recommend considering a SSRI but she has had trouble in the past with Paxil causing activation symptoms and anxiety.  I want to see her back in 1 month for recheck.  I also placed another referral for physical therapy.         Follow Up   No follow-ups on file.  Patient was given instructions and counseling regarding her condition or for health maintenance advice. Please see specific information pulled into the AVS if appropriate.

## 2023-12-29 ENCOUNTER — ANTICOAGULATION VISIT (OUTPATIENT)
Dept: PHARMACY | Facility: HOSPITAL | Age: 73
End: 2023-12-29
Payer: MEDICARE

## 2023-12-29 DIAGNOSIS — I48.0 PAF (PAROXYSMAL ATRIAL FIBRILLATION): Primary | Chronic | ICD-10-CM

## 2023-12-29 LAB
INR PPP: 2.4 (ref 0.91–1.09)
PROTHROMBIN TIME: 28.6 SECONDS (ref 10–13.8)

## 2023-12-29 PROCEDURE — 36416 COLLJ CAPILLARY BLOOD SPEC: CPT

## 2023-12-29 PROCEDURE — 85610 PROTHROMBIN TIME: CPT

## 2023-12-29 NOTE — PROGRESS NOTES
Anticoagulation Clinic Progress Note    Anticoagulation Summary  As of 2023      INR goal:  2.0-3.0   TTR:  59.6% (2.8 y)   INR used for dosin.4 (2023)   Warfarin maintenance plan:  1 mg every Mon, Wed, Fri; 2 mg all other days   Weekly warfarin total:  11 mg   No change documented:  Selene Agustin, Pharmacy Intern   Plan last modified:  Regine Landry Pharmacy Intern (10/6/2023)   Next INR check:  2024   Priority:  High   Target end date:      Indications    PAF (paroxysmal atrial fibrillation) [I48.0]                 Anticoagulation Episode Summary       INR check location:      Preferred lab:      Send INR reminders to:   [facility]    Comments:  Previously apixaban (cost)          Anticoagulation Care Providers       Provider Role Specialty Phone number    Francesco Riojas MD Referring Cardiology [phone]            Clinic Interview:  Patient Findings     Negatives:  Signs/symptoms of thrombosis, Signs/symptoms of bleeding,   Laboratory test error suspected, Change in health, Change in alcohol use,   Change in activity, Upcoming invasive procedure, Emergency department   visit, Upcoming dental procedure, Missed doses, Extra doses, Change in   medications, Change in diet/appetite, Hospital admission, Bruising, Other   complaints      Clinical Outcomes     Negatives:  Major bleeding event, Thromboembolic event,   Anticoagulation-related hospital admission, Anticoagulation-related ED   visit, Anticoagulation-related fatality        INR History:      2023     8:45 AM 2023     8:30 AM 10/6/2023     9:00 AM 10/20/2023     8:30 AM 11/3/2023     8:30 AM 2023     8:45 AM 2023     8:45 AM   Anticoagulation Monitoring   INR 2.6 3.8 3.4 2.5 2.7 1.9 2.4   INR Date 2023 2023 10/6/2023 10/20/2023 11/3/2023 2023 2023   INR Goal 2.0-3.0 2.0-3.0 2.0-3.0 2.0-3.0 2.0-3.0 2.0-3.0 2.0-3.0   Trend Same Same Down Same Same Same Same   Last Week  Total 12 mg 12 mg 12 mg 11 mg 11 mg 11 mg 11 mg   Next Week Total 12 mg 11 mg 10 mg 11 mg 11 mg 11 mg 11 mg   Sun 2 mg 2 mg 2 mg 2 mg 2 mg 2 mg 2 mg   Mon 1 mg 1 mg 1 mg 1 mg 1 mg 1 mg 1 mg   Tue 2 mg 2 mg 2 mg 2 mg 2 mg 2 mg 2 mg   Wed 2 mg 2 mg 1 mg 1 mg 1 mg 1 mg 1 mg   Thu 2 mg 2 mg 2 mg 2 mg 2 mg 2 mg 2 mg   Fri 1 mg Hold (9/22); Otherwise 1 mg Hold (10/6); Otherwise 1 mg 1 mg 1 mg 1 mg 1 mg   Sat 2 mg 2 mg 2 mg 2 mg 2 mg 2 mg 2 mg       Plan:  1. INR is Therapeutic today- see above in Anticoagulation Summary.  Will instruct Ariadne Telles to Continue their warfarin regimen- see above in Anticoagulation Summary.  2. Follow up in 4 weeks  3. Patient declines warfarin refills.  4. Verbal and written information provided. Patient expresses understanding and has no further questions at this time.    Selene Agustin, Pharmacy Intern

## 2024-01-05 ENCOUNTER — TELEPHONE (OUTPATIENT)
Dept: ORTHOPEDICS | Facility: OTHER | Age: 74
End: 2024-01-05
Payer: MEDICARE

## 2024-01-26 ENCOUNTER — ANTICOAGULATION VISIT (OUTPATIENT)
Dept: PHARMACY | Facility: HOSPITAL | Age: 74
End: 2024-01-26
Payer: MEDICARE

## 2024-01-26 DIAGNOSIS — I48.0 PAF (PAROXYSMAL ATRIAL FIBRILLATION): Primary | Chronic | ICD-10-CM

## 2024-01-26 LAB
INR PPP: 1.9 (ref 0.91–1.09)
PROTHROMBIN TIME: 23 SECONDS (ref 10–13.8)

## 2024-01-26 PROCEDURE — 36416 COLLJ CAPILLARY BLOOD SPEC: CPT

## 2024-01-26 PROCEDURE — G0463 HOSPITAL OUTPT CLINIC VISIT: HCPCS

## 2024-01-26 PROCEDURE — 85610 PROTHROMBIN TIME: CPT

## 2024-01-26 NOTE — PROGRESS NOTES
I have supervised and reviewed the notes, assessments, and/or procedures performed. I personally performed the assessment and implemented the plan. I concur with the documentation of this patient encounter.    Radha Carver, [facility]

## 2024-01-26 NOTE — PROGRESS NOTES
Anticoagulation Clinic Progress Note    Anticoagulation Summary  As of 2024      INR goal:  2.0-3.0   TTR:  60.1% (2.9 y)   INR used for dosin.9 (2024)   Warfarin maintenance plan:  1 mg every Mon, Wed, Fri; 2 mg all other days   Weekly warfarin total:  11 mg   No change documented:  Eunice Hill, Pharmacy Technician   Plan last modified:  Regine Landry, Pharmacy Intern (10/6/2023)   Next INR check:  2024   Priority:  High   Target end date:      Indications    PAF (paroxysmal atrial fibrillation) [I48.0]                 Anticoagulation Episode Summary       INR check location:      Preferred lab:      Send INR reminders to:   [facility] [place]    Comments:  Previously apixaban (cost)          Anticoagulation Care Providers       Provider Role Specialty Phone number    Francesco Riojas MD Referring Cardiology [phone]            Clinic Interview:  Patient Findings     Positives:  Missed doses, Change in medications, Change in diet/appetite,   Other complaints    Negatives:  Signs/symptoms of thrombosis, Signs/symptoms of bleeding,   Laboratory test error suspected, Change in health, Change in alcohol use,   Change in activity, Upcoming invasive procedure, Emergency department   visit, Upcoming dental procedure, Extra doses, Hospital admission,   Bruising    Comments:  Patient reports she has been experiencing terrible dizziness   and nausea/vomiting the last couple of months. She saw her PCP for this   who said it is vertigo. She has Zofran at home which she takes as needed.   She hasn't had much of an appetite recently due to being nauseous all of   the time. With being so sick, she thinks she has probably missed a couple   of doses of Warfarin but is unsure which days specifically.      Clinical Outcomes     Negatives:  Major bleeding event, Thromboembolic event,   Anticoagulation-related hospital admission, Anticoagulation-related ED   visit, Anticoagulation-related  fatality    Comments:  Patient reports she has been experiencing terrible dizziness   and nausea/vomiting the last couple of months. She saw her PCP for this   who said it is vertigo. She has Zofran at home which she takes as needed.   She hasn't had much of an appetite recently due to being nauseous all of   the time. With being so sick, she thinks she has probably missed a couple   of doses of Warfarin but is unsure which days specifically.        INR History:      9/22/2023     8:30 AM 10/6/2023     9:00 AM 10/20/2023     8:30 AM 11/3/2023     8:30 AM 12/1/2023     8:45 AM 12/29/2023     8:45 AM 1/26/2024     8:45 AM   Anticoagulation Monitoring   INR 3.8 3.4 2.5 2.7 1.9 2.4 1.9   INR Date 9/22/2023 10/6/2023 10/20/2023 11/3/2023 12/1/2023 12/29/2023 1/26/2024   INR Goal 2.0-3.0 2.0-3.0 2.0-3.0 2.0-3.0 2.0-3.0 2.0-3.0 2.0-3.0   Trend Same Down Same Same Same Same Same   Last Week Total 12 mg 12 mg 11 mg 11 mg 11 mg 11 mg 11 mg   Next Week Total 11 mg 10 mg 11 mg 11 mg 11 mg 11 mg 11 mg   Sun 2 mg 2 mg 2 mg 2 mg 2 mg 2 mg 2 mg   Mon 1 mg 1 mg 1 mg 1 mg 1 mg 1 mg 1 mg   Tue 2 mg 2 mg 2 mg 2 mg 2 mg 2 mg 2 mg   Wed 2 mg 1 mg 1 mg 1 mg 1 mg 1 mg 1 mg   Thu 2 mg 2 mg 2 mg 2 mg 2 mg 2 mg 2 mg   Fri Hold (9/22); Otherwise 1 mg Hold (10/6); Otherwise 1 mg 1 mg 1 mg 1 mg 1 mg 1 mg   Sat 2 mg 2 mg 2 mg 2 mg 2 mg 2 mg 2 mg       Plan:  1. INR is out of range- see above in Anticoagulation Summary.   Will instruct Ariadne Telles to Continue  their warfarin regimen- see above in Anticoagulation Summary.  2. Follow up in 2 weeks.   3. Patient declines warfarin refills.  4. Verbal and written information provided. Patient expresses understanding and has no further questions at this time.    Eunice Hill, Pharmacy Technician

## 2024-01-29 ENCOUNTER — TELEPHONE (OUTPATIENT)
Dept: FAMILY MEDICINE CLINIC | Facility: CLINIC | Age: 74
End: 2024-01-29

## 2024-01-29 NOTE — TELEPHONE ENCOUNTER
Caller: Ariadne Telles    Relationship: Self    Best call back number: [phone]    What orders are you requesting (i.e. lab or imaging): ORTHOPEDIC REFERRAL    In what timeframe would the patient need to come in: AS SOON AS POSSIBLE    Where will you receive your lab/imaging services:  WOULD LIKE TO GO TO DR SANCHEZ'S OFFICE    Additional notes: DR SANCHEZ RETIRED BUT WAS TOLD SHE COULD BE SEEN AT THAT OFFICE AND WAS TOLD THAT AN APRN IN THE OFFICE HAS OPENINGS.

## 2024-02-01 ENCOUNTER — OFFICE VISIT (OUTPATIENT)
Dept: FAMILY MEDICINE CLINIC | Facility: CLINIC | Age: 74
End: 2024-02-01
Payer: MEDICARE

## 2024-02-01 VITALS
SYSTOLIC BLOOD PRESSURE: 138 MMHG | OXYGEN SATURATION: 95 % | HEIGHT: 61 IN | BODY MASS INDEX: 32.25 KG/M2 | HEART RATE: 61 BPM | DIASTOLIC BLOOD PRESSURE: 82 MMHG | WEIGHT: 170.8 LBS

## 2024-02-01 DIAGNOSIS — M54.6 LEFT-SIDED THORACIC BACK PAIN, UNSPECIFIED CHRONICITY: Primary | ICD-10-CM

## 2024-02-01 PROCEDURE — 3079F DIAST BP 80-89 MM HG: CPT | Performed by: FAMILY MEDICINE

## 2024-02-01 PROCEDURE — 3075F SYST BP GE 130 - 139MM HG: CPT | Performed by: FAMILY MEDICINE

## 2024-02-01 PROCEDURE — 99213 OFFICE O/P EST LOW 20 MIN: CPT | Performed by: FAMILY MEDICINE

## 2024-02-01 NOTE — PROGRESS NOTES
"Chief Complaint  BAck Pain      Subjective        Ariadne Telles presents to [facility]  Knee Pain         About 2 months of slightly worse sharp intermittent left lower left mid back pain.  Worse with certain movements but not necessarily reproducible.  It is very brief but sharp pain that makes her yelp a little bit.  There is been no injury repetitive motion or lifting issue that she knows of.  She does continue on warfarin prescribed by cardiology for atrial fibrillation.  She has had no bruising or injury otherwise.  She had a vertebral compression fracture in her T12 about 3 to 4 years ago.  Healed over time.  The pain is not the same as now.  Occasionally feels like the left leg gives away.  With the pain.  But no shooting pain into the legs.  Does not bother her when she is asleep.  The pain is not prolonged.  Usually very brief.  Maybe a few moments.    Objective   Vital Signs:  /82 (BP Location: Right arm, Patient Position: Sitting, Cuff Size: Adult)   Pulse 61   Ht 154.9 cm (61\")   Wt 77.5 kg (170 lb 12.8 oz)   SpO2 95%   BMI 32.27 kg/m²   Estimated body mass index is 32.27 kg/m² as calculated from the following:    Height as of this encounter: 154.9 cm (61\").    Weight as of this encounter: 77.5 kg (170 lb 12.8 oz).               Physical Exam  Constitutional:       General: She is not in acute distress.     Appearance: She is not ill-appearing.   Cardiovascular:      Rate and Rhythm: Normal rate.   Pulmonary:      Effort: Pulmonary effort is normal.   Musculoskeletal:      Comments: There is some pain to palpation at the left CVA.  Questionable localized muscle spasm.  No pain with compression of the ribs.  No pain with percussion.  No pain with percussion of the thoracic or lumbar spine.  No ecchymosis.  No rash.  Fairly good range of motion.  Strength is normal in lower extremities.  DTRs are normal.        Result Review :                     Assessment " and Plan     Diagnoses and all orders for this visit:    1. Left-sided thoracic back pain, unspecified chronicity (Primary)  -     XR Spine Thoracic 3 View (In Office)  -     Ambulatory Referral to Physical Therapy Evaluate and treat      Left-sided thoracic back pain.  Intermittent.  Likely benign musculoskeletal cause.  However emanating a thoracic spine x-ray.  If the pain persist greater than another month or so, would recommend MRI and further evaluation.  I am also recommending physical therapy.  At this time the pain is so intermittent that I do not recommend any specific pain medication.  The patient does not think she wants to see an orthopedic doctor at this time.  This would be reasonable.         Follow Up     No follow-ups on file.  Patient was given instructions and counseling regarding her condition or for health maintenance advice. Please see specific information pulled into the AVS if appropriate.

## 2024-02-07 ENCOUNTER — HOSPITAL ENCOUNTER (OUTPATIENT)
Dept: GENERAL RADIOLOGY | Facility: HOSPITAL | Age: 74
Discharge: HOME | End: 2024-02-07
Admitting: FAMILY MEDICINE
Payer: MEDICARE

## 2024-02-07 PROCEDURE — 72072 X-RAY EXAM THORAC SPINE 3VWS: CPT

## 2024-02-09 ENCOUNTER — ANTICOAGULATION VISIT (OUTPATIENT)
Dept: PHARMACY | Facility: HOSPITAL | Age: 74
End: 2024-02-09
Payer: MEDICARE

## 2024-02-09 DIAGNOSIS — I48.0 PAF (PAROXYSMAL ATRIAL FIBRILLATION): Primary | Chronic | ICD-10-CM

## 2024-02-09 LAB
INR PPP: 2 (ref 0.91–1.09)
PROTHROMBIN TIME: 23.6 SECONDS (ref 10–13.8)

## 2024-02-09 PROCEDURE — 36416 COLLJ CAPILLARY BLOOD SPEC: CPT

## 2024-02-09 PROCEDURE — 85610 PROTHROMBIN TIME: CPT

## 2024-02-09 PROCEDURE — G0463 HOSPITAL OUTPT CLINIC VISIT: HCPCS

## 2024-02-09 NOTE — PROGRESS NOTES
Anticoagulation Clinic Progress Note    Anticoagulation Summary  As of 2024      INR goal:  2.0-3.0   TTR:  59.3% (3 y)   INR used for dosin.0 (2024)   Warfarin maintenance plan:  1 mg every Mon, Wed, Fri; 2 mg all other days   Weekly warfarin total:  11 mg   No change documented:  Octavio Garnett, PharmD   Plan last modified:  Regine Landry, Pharmacy Intern (10/6/2023)   Next INR check:  3/8/2024   Priority:  High   Target end date:      Indications    PAF (paroxysmal atrial fibrillation) [I48.0]                 Anticoagulation Episode Summary       INR check location:      Preferred lab:      Send INR reminders to:   [facility]    Comments:  Previously apixaban (cost)          Anticoagulation Care Providers       Provider Role Specialty Phone number    Francesco Riojas MD Referring Cardiology [phone]            Clinic Interview:  Patient Findings     Negatives:  Signs/symptoms of thrombosis, Signs/symptoms of bleeding,   Laboratory test error suspected, Change in health, Change in alcohol use,   Change in activity, Upcoming invasive procedure, Emergency department   visit, Upcoming dental procedure, Missed doses, Extra doses, Change in   medications, Change in diet/appetite, Hospital admission, Bruising, Other   complaints      Clinical Outcomes     Negatives:  Major bleeding event, Thromboembolic event,   Anticoagulation-related hospital admission, Anticoagulation-related ED   visit, Anticoagulation-related fatality        INR History:      10/6/2023     9:00 AM 10/20/2023     8:30 AM 11/3/2023     8:30 AM 2023     8:45 AM 2023     8:45 AM 2024     8:45 AM 2024     8:45 AM   Anticoagulation Monitoring   INR 3.4 2.5 2.7 1.9 2.4 1.9 2.0   INR Date 10/6/2023 10/20/2023 11/3/2023 2023 2023 2024 2024   INR Goal 2.0-3.0 2.0-3.0 2.0-3.0 2.0-3.0 2.0-3.0 2.0-3.0 2.0-3.0   Trend Down Same Same Same Same Same Same   Last Week Total 12 mg 11 mg  11 mg 11 mg 11 mg 11 mg 11 mg   Next Week Total 10 mg 11 mg 11 mg 11 mg 11 mg 11 mg 11 mg   Sun 2 mg 2 mg 2 mg 2 mg 2 mg 2 mg 2 mg   Mon 1 mg 1 mg 1 mg 1 mg 1 mg 1 mg 1 mg   Tue 2 mg 2 mg 2 mg 2 mg 2 mg 2 mg 2 mg   Wed 1 mg 1 mg 1 mg 1 mg 1 mg 1 mg 1 mg   Thu 2 mg 2 mg 2 mg 2 mg 2 mg 2 mg 2 mg   Fri Hold (10/6); Otherwise 1 mg 1 mg 1 mg 1 mg 1 mg 1 mg 1 mg   Sat 2 mg 2 mg 2 mg 2 mg 2 mg 2 mg 2 mg       Plan:  1. INR is Therapeutic today- see above in Anticoagulation Summary.  Will instruct Ariadne Telles to Continue their warfarin regimen- see above in Anticoagulation Summary.  2. Follow up in 4 weeks. If INR continues to trend 2.0 or lower, will consider increase in total weekly dose.    3. Patient declines warfarin refills.  4. Verbal and written information provided. Patient expresses understanding and has no further questions at this time.    Octavio Garnett, PharmD

## 2024-03-08 ENCOUNTER — ANTICOAGULATION VISIT (OUTPATIENT)
Dept: PHARMACY | Facility: HOSPITAL | Age: 74
End: 2024-03-08
Payer: MEDICARE

## 2024-03-08 DIAGNOSIS — I48.0 PAF (PAROXYSMAL ATRIAL FIBRILLATION): Primary | Chronic | ICD-10-CM

## 2024-03-08 LAB
INR PPP: 1.7 (ref 0.91–1.09)
PROTHROMBIN TIME: 20.5 SECONDS (ref 10–13.8)

## 2024-03-08 PROCEDURE — 85610 PROTHROMBIN TIME: CPT

## 2024-03-08 PROCEDURE — 36416 COLLJ CAPILLARY BLOOD SPEC: CPT

## 2024-03-08 PROCEDURE — G0463 HOSPITAL OUTPT CLINIC VISIT: HCPCS

## 2024-03-08 NOTE — PROGRESS NOTES
Anticoagulation Clinic Progress Note    Anticoagulation Summary  As of 3/8/2024      INR goal:  2.0-3.0   TTR:  57.8% (3 y)   INR used for dosin.7 (3/8/2024)   Warfarin maintenance plan:  1 mg every Mon, Wed; 2 mg all other days   Weekly warfarin total:  12 mg   Plan last modified:  Linsey Stafford RPH (3/8/2024)   Next INR check:  3/22/2024   Priority:  High   Target end date:      Indications    PAF (paroxysmal atrial fibrillation) [I48.0]                 Anticoagulation Episode Summary       INR check location:      Preferred lab:      Send INR reminders to:   [facility]    Comments:  Previously apixaban (cost)          Anticoagulation Care Providers       Provider Role Specialty Phone number    Francesco Riojas MD Referring Cardiology [phone]            Clinic Interview:  Patient Findings     Negatives:  Signs/symptoms of thrombosis, Signs/symptoms of bleeding,   Laboratory test error suspected, Change in health, Change in alcohol use,   Change in activity, Upcoming invasive procedure, Emergency department   visit, Upcoming dental procedure, Missed doses, Extra doses, Change in   medications, Change in diet/appetite, Hospital admission, Bruising, Other   complaints      Clinical Outcomes     Negatives:  Major bleeding event, Thromboembolic event,   Anticoagulation-related hospital admission, Anticoagulation-related ED   visit, Anticoagulation-related fatality        INR History:      10/20/2023     8:30 AM 11/3/2023     8:30 AM 2023     8:45 AM 2023     8:45 AM 2024     8:45 AM 2024     8:45 AM 3/8/2024     8:45 AM   Anticoagulation Monitoring   INR 2.5 2.7 1.9 2.4 1.9 2.0 1.7   INR Date 10/20/2023 11/3/2023 2023 2023 2024 2024 3/8/2024   INR Goal 2.0-3.0 2.0-3.0 2.0-3.0 2.0-3.0 2.0-3.0 2.0-3.0 2.0-3.0   Trend Same Same Same Same Same Same Up   Last Week Total 11 mg 11 mg 11 mg 11 mg 11 mg 11 mg 11 mg   Next Week Total 11 mg 11 mg 11 mg 11  mg 11 mg 11 mg 12 mg   Sun 2 mg 2 mg 2 mg 2 mg 2 mg 2 mg 2 mg   Mon 1 mg 1 mg 1 mg 1 mg 1 mg 1 mg 1 mg   Tue 2 mg 2 mg 2 mg 2 mg 2 mg 2 mg 2 mg   Wed 1 mg 1 mg 1 mg 1 mg 1 mg 1 mg 1 mg   Thu 2 mg 2 mg 2 mg 2 mg 2 mg 2 mg 2 mg   Fri 1 mg 1 mg 1 mg 1 mg 1 mg 1 mg 2 mg   Sat 2 mg 2 mg 2 mg 2 mg 2 mg 2 mg 2 mg       Plan:  1. INR is Subtherapeutic today- see above in Anticoagulation Summary.  Will instruct Ariadne Telles to Change their warfarin regimen to 1 mg on Mondays and Wednesdays, and 2 mg all other days see above in Anticoagulation Summary.  2. Follow up in 2 weeks  3. Patient declines warfarin refills.  4. Verbal and written information provided. Patient expresses understanding and has no further questions at this time.    Linsey Stafford [facility]

## 2024-03-23 DIAGNOSIS — E78.00 HYPERCHOLESTEROLEMIA: ICD-10-CM

## 2024-03-25 RX ORDER — LISINOPRIL 20 MG/1
TABLET ORAL
Qty: 90 TABLET | Refills: 1 | Status: SHIPPED | OUTPATIENT
Start: 2024-03-25

## 2024-03-25 RX ORDER — ATORVASTATIN CALCIUM 40 MG/1
TABLET, FILM COATED ORAL
Qty: 90 TABLET | Refills: 1 | Status: SHIPPED | OUTPATIENT
Start: 2024-03-25

## 2024-03-25 NOTE — TELEPHONE ENCOUNTER
Rx Refill Note  Requested Prescriptions     Pending Prescriptions Disp Refills    lisinopril (PRINIVIL,ZESTRIL) 20 MG tablet [Pharmacy Med Name: LISINOPRIL TAB 20MG] 90 tablet 1     Sig: TAKE 1 TABLET DAILY    atorvastatin (LIPITOR) 40 MG tablet [Pharmacy Med Name: ATORVASTATIN TAB 40MG] 90 tablet 1     Sig: TAKE 1 TABLET DAILY      Last office visit with prescribing clinician: 2/1/2024   Last telemedicine visit with prescribing clinician: Visit date not found   Next office visit with prescribing clinician: 5/2/2024                         Would you like a call back once the refill request has been completed: [] Yes [] No    If the office needs to give you a call back, can they leave a voicemail: [] Yes [] No    Abdoul Anguiano MA  03/25/24, 08:22 EDT

## 2024-03-28 ENCOUNTER — ANTICOAGULATION VISIT (OUTPATIENT)
Dept: PHARMACY | Facility: HOSPITAL | Age: 74
End: 2024-03-28
Payer: MEDICARE

## 2024-03-28 DIAGNOSIS — I48.0 PAF (PAROXYSMAL ATRIAL FIBRILLATION): Primary | Chronic | ICD-10-CM

## 2024-03-28 LAB
INR PPP: 1.7 (ref 0.91–1.09)
PROTHROMBIN TIME: 20.5 SECONDS (ref 10–13.8)

## 2024-03-28 PROCEDURE — 85610 PROTHROMBIN TIME: CPT

## 2024-03-28 PROCEDURE — G0463 HOSPITAL OUTPT CLINIC VISIT: HCPCS

## 2024-03-28 PROCEDURE — 36416 COLLJ CAPILLARY BLOOD SPEC: CPT

## 2024-03-28 RX ORDER — WARFARIN SODIUM 2 MG/1
TABLET ORAL
Qty: 80 TABLET | Refills: 1 | Status: SHIPPED | OUTPATIENT
Start: 2024-03-28

## 2024-03-28 RX ORDER — AMLODIPINE BESYLATE 5 MG/1
5 TABLET ORAL DAILY
Qty: 90 TABLET | Refills: 1 | Status: SHIPPED | OUTPATIENT
Start: 2024-03-28

## 2024-03-28 NOTE — TELEPHONE ENCOUNTER
Rx Refill Note  Requested Prescriptions     Pending Prescriptions Disp Refills    amLODIPine (NORVASC) 5 MG tablet [Pharmacy Med Name: AMLODIPINE BESYLATE 5 MG TAB] 90 tablet 1     Sig: TAKE 1 TABLET BY MOUTH DAILY      Last office visit with prescribing clinician: 2/1/2024   Last telemedicine visit with prescribing clinician: Visit date not found   Next office visit with prescribing clinician: 5/2/2024                         Would you like a call back once the refill request has been completed: [] Yes [] No    If the office needs to give you a call back, can they leave a voicemail: [] Yes [] No    Ba Hernández  03/28/24, 11:21 EDT

## 2024-03-28 NOTE — PROGRESS NOTES
Anticoagulation Clinic Progress Note    Anticoagulation Summary  As of 3/28/2024      INR goal:  2.0-3.0   TTR:  56.8% (3.1 y)   INR used for dosin.7 (3/28/2024)   Warfarin maintenance plan:  1 mg every Mon, Wed; 2 mg all other days   Weekly warfarin total:  12 mg   Plan last modified:  Linsey Stafford RPH (3/8/2024)   Next INR check:  2024   Priority:  High   Target end date:      Indications    PAF (paroxysmal atrial fibrillation) [I48.0]                 Anticoagulation Episode Summary       INR check location:      Preferred lab:      Send INR reminders to:   [facility] [place]    Comments:  Previously apixaban (cost)          Anticoagulation Care Providers       Provider Role Specialty Phone number    Francesco Riojas MD Referring Cardiology [phone]            Clinic Interview:      INR History:      11/3/2023     8:30 AM 2023     8:45 AM 2023     8:45 AM 2024     8:45 AM 2024     8:45 AM 3/8/2024     8:45 AM 3/28/2024     9:30 AM   Anticoagulation Monitoring   INR 2.7 1.9 2.4 1.9 2.0 1.7 1.7   INR Date 11/3/2023 2023 2023 2024 2024 3/8/2024 3/28/2024   INR Goal 2.0-3.0 2.0-3.0 2.0-3.0 2.0-3.0 2.0-3.0 2.0-3.0 2.0-3.0   Trend Same Same Same Same Same Up Same   Last Week Total 11 mg 11 mg 11 mg 11 mg 11 mg 11 mg 12 mg   Next Week Total 11 mg 11 mg 11 mg 11 mg 11 mg 12 mg 14 mg   Sun 2 mg 2 mg 2 mg 2 mg 2 mg 2 mg 2 mg   Mon 1 mg 1 mg 1 mg 1 mg 1 mg 1 mg 1 mg   Tue 2 mg 2 mg 2 mg 2 mg 2 mg 2 mg 2 mg   Wed 1 mg 1 mg 1 mg 1 mg 1 mg 1 mg 1 mg   Thu 2 mg 2 mg 2 mg 2 mg 2 mg 2 mg 4 mg (3/28); Otherwise 2 mg   Fri 1 mg 1 mg 1 mg 1 mg 1 mg 2 mg 2 mg   Sat 2 mg 2 mg 2 mg 2 mg 2 mg 2 mg 2 mg       Plan:  1. INR is Subtherapeutic today- see above in Anticoagulation Summary.   Boost today with 4mg, then cont same, see above in Anticoagulation Summary.  2. Follow up in 2 weeks  3. Patient declines warfarin refills.  4. Verbal and written information provided.  Patient expresses understanding and has no further questions at this time.    Adri Tracey, [facility]

## 2024-04-11 ENCOUNTER — ANTICOAGULATION VISIT (OUTPATIENT)
Dept: PHARMACY | Facility: HOSPITAL | Age: 74
End: 2024-04-11
Payer: MEDICARE

## 2024-04-11 ENCOUNTER — OFFICE VISIT (OUTPATIENT)
Age: 74
End: 2024-04-11
Payer: MEDICARE

## 2024-04-11 VITALS
BODY MASS INDEX: 32.1 KG/M2 | DIASTOLIC BLOOD PRESSURE: 68 MMHG | HEART RATE: 63 BPM | HEIGHT: 61 IN | WEIGHT: 170 LBS | SYSTOLIC BLOOD PRESSURE: 114 MMHG

## 2024-04-11 DIAGNOSIS — I48.0 PAF (PAROXYSMAL ATRIAL FIBRILLATION): Primary | Chronic | ICD-10-CM

## 2024-04-11 LAB
INR PPP: 4.5 (ref 0.91–1.09)
PROTHROMBIN TIME: 53.4 SECONDS (ref 10–13.8)

## 2024-04-11 PROCEDURE — 85610 PROTHROMBIN TIME: CPT

## 2024-04-11 PROCEDURE — 93000 ELECTROCARDIOGRAM COMPLETE: CPT | Performed by: INTERNAL MEDICINE

## 2024-04-11 PROCEDURE — 99214 OFFICE O/P EST MOD 30 MIN: CPT | Performed by: INTERNAL MEDICINE

## 2024-04-11 PROCEDURE — 3078F DIAST BP <80 MM HG: CPT | Performed by: INTERNAL MEDICINE

## 2024-04-11 PROCEDURE — 36416 COLLJ CAPILLARY BLOOD SPEC: CPT

## 2024-04-11 PROCEDURE — G0463 HOSPITAL OUTPT CLINIC VISIT: HCPCS

## 2024-04-11 PROCEDURE — 3074F SYST BP LT 130 MM HG: CPT | Performed by: INTERNAL MEDICINE

## 2024-04-11 NOTE — PROGRESS NOTES
Anticoagulation Clinic Progress Note    Anticoagulation Summary  As of 2024      INR goal:  2.0-3.0   TTR:  56.6% (3.1 y)   INR used for dosin.5 (2024)   Warfarin maintenance plan:  1 mg every Mon, Wed; 2 mg all other days   Weekly warfarin total:  12 mg   Plan last modified:  Linsey Stafford RPH (3/8/2024)   Next INR check:  2024   Priority:  High   Target end date:      Indications    PAF (paroxysmal atrial fibrillation) [I48.0]                 Anticoagulation Episode Summary       INR check location:      Preferred lab:      Send INR reminders to:  [facility]    Comments:  Previously apixaban (cost)          Anticoagulation Care Providers       Provider Role Specialty Phone number    Francesco Riojas MD Referring Cardiology [phone]            Clinic Interview:  Patient Findings     Positives:  Change in health    Negatives:  Signs/symptoms of thrombosis, Signs/symptoms of bleeding,   Laboratory test error suspected, Change in alcohol use, Change in   activity, Upcoming invasive procedure, Emergency department visit,   Upcoming dental procedure, Missed doses, Extra doses, Change in   medications, Change in diet/appetite, Hospital admission, Bruising, Other   complaints    Comments:  Patient reports having vertigo this past week and vomiting as   a result      Clinical Outcomes     Negatives:  Major bleeding event, Thromboembolic event,   Anticoagulation-related hospital admission, Anticoagulation-related ED   visit, Anticoagulation-related fatality    Comments:  Patient reports having vertigo this past week and vomiting as   a result        INR History:      2023     8:45 AM 2023     8:45 AM 2024     8:45 AM 2024     8:45 AM 3/8/2024     8:45 AM 3/28/2024     9:30 AM 2024     9:30 AM   Anticoagulation Monitoring   INR 1.9 2.4 1.9 2.0 1.7 1.7 4.5   INR Date 2023 2023 2024 2024 3/8/2024 3/28/2024 2024   INR Goal  2.0-3.0 2.0-3.0 2.0-3.0 2.0-3.0 2.0-3.0 2.0-3.0 2.0-3.0   Trend Same Same Same Same Up Same Same   Last Week Total 11 mg 11 mg 11 mg 11 mg 11 mg 12 mg 12 mg   Next Week Total 11 mg 11 mg 11 mg 11 mg 12 mg 14 mg 10 mg   Sun 2 mg 2 mg 2 mg 2 mg 2 mg 2 mg 2 mg   Mon 1 mg 1 mg 1 mg 1 mg 1 mg 1 mg 1 mg   Tue 2 mg 2 mg 2 mg 2 mg 2 mg 2 mg 2 mg   Wed 1 mg 1 mg 1 mg 1 mg 1 mg 1 mg 1 mg   Thu 2 mg 2 mg 2 mg 2 mg 2 mg 4 mg (3/28); Otherwise 2 mg Hold (4/11)   Fri 1 mg 1 mg 1 mg 1 mg 2 mg 2 mg 2 mg   Sat 2 mg 2 mg 2 mg 2 mg 2 mg 2 mg 2 mg       Plan:  1. INR is Supratherapeutic today- see above in Anticoagulation Summary.  Will instruct Ariadne CASSIDY Nedaholger to hold today's dose and then continue current regimen - see above in Anticoagulation Summary.  2. Follow up in 1 week  3. Patient declines warfarin refills.  4. Verbal and written information provided. Patient expresses understanding and has no further questions at this time.    Yesy Souza, Pharmacy Intern

## 2024-04-11 NOTE — PROGRESS NOTES
"Date of Office Visit: 2024  Encounter Provider: Francesco Riojas MD  Place of Service: [facility] CARDIOLOGY  Patient Name: Ariadne Telles  :1950    Chief Complaint   Patient presents with    paroxysmal AFIB   :     HPI: Ariadne Telles is a 73 y.o. female who presents today for paroxysmal atrial fibrillation.     She has PVI in .      She has had one episode of symptomatic atrial fibrillation confirmed since then.  It resulted in ER visit.  2 week Holter monitors have been normal.      She does not recall any symptomatic atrial fibrillation recently.     Her main issue currently is vertigo.      PCP has been evaluated.      She did have an MRI with no evidence of stroke.           Past Medical History:   Diagnosis Date    Atrial fibrillation with RVR     Bradycardia 2016    Carpal tunnel syndrome 2018    Endometrial cancer     Essential hypertension 2016    Gastroesophageal reflux disease 2016    History of endometrial cancer 2017    1994, S/P ANGEL, BSO.  Dr. Catracho Alejo.    Hx of bone density study 2017, DEXA scan: Osteoporosis in L1, severe osteopenia in both femoral necks.  T-scores: L1, -2.5; L2, -1.9; L3, -0.7; L4, +0.3.  Right femoral neck, -2.1; total -1.8.  Left femoral neck, -2.3; total, -1.8.    Hypercholesterolemia 2016    Hyperlipidemia     Kidney stone     MVP (mitral valve prolapse)     last 2 echocardiograms showed no MVP in 2018 and 2019     Nausea & vomiting     Osteopenia 2016    2017, DEXA scan: Severe osteopenia in both femoral necks, T score= -2.1 in the right femoral neck, -2.3 in the left femoral neck.    Osteoporosis 2017    DEXA scan, T score L1= -2.5    PAF (paroxysmal atrial fibrillation)     Pituitary adenoma 2018    Thought to have a pituitary microadenoma on previous studies but MRI of the pituitary with and without contrast, February 10, 2017: \"No convincing evidence to suggest " "a pituitary adenoma on this MRI study\".  \"Incidentally noted relatively mild changes of chronic small vessel ischemia phenomena.\"    RBBB (right bundle branch block)     Rectal polyp 2016    Surgical menopause     ANGEL, BSO, for endometrial carcinoma.    Vaginal delivery     x1  NITO, (and 2 C-sections, one stillbirth).    Vertigo        Past Surgical History:   Procedure Laterality Date    CARDIAC ELECTROPHYSIOLOGY PROCEDURE N/A 2021    Procedure: Ablation atrial fibrillation cryo;  Surgeon: Francesco Riojas MD;  Location: [facility] INVASIVE LOCATION;  Service: Cardiovascular;  Laterality: N/A;    CARPAL TUNNEL RELEASE WITH CUBITAL TUNNEL RELEASE  2018    right    CATARACT EXTRACTION Right 2016     SECTION      x2   one stillborn Ariadne Hernandez  and  Reid    COLONOSCOPY  2007    polyps  Dr. Rueda     TOTAL ABDOMINAL HYSTERECTOMY WITH SALPINGO OOPHORECTOMY      Dr. Alejo       Social History     Socioeconomic History    Marital status:      Spouse name:     Number of children: 2   Tobacco Use    Smoking status: Never    Smokeless tobacco: Never   Vaping Use    Vaping status: Never Used   Substance and Sexual Activity    Alcohol use: No     Comment: caffeine use seldom    Drug use: No    Sexual activity: Never     Birth control/protection: Surgical     Comment:        Family History   Problem Relation Age of Onset    Heart disease Mother     Heart attack Mother 67    COPD Father     Parkinsonism Father         ?    Heart defect Sister          at 18    Alcohol abuse Brother     Diabetes Brother     Heart disease Brother     Hypertension Brother     Hyperlipidemia Brother     Other Daughter         stillborn    Sleep apnea Son     No Known Problems Maternal Grandmother     No Known Problems Paternal Grandmother     Breast cancer Maternal Aunt 75    No Known Problems Paternal Aunt     No Known Problems Maternal Grandfather     No Known Problems " "Paternal Grandfather     Kidney cancer Sister     No Known Problems Son     BRCA 1/2 Neg Hx     Colon cancer Neg Hx     Endometrial cancer Neg Hx     Ovarian cancer Neg Hx        Review of Systems   Constitutional: Negative.   Cardiovascular: Negative.    Respiratory: Negative.     Gastrointestinal: Negative.    Neurological:  Positive for dizziness and vertigo.       Allergies   Allergen Reactions    Naproxen Hives and Swelling    Paroxetine Other (See Comments)     TREMORS         Current Outpatient Medications:     alendronate (FOSAMAX) 70 MG tablet, TAKE 1 TABLET EVERY 7 DAYS, Disp: 12 tablet, Rfl: 3    amLODIPine (NORVASC) 5 MG tablet, TAKE 1 TABLET BY MOUTH DAILY, Disp: 90 tablet, Rfl: 1    atorvastatin (LIPITOR) 40 MG tablet, TAKE 1 TABLET DAILY, Disp: 90 tablet, Rfl: 1    calcium carbonate-cholecalciferol 500-400 MG-UNIT tablet tablet, Take  by mouth Daily., Disp: , Rfl:     lisinopril (PRINIVIL,ZESTRIL) 20 MG tablet, TAKE 1 TABLET DAILY, Disp: 90 tablet, Rfl: 1    meclizine (ANTIVERT) 12.5 MG tablet, Take 1 tablet by mouth 3 (Three) Times a Day As Needed for Dizziness., Disp: 21 tablet, Rfl: 0    metoprolol tartrate (LOPRESSOR) 50 MG tablet, Take 1 tablet by mouth 2 (Two) Times a Day., Disp: 180 tablet, Rfl: 3    Multiple Vitamin (MULTIVITAMIN) capsule, Take  by mouth., Disp: , Rfl:     Multiple Vitamins-Minerals (OCUVITE EYE HEALTH FORMULA PO), Take 1 tablet by mouth 2 (Two) Times a Day., Disp: , Rfl:     ondansetron (Zofran) 4 MG tablet, Take 1 tablet by mouth Every 8 (Eight) Hours As Needed for Nausea (and vertigo)., Disp: 30 tablet, Rfl: 0    warfarin (COUMADIN) 2 MG tablet, TAKE 1/2 TABLET BY MOUTH ON MONDAY AND FRIDAY AND TAKE 1 TABLET BY MOUTH ALL OTHER DAYS OR AS DIRECTED., Disp: 80 tablet, Rfl: 1      Objective:     Vitals:    04/11/24 0941   BP: 114/68   Pulse: 63   Weight: 77.1 kg (170 lb)   Height: 154.9 cm (61\")     Body mass index is 32.12 kg/m².    PHYSICAL EXAM:    Vitals and nursing note " reviewed.   Constitutional:       General: Not in acute distress.  Pulmonary:      Effort: Pulmonary effort is normal. No respiratory distress.   Cardiovascular:      Normal rate. Regular rhythm.   Edema:     Peripheral edema absent.   Skin:     General: Skin is warm and dry.   Neurological:      Mental Status: Alert and oriented to person, place, and time.   Psychiatric:         Behavior: Behavior normal.         Thought Content: Thought content normal.         Judgment: Judgment normal.             ECG 12 Lead    Date/Time: 4/11/2024 5:02 PM  Performed by: Francesco Riojas MD    Authorized by: Francesco Riojas MD  Comparison: compared with previous ECG   Rhythm: sinus rhythm      Echo with left atrial volume of 41 ml, index of 23      Assessment:       Diagnosis Plan   1. PAF (paroxysmal atrial fibrillation)               Plan:       Only one confirmed episode of atrial fibrillation since ablation.     She has not noted any recent episodes.      Plan is continue current treatment.      No evidence of stroke on MRI in 11/23 to explain vertigo    If more episodes of atrial fibrillation could consider EPS/ablation    As always, it has been a pleasure to participate in your patient's care.      Sincerely,         Francesco Riojas MD

## 2024-04-16 ENCOUNTER — TELEPHONE (OUTPATIENT)
Dept: FAMILY MEDICINE CLINIC | Facility: CLINIC | Age: 74
End: 2024-04-16
Payer: MEDICARE

## 2024-04-18 ENCOUNTER — ANTICOAGULATION VISIT (OUTPATIENT)
Dept: PHARMACY | Facility: HOSPITAL | Age: 74
End: 2024-04-18
Payer: MEDICARE

## 2024-04-18 DIAGNOSIS — I48.0 PAF (PAROXYSMAL ATRIAL FIBRILLATION): Primary | Chronic | ICD-10-CM

## 2024-04-18 LAB
INR PPP: 3 (ref 0.91–1.09)
PROTHROMBIN TIME: 36.3 SECONDS (ref 10–13.8)

## 2024-04-18 PROCEDURE — 85610 PROTHROMBIN TIME: CPT

## 2024-04-18 PROCEDURE — 36416 COLLJ CAPILLARY BLOOD SPEC: CPT

## 2024-04-18 PROCEDURE — G0463 HOSPITAL OUTPT CLINIC VISIT: HCPCS

## 2024-04-18 RX ORDER — WARFARIN SODIUM 2 MG/1
TABLET ORAL
Qty: 80 TABLET | Refills: 1 | Status: SHIPPED | OUTPATIENT
Start: 2024-04-18

## 2024-04-18 NOTE — PROGRESS NOTES
I have supervised and reviewed the notes, assessments, and/or procedures performed. I personally performed the assessment and implemented the plan. I concur with the documentation of this patient encounter.    Ela Rodriguez, MiriamD

## 2024-04-18 NOTE — PROGRESS NOTES
Anticoagulation Clinic Progress Note    Anticoagulation Summary  As of 4/18/2024      INR goal:  2.0-3.0   TTR:  56.2% (3.2 y)   INR used for dosing:  3.0 (4/18/2024)   Warfarin maintenance plan:  1 mg every Mon, Wed; 2 mg all other days   Weekly warfarin total:  12 mg   No change documented:  Eunice Hill, Pharmacy Technician   Plan last modified:  Linsey Stafford RPH (3/8/2024)   Next INR check:  5/3/2024   Priority:  High   Target end date:      Indications    PAF (paroxysmal atrial fibrillation) [I48.0]                 Anticoagulation Episode Summary       INR check location:      Preferred lab:      Send INR reminders to:   [facility]    Comments:  Previously apixaban (cost)          Anticoagulation Care Providers       Provider Role Specialty Phone number    Francesco Riojas MD Referring Cardiology [phone]            Clinic Interview:  Patient Findings     Positives:  Emergency department visit, Change in medications    Negatives:  Signs/symptoms of thrombosis, Signs/symptoms of bleeding,   Laboratory test error suspected, Change in health, Change in alcohol use,   Change in activity, Upcoming invasive procedure, Upcoming dental   procedure, Missed doses, Extra doses, Change in diet/appetite, Hospital   admission, Bruising, Other complaints    Comments:  Urgent care visit on 4/13 for sudden jaw pain. No significant   findings. They prescribed Amoxicillin x 10 days.      Clinical Outcomes     Negatives:  Major bleeding event, Thromboembolic event,   Anticoagulation-related hospital admission, Anticoagulation-related ED   visit, Anticoagulation-related fatality    Comments:  Urgent care visit on 4/13 for sudden jaw pain. No significant   findings. They prescribed Amoxicillin x 10 days.        INR History:      12/29/2023     8:45 AM 1/26/2024     8:45 AM 2/9/2024     8:45 AM 3/8/2024     8:45 AM 3/28/2024     9:30 AM 4/11/2024     9:30 AM 4/18/2024     9:00 AM   Anticoagulation  Monitoring   INR 2.4 1.9 2.0 1.7 1.7 4.5 3.0   INR Date 12/29/2023 1/26/2024 2/9/2024 3/8/2024 3/28/2024 4/11/2024 4/18/2024   INR Goal 2.0-3.0 2.0-3.0 2.0-3.0 2.0-3.0 2.0-3.0 2.0-3.0 2.0-3.0   Trend Same Same Same Up Same Same Same   Last Week Total 11 mg 11 mg 11 mg 11 mg 12 mg 12 mg 10 mg   Next Week Total 11 mg 11 mg 11 mg 12 mg 14 mg 10 mg 12 mg   Sun 2 mg 2 mg 2 mg 2 mg 2 mg 2 mg 2 mg   Mon 1 mg 1 mg 1 mg 1 mg 1 mg 1 mg 1 mg   Tue 2 mg 2 mg 2 mg 2 mg 2 mg 2 mg 2 mg   Wed 1 mg 1 mg 1 mg 1 mg 1 mg 1 mg 1 mg   Thu 2 mg 2 mg 2 mg 2 mg 4 mg (3/28); Otherwise 2 mg Hold (4/11) 2 mg   Fri 1 mg 1 mg 1 mg 2 mg 2 mg 2 mg 2 mg   Sat 2 mg 2 mg 2 mg 2 mg 2 mg 2 mg 2 mg   Visit Report      Report        Plan:  1. INR is therapeutic today- see above in Anticoagulation Summary.   Will instruct Ariadne Telles to continue their warfarin regimen- see above in Anticoagulation Summary.  2. Follow up in 2 weeks.  3. Patient desires warfarin refills.  4. Verbal and written information provided. Patient expresses understanding and has no further questions at this time.    Eunice Hill, Pharmacy Technician

## 2024-05-02 ENCOUNTER — OFFICE VISIT (OUTPATIENT)
Dept: FAMILY MEDICINE CLINIC | Facility: CLINIC | Age: 74
End: 2024-05-02
Payer: MEDICARE

## 2024-05-02 VITALS
HEIGHT: 61 IN | OXYGEN SATURATION: 97 % | WEIGHT: 170.2 LBS | HEART RATE: 67 BPM | BODY MASS INDEX: 32.13 KG/M2 | TEMPERATURE: 97.7 F | DIASTOLIC BLOOD PRESSURE: 91 MMHG | SYSTOLIC BLOOD PRESSURE: 132 MMHG

## 2024-05-02 DIAGNOSIS — I48.0 PAF (PAROXYSMAL ATRIAL FIBRILLATION): Chronic | ICD-10-CM

## 2024-05-02 DIAGNOSIS — M81.0 OSTEOPOROSIS, UNSPECIFIED OSTEOPOROSIS TYPE, UNSPECIFIED PATHOLOGICAL FRACTURE PRESENCE: Chronic | ICD-10-CM

## 2024-05-02 DIAGNOSIS — G50.0 TRIGEMINAL NEURALGIA OF RIGHT SIDE OF FACE: Primary | ICD-10-CM

## 2024-05-02 DIAGNOSIS — E78.00 HYPERCHOLESTEROLEMIA: Chronic | ICD-10-CM

## 2024-05-02 DIAGNOSIS — I10 ESSENTIAL HYPERTENSION: Chronic | ICD-10-CM

## 2024-05-02 PROCEDURE — 3080F DIAST BP >= 90 MM HG: CPT | Performed by: FAMILY MEDICINE

## 2024-05-02 PROCEDURE — 3075F SYST BP GE 130 - 139MM HG: CPT | Performed by: FAMILY MEDICINE

## 2024-05-02 PROCEDURE — 99214 OFFICE O/P EST MOD 30 MIN: CPT | Performed by: FAMILY MEDICINE

## 2024-05-02 NOTE — PROGRESS NOTES
"Chief Complaint  Hypertension    Subjective        Ariadne Telles presents to [facility]  History of Present Illness    Patient was seen in the urgent care center recently for severe right-sided facial pain that lasted about a day and a half.  Came on suddenly.  She had a similar problem number of years ago and described as having neuralgia.  She states the pain was very severe.  She had to use a heating pad.  She had no sinus drainage.  No teeth problems.  Hurt when she chew for a bit.  She went to urgent care center.  They gave her amoxicillin.  Got better.  She has had no symptoms since.  And the last episode was number of years ago.  She had MRI done in recent months of the brain which which demonstrated no significant pathology.  She continues on her blood pressure medication including amlodipine lisinopril metoprolol.  She is on warfarin for atrial fibrillation prescribed by cardiology.  During these episodes she had no focal neurological deficits.  No visual changes.  No troubles with coordination or gait.  She also continues on atorvastatin 40 mg daily.  She has osteoporosis.  She continues alendronate weekly without GI upset or other concerns.    Objective   Vital Signs:  /91   Pulse 67   Temp 97.7 °F (36.5 °C) (Temporal)   Ht 154.9 cm (61\")   Wt 77.2 kg (170 lb 3.2 oz)   SpO2 97%   BMI 32.16 kg/m²   Estimated body mass index is 32.16 kg/m² as calculated from the following:    Height as of this encounter: 154.9 cm (61\").    Weight as of this encounter: 77.2 kg (170 lb 3.2 oz).               Physical Exam  Constitutional:       Appearance: Normal appearance.   HENT:      Head: Atraumatic.      Nose: Nose normal. No congestion or rhinorrhea.      Comments: No pain to palpate sinus areas.  Eyes:      Extraocular Movements: Extraocular movements intact.      Pupils: Pupils are equal, round, and reactive to light.   Cardiovascular:      Rate and Rhythm: Normal rate " and regular rhythm.      Comments: Appears to be in sinus rhythm today.  Pulmonary:      Effort: Pulmonary effort is normal.   Neurological:      Mental Status: She is alert.      Coordination: Coordination normal.      Gait: Gait normal.      Comments: Neurological exam.  Pupils equal and reactive to light.  Extra ocular muscle movements intact all directions.  Face symmetric.  Gait unremarkable.  No ataxia.  No dysmetria          Result Review :    The following data was reviewed by: Nick Ariza MD on 05/02/2024:  Common labs          7/6/2023    09:26   Common Labs   Glucose 96    BUN 20    Creatinine 0.98    Sodium 143    Potassium 3.9    Chloride 107    Calcium 9.7    Total Protein 6.9    Albumin 4.2    Total Bilirubin 0.7    Alkaline Phosphatase 144    AST (SGOT) 17    ALT (SGPT) 15                   Assessment and Plan     Diagnoses and all orders for this visit:    1. Trigeminal neuralgia of right side of face (Primary)  -     MRI Brain Without Contrast; Future  -     CBC & Differential  -     Comprehensive Metabolic Panel  -     Lipid Panel  -     Sedimentation Rate  -     C-reactive Protein    2. Essential hypertension  -     CBC & Differential  -     Comprehensive Metabolic Panel  -     Lipid Panel  -     Sedimentation Rate  -     C-reactive Protein    3. Hypercholesterolemia  -     CBC & Differential  -     Comprehensive Metabolic Panel  -     Lipid Panel  -     Sedimentation Rate  -     C-reactive Protein    4. PAF (paroxysmal atrial fibrillation)  -     CBC & Differential  -     Comprehensive Metabolic Panel  -     Lipid Panel  -     Sedimentation Rate  -     C-reactive Protein    5. Osteoporosis, unspecified osteoporosis type, unspecified pathological fracture presence      Right-sided facial pain.  Severe.  Resolved for a number of days now.  Had a similar episode many years ago.  Suggestive of probable trigeminal neuralgia.  Differential diagnosis includes sinusitis that is resolved, giant cell  arteritis but less likely, intracranial bleed but very unlikely.  Patient was instructed to go immediately to the emergency room with recurrent severe symptoms.  I am recommending MRI of the brain with trigeminal slices.  I am checking a sed rate and CRP.  And other lab work is indicated.  I will see her back in 6 months for routine checkup but sooner if symptoms return.  But if severe, once again she was counseled to go directly to the emergency room.    Hyperlipidemia, hypertension.  Overall likely stable.  Atrial fibrillation, appears to be in sinus rhythm today.         Follow Up     Return in about 6 months (around 11/2/2024) for Subsequent Medicare Wellness Visit, lab work today, Lab Visit One Week Prior to Next Appointment.  Patient was given instructions and counseling regarding her condition or for health maintenance advice. Please see specific information pulled into the AVS if appropriate.

## 2024-05-03 ENCOUNTER — ANTICOAGULATION VISIT (OUTPATIENT)
Dept: PHARMACY | Facility: HOSPITAL | Age: 74
End: 2024-05-03
Payer: MEDICARE

## 2024-05-03 DIAGNOSIS — I48.0 PAF (PAROXYSMAL ATRIAL FIBRILLATION): Primary | Chronic | ICD-10-CM

## 2024-05-03 LAB
INR PPP: 1.9 (ref 0.91–1.09)
PROTHROMBIN TIME: 23.4 SECONDS (ref 10–13.8)

## 2024-05-03 PROCEDURE — 36416 COLLJ CAPILLARY BLOOD SPEC: CPT

## 2024-05-03 PROCEDURE — G0463 HOSPITAL OUTPT CLINIC VISIT: HCPCS

## 2024-05-03 PROCEDURE — 85610 PROTHROMBIN TIME: CPT

## 2024-05-03 NOTE — PROGRESS NOTES
Anticoagulation Clinic Progress Note    Anticoagulation Summary  As of 5/3/2024      INR goal:  2.0-3.0   TTR:  56.7% (3.2 y)   INR used for dosin.9 (5/3/2024)   Warfarin maintenance plan:  1 mg every Wed; 2 mg all other days   Weekly warfarin total:  13 mg   Plan last modified:  Ela Rodriguez, PharmD (5/3/2024)   Next INR check:  2024   Priority:  High   Target end date:      Indications    PAF (paroxysmal atrial fibrillation) [I48.0]                 Anticoagulation Episode Summary       INR check location:      Preferred lab:      Send INR reminders to:   [facility]    Comments:  Previously apixaban (cost)          Anticoagulation Care Providers       Provider Role Specialty Phone number    Francesco Riojas MD Referring Cardiology [phone]            Clinic Interview:  Patient Findings     Negatives:  Signs/symptoms of thrombosis, Signs/symptoms of bleeding,   Laboratory test error suspected, Change in health, Change in alcohol use,   Change in activity, Upcoming invasive procedure, Emergency department   visit, Upcoming dental procedure, Missed doses, Extra doses, Change in   medications, Change in diet/appetite, Hospital admission, Bruising, Other   complaints      Clinical Outcomes     Negatives:  Major bleeding event, Thromboembolic event,   Anticoagulation-related hospital admission, Anticoagulation-related ED   visit, Anticoagulation-related fatality        INR History:      2024     8:45 AM 2024     8:45 AM 3/8/2024     8:45 AM 3/28/2024     9:30 AM 2024     9:30 AM 2024     9:00 AM 5/3/2024     9:00 AM   Anticoagulation Monitoring   INR 1.9 2.0 1.7 1.7 4.5 3.0 1.9   INR Date 2024 2024 3/8/2024 3/28/2024 2024 2024 5/3/2024   INR Goal 2.0-3.0 2.0-3.0 2.0-3.0 2.0-3.0 2.0-3.0 2.0-3.0 2.0-3.0   Trend Same Same Up Same Same Same Up   Last Week Total 11 mg 11 mg 11 mg 12 mg 12 mg 10 mg 12 mg   Next Week Total 11 mg 11 mg 12 mg 14 mg 10  mg 12 mg 13 mg   Sun 2 mg 2 mg 2 mg 2 mg 2 mg 2 mg 2 mg   Mon 1 mg 1 mg 1 mg 1 mg 1 mg 1 mg 2 mg (5/6, 5/13)   Tue 2 mg 2 mg 2 mg 2 mg 2 mg 2 mg 2 mg   Wed 1 mg 1 mg 1 mg 1 mg 1 mg 1 mg 1 mg   Thu 2 mg 2 mg 2 mg 4 mg (3/28); Otherwise 2 mg Hold (4/11) 2 mg 2 mg   Fri 1 mg 1 mg 2 mg 2 mg 2 mg 2 mg 2 mg   Sat 2 mg 2 mg 2 mg 2 mg 2 mg 2 mg 2 mg   Visit Report     Report         Plan:  1. INR is Subtherapeutic today- see above in Anticoagulation Summary.  Will instruct Ariadne Telles to Change their warfarin regimen- see above in Anticoagulation Summary.  Increase to 1 mg on Wednesday and 2 mg on all other days.  2. Follow up in 2 weeks  3. Patient declines warfarin refills.  4. Verbal and written information provided. Patient expresses understanding and has no further questions at this time.    Ela Rodriguez, PharmD

## 2024-05-17 ENCOUNTER — ANTICOAGULATION VISIT (OUTPATIENT)
Dept: PHARMACY | Facility: HOSPITAL | Age: 74
End: 2024-05-17
Payer: MEDICARE

## 2024-05-17 DIAGNOSIS — I48.0 PAF (PAROXYSMAL ATRIAL FIBRILLATION): Primary | Chronic | ICD-10-CM

## 2024-05-17 LAB
INR PPP: 3 (ref 0.91–1.09)
PROTHROMBIN TIME: 35.9 SECONDS (ref 10–13.8)

## 2024-05-17 PROCEDURE — G0463 HOSPITAL OUTPT CLINIC VISIT: HCPCS

## 2024-05-17 PROCEDURE — 36416 COLLJ CAPILLARY BLOOD SPEC: CPT

## 2024-05-17 PROCEDURE — 85610 PROTHROMBIN TIME: CPT

## 2024-05-17 NOTE — PROGRESS NOTES
Anticoagulation Clinic Progress Note    Anticoagulation Summary  As of 5/17/2024      INR goal:  2.0-3.0   TTR:  57.1% (3.2 y)   INR used for dosing:  3.0 (5/17/2024)   Warfarin maintenance plan:  1 mg every Wed; 2 mg all other days   Weekly warfarin total:  13 mg   No change documented:  Octavio Garnett, PharmD   Plan last modified:  Ela Rodriguez, PharmD (5/3/2024)   Next INR check:  5/31/2024   Priority:  High   Target end date:      Indications    PAF (paroxysmal atrial fibrillation) [I48.0]                 Anticoagulation Episode Summary       INR check location:      Preferred lab:      Send INR reminders to:   [facility]    Comments:  Previously apixaban (cost)          Anticoagulation Care Providers       Provider Role Specialty Phone number    Francesco Riojas MD Referring Cardiology [phone]            Clinic Interview:  Patient Findings     Negatives:  Signs/symptoms of thrombosis, Signs/symptoms of bleeding,   Laboratory test error suspected, Change in health, Change in alcohol use,   Change in activity, Upcoming invasive procedure, Emergency department   visit, Upcoming dental procedure, Missed doses, Extra doses, Change in   medications, Change in diet/appetite, Hospital admission, Bruising, Other   complaints      Clinical Outcomes     Negatives:  Major bleeding event, Thromboembolic event,   Anticoagulation-related hospital admission, Anticoagulation-related ED   visit, Anticoagulation-related fatality        INR History:      2/9/2024     8:45 AM 3/8/2024     8:45 AM 3/28/2024     9:30 AM 4/11/2024     9:30 AM 4/18/2024     9:00 AM 5/3/2024     9:00 AM 5/17/2024     9:00 AM   Anticoagulation Monitoring   INR 2.0 1.7 1.7 4.5 3.0 1.9 3.0   INR Date 2/9/2024 3/8/2024 3/28/2024 4/11/2024 4/18/2024 5/3/2024 5/17/2024   INR Goal 2.0-3.0 2.0-3.0 2.0-3.0 2.0-3.0 2.0-3.0 2.0-3.0 2.0-3.0   Trend Same Up Same Same Same Up Same   Last Week Total 11 mg 11 mg 12 mg 12 mg 10 mg 12 mg 13  mg   Next Week Total 11 mg 12 mg 14 mg 10 mg 12 mg 13 mg 13 mg   Sun 2 mg 2 mg 2 mg 2 mg 2 mg 2 mg 2 mg   Mon 1 mg 1 mg 1 mg 1 mg 1 mg 2 mg (5/6, 5/13) 2 mg   Tue 2 mg 2 mg 2 mg 2 mg 2 mg 2 mg 2 mg   Wed 1 mg 1 mg 1 mg 1 mg 1 mg 1 mg 1 mg   Thu 2 mg 2 mg 4 mg (3/28); Otherwise 2 mg Hold (4/11) 2 mg 2 mg 2 mg   Fri 1 mg 2 mg 2 mg 2 mg 2 mg 2 mg 2 mg   Sat 2 mg 2 mg 2 mg 2 mg 2 mg 2 mg 2 mg   Visit Report    Report          Plan:  1. INR is Therapeutic today- see above in Anticoagulation Summary.  Will instruct Ariadne Telles to Continue their warfarin regimen- see above in Anticoagulation Summary.  2. Follow up in 2 weeks.  3. Patient declines warfarin refills.  4. Verbal and written information provided. Patient expresses understanding and has no further questions at this time.    Octavio Garnett, PharmD

## 2024-06-07 ENCOUNTER — ANTICOAGULATION VISIT (OUTPATIENT)
Dept: PHARMACY | Facility: HOSPITAL | Age: 74
End: 2024-06-07
Payer: MEDICARE

## 2024-06-07 DIAGNOSIS — I48.0 PAF (PAROXYSMAL ATRIAL FIBRILLATION): Primary | Chronic | ICD-10-CM

## 2024-06-07 LAB
INR PPP: 1.7 (ref 0.91–1.09)
PROTHROMBIN TIME: 20.9 SECONDS (ref 10–13.8)

## 2024-06-07 PROCEDURE — G0463 HOSPITAL OUTPT CLINIC VISIT: HCPCS

## 2024-06-07 PROCEDURE — 36416 COLLJ CAPILLARY BLOOD SPEC: CPT

## 2024-06-07 PROCEDURE — 85610 PROTHROMBIN TIME: CPT

## 2024-06-14 ENCOUNTER — HOSPITAL ENCOUNTER (OUTPATIENT)
Dept: MRI IMAGING | Facility: HOSPITAL | Age: 74
Discharge: HOME | End: 2024-06-14
Payer: MEDICARE

## 2024-06-14 PROCEDURE — A9585 GADOBUTROL INJECTION: HCPCS | Performed by: FAMILY MEDICINE

## 2024-06-14 PROCEDURE — 0 GADOBUTROL 1 MMOL/ML SOLUTION: Performed by: FAMILY MEDICINE

## 2024-06-14 PROCEDURE — 82565 ASSAY OF CREATININE: CPT

## 2024-06-14 PROCEDURE — 70553 MRI BRAIN STEM W/O & W/DYE: CPT

## 2024-06-14 RX ORDER — GADOBUTROL 604.72 MG/ML
7.7 INJECTION INTRAVENOUS
Status: COMPLETED | OUTPATIENT
Start: 2024-06-14 | End: 2024-06-14

## 2024-06-14 RX ADMIN — GADOBUTROL 7.7 ML: 604.72 INJECTION INTRAVENOUS at 08:59

## 2024-06-15 LAB — CREAT BLDA-MCNC: 1.8 MG/DL (ref 0.6–1.3)

## 2024-06-21 ENCOUNTER — ANTICOAGULATION VISIT (OUTPATIENT)
Dept: PHARMACY | Facility: HOSPITAL | Age: 74
End: 2024-06-21
Payer: MEDICARE

## 2024-06-21 DIAGNOSIS — I48.0 PAF (PAROXYSMAL ATRIAL FIBRILLATION): Primary | Chronic | ICD-10-CM

## 2024-06-21 LAB
INR PPP: 3.3 (ref 0.91–1.09)
PROTHROMBIN TIME: 39.5 SECONDS (ref 10–13.8)

## 2024-06-21 PROCEDURE — 36416 COLLJ CAPILLARY BLOOD SPEC: CPT

## 2024-06-21 PROCEDURE — G0463 HOSPITAL OUTPT CLINIC VISIT: HCPCS

## 2024-06-21 PROCEDURE — 85610 PROTHROMBIN TIME: CPT

## 2024-06-21 NOTE — PROGRESS NOTES
Anticoagulation Clinic Progress Note    Anticoagulation Summary  As of 6/21/2024      INR goal:  2.0-3.0   TTR:  57.5% (3.3 y)   INR used for dosing:  3.3 (6/21/2024)   Warfarin maintenance plan:  1 mg every Wed; 2 mg all other days   Weekly warfarin total:  13 mg   Plan last modified:  Ela Rodriguez, PharmD (5/3/2024)   Next INR check:  7/5/2024   Priority:  High   Target end date:      Indications    PAF (paroxysmal atrial fibrillation) [I48.0]                 Anticoagulation Episode Summary       INR check location:      Preferred lab:      Send INR reminders to:   [facility]    Comments:  Previously apixaban (cost)          Anticoagulation Care Providers       Provider Role Specialty Phone number    Francesco Riojas MD Referring Cardiology [phone]            Clinic Interview:  Patient Findings     Positives:  Bruising, Other complaints    Negatives:  Signs/symptoms of thrombosis, Signs/symptoms of bleeding,   Laboratory test error suspected, Change in health, Change in alcohol use,   Change in activity, Upcoming invasive procedure, Emergency department   visit, Upcoming dental procedure, Missed doses, Extra doses, Change in   medications, Change in diet/appetite, Hospital admission    Comments:  patient reports that she had an MRI a week or week and a half   ago and accidentally had a mirror dropped on her head prior to the MRI.   Patient reports she had a headache that occurred after. Patient presents   with bruise on head today.      Clinical Outcomes     Negatives:  Major bleeding event, Thromboembolic event,   Anticoagulation-related hospital admission, Anticoagulation-related ED   visit, Anticoagulation-related fatality    Comments:  patient reports that she had an MRI a week or week and a half   ago and accidentally had a mirror dropped on her head prior to the MRI.   Patient reports she had a headache that occurred after. Patient presents   with bruise on head today.         INR History:      3/28/2024     9:30 AM 4/11/2024     9:30 AM 4/18/2024     9:00 AM 5/3/2024     9:00 AM 5/17/2024     9:00 AM 6/7/2024     8:45 AM 6/21/2024     9:00 AM   Anticoagulation Monitoring   INR 1.7 4.5 3.0 1.9 3.0 1.7 3.3   INR Date 3/28/2024 4/11/2024 4/18/2024 5/3/2024 5/17/2024 6/7/2024 6/21/2024   INR Goal 2.0-3.0 2.0-3.0 2.0-3.0 2.0-3.0 2.0-3.0 2.0-3.0 2.0-3.0   Trend Same Same Same Up Same Same Same   Last Week Total 12 mg 12 mg 10 mg 12 mg 13 mg 12 mg 13 mg   Next Week Total 14 mg 10 mg 12 mg 13 mg 13 mg 15 mg 12 mg   Sun 2 mg 2 mg 2 mg 2 mg 2 mg 2 mg 2 mg   Mon 1 mg 1 mg 1 mg 2 mg (5/6, 5/13) 2 mg 2 mg 2 mg   Tue 2 mg 2 mg 2 mg 2 mg 2 mg 2 mg 2 mg   Wed 1 mg 1 mg 1 mg 1 mg 1 mg 1 mg 1 mg   Thu 4 mg (3/28); Otherwise 2 mg Hold (4/11) 2 mg 2 mg 2 mg 2 mg 2 mg   Fri 2 mg 2 mg 2 mg 2 mg 2 mg 4 mg (6/7); Otherwise 2 mg 1 mg (6/21); Otherwise 2 mg   Sat 2 mg 2 mg 2 mg 2 mg 2 mg 2 mg 2 mg   Visit Report  Report            Plan:  1. INR is Supratherapeutic today- see above in Anticoagulation Summary.  Will instruct Ariadne Telles to Change their warfarin regimen (1 mg today, then continue 1 mg on W then 2 mg all other days) - see above in Anticoagulation Summary.  2. Follow up in 2 weeks  3. Patient declines warfarin refills.  4. Verbal and written information provided. Patient expresses understanding and has no further questions at this time.    Raya Sewell, Pharmacy Intern

## 2024-06-21 NOTE — PROGRESS NOTES
I have supervised and reviewed the notes, assessments, and/or procedures performed. The documented assessment and plan were developed cooperatively, and the plan was implemented in my presence. I concur with the documentation of this patient encounter. Mrs. Telles was instructed to seek immediate medical management at the Emergency Department if any s/sx of an ICH (e.g., severe headache, nausea/vomiting, etc.) develop.     Octavio Garnett, PharmD

## 2024-07-05 ENCOUNTER — ANTICOAGULATION VISIT (OUTPATIENT)
Dept: PHARMACY | Facility: HOSPITAL | Age: 74
End: 2024-07-05
Payer: MEDICARE

## 2024-07-05 DIAGNOSIS — I48.0 PAF (PAROXYSMAL ATRIAL FIBRILLATION): Primary | Chronic | ICD-10-CM

## 2024-07-05 LAB
INR PPP: 3.6 (ref 0.91–1.09)
PROTHROMBIN TIME: 42.7 SECONDS (ref 10–13.8)

## 2024-07-05 PROCEDURE — G0463 HOSPITAL OUTPT CLINIC VISIT: HCPCS

## 2024-07-05 PROCEDURE — 36416 COLLJ CAPILLARY BLOOD SPEC: CPT

## 2024-07-05 PROCEDURE — 85610 PROTHROMBIN TIME: CPT

## 2024-07-05 RX ORDER — WARFARIN SODIUM 2 MG/1
TABLET ORAL
Qty: 80 TABLET | Refills: 1 | Status: SHIPPED | OUTPATIENT
Start: 2024-07-05

## 2024-07-05 NOTE — PROGRESS NOTES
Anticoagulation Clinic Progress Note    Anticoagulation Summary  As of 7/5/2024      INR goal:  2.0-3.0   TTR:  56.8% (3.4 y)   INR used for dosing:  3.6 (7/5/2024)   Warfarin maintenance plan:  1 mg every Mon, Fri; 2 mg all other days   Weekly warfarin total:  12 mg   Plan last modified:  Radha Carver RPH (7/5/2024)   Next INR check:  7/19/2024   Priority:  High   Target end date:      Indications    PAF (paroxysmal atrial fibrillation) [I48.0]                 Anticoagulation Episode Summary       INR check location:      Preferred lab:      Send INR reminders to:   [facility]    Comments:  Previously apixaban (cost)          Anticoagulation Care Providers       Provider Role Specialty Phone number    Francesco Riojas MD Referring Cardiology [phone]            Clinic Interview:  Patient Findings     Negatives:  Signs/symptoms of thrombosis, Signs/symptoms of bleeding,   Laboratory test error suspected, Change in health, Change in alcohol use,   Change in activity, Upcoming invasive procedure, Emergency department   visit, Upcoming dental procedure, Missed doses, Extra doses, Change in   medications, Change in diet/appetite, Hospital admission, Bruising, Other   complaints      Clinical Outcomes     Negatives:  Major bleeding event, Thromboembolic event,   Anticoagulation-related hospital admission, Anticoagulation-related ED   visit, Anticoagulation-related fatality        INR History:      4/11/2024     9:30 AM 4/18/2024     9:00 AM 5/3/2024     9:00 AM 5/17/2024     9:00 AM 6/7/2024     8:45 AM 6/21/2024     9:00 AM 7/5/2024     9:00 AM   Anticoagulation Monitoring   INR 4.5 3.0 1.9 3.0 1.7 3.3 3.6   INR Date 4/11/2024 4/18/2024 5/3/2024 5/17/2024 6/7/2024 6/21/2024 7/5/2024   INR Goal 2.0-3.0 2.0-3.0 2.0-3.0 2.0-3.0 2.0-3.0 2.0-3.0 2.0-3.0   Trend Same Same Up Same Same Same Down   Last Week Total 12 mg 10 mg 12 mg 13 mg 12 mg 13 mg 13 mg   Next Week Total 10 mg 12 mg 13 mg 13 mg  15 mg 12 mg 12 mg   Sun 2 mg 2 mg 2 mg 2 mg 2 mg 2 mg 2 mg   Mon 1 mg 1 mg 2 mg (5/6, 5/13) 2 mg 2 mg 2 mg 1 mg   Tue 2 mg 2 mg 2 mg 2 mg 2 mg 2 mg 2 mg   Wed 1 mg 1 mg 1 mg 1 mg 1 mg 1 mg 2 mg   Thu Hold (4/11) 2 mg 2 mg 2 mg 2 mg 2 mg 2 mg   Fri 2 mg 2 mg 2 mg 2 mg 4 mg (6/7); Otherwise 2 mg 1 mg (6/21); Otherwise 2 mg 1 mg   Sat 2 mg 2 mg 2 mg 2 mg 2 mg 2 mg 2 mg   Visit Report Report             Plan:  1. INR is Supratherapeutic today- see above in Anticoagulation Summary.  Will instruct Ariadne Telles to Change their warfarin regimen (Decrease to 1 mg on M/F and 2 mg all other days) - see above in Anticoagulation Summary.  2. Follow up in 2 weeks  3. Patient desires warfarin refills.  4. Verbal and written information provided. Patient expresses understanding and has no further questions at this time.    Raya Sewell, Pharmacy Intern

## 2024-07-05 NOTE — PROGRESS NOTES
I have supervised and reviewed the notes, assessments, and/or procedures performed. The documented assessment and plan were developed cooperatively, and the plan was implemented in my presence. I concur with the documentation of this patient encounter.    Radha Carver, [facility]

## 2024-07-26 RX ORDER — METOPROLOL TARTRATE 50 MG/1
50 TABLET, FILM COATED ORAL 2 TIMES DAILY
Qty: 180 TABLET | Refills: 3 | Status: SHIPPED | OUTPATIENT
Start: 2024-07-26

## 2024-07-31 ENCOUNTER — TRANSCRIBE ORDERS (OUTPATIENT)
Dept: FAMILY MEDICINE CLINIC | Facility: CLINIC | Age: 74
End: 2024-07-31
Payer: MEDICARE

## 2024-07-31 DIAGNOSIS — Z12.31 BREAST CANCER SCREENING BY MAMMOGRAM: Primary | ICD-10-CM

## 2024-08-02 ENCOUNTER — ANTICOAGULATION VISIT (OUTPATIENT)
Dept: PHARMACY | Facility: HOSPITAL | Age: 74
End: 2024-08-02
Payer: MEDICARE

## 2024-08-02 DIAGNOSIS — I48.0 PAF (PAROXYSMAL ATRIAL FIBRILLATION): Primary | Chronic | ICD-10-CM

## 2024-08-02 LAB
INR PPP: 2.4 (ref 0.91–1.09)
PROTHROMBIN TIME: 28.2 SECONDS (ref 10–13.8)

## 2024-08-02 PROCEDURE — 85610 PROTHROMBIN TIME: CPT

## 2024-08-02 PROCEDURE — G0463 HOSPITAL OUTPT CLINIC VISIT: HCPCS

## 2024-08-02 PROCEDURE — 36416 COLLJ CAPILLARY BLOOD SPEC: CPT

## 2024-08-02 NOTE — PROGRESS NOTES
Anticoagulation Clinic Progress Note    Anticoagulation Summary  As of 2024      INR goal:  2.0-3.0   TTR:  56.7% (3.4 y)   INR used for dosin.4 (2024)   Warfarin maintenance plan:  1 mg every Mon, Fri; 2 mg all other days   Weekly warfarin total:  12 mg   No change documented:  Eunice Hill, Pharmacy Technician   Plan last modified:  Radha Carver RPH (2024)   Next INR check:  2024   Priority:  High   Target end date:      Indications    PAF (paroxysmal atrial fibrillation) [I48.0]                 Anticoagulation Episode Summary       INR check location:      Preferred lab:      Send INR reminders to:   [facility]    Comments:  Previously apixaban (cost)          Anticoagulation Care Providers       Provider Role Specialty Phone number    Francesco Riojas MD Referring Cardiology [phone]            Clinic Interview:  Patient Findings     Negatives:  Signs/symptoms of thrombosis, Signs/symptoms of bleeding,   Laboratory test error suspected, Change in health, Change in alcohol use,   Change in activity, Upcoming invasive procedure, Emergency department   visit, Upcoming dental procedure, Missed doses, Extra doses, Change in   medications, Change in diet/appetite, Hospital admission, Bruising, Other   complaints      Clinical Outcomes     Negatives:  Major bleeding event, Thromboembolic event,   Anticoagulation-related hospital admission, Anticoagulation-related ED   visit, Anticoagulation-related fatality        INR History:      5/3/2024     9:00 AM 2024     9:00 AM 2024     8:45 AM 2024     9:00 AM 2024     9:00 AM 2024     8:45 AM 2024    10:00 AM   Anticoagulation Monitoring   INR 1.9 3.0 1.7 3.3 3.6  2.4   INR Date 5/3/2024 2024 2024 2024 2024  2024   INR Goal 2.0-3.0 2.0-3.0 2.0-3.0 2.0-3.0 2.0-3.0 2.0-3.0 2.0-3.0   Trend Up Same Same Same Down  Same   Last Week Total 12 mg 13 mg 12 mg 13 mg 13 mg  12 mg    Next Week Total 13 mg 13 mg 15 mg 12 mg 12 mg  12 mg   Sun 2 mg 2 mg 2 mg 2 mg 2 mg  2 mg   Mon 2 mg (5/6, 5/13) 2 mg 2 mg 2 mg 1 mg  1 mg   Tue 2 mg 2 mg 2 mg 2 mg 2 mg  2 mg   Wed 1 mg 1 mg 1 mg 1 mg 2 mg  2 mg   Thu 2 mg 2 mg 2 mg 2 mg 2 mg  2 mg   Fri 2 mg 2 mg 4 mg (6/7); Otherwise 2 mg 1 mg (6/21); Otherwise 2 mg 1 mg  1 mg   Sat 2 mg 2 mg 2 mg 2 mg 2 mg  2 mg       Plan:  1. INR is therapeutic today- see above in Anticoagulation Summary.   Will instruct Ariadne Telles to continue their warfarin regimen- see above in Anticoagulation Summary.  2. Follow up in 2 weeks.  3. Patient declines warfarin refills.  4. Verbal and written information provided. Patient expresses understanding and has no further questions at this time.    Eunice Hill, Pharmacy Technician

## 2024-08-02 NOTE — PROGRESS NOTES
I have supervised and reviewed the notes, assessments, and/or procedures performed. I personally performed the assessment and implemented the plan. I concur with the documentation of this patient encounter.    Radha Carver, [facility]

## 2024-08-16 ENCOUNTER — ANTICOAGULATION VISIT (OUTPATIENT)
Dept: PHARMACY | Facility: HOSPITAL | Age: 74
End: 2024-08-16
Payer: MEDICARE

## 2024-08-16 DIAGNOSIS — I48.0 PAF (PAROXYSMAL ATRIAL FIBRILLATION): Primary | Chronic | ICD-10-CM

## 2024-08-16 LAB
INR PPP: 1.9 (ref 0.91–1.09)
PROTHROMBIN TIME: 23.4 SECONDS (ref 10–13.8)

## 2024-08-16 PROCEDURE — 36416 COLLJ CAPILLARY BLOOD SPEC: CPT

## 2024-08-16 PROCEDURE — 85610 PROTHROMBIN TIME: CPT

## 2024-08-16 PROCEDURE — G0463 HOSPITAL OUTPT CLINIC VISIT: HCPCS

## 2024-08-16 NOTE — PROGRESS NOTES
I have supervised and reviewed the notes, assessments, and/or procedures performed. The documented assessment and plan were developed cooperatively, and the plan was implemented in my presence. I concur with the documentation of this patient encounter.    Octavio Garnett, PharmD

## 2024-08-16 NOTE — PROGRESS NOTES
Anticoagulation Clinic Progress Note    Anticoagulation Summary  As of 2024      INR goal:  2.0-3.0   TTR:  56.9% (3.5 y)   INR used for dosin.9 (2024)   Warfarin maintenance plan:  1 mg every Mon, Fri; 2 mg all other days   Weekly warfarin total:  12 mg   Plan last modified:  Radha Carver RPH (2024)   Next INR check:  2024   Priority:  High   Target end date:      Indications    PAF (paroxysmal atrial fibrillation) [I48.0]                 Anticoagulation Episode Summary       INR check location:      Preferred lab:      Send INR reminders to:  [facility] [place]    Comments:  Previously apixaban (cost)          Anticoagulation Care Providers       Provider Role Specialty Phone number    Francesco Riojas MD Referring Cardiology [phone]            Clinic Interview:  Patient Findings     Positives:  Change in diet/appetite    Negatives:  Signs/symptoms of thrombosis, Signs/symptoms of bleeding,   Laboratory test error suspected, Change in health, Change in alcohol use,   Change in activity, Upcoming invasive procedure, Emergency department   visit, Upcoming dental procedure, Missed doses, Extra doses, Change in   medications, Hospital admission, Bruising, Other complaints    Comments:  pt reports she had small amount of broccoli yesterday      Clinical Outcomes     Negatives:  Major bleeding event, Thromboembolic event,   Anticoagulation-related hospital admission, Anticoagulation-related ED   visit, Anticoagulation-related fatality    Comments:  pt reports she had small amount of broccoli yesterday        INR History:      2024     9:00 AM 2024     8:45 AM 2024     9:00 AM 2024     9:00 AM 2024     8:45 AM 2024    10:00 AM 2024     9:00 AM   Anticoagulation Monitoring   INR 3.0 1.7 3.3 3.6  2.4 1.9   INR Date 2024   INR Goal 2.0-3.0 2.0-3.0 2.0-3.0 2.0-3.0 2.0-3.0 2.0-3.0 2.0-3.0    Trend Same Same Same Down  Same Same   Last Week Total 13 mg 12 mg 13 mg 13 mg  12 mg 12 mg   Next Week Total 13 mg 15 mg 12 mg 12 mg  12 mg 13 mg   Sun 2 mg 2 mg 2 mg 2 mg  2 mg 2 mg   Mon 2 mg 2 mg 2 mg 1 mg  1 mg 1 mg   Tue 2 mg 2 mg 2 mg 2 mg  2 mg 2 mg   Wed 1 mg 1 mg 1 mg 2 mg  2 mg 2 mg   Thu 2 mg 2 mg 2 mg 2 mg  2 mg 2 mg   Fri 2 mg 4 mg (6/7); Otherwise 2 mg 1 mg (6/21); Otherwise 2 mg 1 mg  1 mg 2 mg (8/16); Otherwise 1 mg   Sat 2 mg 2 mg 2 mg 2 mg  2 mg 2 mg       Plan:  1. INR is Subtherapeutic today- see above in Anticoagulation Summary.   Will instruct Ariadne Telles to Change their warfarin regimen- see above in Anticoagulation Summary.  Boost to 2 mg today then resume normal dosing  2. Follow up in 2 weeks  3. They have been instructed to call if any changes in medications, doses, concerns, etc. Patient expresses understanding and has no further questions at this time.    Alicia Murray, Pharmacy Intern

## 2024-08-19 RX ORDER — ALENDRONATE SODIUM 70 MG/1
TABLET ORAL
Qty: 12 TABLET | Refills: 3 | Status: SHIPPED | OUTPATIENT
Start: 2024-08-19

## 2024-08-19 NOTE — TELEPHONE ENCOUNTER
Rx Refill Note  Requested Prescriptions     Pending Prescriptions Disp Refills    alendronate (FOSAMAX) 70 MG tablet [Pharmacy Med Name: ALENDRONATE  TAB 70MG] 12 tablet 3     Sig: TAKE 1 TABLET EVERY 7 DAYS      Last office visit with prescribing clinician: 5/2/2024   Last telemedicine visit with prescribing clinician: Visit date not found   Next office visit with prescribing clinician: 11/8/2024                         Would you like a call back once the refill request has been completed: [] Yes [] No    If the office needs to give you a call back, can they leave a voicemail: [] Yes [] No    Ba Hernández  08/19/24, 10:34 EDT

## 2024-08-27 ENCOUNTER — OFFICE VISIT (OUTPATIENT)
Dept: FAMILY MEDICINE CLINIC | Facility: CLINIC | Age: 74
End: 2024-08-27
Payer: MEDICARE

## 2024-08-27 VITALS
WEIGHT: 166.9 LBS | SYSTOLIC BLOOD PRESSURE: 124 MMHG | DIASTOLIC BLOOD PRESSURE: 74 MMHG | HEIGHT: 61 IN | BODY MASS INDEX: 31.51 KG/M2 | OXYGEN SATURATION: 99 % | HEART RATE: 66 BPM

## 2024-08-27 DIAGNOSIS — M62.838 MUSCLE SPASM: ICD-10-CM

## 2024-08-27 DIAGNOSIS — M54.2 ACUTE NECK PAIN: Primary | ICD-10-CM

## 2024-08-27 LAB
ALBUMIN SERPL-MCNC: 4.1 G/DL (ref 3.5–5.2)
ALBUMIN/GLOB SERPL: 1.4 G/DL
ALP SERPL-CCNC: 196 U/L (ref 39–117)
ALT SERPL-CCNC: 13 U/L (ref 1–33)
AST SERPL-CCNC: 17 U/L (ref 1–32)
BASOPHILS # BLD AUTO: 0.11 10*3/MM3 (ref 0–0.2)
BASOPHILS NFR BLD AUTO: 0.9 % (ref 0–1.5)
BILIRUB SERPL-MCNC: 0.8 MG/DL (ref 0–1.2)
BUN SERPL-MCNC: 22 MG/DL (ref 8–23)
BUN/CREAT SERPL: 18.3 (ref 7–25)
CALCIUM SERPL-MCNC: 9.5 MG/DL (ref 8.6–10.5)
CHLORIDE SERPL-SCNC: 104 MMOL/L (ref 98–107)
CHOLEST SERPL-MCNC: 223 MG/DL (ref 0–200)
CO2 SERPL-SCNC: 25.1 MMOL/L (ref 22–29)
CREAT SERPL-MCNC: 1.2 MG/DL (ref 0.57–1)
CRP SERPL-MCNC: 1.45 MG/DL (ref 0–0.5)
EGFRCR SERPLBLD CKD-EPI 2021: 47.6 ML/MIN/1.73
EOSINOPHIL # BLD AUTO: 0.57 10*3/MM3 (ref 0–0.4)
EOSINOPHIL NFR BLD AUTO: 4.8 % (ref 0.3–6.2)
ERYTHROCYTE [DISTWIDTH] IN BLOOD BY AUTOMATED COUNT: 12.6 % (ref 12.3–15.4)
ERYTHROCYTE [SEDIMENTATION RATE] IN BLOOD BY WESTERGREN METHOD: 11 MM/HR (ref 0–30)
GLOBULIN SER CALC-MCNC: 2.9 GM/DL
GLUCOSE SERPL-MCNC: 84 MG/DL (ref 65–99)
HCT VFR BLD AUTO: 39.4 % (ref 34–46.6)
HDLC SERPL-MCNC: 42 MG/DL (ref 40–60)
HGB BLD-MCNC: 13.5 G/DL (ref 12–15.9)
IMM GRANULOCYTES # BLD AUTO: 0.06 10*3/MM3 (ref 0–0.05)
IMM GRANULOCYTES NFR BLD AUTO: 0.5 % (ref 0–0.5)
LDLC SERPL CALC-MCNC: 149 MG/DL (ref 0–100)
LYMPHOCYTES # BLD AUTO: 1.45 10*3/MM3 (ref 0.7–3.1)
LYMPHOCYTES NFR BLD AUTO: 12.2 % (ref 19.6–45.3)
MCH RBC QN AUTO: 31.2 PG (ref 26.6–33)
MCHC RBC AUTO-ENTMCNC: 34.3 G/DL (ref 31.5–35.7)
MCV RBC AUTO: 91 FL (ref 79–97)
MONOCYTES # BLD AUTO: 1.06 10*3/MM3 (ref 0.1–0.9)
MONOCYTES NFR BLD AUTO: 8.9 % (ref 5–12)
NEUTROPHILS # BLD AUTO: 8.62 10*3/MM3 (ref 1.7–7)
NEUTROPHILS NFR BLD AUTO: 72.7 % (ref 42.7–76)
NRBC BLD AUTO-RTO: 0 /100 WBC (ref 0–0.2)
PLATELET # BLD AUTO: 329 10*3/MM3 (ref 140–450)
POTASSIUM SERPL-SCNC: 4.6 MMOL/L (ref 3.5–5.2)
PROT SERPL-MCNC: 7 G/DL (ref 6–8.5)
RBC # BLD AUTO: 4.33 10*6/MM3 (ref 3.77–5.28)
SODIUM SERPL-SCNC: 141 MMOL/L (ref 136–145)
TRIGL SERPL-MCNC: 178 MG/DL (ref 0–150)
VLDLC SERPL CALC-MCNC: 32 MG/DL (ref 5–40)
WBC # BLD AUTO: 11.87 10*3/MM3 (ref 3.4–10.8)

## 2024-08-27 PROCEDURE — 99214 OFFICE O/P EST MOD 30 MIN: CPT | Performed by: INTERNAL MEDICINE

## 2024-08-27 PROCEDURE — 3074F SYST BP LT 130 MM HG: CPT | Performed by: INTERNAL MEDICINE

## 2024-08-27 PROCEDURE — 3078F DIAST BP <80 MM HG: CPT | Performed by: INTERNAL MEDICINE

## 2024-08-27 PROCEDURE — 1160F RVW MEDS BY RX/DR IN RCRD: CPT | Performed by: INTERNAL MEDICINE

## 2024-08-27 PROCEDURE — 1159F MED LIST DOCD IN RCRD: CPT | Performed by: INTERNAL MEDICINE

## 2024-08-27 PROCEDURE — 1125F AMNT PAIN NOTED PAIN PRSNT: CPT | Performed by: INTERNAL MEDICINE

## 2024-08-27 RX ORDER — METHYLPREDNISOLONE 4 MG
TABLET, DOSE PACK ORAL
Qty: 21 TABLET | Refills: 0 | Status: SHIPPED | OUTPATIENT
Start: 2024-08-27

## 2024-08-27 RX ORDER — CYCLOBENZAPRINE HCL 5 MG
5 TABLET ORAL 2 TIMES DAILY PRN
Qty: 20 TABLET | Refills: 0 | Status: SHIPPED | OUTPATIENT
Start: 2024-08-27

## 2024-08-27 NOTE — PROGRESS NOTES
"Chief Complaint  Neck Pain (Neck has been hurting since yesterday and pain shoots down to shoulders )    Subjective        HPI   Ariadne presents to [facility] for a same day appt for neck pain. She normally sees Dr. Ariza and is new to me. She has A fib, possible trigeminal neuralgia, HTN, HLD, osteoporosis.    Thinks she had a pinched nerve in her neck  For two days, had a \"horrible headache\"  Awakened last night, neck and shoulders hurt so badly she almost went to ER  Used a heating pad and slept rest of night in chair, seemed to help  Feels a little better this morning but couldn't go to work  No vision changes or pain temples/scalp currently  Wonders if headache is realted to heat, \"vise-like\", headache is better/nearly gone today  Pushing on her temples helped, also with allergy sxs  No weakness in arms  States she had a pinched nerve a couple years ago   Can move neck but feels tight  No numbness/tingling/shooting pain down arms  She saw Dr. 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 Ariza in May        Objective   Vital Signs:  Vitals:    08/27/24 0837   BP: 124/74   BP Location: Right arm   Patient Position: Sitting   Cuff Size: Large Adult   Pulse: 66   SpO2: 99%   Weight: 75.7 kg (166 lb 14.4 oz)   Height: 154.9 cm (61\")          Physical Exam  Constitutional:       General: She is not in acute distress.     Appearance: Normal appearance.   HENT:      Head: Normocephalic and atraumatic.   Eyes:      Conjunctiva/sclera: Conjunctivae normal.   Cardiovascular:      Rate and Rhythm: Normal rate.   Pulmonary:      Effort: Pulmonary effort is normal.   Musculoskeletal:         General: No swelling or deformity.      Comments: TTP b/l trap muscles, no C-spine tenderness  Perhaps just " "slight 4+/5 symmetric weakness in arms    Skin:     Coloration: Skin is not jaundiced.      Findings: No rash.   Neurological:      General: No focal deficit present.      Mental Status: She is alert and oriented to person, place, and time.   Psychiatric:         Mood and Affect: Mood normal.         Behavior: Behavior normal.         Judgment: Judgment normal.          Result Review :     The following data was reviewed by: Bobbi Bautista MD on 08/27/2024:  Office Visit with Nick Ariza MD (05/02/2024)   MRI Brain With & Without Contrast (06/14/2024 09:15)   XR neck soft tissue (02/22/2023 12:49)   Progress Notes by Clara Felton MD (02/22/2023 12:00)          Assessment and Plan    Diagnoses and all orders for this visit:    1. Acute neck pain (Primary)  -     Ambulatory Referral to Physical Therapy for Evaluation & Treatment  -     cyclobenzaprine (FLEXERIL) 5 MG tablet; Take 1 tablet by mouth 2 (Two) Times a Day As Needed for Muscle Spasms. Avoid with driving. Do not use alcohol with this prescription. Can cause sedation.  Dispense: 20 tablet; Refill: 0  -     methylPREDNISolone (MEDROL) 4 MG dose pack; Take as directed on package instructions.  Dispense: 21 tablet; Refill: 0    2. Muscle spasm  -     cyclobenzaprine (FLEXERIL) 5 MG tablet; Take 1 tablet by mouth 2 (Two) Times a Day As Needed for Muscle Spasms. Avoid with driving. Do not use alcohol with this prescription. Can cause sedation.  Dispense: 20 tablet; Refill: 0  -     methylPREDNISolone (MEDROL) 4 MG dose pack; Take as directed on package instructions.  Dispense: 21 tablet; Refill: 0    I suspect her neck pain is more muscle strain/spasm as opposed to radiculopathy pain although she does have DDD on C-spine x-rays obtained in 2023. She still works, lifts/reaches overhead. She also had a headache the last couple days which was unusual for her--sounds like tension HA in nature, described as \"vise-like\" and pushing on temples helped, now " with the neck pain. No classic sxs GCA, SAH/SDH, acute glaucoma, meningitis to warrant emergent imaging of brain at this time but advised pt to go to ED if severe headache, vision changes, etc.--low threshold for imaging since on blood thinner. If headache recurs/persists, would need to be re-evaluated for imaging but at this time the HA is almost gone. She did have an unremarkable MRI brain in 6/2024 other than mild small vessel ischemic disease. Told her we are very careful with headaches in patients no blood thinners hence the recommendations.    At any rate, recommended gentle stretching exercises, heating pad, menthol-based creams/ointments like icy hot/tiger balm. Con't APAP prn, NSAIDs contraindicated due to allergy and Warfarin use. Discussed r/b steroids (insomnia, GI irritation, confusion, etc) and muscle relaxers (sedation, low BP, confusion etc). That said, she has tolerated both in the past so will rx low dose Flexeril prn and Medrol Dose-anny. Big picture, this is the second episode of acute neck pain, recommend physical therapy to address underlying biomechanics that are causing this. Pt agreeable. I placed order for PT. If sxs worsen or fail to improve, would need re-evaluation in office and then consideration for neck imaging (e.g MRI).    Noted pt overdue for labs with Dr. Ariza. She is fasting today so she was advised to get those labs today.      Follow Up   Return if symptoms worsen or fail to improve.  Patient was given instructions and counseling regarding her condition or for health maintenance advice. Please see specific information pulled into the AVS if appropriate.

## 2024-08-30 ENCOUNTER — ANTICOAGULATION VISIT (OUTPATIENT)
Dept: PHARMACY | Facility: HOSPITAL | Age: 74
End: 2024-08-30
Payer: MEDICARE

## 2024-08-30 DIAGNOSIS — I48.0 PAF (PAROXYSMAL ATRIAL FIBRILLATION): Primary | Chronic | ICD-10-CM

## 2024-08-30 LAB
INR PPP: 2.1 (ref 0.91–1.09)
PROTHROMBIN TIME: 25.3 SECONDS (ref 10–13.8)

## 2024-08-30 PROCEDURE — 36416 COLLJ CAPILLARY BLOOD SPEC: CPT

## 2024-08-30 PROCEDURE — 85610 PROTHROMBIN TIME: CPT

## 2024-08-30 PROCEDURE — G0463 HOSPITAL OUTPT CLINIC VISIT: HCPCS

## 2024-08-30 NOTE — PROGRESS NOTES
Anticoagulation Clinic Progress Note    Anticoagulation Summary  As of 2024      INR goal:  2.0-3.0   TTR:  56.8% (3.5 y)   INR used for dosin.1 (2024)   Warfarin maintenance plan:  1 mg every Mon, Fri; 2 mg all other days   Weekly warfarin total:  12 mg   No change documented:  Octavio Garnett, PharmD   Plan last modified:  Radha Carver RPH (2024)   Next INR check:  2024   Priority:  High   Target end date:      Indications    PAF (paroxysmal atrial fibrillation) [I48.0]                 Anticoagulation Episode Summary       INR check location:      Preferred lab:      Send INR reminders to:   [facility] [place]    Comments:  Previously apixaban (cost)          Anticoagulation Care Providers       Provider Role Specialty Phone number    Francesco Riojas MD Referring Cardiology [phone]            Clinic Interview:  Patient Findings     Positives:  Change in health, Change in medications    Negatives:  Signs/symptoms of thrombosis, Signs/symptoms of bleeding,   Laboratory test error suspected, Change in alcohol use, Change in   activity, Upcoming invasive procedure, Emergency department visit,   Upcoming dental procedure, Missed doses, Extra doses, Change in   diet/appetite, Hospital admission, Bruising, Other complaints    Comments:  Reports she was prescribed Medrol Dosepak and cyclobenzaprine   for neck pain/muscle spasm on 24.       Clinical Outcomes     Negatives:  Major bleeding event, Thromboembolic event,   Anticoagulation-related hospital admission, Anticoagulation-related ED   visit, Anticoagulation-related fatality    Comments:  Reports she was prescribed Medrol Dosepak and cyclobenzaprine   for neck pain/muscle spasm on 24.         INR History:      2024     8:45 AM 2024     9:00 AM 2024     9:00 AM 2024     8:45 AM 2024    10:00 AM 2024     9:00 AM 2024     8:45 AM   Anticoagulation Monitoring   INR 1.7 3.3 3.6  2.4 1.9  2.1   INR Date 6/7/2024 6/21/2024 7/5/2024 8/2/2024 8/16/2024 8/30/2024   INR Goal 2.0-3.0 2.0-3.0 2.0-3.0 2.0-3.0 2.0-3.0 2.0-3.0 2.0-3.0   Trend Same Same Down  Same Same Same   Last Week Total 12 mg 13 mg 13 mg  12 mg 12 mg 12 mg   Next Week Total 15 mg 12 mg 12 mg  12 mg 13 mg 12 mg   Sun 2 mg 2 mg 2 mg  2 mg 2 mg 2 mg   Mon 2 mg 2 mg 1 mg  1 mg 1 mg 1 mg   Tue 2 mg 2 mg 2 mg  2 mg 2 mg 2 mg   Wed 1 mg 1 mg 2 mg  2 mg 2 mg 2 mg   Thu 2 mg 2 mg 2 mg  2 mg 2 mg 2 mg   Fri 4 mg (6/7); Otherwise 2 mg 1 mg (6/21); Otherwise 2 mg 1 mg  1 mg 2 mg (8/16); Otherwise 1 mg 1 mg   Sat 2 mg 2 mg 2 mg  2 mg 2 mg 2 mg       Plan:  1. INR is Therapeutic today- see above in Anticoagulation Summary.  Will instruct Ariadne Telles to Continue their warfarin regimen- see above in Anticoagulation Summary.  2. Follow up in 2 weeks to determine need for increase in total weekly dose of warfarin following completion of her corticosteroid course.  3. Patient declines warfarin refills.  4. Verbal and written information provided. Patient expresses understanding and has no further questions at this time.    Octavio Garnett, PharmD

## 2024-09-12 ENCOUNTER — ANTICOAGULATION VISIT (OUTPATIENT)
Dept: PHARMACY | Facility: HOSPITAL | Age: 74
End: 2024-09-12
Payer: MEDICARE

## 2024-09-12 DIAGNOSIS — I48.0 PAF (PAROXYSMAL ATRIAL FIBRILLATION): Primary | Chronic | ICD-10-CM

## 2024-09-12 LAB
INR PPP: 3.5 (ref 0.91–1.09)
PROTHROMBIN TIME: 41.8 SECONDS (ref 10–13.8)

## 2024-09-12 PROCEDURE — G0463 HOSPITAL OUTPT CLINIC VISIT: HCPCS

## 2024-09-12 PROCEDURE — 85610 PROTHROMBIN TIME: CPT

## 2024-09-12 PROCEDURE — 36416 COLLJ CAPILLARY BLOOD SPEC: CPT

## 2024-09-12 NOTE — PROGRESS NOTES
Anticoagulation Clinic Progress Note    Anticoagulation Summary  As of 9/12/2024      INR goal:  2.0-3.0   TTR:  56.9% (3.6 y)   INR used for dosing:  3.5 (9/12/2024)   Warfarin maintenance plan:  1 mg every Mon, Fri; 2 mg all other days   Weekly warfarin total:  12 mg   Plan last modified:  Radha Carver RPH (7/5/2024)   Next INR check:  9/27/2024   Priority:  High   Target end date:      Indications    PAF (paroxysmal atrial fibrillation) [I48.0]                 Anticoagulation Episode Summary       INR check location:      Preferred lab:      Send INR reminders to:   [facility]    Comments:  Previously apixaban (cost)          Anticoagulation Care Providers       Provider Role Specialty Phone number    Francesco Riojas MD Referring Cardiology [phone]            Clinic Interview:  Patient Findings     Positives:  Other complaints    Negatives:  Signs/symptoms of thrombosis, Signs/symptoms of bleeding,   Laboratory test error suspected, Change in health, Change in alcohol use,   Change in activity, Upcoming invasive procedure, Emergency department   visit, Upcoming dental procedure, Missed doses, Extra doses, Change in   medications, Change in diet/appetite, Hospital admission, Bruising    Comments:  Patient reports she completed her medrol anny about a week ago.    Has swelling in her legs and ankles but endorses this is not unusual.        Clinical Outcomes     Negatives:  Major bleeding event, Thromboembolic event,   Anticoagulation-related hospital admission, Anticoagulation-related ED   visit, Anticoagulation-related fatality    Comments:  Patient reports she completed her medrol anny about a week ago.    Has swelling in her legs and ankles but endorses this is not unusual.          INR History:      6/21/2024     9:00 AM 7/5/2024     9:00 AM 7/19/2024     8:45 AM 8/2/2024    10:00 AM 8/16/2024     9:00 AM 8/30/2024     8:45 AM 9/12/2024     8:45 AM   Anticoagulation Monitoring    INR 3.3 3.6  2.4 1.9 2.1 3.5   INR Date 6/21/2024 7/5/2024 8/2/2024 8/16/2024 8/30/2024 9/12/2024   INR Goal 2.0-3.0 2.0-3.0 2.0-3.0 2.0-3.0 2.0-3.0 2.0-3.0 2.0-3.0   Trend Same Down  Same Same Same Same   Last Week Total 13 mg 13 mg  12 mg 12 mg 12 mg 12 mg   Next Week Total 12 mg 12 mg  12 mg 13 mg 12 mg 11 mg   Sun 2 mg 2 mg  2 mg 2 mg 2 mg 2 mg   Mon 2 mg 1 mg  1 mg 1 mg 1 mg 1 mg   Tue 2 mg 2 mg  2 mg 2 mg 2 mg 2 mg   Wed 1 mg 2 mg  2 mg 2 mg 2 mg 2 mg   Thu 2 mg 2 mg  2 mg 2 mg 2 mg 1 mg (9/12); Otherwise 2 mg   Fri 1 mg (6/21); Otherwise 2 mg 1 mg  1 mg 2 mg (8/16); Otherwise 1 mg 1 mg 1 mg   Sat 2 mg 2 mg  2 mg 2 mg 2 mg 2 mg       Plan:  1. INR is Supratherapeutic today- see above in Anticoagulation Summary.  Will instruct Ariadne KHANH Telles to Change their warfarin regimen- see above in Anticoagulation Summary.  2. Follow up in 2 weeks  3. Patient declines warfarin refills.  4. Verbal and written information provided. Patient expresses understanding and has no further questions at this time.    Ela Rodriguez, PharmD

## 2024-09-19 DIAGNOSIS — E78.00 HYPERCHOLESTEROLEMIA: ICD-10-CM

## 2024-09-19 RX ORDER — LISINOPRIL 20 MG/1
TABLET ORAL
Qty: 90 TABLET | Refills: 1 | Status: SHIPPED | OUTPATIENT
Start: 2024-09-19

## 2024-09-19 RX ORDER — ATORVASTATIN CALCIUM 40 MG/1
TABLET, FILM COATED ORAL
Qty: 90 TABLET | Refills: 1 | Status: SHIPPED | OUTPATIENT
Start: 2024-09-19

## 2024-09-25 RX ORDER — AMLODIPINE BESYLATE 5 MG/1
5 TABLET ORAL DAILY
Qty: 90 TABLET | Refills: 1 | Status: SHIPPED | OUTPATIENT
Start: 2024-09-25

## 2024-09-27 ENCOUNTER — ANTICOAGULATION VISIT (OUTPATIENT)
Dept: PHARMACY | Facility: HOSPITAL | Age: 74
End: 2024-09-27
Payer: MEDICARE

## 2024-09-27 DIAGNOSIS — I48.0 PAF (PAROXYSMAL ATRIAL FIBRILLATION): Primary | Chronic | ICD-10-CM

## 2024-09-27 LAB
INR PPP: 2.7 (ref 0.91–1.09)
PROTHROMBIN TIME: 32.7 SECONDS (ref 10–13.8)

## 2024-09-27 PROCEDURE — 85610 PROTHROMBIN TIME: CPT

## 2024-09-27 PROCEDURE — 36416 COLLJ CAPILLARY BLOOD SPEC: CPT

## 2024-09-27 PROCEDURE — G0463 HOSPITAL OUTPT CLINIC VISIT: HCPCS

## 2024-10-04 ENCOUNTER — TELEPHONE (OUTPATIENT)
Dept: FAMILY MEDICINE CLINIC | Facility: CLINIC | Age: 74
End: 2024-10-04
Payer: MEDICARE

## 2024-10-04 NOTE — TELEPHONE ENCOUNTER
HUB RELAY:  Left vm adv Bird will be out off office on 11/8 and needs to move apt adv we are canceling it and they can reschedule on my chart or call back.

## 2024-10-10 ENCOUNTER — OFFICE VISIT (OUTPATIENT)
Age: 74
End: 2024-10-10
Payer: MEDICARE

## 2024-10-10 VITALS
DIASTOLIC BLOOD PRESSURE: 86 MMHG | SYSTOLIC BLOOD PRESSURE: 132 MMHG | HEIGHT: 61 IN | HEART RATE: 60 BPM | BODY MASS INDEX: 31.15 KG/M2 | WEIGHT: 165 LBS

## 2024-10-10 DIAGNOSIS — I48.0 PAF (PAROXYSMAL ATRIAL FIBRILLATION): Primary | Chronic | ICD-10-CM

## 2024-10-10 PROCEDURE — 3079F DIAST BP 80-89 MM HG: CPT

## 2024-10-10 PROCEDURE — 99214 OFFICE O/P EST MOD 30 MIN: CPT

## 2024-10-10 PROCEDURE — 3075F SYST BP GE 130 - 139MM HG: CPT

## 2024-10-10 PROCEDURE — 93000 ELECTROCARDIOGRAM COMPLETE: CPT

## 2024-10-10 NOTE — PROGRESS NOTES
Date of Office Visit: 10/10/2024  Encounter Provider: DALILA Johnson  Place of Service: [facility] CARDIOLOGY  Patient Name: Ariadne Telles  :1950    Chief Complaint   Patient presents with    paroxysmal AFIB   :     HPI: Ariadne Telles is a 74 y.o. female who follows with Dr. Riojas. She has paroxsymal atrial fibrillation and vertigo.     She has several year history of palpitations.     In  she presented to ED in rapid AF. Spontaenously converted.     She continued to have intermittent episodes and in  she underwent PVI.     Post ablation she had one recurrent episode of AF and presented to ED. This resolved and she has not had anymore recurrent episodes.     She continues to have issues with dizziness. She was diagnosed with vertigo but continues to struggle despite medication.                 She presents today for follow up appt.     She continues to have frequent dizzy spells that she describes as the whole room spinning, it also makes her nauseous.     Says she saw ENT and did epley maneuver, it made her feel worse.     She associates dizziness with her AF but is not sure if she is having AF.    She denies any chest pain, palpitations, or syncope.     EKG shows NSR.     She had ablation In . Only one known recurrent episode since ablation.     She is on meclizine for dizziness.      Past Medical History:   Diagnosis Date    Atrial fibrillation with RVR     Bradycardia 2016    Carpal tunnel syndrome 2018    Endometrial cancer     Essential hypertension 2016    Gastroesophageal reflux disease 2016    History of endometrial cancer 2017, S/P ANGEL, LUIS.  Dr. Catracho Alejo.    Hx of bone density study 2017, DEXA scan: Osteoporosis in L1, severe osteopenia in both femoral necks.  T-scores: L1, -2.5; L2, -1.9; L3, -0.7; L4, +0.3.  Right femoral neck, -2.1; total -1.8.  Left femoral neck, -2.3; total, -1.8.     "Hypercholesterolemia 2016    Hyperlipidemia     Kidney stone     MVP (mitral valve prolapse)     last 2 echocardiograms showed no MVP in 2018 and 2019     Nausea & vomiting     Osteopenia 2016    2017, DEXA scan: Severe osteopenia in both femoral necks, T score= -2.1 in the right femoral neck, -2.3 in the left femoral neck.    Osteoporosis 2017    DEXA scan, T score L1= -2.5    PAF (paroxysmal atrial fibrillation)     Pituitary adenoma 2018    Thought to have a pituitary microadenoma on previous studies but MRI of the pituitary with and without contrast, February 10, 2017: \"No convincing evidence to suggest a pituitary adenoma on this MRI study\".  \"Incidentally noted relatively mild changes of chronic small vessel ischemia phenomena.\"    RBBB (right bundle branch block)     Rectal polyp 2016    Surgical menopause     ANGEL, BSO, for endometrial carcinoma.    Vaginal delivery     x1  NITO, (and 2 C-sections, one stillbirth).    Vertigo        Past Surgical History:   Procedure Laterality Date    CARDIAC ELECTROPHYSIOLOGY PROCEDURE N/A 2021    Procedure: Ablation atrial fibrillation cryo;  Surgeon: Francesco Riojas MD;  Location: [facility] INVASIVE LOCATION;  Service: Cardiovascular;  Laterality: N/A;    CARPAL TUNNEL RELEASE WITH CUBITAL TUNNEL RELEASE  2018    right    CATARACT EXTRACTION Right 2016     SECTION      x2   one stillborn Ariadne Hernandez  and  Reid    COLONOSCOPY  2007    polyps  Dr. Rueda     TOTAL ABDOMINAL HYSTERECTOMY WITH SALPINGO OOPHORECTOMY      Dr. Alejo       Social History     Socioeconomic History    Marital status:      Spouse name:     Number of children: 2   Tobacco Use    Smoking status: Never    Smokeless tobacco: Never   Vaping Use    Vaping status: Never Used   Substance and Sexual Activity    Alcohol use: No     Comment: caffeine use seldom    Drug use: No    Sexual activity: Never     Birth " control/protection: Surgical     Comment:        Family History   Problem Relation Age of Onset    Heart disease Mother     Heart attack Mother 67    COPD Father     Parkinsonism Father         ?    Heart defect Sister          at 18    Alcohol abuse Brother     Diabetes Brother     Heart disease Brother     Hypertension Brother     Hyperlipidemia Brother     Other Daughter         stillborn    Sleep apnea Son     No Known Problems Maternal Grandmother     No Known Problems Paternal Grandmother     Breast cancer Maternal Aunt 75    No Known Problems Paternal Aunt     No Known Problems Maternal Grandfather     No Known Problems Paternal Grandfather     Kidney cancer Sister     No Known Problems Son     BRCA 1/2 Neg Hx     Colon cancer Neg Hx     Endometrial cancer Neg Hx     Ovarian cancer Neg Hx        Review of Systems   Constitutional: Negative for chills, fever and malaise/fatigue.   Cardiovascular:  Negative for chest pain, dyspnea on exertion, leg swelling, near-syncope, orthopnea, palpitations, paroxysmal nocturnal dyspnea and syncope.   Respiratory:  Negative for cough and shortness of breath.    Hematologic/Lymphatic: Negative.    Musculoskeletal:  Negative for joint pain, joint swelling and myalgias.   Gastrointestinal:  Negative for abdominal pain, diarrhea, melena, nausea and vomiting.   Genitourinary:  Negative for frequency and hematuria.   Neurological:  Positive for dizziness. Negative for light-headedness, numbness, paresthesias and seizures.   Allergic/Immunologic: Negative.    All other systems reviewed and are negative.      Allergies   Allergen Reactions    Naproxen Hives and Swelling    Paroxetine Other (See Comments)     TREMORS         Current Outpatient Medications:     alendronate (FOSAMAX) 70 MG tablet, TAKE 1 TABLET EVERY 7 DAYS, Disp: 12 tablet, Rfl: 3    amLODIPine (NORVASC) 5 MG tablet, TAKE 1 TABLET BY MOUTH DAILY, Disp: 90 tablet, Rfl: 1    atorvastatin (LIPITOR) 40 MG  "tablet, TAKE 1 TABLET DAILY, Disp: 90 tablet, Rfl: 1    calcium carbonate-cholecalciferol 500-400 MG-UNIT tablet tablet, Take  by mouth Daily., Disp: , Rfl:     cyclobenzaprine (FLEXERIL) 5 MG tablet, Take 1 tablet by mouth 2 (Two) Times a Day As Needed for Muscle Spasms. Avoid with driving. Do not use alcohol with this prescription. Can cause sedation., Disp: 20 tablet, Rfl: 0    lisinopril (PRINIVIL,ZESTRIL) 20 MG tablet, TAKE 1 TABLET DAILY, Disp: 90 tablet, Rfl: 1    meclizine (ANTIVERT) 12.5 MG tablet, Take 1 tablet by mouth 3 (Three) Times a Day As Needed for Dizziness., Disp: 21 tablet, Rfl: 0    metoprolol tartrate (LOPRESSOR) 50 MG tablet, TAKE ONE TABLET BY MOUTH TWICE A DAY, Disp: 180 tablet, Rfl: 3    Multiple Vitamin (MULTIVITAMIN) capsule, Take  by mouth., Disp: , Rfl:     Multiple Vitamins-Minerals (OCUVITE EYE HEALTH FORMULA PO), Take 1 tablet by mouth 2 (Two) Times a Day., Disp: , Rfl:     ondansetron (Zofran) 4 MG tablet, Take 1 tablet by mouth Every 8 (Eight) Hours As Needed for Nausea (and vertigo)., Disp: 30 tablet, Rfl: 0    warfarin (COUMADIN) 2 MG tablet, TAKE 1/2 TABLET BY MOUTH ON MONDAY AND FRIDAY AND TAKE 1 TABLET BY MOUTH ALL OTHER DAYS OR AS DIRECTED., Disp: 80 tablet, Rfl: 1      Objective:     Vitals:    10/10/24 0843   BP: 132/86   Pulse: 60   Weight: 74.8 kg (165 lb)   Height: 154.9 cm (61\")     Body mass index is 31.18 kg/m².    PHYSICAL EXAM:    Vitals Reviewed.   General Appearance: No acute distress, well developed and well nourished.   Eyes: Conjunctiva and lids: No erythema, swelling, or discharge. Sclera non-icteric.   HENT: Atraumatic, normocephalic. External eyes, ears, and nose normal.   Respiratory: No signs of respiratory distress. Respiration rhythm and depth normal.   Clear to auscultation. No rales, crackles, rhonchi, or wheezing auscultated.   Cardiovascular:  Heart Rate and Rhythm: Normal, Heart Sounds: Normal S1 and S2. No S3 or S4 " noted.  Gastrointestinal:  Abdomen soft, non-distended, non-tender.   Musculoskeletal: Normal movement of extremities  Skin: Warm and dry.   Psychiatric: Patient alert and oriented to person, place, and time. Speech and behavior appropriate. Normal mood and affect.       ECG 12 Lead    Date/Time: 10/10/2024 10:00 AM  Performed by: Surya Renee APRN    Authorized by: Surya Renee APRN  Comparison: compared with previous ECG   Similar to previous ECG  Rhythm: sinus rhythm            Assessment:       Diagnosis Plan   1. PAF (paroxysmal atrial fibrillation)  Holter Monitor - 72 Hour Up To 15 Days             Plan:   PAF---s/p PVI (2021)---- her dizziness does not sound like dizziness related to AF. It sounds more like vertigo. It has been having a significant impact on her QOL. I am going to do a monitor to screen for AF, if AF correlates with her dizziness then we should consider intervention. If her monitor is normal then we need to get her into a specialist for these spells.       Follow up post monitor with recommendations.         As always, it has been a pleasure to participate in your patient's care.      Sincerely,         DALILA Johnson

## 2024-10-11 ENCOUNTER — ANTICOAGULATION VISIT (OUTPATIENT)
Dept: PHARMACY | Facility: HOSPITAL | Age: 74
End: 2024-10-11
Payer: MEDICARE

## 2024-10-11 ENCOUNTER — HOSPITAL ENCOUNTER (OUTPATIENT)
Dept: MAMMOGRAPHY | Facility: HOSPITAL | Age: 74
Discharge: HOME | End: 2024-10-11
Admitting: FAMILY MEDICINE
Payer: MEDICARE

## 2024-10-11 DIAGNOSIS — I48.0 PAF (PAROXYSMAL ATRIAL FIBRILLATION): Primary | Chronic | ICD-10-CM

## 2024-10-11 DIAGNOSIS — Z12.31 BREAST CANCER SCREENING BY MAMMOGRAM: ICD-10-CM

## 2024-10-11 LAB
INR PPP: 1.6 (ref 0.91–1.09)
PROTHROMBIN TIME: 18.8 SECONDS (ref 10–13.8)

## 2024-10-11 PROCEDURE — 85610 PROTHROMBIN TIME: CPT

## 2024-10-11 PROCEDURE — 77063 BREAST TOMOSYNTHESIS BI: CPT

## 2024-10-11 PROCEDURE — 77067 SCR MAMMO BI INCL CAD: CPT

## 2024-10-11 PROCEDURE — G0463 HOSPITAL OUTPT CLINIC VISIT: HCPCS

## 2024-10-11 PROCEDURE — 36416 COLLJ CAPILLARY BLOOD SPEC: CPT

## 2024-10-11 NOTE — PROGRESS NOTES
Anticoagulation Clinic Progress Note    Anticoagulation Summary  As of 10/11/2024      INR goal:  2.0-3.0   TTR:  56.8% (3.6 y)   INR used for dosin.6 (10/11/2024)   Warfarin maintenance plan:  1 mg every Mon, Fri; 2 mg all other days   Weekly warfarin total:  12 mg   Plan last modified:  Radha Carver RPH (2024)   Next INR check:  10/25/2024   Priority:  High   Target end date:      Indications    PAF (paroxysmal atrial fibrillation) [I48.0]                 Anticoagulation Episode Summary       INR check location:      Preferred lab:      Send INR reminders to:   [facility] [place]    Comments:  Previously apixaban (cost)          Anticoagulation Care Providers       Provider Role Specialty Phone number    Francesco Riojas MD Referring Cardiology [phone]            Clinic Interview:      INR History:      2024     8:45 AM 2024    10:00 AM 2024     9:00 AM 2024     8:45 AM 2024     8:45 AM 2024     8:30 AM 10/11/2024     9:45 AM   Anticoagulation Monitoring   INR  2.4 1.9 2.1 3.5 2.7 1.6   INR Date  2024 2024 2024 2024 2024 10/11/2024   INR Goal 2.0-3.0 2.0-3.0 2.0-3.0 2.0-3.0 2.0-3.0 2.0-3.0 2.0-3.0   Trend  Same Same Same Same Same Same   Last Week Total  12 mg 12 mg 12 mg 12 mg 12 mg 12 mg   Next Week Total  12 mg 13 mg 12 mg 11 mg 12 mg 13 mg   Sun  2 mg 2 mg 2 mg 2 mg 2 mg 2 mg   Mon  1 mg 1 mg 1 mg 1 mg 1 mg 1 mg   Tue  2 mg 2 mg 2 mg 2 mg 2 mg 2 mg   Wed  2 mg 2 mg 2 mg 2 mg 2 mg 2 mg   Thu  2 mg 2 mg 2 mg 1 mg (); Otherwise 2 mg 2 mg 2 mg   Fri  1 mg 2 mg (); Otherwise 1 mg 1 mg 1 mg 1 mg 2 mg (10/11); Otherwise 1 mg   Sat  2 mg 2 mg 2 mg 2 mg 2 mg 2 mg       Plan:  1. INR is Subtherapeutic today- see above in Anticoagulation Summary.  No pt issues, Instructed pt to take boost of 2mg, then cont same- see above in Anticoagulation Summary.  2. Follow up in 2 weeks  3. Patient declines warfarin refills.  4. Verbal and  written information provided. Patient expresses understanding and has no further questions at this time.    Adri Tracey, [facility]

## 2024-11-01 ENCOUNTER — ANTICOAGULATION VISIT (OUTPATIENT)
Dept: PHARMACY | Facility: HOSPITAL | Age: 74
End: 2024-11-01
Payer: MEDICARE

## 2024-11-01 DIAGNOSIS — R53.83 FATIGUE, UNSPECIFIED TYPE: ICD-10-CM

## 2024-11-01 DIAGNOSIS — I10 ESSENTIAL HYPERTENSION: Chronic | ICD-10-CM

## 2024-11-01 DIAGNOSIS — E11.65 TYPE 2 DIABETES MELLITUS WITH HYPERGLYCEMIA, UNSPECIFIED WHETHER LONG TERM INSULIN USE: ICD-10-CM

## 2024-11-01 DIAGNOSIS — E78.00 HYPERCHOLESTEROLEMIA: Primary | Chronic | ICD-10-CM

## 2024-11-01 DIAGNOSIS — I48.0 PAF (PAROXYSMAL ATRIAL FIBRILLATION): Primary | Chronic | ICD-10-CM

## 2024-11-01 LAB
INR PPP: 2.2 (ref 0.91–1.09)
PROTHROMBIN TIME: 26.1 SECONDS (ref 10–13.8)

## 2024-11-01 PROCEDURE — G0463 HOSPITAL OUTPT CLINIC VISIT: HCPCS

## 2024-11-01 PROCEDURE — 85610 PROTHROMBIN TIME: CPT

## 2024-11-01 PROCEDURE — 36416 COLLJ CAPILLARY BLOOD SPEC: CPT

## 2024-11-01 RX ORDER — WARFARIN SODIUM 2 MG/1
TABLET ORAL
Qty: 80 TABLET | Refills: 1 | Status: SHIPPED | OUTPATIENT
Start: 2024-11-01

## 2024-11-01 NOTE — PROGRESS NOTES
Anticoagulation Clinic Progress Note    Anticoagulation Summary  As of 2024      INR goal:  2.0-3.0   TTR:  56.4% (3.7 y)   INR used for dosin.2 (2024)   Warfarin maintenance plan:  1 mg every Mon, Fri; 2 mg all other days   Weekly warfarin total:  12 mg   No change documented:  Becca Randolph RPH   Plan last modified:  Radha Carver RPH (2024)   Next INR check:  11/15/2024   Priority:  High   Target end date:  --    Indications    PAF (paroxysmal atrial fibrillation) [I48.0]                 Anticoagulation Episode Summary       INR check location:  --    Preferred lab:  --    Send INR reminders to:   [facility] [place]    Comments:  Previously apixaban (cost)          Anticoagulation Care Providers       Provider Role Specialty Phone number    Francesco Riojas MD Referring Cardiology [phone]            Clinic Interview:  Patient Findings     Negatives:  Signs/symptoms of thrombosis, Signs/symptoms of bleeding,   Laboratory test error suspected, Change in health, Change in alcohol use,   Change in activity, Upcoming invasive procedure, Emergency department   visit, Upcoming dental procedure, Missed doses, Extra doses, Change in   medications, Change in diet/appetite, Hospital admission, Bruising, Other   complaints      Clinical Outcomes     Negatives:  Major bleeding event, Thromboembolic event,   Anticoagulation-related hospital admission, Anticoagulation-related ED   visit, Anticoagulation-related fatality        INR History:      2024    10:00 AM 2024     9:00 AM 2024     8:45 AM 2024     8:45 AM 2024     8:30 AM 10/11/2024     9:45 AM 2024     9:15 AM   Anticoagulation Monitoring   INR 2.4 1.9 2.1 3.5 2.7 1.6 2.2   INR Date 2024 2024 2024 2024 2024 10/11/2024 2024   INR Goal 2.0-3.0 2.0-3.0 2.0-3.0 2.0-3.0 2.0-3.0 2.0-3.0 2.0-3.0   Trend Same Same Same Same Same Same Same   Last Week Total 12 mg 12 mg 12 mg 12  mg 12 mg 12 mg 12 mg   Next Week Total 12 mg 13 mg 12 mg 11 mg 12 mg 13 mg 12 mg   Sun 2 mg 2 mg 2 mg 2 mg 2 mg 2 mg 2 mg   Mon 1 mg 1 mg 1 mg 1 mg 1 mg 1 mg 1 mg   Tue 2 mg 2 mg 2 mg 2 mg 2 mg 2 mg 2 mg   Wed 2 mg 2 mg 2 mg 2 mg 2 mg 2 mg 2 mg   Thu 2 mg 2 mg 2 mg 1 mg (9/12); Otherwise 2 mg 2 mg 2 mg 2 mg   Fri 1 mg 2 mg (8/16); Otherwise 1 mg 1 mg 1 mg 1 mg 2 mg (10/11); Otherwise 1 mg 1 mg   Sat 2 mg 2 mg 2 mg 2 mg 2 mg 2 mg 2 mg       Plan:  1. INR is Therapeutic today- see above in Anticoagulation Summary.  Will instruct Ariadne Telles to Continue their warfarin regimen- see above in Anticoagulation Summary.  2. Follow up in 2 weeks  3. Patient desires warfarin refills.  4. Verbal and written information provided. Patient expresses understanding and has no further questions at this time.    Becca Randolph RP

## 2024-11-04 NOTE — PROGRESS NOTES
I have supervised and reviewed the notes, assessments, and/or procedures performed. The documented assessment and plan were developed cooperatively. I concur with the documentation of this patient encounter.    Octavio Garnett, PharmD

## 2024-11-15 ENCOUNTER — ANTICOAGULATION VISIT (OUTPATIENT)
Dept: PHARMACY | Facility: HOSPITAL | Age: 74
End: 2024-11-15
Payer: MEDICARE

## 2024-11-15 DIAGNOSIS — I48.0 PAF (PAROXYSMAL ATRIAL FIBRILLATION): Primary | Chronic | ICD-10-CM

## 2024-11-15 LAB
INR PPP: 1.6 (ref 0.91–1.09)
PROTHROMBIN TIME: 19.4 SECONDS (ref 10–13.8)

## 2024-11-15 PROCEDURE — G0463 HOSPITAL OUTPT CLINIC VISIT: HCPCS

## 2024-11-15 PROCEDURE — 36416 COLLJ CAPILLARY BLOOD SPEC: CPT

## 2024-11-15 PROCEDURE — 85610 PROTHROMBIN TIME: CPT

## 2024-11-15 NOTE — PROGRESS NOTES
Anticoagulation Clinic Progress Note    Anticoagulation Summary  As of 11/15/2024      INR goal:  2.0-3.0   TTR:  56.2% (3.7 y)   INR used for dosin.6 (11/15/2024)   Warfarin maintenance plan:  1 mg every Mon, Fri; 2 mg all other days   Weekly warfarin total:  12 mg   Plan last modified:  Radha Carver RPH (2024)   Next INR check:  2024   Priority:  High   Target end date:  --    Indications    PAF (paroxysmal atrial fibrillation) [I48.0]                 Anticoagulation Episode Summary       INR check location:  --    Preferred lab:  --    Send INR reminders to:   [facility]    Comments:  Previously apixaban (cost)          Anticoagulation Care Providers       Provider Role Specialty Phone number    Francesco Riojas MD Referring Cardiology [phone]            Clinic Interview:  Patient Findings     Positives:  Missed doses    Negatives:  Signs/symptoms of thrombosis, Signs/symptoms of bleeding,   Laboratory test error suspected, Change in health, Change in alcohol use,   Change in activity, Upcoming invasive procedure, Emergency department   visit, Upcoming dental procedure, Extra doses, Change in medications,   Change in diet/appetite, Hospital admission, Bruising, Other complaints    Comments:  Reports she missed her warfarin twice this week.      Clinical Outcomes     Negatives:  Major bleeding event, Thromboembolic event,   Anticoagulation-related hospital admission, Anticoagulation-related ED   visit, Anticoagulation-related fatality    Comments:  Reports she missed her warfarin twice this week.        INR History:      2024     9:00 AM 2024     8:45 AM 2024     8:45 AM 2024     8:30 AM 10/11/2024     9:45 AM 2024     9:15 AM 11/15/2024     8:45 AM   Anticoagulation Monitoring   INR 1.9 2.1 3.5 2.7 1.6 2.2 1.6   INR Date 2024 2024 2024 2024 10/11/2024 2024 11/15/2024   INR Goal 2.0-3.0 2.0-3.0 2.0-3.0 2.0-3.0 2.0-3.0  2.0-3.0 2.0-3.0   Trend Same Same Same Same Same Same Same   Last Week Total 12 mg 12 mg 12 mg 12 mg 12 mg 12 mg 9 mg   Next Week Total 13 mg 12 mg 11 mg 12 mg 13 mg 12 mg 14 mg   Sun 2 mg 2 mg 2 mg 2 mg 2 mg 2 mg 2 mg   Mon 1 mg 1 mg 1 mg 1 mg 1 mg 1 mg 1 mg   Tue 2 mg 2 mg 2 mg 2 mg 2 mg 2 mg 2 mg   Wed 2 mg 2 mg 2 mg 2 mg 2 mg 2 mg 2 mg   Thu 2 mg 2 mg 1 mg (9/12); Otherwise 2 mg 2 mg 2 mg 2 mg 2 mg   Fri 2 mg (8/16); Otherwise 1 mg 1 mg 1 mg 1 mg 2 mg (10/11); Otherwise 1 mg 1 mg 3 mg (11/15)   Sat 2 mg 2 mg 2 mg 2 mg 2 mg 2 mg 2 mg       Plan:  1. INR is Subtherapeutic today- see above in Anticoagulation Summary.  Will instruct Ariadne Telles to Change their warfarin regimen (3 mg today, then resume 1 mg MF, 2 mg all other days) - see above in Anticoagulation Summary.  2. Follow up in 1 week  3. Patient declines warfarin refills.  4. Verbal and written information provided. Patient expresses understanding and has no further questions at this time.    Octavio Garnett, PharmD

## 2024-12-05 ENCOUNTER — ANTICOAGULATION VISIT (OUTPATIENT)
Dept: PHARMACY | Facility: HOSPITAL | Age: 74
End: 2024-12-05
Payer: MEDICARE

## 2024-12-05 DIAGNOSIS — I48.0 PAF (PAROXYSMAL ATRIAL FIBRILLATION): Primary | Chronic | ICD-10-CM

## 2024-12-05 LAB
INR PPP: 2 (ref 0.91–1.09)
PROTHROMBIN TIME: 23.8 SECONDS (ref 10–13.8)

## 2024-12-05 PROCEDURE — 36416 COLLJ CAPILLARY BLOOD SPEC: CPT

## 2024-12-05 PROCEDURE — G0463 HOSPITAL OUTPT CLINIC VISIT: HCPCS

## 2024-12-05 PROCEDURE — 85610 PROTHROMBIN TIME: CPT

## 2024-12-05 NOTE — PROGRESS NOTES
Anticoagulation Clinic Progress Note    Anticoagulation Summary  As of 2024      INR goal:  2.0-3.0   TTR:  55.4% (3.8 y)   INR used for dosin.0 (2024)   Warfarin maintenance plan:  1 mg every Mon, Fri; 2 mg all other days   Weekly warfarin total:  12 mg   No change documented:  Octavio Garnett, PharmD   Plan last modified:  Radha Carver RPH (2024)   Next INR check:  2024   Priority:  High   Target end date:  --    Indications    PAF (paroxysmal atrial fibrillation) [I48.0]                 Anticoagulation Episode Summary       INR check location:  --    Preferred lab:  --    Send INR reminders to:   [facility] [place]    Comments:  Previously apixaban (cost)          Anticoagulation Care Providers       Provider Role Specialty Phone number    Francesco Riojas MD Referring Cardiology [phone]            Clinic Interview:  Patient Findings     Negatives:  Signs/symptoms of thrombosis, Signs/symptoms of bleeding,   Laboratory test error suspected, Change in health, Change in alcohol use,   Change in activity, Upcoming invasive procedure, Emergency department   visit, Upcoming dental procedure, Missed doses, Extra doses, Change in   medications, Change in diet/appetite, Hospital admission, Bruising, Other   complaints      Clinical Outcomes     Negatives:  Major bleeding event, Thromboembolic event,   Anticoagulation-related hospital admission, Anticoagulation-related ED   visit, Anticoagulation-related fatality        INR History:      2024     8:45 AM 2024     8:45 AM 2024     8:30 AM 10/11/2024     9:45 AM 2024     9:15 AM 11/15/2024     8:45 AM 2024     8:45 AM   Anticoagulation Monitoring   INR 2.1 3.5 2.7 1.6 2.2 1.6 2.0   INR Date 2024 2024 2024 10/11/2024 2024 11/15/2024 2024   INR Goal 2.0-3.0 2.0-3.0 2.0-3.0 2.0-3.0 2.0-3.0 2.0-3.0 2.0-3.0   Trend Same Same Same Same Same Same Same   Last Week Total 12 mg 12 mg 12 mg  12 mg 12 mg 9 mg 12 mg   Next Week Total 12 mg 11 mg 12 mg 13 mg 12 mg 14 mg 12 mg   Sun 2 mg 2 mg 2 mg 2 mg 2 mg 2 mg 2 mg   Mon 1 mg 1 mg 1 mg 1 mg 1 mg 1 mg 1 mg   Tue 2 mg 2 mg 2 mg 2 mg 2 mg 2 mg 2 mg   Wed 2 mg 2 mg 2 mg 2 mg 2 mg 2 mg 2 mg   Thu 2 mg 1 mg (9/12); Otherwise 2 mg 2 mg 2 mg 2 mg 2 mg 2 mg   Fri 1 mg 1 mg 1 mg 2 mg (10/11); Otherwise 1 mg 1 mg 3 mg (11/15) 1 mg   Sat 2 mg 2 mg 2 mg 2 mg 2 mg 2 mg 2 mg       Plan:  1. INR is Therapeutic today- see above in Anticoagulation Summary.  Will instruct Ariadne Telles to Continue their warfarin regimen- see above in Anticoagulation Summary.  2. Follow up in 2 weeks  3. Patient declines warfarin refills.  4. Verbal and written information provided. Patient expresses understanding and has no further questions at this time.    Octavio Garnett, PharmD

## 2024-12-20 ENCOUNTER — ANTICOAGULATION VISIT (OUTPATIENT)
Dept: PHARMACY | Facility: HOSPITAL | Age: 74
End: 2024-12-20
Payer: MEDICARE

## 2024-12-20 DIAGNOSIS — I48.0 PAF (PAROXYSMAL ATRIAL FIBRILLATION): Primary | Chronic | ICD-10-CM

## 2024-12-20 LAB
INR PPP: 1.6 (ref 0.91–1.09)
PROTHROMBIN TIME: 19.1 SECONDS (ref 10–13.8)

## 2024-12-20 PROCEDURE — 36416 COLLJ CAPILLARY BLOOD SPEC: CPT

## 2024-12-20 PROCEDURE — G0463 HOSPITAL OUTPT CLINIC VISIT: HCPCS

## 2024-12-20 PROCEDURE — 85610 PROTHROMBIN TIME: CPT

## 2024-12-20 NOTE — PROGRESS NOTES
Anticoagulation Clinic Progress Note    Anticoagulation Summary  As of 2024      INR goal:  2.0-3.0   TTR:  54.8% (3.8 y)   INR used for dosin.6 (2024)   Warfarin maintenance plan:  1 mg every Mon; 2 mg all other days   Weekly warfarin total:  13 mg   Plan last modified:  Octavio Garnett, PharmD (2024)   Next INR check:  1/3/2025   Priority:  High   Target end date:  --    Indications    PAF (paroxysmal atrial fibrillation) [I48.0]                 Anticoagulation Episode Summary       INR check location:  --    Preferred lab:  --    Send INR reminders to:   [facility]    Comments:  Previously apixaban (cost)          Anticoagulation Care Providers       Provider Role Specialty Phone number    Francesco Riojas MD Referring Cardiology [phone]            Clinic Interview:  Patient Findings     Negatives:  Signs/symptoms of thrombosis, Signs/symptoms of bleeding,   Laboratory test error suspected, Change in health, Change in alcohol use,   Change in activity, Upcoming invasive procedure, Emergency department   visit, Upcoming dental procedure, Missed doses, Extra doses, Change in   medications, Change in diet/appetite, Hospital admission, Bruising, Other   complaints      Clinical Outcomes     Negatives:  Major bleeding event, Thromboembolic event,   Anticoagulation-related hospital admission, Anticoagulation-related ED   visit, Anticoagulation-related fatality        INR History:      2024     8:45 AM 2024     8:30 AM 10/11/2024     9:45 AM 2024     9:15 AM 11/15/2024     8:45 AM 2024     8:45 AM 2024     8:45 AM   Anticoagulation Monitoring   INR 3.5 2.7 1.6 2.2 1.6 2.0 1.6   INR Date 2024 2024 10/11/2024 2024 11/15/2024 2024 2024   INR Goal 2.0-3.0 2.0-3.0 2.0-3.0 2.0-3.0 2.0-3.0 2.0-3.0 2.0-3.0   Trend Same Same Same Same Same Same Up   Last Week Total 12 mg 12 mg 12 mg 12 mg 9 mg 12 mg 12 mg   Next Week Total 11 mg  12 mg 13 mg 12 mg 14 mg 12 mg 13 mg   Sun 2 mg 2 mg 2 mg 2 mg 2 mg 2 mg 2 mg   Mon 1 mg 1 mg 1 mg 1 mg 1 mg 1 mg 1 mg   Tue 2 mg 2 mg 2 mg 2 mg 2 mg 2 mg 2 mg   Wed 2 mg 2 mg 2 mg 2 mg 2 mg 2 mg 2 mg   Thu 1 mg (9/12); Otherwise 2 mg 2 mg 2 mg 2 mg 2 mg 2 mg 2 mg   Fri 1 mg 1 mg 2 mg (10/11); Otherwise 1 mg 1 mg 3 mg (11/15) 1 mg 2 mg   Sat 2 mg 2 mg 2 mg 2 mg 2 mg 2 mg 2 mg       Plan:  1. INR is Subtherapeutic today- see above in Anticoagulation Summary.  Will instruct Ariadne Telles to Increase their warfarin regimen to 1 mg Mon, 2 mg all other days- see above in Anticoagulation Summary.  2. Follow up in 2 weeks   3. Patient declines warfarin refills.  4. Verbal and written information provided. Patient expresses understanding and has no further questions at this time.    Octavio Garnett, PharmD

## 2025-01-10 ENCOUNTER — ANTICOAGULATION VISIT (OUTPATIENT)
Dept: PHARMACY | Facility: HOSPITAL | Age: 75
End: 2025-01-10
Payer: MEDICARE

## 2025-01-10 DIAGNOSIS — I48.0 PAF (PAROXYSMAL ATRIAL FIBRILLATION): Primary | Chronic | ICD-10-CM

## 2025-01-10 LAB
INR PPP: 1.5 (ref 0.91–1.09)
PROTHROMBIN TIME: 18.6 SECONDS (ref 10–13.8)

## 2025-01-10 PROCEDURE — 36416 COLLJ CAPILLARY BLOOD SPEC: CPT

## 2025-01-10 PROCEDURE — 85610 PROTHROMBIN TIME: CPT

## 2025-01-10 PROCEDURE — G0463 HOSPITAL OUTPT CLINIC VISIT: HCPCS

## 2025-01-10 NOTE — PROGRESS NOTES
Anticoagulation Clinic Progress Note    Anticoagulation Summary  As of 1/10/2025      INR goal:  2.0-3.0   TTR:  54.0% (3.9 y)   INR used for dosin.5 (1/10/2025)   Warfarin maintenance plan:  2 mg every day   Weekly warfarin total:  14 mg   Plan last modified:  Octavio Garnett, PharmD (1/10/2025)   Next INR check:  2025   Priority:  High   Target end date:  --    Indications    PAF (paroxysmal atrial fibrillation) [I48.0]                 Anticoagulation Episode Summary       INR check location:  --    Preferred lab:  --    Send INR reminders to:   [facility]    Comments:  Previously apixaban (cost)          Anticoagulation Care Providers       Provider Role Specialty Phone number    Francesco Riojas MD Referring Cardiology [phone]            Clinic Interview:  Patient Findings     Negatives:  Signs/symptoms of thrombosis, Signs/symptoms of bleeding,   Laboratory test error suspected, Change in health, Change in alcohol use,   Change in activity, Upcoming invasive procedure, Emergency department   visit, Upcoming dental procedure, Missed doses, Extra doses, Change in   medications, Change in diet/appetite, Hospital admission, Bruising, Other   complaints      Clinical Outcomes     Negatives:  Major bleeding event, Thromboembolic event,   Anticoagulation-related hospital admission, Anticoagulation-related ED   visit, Anticoagulation-related fatality        INR History:      2024     8:30 AM 10/11/2024     9:45 AM 2024     9:15 AM 11/15/2024     8:45 AM 2024     8:45 AM 2024     8:45 AM 1/10/2025    11:15 AM   Anticoagulation Monitoring   INR 2.7 1.6 2.2 1.6 2.0 1.6 1.5   INR Date 2024 10/11/2024 2024 11/15/2024 2024 2024 1/10/2025   INR Goal 2.0-3.0 2.0-3.0 2.0-3.0 2.0-3.0 2.0-3.0 2.0-3.0 2.0-3.0   Trend Same Same Same Same Same Up Up   Last Week Total 12 mg 12 mg 12 mg 9 mg 12 mg 12 mg 13 mg   Next Week Total 12 mg 13 mg 12 mg 14 mg 12 mg 13  mg 15 mg   Sun 2 mg 2 mg 2 mg 2 mg 2 mg 2 mg 2 mg   Mon 1 mg 1 mg 1 mg 1 mg 1 mg 1 mg 2 mg   Tue 2 mg 2 mg 2 mg 2 mg 2 mg 2 mg 2 mg   Wed 2 mg 2 mg 2 mg 2 mg 2 mg 2 mg 2 mg   Thu 2 mg 2 mg 2 mg 2 mg 2 mg 2 mg 2 mg   Fri 1 mg 2 mg (10/11); Otherwise 1 mg 1 mg 3 mg (11/15) 1 mg 2 mg 3 mg (1/10); Otherwise 2 mg   Sat 2 mg 2 mg 2 mg 2 mg 2 mg 2 mg 2 mg       Plan:  1. INR is Subtherapeutic today- see above in Anticoagulation Summary.  Will instruct Ariadne Telles to change their warfarin regimen- see above in Anticoagulation Summary.  2. Follow up in 2 weeks  3. Patient declines warfarin refills.  4. Verbal and written information provided. Patient expresses understanding and has no further questions at this time.    [facility], located within [facility]

## 2025-01-16 ENCOUNTER — OFFICE VISIT (OUTPATIENT)
Dept: FAMILY MEDICINE CLINIC | Facility: CLINIC | Age: 75
End: 2025-01-16
Payer: MEDICARE

## 2025-01-16 VITALS
HEART RATE: 68 BPM | SYSTOLIC BLOOD PRESSURE: 113 MMHG | BODY MASS INDEX: 32.28 KG/M2 | OXYGEN SATURATION: 98 % | DIASTOLIC BLOOD PRESSURE: 69 MMHG | TEMPERATURE: 97.5 F | HEIGHT: 61 IN | WEIGHT: 171 LBS

## 2025-01-16 DIAGNOSIS — M81.0 OSTEOPOROSIS, UNSPECIFIED OSTEOPOROSIS TYPE, UNSPECIFIED PATHOLOGICAL FRACTURE PRESENCE: Chronic | ICD-10-CM

## 2025-01-16 DIAGNOSIS — I10 ESSENTIAL HYPERTENSION: Chronic | ICD-10-CM

## 2025-01-16 DIAGNOSIS — Z00.00 MEDICARE ANNUAL WELLNESS VISIT, SUBSEQUENT: Primary | ICD-10-CM

## 2025-01-16 DIAGNOSIS — E78.00 HYPERCHOLESTEROLEMIA: Chronic | ICD-10-CM

## 2025-01-16 DIAGNOSIS — R79.9 ABNORMAL FINDING OF BLOOD CHEMISTRY, UNSPECIFIED: ICD-10-CM

## 2025-01-16 DIAGNOSIS — I48.0 PAF (PAROXYSMAL ATRIAL FIBRILLATION): Chronic | ICD-10-CM

## 2025-01-16 PROCEDURE — 1125F AMNT PAIN NOTED PAIN PRSNT: CPT | Performed by: FAMILY MEDICINE

## 2025-01-16 PROCEDURE — G0439 PPPS, SUBSEQ VISIT: HCPCS | Performed by: FAMILY MEDICINE

## 2025-01-16 PROCEDURE — 1170F FXNL STATUS ASSESSED: CPT | Performed by: FAMILY MEDICINE

## 2025-01-16 PROCEDURE — 99214 OFFICE O/P EST MOD 30 MIN: CPT | Performed by: FAMILY MEDICINE

## 2025-01-16 PROCEDURE — 3078F DIAST BP <80 MM HG: CPT | Performed by: FAMILY MEDICINE

## 2025-01-16 PROCEDURE — 3074F SYST BP LT 130 MM HG: CPT | Performed by: FAMILY MEDICINE

## 2025-01-16 NOTE — PROGRESS NOTES
Subjective   The ABCs of the Annual Wellness Visit  Medicare Wellness Visit      Ariadne Telles is a 74 y.o. patient who presents for a Medicare Wellness Visit.    The following portions of the patient's history were reviewed and   updated as appropriate: allergies, current medications, past family history, past medical history, past social history, past surgical history, and problem list.    Compared to one year ago, the patient's physical   health is better.  Compared to one year ago, the patient's mental   health is better.    Recent Hospitalizations:  She was not admitted to the hospital during the last year.     Current Medical Providers:  Patient Care Team:  Nick Ariza MD as PCP - General  Angie Lomeli RPH as Pharmacist (Pharmacy)  Octavio Garnett PharmD as Pharmacist (Pharmacy)    Outpatient Medications Prior to Visit   Medication Sig Dispense Refill    alendronate (FOSAMAX) 70 MG tablet TAKE 1 TABLET EVERY 7 DAYS 12 tablet 3    amLODIPine (NORVASC) 5 MG tablet TAKE 1 TABLET BY MOUTH DAILY 90 tablet 1    atorvastatin (LIPITOR) 40 MG tablet TAKE 1 TABLET DAILY 90 tablet 1    calcium carbonate-cholecalciferol 500-400 MG-UNIT tablet tablet Take  by mouth Daily.      lisinopril (PRINIVIL,ZESTRIL) 20 MG tablet TAKE 1 TABLET DAILY 90 tablet 1    metoprolol tartrate (LOPRESSOR) 50 MG tablet TAKE ONE TABLET BY MOUTH TWICE A  tablet 3    Multiple Vitamin (MULTIVITAMIN) capsule Take  by mouth.      warfarin (COUMADIN) 2 MG tablet TAKE ONE HALF (1 MG) TABLET BY MOUTH ON MONDAY AND FRIDAY AND TAKE 1 TABLET (2 MG) BY MOUTH ALL OTHER DAYS OR AS DIRECTED. 80 tablet 1     No facility-administered medications prior to visit.     No opioid medication identified on active medication list. I have reviewed chart for other potential  high risk medication/s and harmful drug interactions in the elderly.      Aspirin is not on active medication list.  Aspirin use is not indicated based on review of current medical  "condition/s. Risk of harm outweighs potential benefits.  .    Patient Active Problem List   Diagnosis    Gastroesophageal reflux disease    Bradycardia    Hypercholesterolemia    Essential hypertension    Rectal polyp    History of endometrial cancer    Carpal tunnel syndrome    MVP (mitral valve prolapse)    Osteoporosis    Bilateral carotid artery stenosis    PAF (paroxysmal atrial fibrillation)    Vertigo    MVP (mitral valve prolapse)    AF (paroxysmal atrial fibrillation)    Atrial fibrillation with rapid ventricular response     Advance Care Planning Advance Directive is on file.  ACP discussion was held with the patient during this visit. Patient has an advance directive in EMR which is still valid.             Objective   Vitals:    25 1118   BP: 113/69   Pulse: 68   Temp: 97.5 °F (36.4 °C)   TempSrc: Temporal   SpO2: 98%   Weight: 77.6 kg (171 lb)   Height: 154.9 cm (61\")       Estimated body mass index is 32.31 kg/m² as calculated from the following:    Height as of this encounter: 154.9 cm (61\").    Weight as of this encounter: 77.6 kg (171 lb).                Does the patient have evidence of cognitive impairment? No                                                                                                Health  Risk Assessment    Smoking Status:  Social History     Tobacco Use   Smoking Status Never   Smokeless Tobacco Never     Alcohol Consumption:  Social History     Substance and Sexual Activity   Alcohol Use No    Comment: caffeine use seldom       Fall Risk Screen  STEADI Fall Risk Assessment was completed, and patient is at LOW risk for falls.Assessment completed on:2025    Depression Screening   Little interest or pleasure in doing things? Not at all   Feeling down, depressed, or hopeless? Not at all   PHQ-2 Total Score 0      Health Habits and Functional and Cognitive Screenin/16/2025    11:22 AM   Functional & Cognitive Status   Do you have difficulty preparing food " and eating? No   Do you have difficulty bathing yourself, getting dressed or grooming yourself? No   Do you have difficulty using the toilet? No   Do you have difficulty moving around from place to place? No   Do you have trouble with steps or getting out of a bed or a chair? No   Current Diet Well Balanced Diet   Dental Exam Up to date   Eye Exam Up to date   Exercise (times per week) 4 times per week   Current Exercises Include Walking   Do you need help using the phone?  No   Are you deaf or do you have serious difficulty hearing?  No   Do you need help to go to places out of walking distance? No   Do you need help shopping? No   Do you need help preparing meals?  No   Do you need help with housework?  No   Do you need help with laundry? No   Do you need help taking your medications? No   Do you need help managing money? No   Do you ever drive or ride in a car without wearing a seat belt? No   Have you felt unusual stress, anger or loneliness in the last month? No   Who do you live with? Alone   If you need help, do you have trouble finding someone available to you? No   Have you been bothered in the last four weeks by sexual problems? No   Do you have difficulty concentrating, remembering or making decisions? No           Age-appropriate Screening Schedule:  Refer to the list below for future screening recommendations based on patient's age, sex and/or medical conditions. Orders for these recommended tests are listed in the plan section. The patient has been provided with a written plan.    Health Maintenance List  Health Maintenance   Topic Date Due    DIABETIC FOOT EXAM  Never done    ZOSTER VACCINE (1 of 2) Never done    HEMOGLOBIN A1C  Never done    PAP SMEAR  11/30/2021    DXA SCAN  10/01/2022    DIABETIC EYE EXAM  09/28/2023    INFLUENZA VACCINE  Never done    COVID-19 Vaccine (3 - 2024-25 season) 09/01/2024    ANNUAL WELLNESS VISIT  11/01/2024    COLORECTAL CANCER SCREENING  11/03/2024    LIPID PANEL   "08/27/2025    BMI FOLLOWUP  08/27/2025    TDAP/TD VACCINES (3 - Td or Tdap) 08/25/2026    MAMMOGRAM  10/11/2026    HEPATITIS C SCREENING  Completed    Pneumococcal Vaccine 65+  Completed                                                                                                                                                CMS Preventative Services Quick Reference  Risk Factors Identified During Encounter  Immunizations Discussed/Encouraged:  Patient is refusing all immunizations.    The above risks/problems have been discussed with the patient.  Pertinent information has been shared with the patient in the After Visit Summary.  An After Visit Summary and PPPS were made available to the patient.    Follow Up:   Next Medicare Wellness visit to be scheduled in 1 year.         Additional E&M Note during same encounter follows:  Patient has additional, significant, and separately identifiable condition(s)/problem(s) that require work above and beyond the Medicare Wellness Visit     Chief Complaint  Medicare Wellness-subsequent and Hypertension    Subjective   HPI            Hypertension follow-up.  Continues on amlodipine 5 mg daily, lisinopril 20 mg daily, and metoprolol.  History of PAF.  Followed by cardiology also.  Continues on warfarin, INR managed by them.    Dyslipidemia.  Continues on atorvastatin 40 mg daily.    Osteoporosis.  Continues on alendronate 70 mg weekly.  No adverse effects.  The alendronate has been prescribed by me since 2016.  Almost 10 years.  Last DEXA scan 2020 with osteoporosis both hips with interval decrease.            Objective   Vital Signs:  /69   Pulse 68   Temp 97.5 °F (36.4 °C) (Temporal)   Ht 154.9 cm (61\")   Wt 77.6 kg (171 lb)   SpO2 98%   BMI 32.31 kg/m²   Physical Exam                  Assessment and Plan     Annual Medicare wellness visit    Immunizations.  Refusing all, see above.    Colon cancer screening.  Patient declines further colon cancer screening.  " Ki negative about 4 years ago.    Hypertension.  Overall well-controlled.  Continues amlodipine 5 mg daily and metoprolol and lisinopril.  Labs pending today.    Paroxysmal atrial fibrillation.  Appears to be in sinus rhythm today.  Continues metoprolol.  Continues warfarin, INR managed by cardiology.    I will see her back in 6 months for recheck.    Osteoporosis.  She has been on alendronate now over 9 years.  Her last DEXA scan was about 3 or 4 years ago with osteoporosis, decreased interval.  At this time I am recommending she stop the alendronate for a number of months and then we will reassess DEXA scan in the future.  Typically after 8 to 10 years of therapy, I recommend stopping alendronate to help prevent serious but rare complication such as pathological fracture.  She can continue calcium and vitamin D if she wishes.           Medicare annual wellness visit, subsequent         Essential hypertension           Hypercholesterolemia            PAF (paroxysmal atrial fibrillation)         Osteoporosis, unspecified osteoporosis type, unspecified pathological fracture presence                 Follow Up   No follow-ups on file.  Patient was given instructions and counseling regarding her condition or for health maintenance advice. Please see specific information pulled into the AVS if appropriate.

## 2025-01-17 LAB
ALBUMIN SERPL-MCNC: 4.1 G/DL (ref 3.5–5.2)
ALBUMIN/GLOB SERPL: 1.2 G/DL
ALP SERPL-CCNC: 182 U/L (ref 39–117)
ALT SERPL-CCNC: 12 U/L (ref 1–33)
AST SERPL-CCNC: 16 U/L (ref 1–32)
BASOPHILS # BLD AUTO: 0.13 10*3/MM3 (ref 0–0.2)
BASOPHILS NFR BLD AUTO: 1.2 % (ref 0–1.5)
BILIRUB SERPL-MCNC: 0.4 MG/DL (ref 0–1.2)
BUN SERPL-MCNC: 26 MG/DL (ref 8–23)
BUN/CREAT SERPL: 20.3 (ref 7–25)
CALCIUM SERPL-MCNC: 9.7 MG/DL (ref 8.6–10.5)
CHLORIDE SERPL-SCNC: 105 MMOL/L (ref 98–107)
CHOLEST SERPL-MCNC: 200 MG/DL (ref 0–200)
CO2 SERPL-SCNC: 24.8 MMOL/L (ref 22–29)
CREAT SERPL-MCNC: 1.28 MG/DL (ref 0.57–1)
EGFRCR SERPLBLD CKD-EPI 2021: 44.1 ML/MIN/1.73
EOSINOPHIL # BLD AUTO: 0.84 10*3/MM3 (ref 0–0.4)
EOSINOPHIL NFR BLD AUTO: 7.7 % (ref 0.3–6.2)
ERYTHROCYTE [DISTWIDTH] IN BLOOD BY AUTOMATED COUNT: 12.3 % (ref 12.3–15.4)
GLOBULIN SER CALC-MCNC: 3.3 GM/DL
GLUCOSE SERPL-MCNC: 87 MG/DL (ref 65–99)
HBA1C MFR BLD: 5.7 % (ref 4.8–5.6)
HCT VFR BLD AUTO: 38.6 % (ref 34–46.6)
HDLC SERPL-MCNC: 45 MG/DL (ref 40–60)
HGB BLD-MCNC: 13.1 G/DL (ref 12–15.9)
IMM GRANULOCYTES # BLD AUTO: 0.05 10*3/MM3 (ref 0–0.05)
IMM GRANULOCYTES NFR BLD AUTO: 0.5 % (ref 0–0.5)
LDLC SERPL CALC-MCNC: 130 MG/DL (ref 0–100)
LYMPHOCYTES # BLD AUTO: 1.92 10*3/MM3 (ref 0.7–3.1)
LYMPHOCYTES NFR BLD AUTO: 17.6 % (ref 19.6–45.3)
MCH RBC QN AUTO: 31.3 PG (ref 26.6–33)
MCHC RBC AUTO-ENTMCNC: 33.9 G/DL (ref 31.5–35.7)
MCV RBC AUTO: 92.1 FL (ref 79–97)
MONOCYTES # BLD AUTO: 0.9 10*3/MM3 (ref 0.1–0.9)
MONOCYTES NFR BLD AUTO: 8.3 % (ref 5–12)
NEUTROPHILS # BLD AUTO: 7.06 10*3/MM3 (ref 1.7–7)
NEUTROPHILS NFR BLD AUTO: 64.7 % (ref 42.7–76)
NRBC BLD AUTO-RTO: 0 /100 WBC (ref 0–0.2)
PLATELET # BLD AUTO: 358 10*3/MM3 (ref 140–450)
POTASSIUM SERPL-SCNC: 5 MMOL/L (ref 3.5–5.2)
PROT SERPL-MCNC: 7.4 G/DL (ref 6–8.5)
RBC # BLD AUTO: 4.19 10*6/MM3 (ref 3.77–5.28)
SODIUM SERPL-SCNC: 141 MMOL/L (ref 136–145)
TRIGL SERPL-MCNC: 139 MG/DL (ref 0–150)
VLDLC SERPL CALC-MCNC: 25 MG/DL (ref 5–40)
WBC # BLD AUTO: 10.9 10*3/MM3 (ref 3.4–10.8)

## 2025-01-20 ENCOUNTER — TELEPHONE (OUTPATIENT)
Dept: FAMILY MEDICINE CLINIC | Facility: CLINIC | Age: 75
End: 2025-01-20
Payer: MEDICARE

## 2025-01-20 NOTE — TELEPHONE ENCOUNTER
Patient returned a call for test results. I read verbatim the note that Dr. Ariza put in.Patient voiced understanding and wanted to know what that means. Patient would like a call back to discuss labs results in further detail please.

## 2025-01-30 ENCOUNTER — OFFICE VISIT (OUTPATIENT)
Dept: FAMILY MEDICINE CLINIC | Facility: CLINIC | Age: 75
End: 2025-01-30
Payer: MEDICARE

## 2025-01-30 VITALS
WEIGHT: 167.1 LBS | SYSTOLIC BLOOD PRESSURE: 128 MMHG | OXYGEN SATURATION: 100 % | BODY MASS INDEX: 31.55 KG/M2 | HEART RATE: 65 BPM | DIASTOLIC BLOOD PRESSURE: 82 MMHG | TEMPERATURE: 97.5 F | HEIGHT: 61 IN

## 2025-01-30 DIAGNOSIS — R51.9 NONINTRACTABLE HEADACHE, UNSPECIFIED CHRONICITY PATTERN, UNSPECIFIED HEADACHE TYPE: Primary | ICD-10-CM

## 2025-01-30 DIAGNOSIS — J01.10 ACUTE NON-RECURRENT FRONTAL SINUSITIS: ICD-10-CM

## 2025-01-30 DIAGNOSIS — J01.00 ACUTE NON-RECURRENT MAXILLARY SINUSITIS: ICD-10-CM

## 2025-01-30 DIAGNOSIS — R09.81 NASAL CONGESTION: ICD-10-CM

## 2025-01-30 PROCEDURE — 1125F AMNT PAIN NOTED PAIN PRSNT: CPT | Performed by: NURSE PRACTITIONER

## 2025-01-30 PROCEDURE — 87428 SARSCOV & INF VIR A&B AG IA: CPT | Performed by: NURSE PRACTITIONER

## 2025-01-30 PROCEDURE — 3079F DIAST BP 80-89 MM HG: CPT | Performed by: NURSE PRACTITIONER

## 2025-01-30 PROCEDURE — 3044F HG A1C LEVEL LT 7.0%: CPT | Performed by: NURSE PRACTITIONER

## 2025-01-30 PROCEDURE — 3074F SYST BP LT 130 MM HG: CPT | Performed by: NURSE PRACTITIONER

## 2025-01-30 PROCEDURE — 99214 OFFICE O/P EST MOD 30 MIN: CPT | Performed by: NURSE PRACTITIONER

## 2025-01-30 RX ORDER — AZITHROMYCIN 250 MG/1
TABLET, FILM COATED ORAL
Qty: 6 TABLET | Refills: 0 | Status: SHIPPED | OUTPATIENT
Start: 2025-01-30

## 2025-01-30 NOTE — PROGRESS NOTES
"Chief Complaint  Headache and Facial Pain (Right side, started 2 weeks ago. Had the flu)    Subjective        Ariadne Telles presents to [facility]  History of Present Illness  Pleasant patient here today, had cough congestion respiratory infection mostly sinuses a couple weeks ago she has no fever the initial part is better but she thinks she has a secondary sinus infection she is having right frontal pain right at her eyebrow level and right maxillary no vision change no headache no throbbing headache no parietal headache occipital or other  No fever chills chest pain or shortness of breath, simply tenderness of her right brow right maxillary region without any rash  She has had neuralgia in the past but this is different and nothing like her previous symptoms, no history of stroke takes warfarin dose adjusted recently for history of A-fib and she has appointment for INR tomorrow    Headache  Facial Pain  Symptoms include headaches.      Objective   Vital Signs:  /82 (BP Location: Right arm, Patient Position: Sitting, Cuff Size: Adult)   Pulse 65   Temp 97.5 °F (36.4 °C) (Temporal)   Ht 154.9 cm (61\")   Wt 75.8 kg (167 lb 1.6 oz)   SpO2 100%   BMI 31.57 kg/m²   Estimated body mass index is 31.57 kg/m² as calculated from the following:    Height as of this encounter: 154.9 cm (61\").    Weight as of this encounter: 75.8 kg (167 lb 1.6 oz).            Physical Exam  Vitals reviewed.   Constitutional:       General: She is not in acute distress.     Appearance: Normal appearance. She is well-developed. She is not ill-appearing, toxic-appearing or diaphoretic.   HENT:      Head: Normocephalic.      Ears:      Comments: TMs are dull turbinates congested pharynx is clear,  Neck supple no cervical apathy  No facial swelling cranial nerves II through XII grossly intact,  Positive tenderness right eyebrow at or slightly superior, and right maxillary,  No rash in this region or " lesions, no periorbital cellulitis, signs and symptoms consistent with secondary sinusitis       Nose: Nose normal.   Eyes:      General: No scleral icterus.     Conjunctiva/sclera: Conjunctivae normal.      Pupils: Pupils are equal, round, and reactive to light.   Neck:      Thyroid: No thyromegaly.      Vascular: No JVD.   Cardiovascular:      Rate and Rhythm: Normal rate and regular rhythm.      Heart sounds: Normal heart sounds. No murmur heard.     No friction rub. No gallop.   Pulmonary:      Effort: Pulmonary effort is normal. No respiratory distress.      Breath sounds: Normal breath sounds. No stridor. No wheezing or rales.   Abdominal:      General: Bowel sounds are normal. There is no distension.      Palpations: Abdomen is soft.      Tenderness: There is no abdominal tenderness.      Comments: No hepatosplenomegaly, no ascites,   Musculoskeletal:         General: No tenderness.      Cervical back: Neck supple.   Lymphadenopathy:      Cervical: No cervical adenopathy.   Skin:     General: Skin is warm and dry.      Findings: No erythema or rash.   Neurological:      General: No focal deficit present.      Mental Status: She is alert and oriented to person, place, and time. Mental status is at baseline.      Cranial Nerves: No cranial nerve deficit.      Sensory: No sensory deficit.      Motor: No weakness.      Deep Tendon Reflexes: Reflexes are normal and symmetric.   Psychiatric:         Behavior: Behavior normal.         Thought Content: Thought content normal.         Judgment: Judgment normal.      Result Review :                Assessment and Plan   Diagnoses and all orders for this visit:    1. Nonintractable headache, unspecified chronicity pattern, unspecified headache type (Primary)  -     POCT SARS-CoV-2 Antigen PAMELA + Flu    2. Nasal congestion  -     POCT SARS-CoV-2 Antigen PAMELA + Flu    3. Acute non-recurrent frontal sinusitis    4. Acute non-recurrent maxillary sinusitis    Other orders  -      azithromycin (Zithromax Z-Michael) 250 MG tablet; Take 2 tablets the first day, then 1 tablet daily for 4 days.  Dispense: 6 tablet; Refill: 0             Follow Up   Return for Print discharge instructions/educational handouts.  Patient was given instructions and counseling regarding her condition or for health maintenance advice. Please see specific information pulled into the AVS if appropriate.     Patient Instructions   Discharge instruction start Zithromax now for likely secondary infection of a right frontal maxillary sinusitis with postviral congestion and syndrome symptom    If needed saline nasal spray  Afrin nasal spray generic is fine  2 sprays each nostril  1 time a day for 3 days, the fourth day  Use on the right side 1 spray  Then discontinue  May use it a couple more times for the week on the right side if needed but otherwise avoided to avoid rebound congestion  Tylenol as needed    Let the anticoagulation clinic know that we started you on Zithromax and maybe get a deal to check you in a week    But otherwise follow their advice on what ever they decide, update your condition next week    However if you have increased facial pain severe headache weakness facial swelling vision change chest pain shortness of breath emergency room if you are not feeling better  In several days go to the emergency room

## 2025-01-30 NOTE — PATIENT INSTRUCTIONS
Discharge instruction start Zithromax now for likely secondary infection of a right frontal maxillary sinusitis with postviral congestion and syndrome symptom    If needed saline nasal spray  Afrin nasal spray generic is fine  2 sprays each nostril  1 time a day for 3 days, the fourth day  Use on the right side 1 spray  Then discontinue  May use it a couple more times for the week on the right side if needed but otherwise avoided to avoid rebound congestion  Tylenol as needed    Let the anticoagulation clinic know that we started you on Zithromax and maybe get a deal to check you in a week    But otherwise follow their advice on what ever they decide, update your condition next week    However if you have increased facial pain severe headache weakness facial swelling vision change chest pain shortness of breath emergency room if you are not feeling better  In several days go to the emergency room

## 2025-01-31 ENCOUNTER — ANTICOAGULATION VISIT (OUTPATIENT)
Dept: PHARMACY | Facility: HOSPITAL | Age: 75
End: 2025-01-31
Payer: MEDICARE

## 2025-01-31 DIAGNOSIS — I48.0 PAF (PAROXYSMAL ATRIAL FIBRILLATION): Primary | Chronic | ICD-10-CM

## 2025-01-31 LAB
INR PPP: 3.8 (ref 0.91–1.09)
PROTHROMBIN TIME: 46 SECONDS (ref 10–13.8)

## 2025-01-31 PROCEDURE — 85610 PROTHROMBIN TIME: CPT

## 2025-01-31 PROCEDURE — G0463 HOSPITAL OUTPT CLINIC VISIT: HCPCS

## 2025-01-31 NOTE — PROGRESS NOTES
Anticoagulation Clinic Progress Note    Anticoagulation Summary  As of 1/31/2025      INR goal:  2.0-3.0   TTR:  53.8% (3.9 y)   INR used for dosing:  3.8 (1/31/2025)   Warfarin maintenance plan:  2 mg every day   Weekly warfarin total:  14 mg   Plan last modified:  Octavio Garnett, PharmD (1/10/2025)   Next INR check:  2/14/2025   Priority:  High   Target end date:  --    Indications    PAF (paroxysmal atrial fibrillation) [I48.0]                 Anticoagulation Episode Summary       INR check location:  --    Preferred lab:  --    Send INR reminders to:   [facility]    Comments:  Previously apixaban (cost)          Anticoagulation Care Providers       Provider Role Specialty Phone number    Francesco Riojas MD Referring Cardiology [phone]            Clinic Interview:  Patient Findings     Positives:  Change in health, Change in medications    Negatives:  Signs/symptoms of thrombosis, Signs/symptoms of bleeding,   Laboratory test error suspected, Change in alcohol use, Change in   activity, Upcoming invasive procedure, Emergency department visit,   Upcoming dental procedure, Missed doses, Extra doses, Change in   diet/appetite, Hospital admission, Bruising, Other complaints      Clinical Outcomes     Negatives:  Major bleeding event, Thromboembolic event,   Anticoagulation-related hospital admission, Anticoagulation-related ED   visit, Anticoagulation-related fatality        INR History:      10/11/2024     9:45 AM 11/1/2024     9:15 AM 11/15/2024     8:45 AM 12/5/2024     8:45 AM 12/20/2024     8:45 AM 1/10/2025    11:15 AM 1/31/2025     8:15 AM   Anticoagulation Monitoring   INR 1.6 2.2 1.6 2.0 1.6 1.5 3.8   INR Date 10/11/2024 11/1/2024 11/15/2024 12/5/2024 12/20/2024 1/10/2025 1/31/2025   INR Goal 2.0-3.0 2.0-3.0 2.0-3.0 2.0-3.0 2.0-3.0 2.0-3.0 2.0-3.0   Trend Same Same Same Same Up Up Same   Last Week Total 12 mg 12 mg 9 mg 12 mg 12 mg 13 mg 14 mg   Next Week Total 13 mg 12 mg 14 mg 12  mg 13 mg 15 mg 11 mg   Sun 2 mg 2 mg 2 mg 2 mg 2 mg 2 mg 1 mg (2/2); Otherwise 2 mg   Mon 1 mg 1 mg 1 mg 1 mg 1 mg 2 mg 2 mg   Tue 2 mg 2 mg 2 mg 2 mg 2 mg 2 mg 2 mg   Wed 2 mg 2 mg 2 mg 2 mg 2 mg 2 mg 2 mg   Thu 2 mg 2 mg 2 mg 2 mg 2 mg 2 mg 2 mg   Fri 2 mg (10/11); Otherwise 1 mg 1 mg 3 mg (11/15) 1 mg 2 mg 3 mg (1/10); Otherwise 2 mg Hold (1/31); Otherwise 2 mg   Sat 2 mg 2 mg 2 mg 2 mg 2 mg 2 mg 2 mg       Plan:  1. INR is Supratherapeutic today- see above in Anticoagulation Summary.  Will instruct Ariadne Telles to Change their warfarin regimen- see above in Anticoagulation Summary.  Pt has been sick for 1 month- flu earlier in the month and now sinusitis. Started a z pack yesterday for 4 days. She has very low appetite. Hold warfarin today and partial to 1 mg on Sunday due to abx interaction and then resume 2 mg daily. Rck 2 weeks   2. Follow up in 2 weeks  3. Patient declines warfarin refills.  4. Verbal and written information provided. Patient expresses understanding and has no further questions at this time.    Radha Carver [facility]

## 2025-02-14 ENCOUNTER — OFFICE VISIT (OUTPATIENT)
Dept: FAMILY MEDICINE CLINIC | Facility: CLINIC | Age: 75
End: 2025-02-14
Payer: MEDICARE

## 2025-02-14 ENCOUNTER — ANTICOAGULATION VISIT (OUTPATIENT)
Dept: PHARMACY | Facility: HOSPITAL | Age: 75
End: 2025-02-14
Payer: MEDICARE

## 2025-02-14 VITALS
HEIGHT: 61 IN | WEIGHT: 166.4 LBS | SYSTOLIC BLOOD PRESSURE: 130 MMHG | BODY MASS INDEX: 31.42 KG/M2 | HEART RATE: 75 BPM | TEMPERATURE: 98 F | DIASTOLIC BLOOD PRESSURE: 78 MMHG | OXYGEN SATURATION: 97 %

## 2025-02-14 DIAGNOSIS — R09.81 SINUS CONGESTION: ICD-10-CM

## 2025-02-14 DIAGNOSIS — J34.89 SINUS PAIN: Primary | ICD-10-CM

## 2025-02-14 DIAGNOSIS — I48.0 PAF (PAROXYSMAL ATRIAL FIBRILLATION): Primary | Chronic | ICD-10-CM

## 2025-02-14 LAB
INR PPP: 3 (ref 0.91–1.09)
PROTHROMBIN TIME: 35.6 SECONDS (ref 10–13.8)

## 2025-02-14 PROCEDURE — 3075F SYST BP GE 130 - 139MM HG: CPT | Performed by: NURSE PRACTITIONER

## 2025-02-14 PROCEDURE — 85610 PROTHROMBIN TIME: CPT

## 2025-02-14 PROCEDURE — 99213 OFFICE O/P EST LOW 20 MIN: CPT | Performed by: NURSE PRACTITIONER

## 2025-02-14 PROCEDURE — G0463 HOSPITAL OUTPT CLINIC VISIT: HCPCS

## 2025-02-14 PROCEDURE — 3078F DIAST BP <80 MM HG: CPT | Performed by: NURSE PRACTITIONER

## 2025-02-14 PROCEDURE — 3044F HG A1C LEVEL LT 7.0%: CPT | Performed by: NURSE PRACTITIONER

## 2025-02-14 PROCEDURE — 1125F AMNT PAIN NOTED PAIN PRSNT: CPT | Performed by: NURSE PRACTITIONER

## 2025-02-14 RX ORDER — METHYLPREDNISOLONE 4 MG/1
TABLET ORAL
Qty: 21 TABLET | Refills: 0 | Status: SHIPPED | OUTPATIENT
Start: 2025-02-14

## 2025-02-14 RX ORDER — FLUTICASONE PROPIONATE 50 MCG
2 SPRAY, SUSPENSION (ML) NASAL DAILY
Qty: 18.2 G | Refills: 2 | Status: SHIPPED | OUTPATIENT
Start: 2025-02-14

## 2025-02-14 NOTE — PATIENT INSTRUCTIONS
Discharge instructions,  You improved after the antibiotic, and now you are having some increased sinus congestion and pain less so than previous  Less likely bacterial infection more likely nasal obstruction causing pain    Flonase daily  Try to wean off Afrin but if needed okay to use  Medrol dose pack for inflammation just continue should you have increased insomnia irritability palpitations    In any time saline nasal spray medical  Update me Monday or Tuesday if you not better that time, I will give you another antibiotic as well as consider a CT of the head  Severe headache weakness chest pain shortness of breath emergency room

## 2025-02-14 NOTE — PROGRESS NOTES
"Chief Complaint  Facial Pain (Right side. No improvement) and Shoulder Pain (Left. Started on Monday. Denies any trauma. Has been using a heating pad)    Subjective        Ariadne Telles presents to [facility]  History of Present Illness  Very pleasant patient here today follow-up recent possible likely sinusitis right frontal right maxillary after having upper restaurant infection  She improved quite a bit, for several days and then her pain started coming back is real severe originally  Now is less severe about 3 maxillary sinus right frontal without vision changes severe headache facial swelling or other complaints, she has a metallic taste in her mouth, some drainage little bit of upper cough  Some small pain left scapular region but nothing with deep breath or shortness of breath to worry about pneumonia or other difficulty.    Last year, MRI brain for right facial pain, essentially negative  No confusion fever chills or other worries, no unilateral paresthesias, gait difficulty speech clear without difficult    Facial Pain      Objective   Vital Signs:  /78 (BP Location: Left arm, Patient Position: Sitting, Cuff Size: Adult)   Pulse 75   Temp 98 °F (36.7 °C) (Temporal)   Ht 154.9 cm (61\")   Wt 75.5 kg (166 lb 6.4 oz)   SpO2 97%   BMI 31.44 kg/m²   Estimated body mass index is 31.44 kg/m² as calculated from the following:    Height as of this encounter: 154.9 cm (61\").    Weight as of this encounter: 75.5 kg (166 lb 6.4 oz).            Physical Exam  Constitutional:       General: She is not in acute distress.     Appearance: She is not ill-appearing, toxic-appearing or diaphoretic.   HENT:      Head:      Comments: Mild tenderness right frontal eyebrow level and right upper maxillary, turbinates congested bilaterally, no purulence, pharynx is clear speech clear  Eyes:      Conjunctiva/sclera: Conjunctivae normal.      Pupils: Pupils are equal, round, and reactive " to light.   Cardiovascular:      Rate and Rhythm: Normal rate and regular rhythm.      Pulses: Normal pulses.      Heart sounds: Normal heart sounds.   Skin:     General: Skin is warm.   Neurological:      General: No focal deficit present.      Mental Status: Mental status is at baseline.   Psychiatric:         Mood and Affect: Mood normal.         Behavior: Behavior normal.         Thought Content: Thought content normal.         Judgment: Judgment normal.      Result Review :                Assessment and Plan   Diagnoses and all orders for this visit:    1. Sinus pain (Primary)    2. Sinus congestion    Other orders  -     fluticasone (FLONASE) 50 MCG/ACT nasal spray; Administer 2 sprays into the nostril(s) as directed by provider Daily.  Dispense: 18.2 g; Refill: 2  -     methylPREDNISolone (MEDROL) 4 MG dose pack; Take as directed on package instructions.  Dispense: 21 tablet; Refill: 0             Follow Up   No follow-ups on file.  Patient was given instructions and counseling regarding her condition or for health maintenance advice. Please see specific information pulled into the AVS if appropriate.     Patient Instructions   Discharge instructions,  You improved after the antibiotic, and now you are having some increased sinus congestion and pain less so than previous  Less likely bacterial infection more likely nasal obstruction causing pain    Flonase daily  Try to wean off Afrin but if needed okay to use  Medrol dose pack for inflammation just continue should you have increased insomnia irritability palpitations    In any time saline nasal spray medical  Update me Monday or Tuesday if you not better that time, I will give you another antibiotic as well as consider a CT of the head  Severe headache weakness chest pain shortness of breath emergency room

## 2025-02-14 NOTE — PROGRESS NOTES
Anticoagulation Clinic Progress Note    Anticoagulation Summary  As of 2/14/2025      INR goal:  2.0-3.0   TTR:  53.3% (4 y)   INR used for dosing:  3.0 (2/14/2025)   Warfarin maintenance plan:  2 mg every day   Weekly warfarin total:  14 mg   No change documented:  Linsey Stafford RPH   Plan last modified:  Octavio Garnett, PharmD (1/10/2025)   Next INR check:  2/28/2025   Priority:  High   Target end date:  --    Indications    PAF (paroxysmal atrial fibrillation) [I48.0]                 Anticoagulation Episode Summary       INR check location:  --    Preferred lab:  --    [facility] [place]    Comments:  Previously apixaban (cost)          Anticoagulation Care Providers       Provider Role Specialty Phone number    Francesco Riojas MD Referring Cardiology [phone]            Clinic Interview:  Patient Findings     Positives:  Change in health    Negatives:  Signs/symptoms of thrombosis, Signs/symptoms of bleeding,   Laboratory test error suspected, Change in alcohol use, Change in   activity, Upcoming invasive procedure, Emergency department visit,   Upcoming dental procedure, Missed doses, Extra doses, Change in   medications, Change in diet/appetite, Hospital admission, Bruising, Other   complaints    Comments:  Patient reports still feeling sick with sinusitis. Not   currently on any antibiotics.       Clinical Outcomes     Negatives:  Major bleeding event, Thromboembolic event,   Anticoagulation-related hospital admission, Anticoagulation-related ED   visit, Anticoagulation-related fatality    Comments:  Patient reports still feeling sick with sinusitis. Not   currently on any antibiotics.         INR History:      11/1/2024     9:15 AM 11/15/2024     8:45 AM 12/5/2024     8:45 AM 12/20/2024     8:45 AM 1/10/2025    11:15 AM 1/31/2025     8:15 AM 2/14/2025     8:30 AM   Anticoagulation Monitoring   INR 2.2 1.6 2.0 1.6 1.5 3.8 3.0   INR Date 11/1/2024 11/15/2024  12/5/2024 12/20/2024 1/10/2025 1/31/2025 2/14/2025   INR Goal 2.0-3.0 2.0-3.0 2.0-3.0 2.0-3.0 2.0-3.0 2.0-3.0 2.0-3.0   Trend Same Same Same Up Up Same Same   Last Week Total 12 mg 9 mg 12 mg 12 mg 13 mg 14 mg 14 mg   Next Week Total 12 mg 14 mg 12 mg 13 mg 15 mg 11 mg 14 mg   Sun 2 mg 2 mg 2 mg 2 mg 2 mg 1 mg (2/2); Otherwise 2 mg 2 mg   Mon 1 mg 1 mg 1 mg 1 mg 2 mg 2 mg 2 mg   Tue 2 mg 2 mg 2 mg 2 mg 2 mg 2 mg 2 mg   Wed 2 mg 2 mg 2 mg 2 mg 2 mg 2 mg 2 mg   Thu 2 mg 2 mg 2 mg 2 mg 2 mg 2 mg 2 mg   Fri 1 mg 3 mg (11/15) 1 mg 2 mg 3 mg (1/10); Otherwise 2 mg Hold (1/31); Otherwise 2 mg 2 mg   Sat 2 mg 2 mg 2 mg 2 mg 2 mg 2 mg 2 mg       Plan:  1. INR is Therapeutic today- see above in Anticoagulation Summary.  Will instruct Ariadne Telels to Continue their warfarin regimen- see above in Anticoagulation Summary.  2. Follow up in 2 weeks  3. Patient declines warfarin refills.  4. Verbal and written information provided. Patient expresses understanding and has no further questions at this time.    Linsey Stafford formerly [facility]

## 2025-02-28 ENCOUNTER — ANTICOAGULATION VISIT (OUTPATIENT)
Dept: PHARMACY | Facility: HOSPITAL | Age: 75
End: 2025-02-28
Payer: MEDICARE

## 2025-02-28 DIAGNOSIS — I48.0 PAF (PAROXYSMAL ATRIAL FIBRILLATION): Primary | Chronic | ICD-10-CM

## 2025-02-28 LAB
INR PPP: 1.6 (ref 0.91–1.09)
PROTHROMBIN TIME: 19.5 SECONDS (ref 10–13.8)

## 2025-02-28 PROCEDURE — G0463 HOSPITAL OUTPT CLINIC VISIT: HCPCS

## 2025-02-28 PROCEDURE — 85610 PROTHROMBIN TIME: CPT

## 2025-02-28 NOTE — PROGRESS NOTES
Anticoagulation Clinic Progress Note    Anticoagulation Summary  As of 2025      INR goal:  2.0-3.0   TTR:  53.5% (4 y)   INR used for dosin.6 (2025)   Warfarin maintenance plan:  2 mg every day   Weekly warfarin total:  14 mg   Plan last modified:  Octavio Garnett, PharmD (1/10/2025)   Next INR check:  3/14/2025   Priority:  High   Target end date:  --    Indications    PAF (paroxysmal atrial fibrillation) [I48.0]                 Anticoagulation Episode Summary       INR check location:  --    Preferred lab:  --    Send INR reminders to:  [facility]    Comments:  Previously apixaban (cost)          Anticoagulation Care Providers       Provider Role Specialty Phone number    Francesco Riojas MD Referring Cardiology [phone]            Clinic Interview:  Patient Findings     Positives:  Change in health, Missed doses, Change in medications    Negatives:  Signs/symptoms of thrombosis, Signs/symptoms of bleeding,   Laboratory test error suspected, Change in alcohol use, Change in   activity, Upcoming invasive procedure, Emergency department visit,   Upcoming dental procedure, Extra doses, Change in diet/appetite, Hospital   admission, Bruising, Other complaints    Comments:  Reports she took a Medrol Dosepak starting 25. Reports   her sinus congestion and pain is persisting. She plans to seek   re-evaluation if symptoms do not improve in coming days. She reports   missing last night's dose of warfarin.       Clinical Outcomes     Negatives:  Major bleeding event, Thromboembolic event,   Anticoagulation-related hospital admission, Anticoagulation-related ED   visit, Anticoagulation-related fatality    Comments:  Reports she took a Medrol Dosepak starting 25. Reports   her sinus congestion and pain is persisting. She plans to seek   re-evaluation if symptoms do not improve in coming days. She reports   missing last night's dose of warfarin.         INR History:       11/15/2024     8:45 AM 12/5/2024     8:45 AM 12/20/2024     8:45 AM 1/10/2025    11:15 AM 1/31/2025     8:15 AM 2/14/2025     8:30 AM 2/28/2025     8:45 AM   Anticoagulation Monitoring   INR 1.6 2.0 1.6 1.5 3.8 3.0 1.6   INR Date 11/15/2024 12/5/2024 12/20/2024 1/10/2025 1/31/2025 2/14/2025 2/28/2025   INR Goal 2.0-3.0 2.0-3.0 2.0-3.0 2.0-3.0 2.0-3.0 2.0-3.0 2.0-3.0   Trend Same Same Up Up Same Same Same   Last Week Total 9 mg 12 mg 12 mg 13 mg 14 mg 14 mg 12 mg   Next Week Total 14 mg 12 mg 13 mg 15 mg 11 mg 14 mg 18 mg   Sun 2 mg 2 mg 2 mg 2 mg 1 mg (2/2); Otherwise 2 mg 2 mg 2 mg   Mon 1 mg 1 mg 1 mg 2 mg 2 mg 2 mg 2 mg   Tue 2 mg 2 mg 2 mg 2 mg 2 mg 2 mg 2 mg   Wed 2 mg 2 mg 2 mg 2 mg 2 mg 2 mg 2 mg   Thu 2 mg 2 mg 2 mg 2 mg 2 mg 2 mg 2 mg   Fri 3 mg (11/15) 1 mg 2 mg 3 mg (1/10); Otherwise 2 mg Hold (1/31); Otherwise 2 mg 2 mg 6 mg (2/28); Otherwise 2 mg   Sat 2 mg 2 mg 2 mg 2 mg 2 mg 2 mg 2 mg   Visit Report      Report        Plan:  1. INR is Subtherapeutic today- see above in Anticoagulation Summary.  Will instruct Aridane Telles to Change their warfarin regimen- see above in Anticoagulation Summary. Take 2 mg this morning to make up last night's missed dose. Take 4 mg tonight, then resume 2 mg daily beginning tomorrow.   2. Follow up in 2 weeks.  3. Patient declines warfarin refills.  4. Verbal and written information provided. Patient expresses understanding and has no further questions at this time.    Octavio Garnett, PharmD

## 2025-02-28 NOTE — TELEPHONE ENCOUNTER
Rx Refill Note  Requested Prescriptions     Pending Prescriptions Disp Refills    amLODIPine (NORVASC) 5 MG tablet [Pharmacy Med Name: amLODIPine BESYLATE 5 MG TAB] 90 tablet 1     Sig: TAKE 1 TABLET BY MOUTH DAILY      Last office visit with prescribing clinician: 1/16/2025   Last telemedicine visit with prescribing clinician: Visit date not found   Next office visit with prescribing clinician: 7/17/2025                         Would you like a call back once the refill request has been completed: [] Yes [] No    If the office needs to give you a call back, can they leave a voicemail: [] Yes [] No    Jeanne Morales Rep  02/28/25, 15:12 EST

## 2025-03-02 RX ORDER — AMLODIPINE BESYLATE 5 MG/1
5 TABLET ORAL DAILY
Qty: 90 TABLET | Refills: 1 | Status: SHIPPED | OUTPATIENT
Start: 2025-03-02

## 2025-03-14 ENCOUNTER — ANTICOAGULATION VISIT (OUTPATIENT)
Dept: PHARMACY | Facility: HOSPITAL | Age: 75
End: 2025-03-14
Payer: MEDICARE

## 2025-03-14 ENCOUNTER — TELEPHONE (OUTPATIENT)
Dept: CARDIOLOGY | Age: 75
End: 2025-03-14

## 2025-03-14 DIAGNOSIS — I48.0 PAF (PAROXYSMAL ATRIAL FIBRILLATION): Primary | Chronic | ICD-10-CM

## 2025-03-14 LAB
INR PPP: 4.3 (ref 0.91–1.09)
PROTHROMBIN TIME: 51.2 SECONDS (ref 10–13.8)

## 2025-03-14 PROCEDURE — G0463 HOSPITAL OUTPT CLINIC VISIT: HCPCS

## 2025-03-14 PROCEDURE — 85610 PROTHROMBIN TIME: CPT

## 2025-03-14 RX ORDER — WARFARIN SODIUM 2 MG/1
TABLET ORAL
Qty: 90 TABLET | Refills: 1 | Status: SHIPPED | OUTPATIENT
Start: 2025-03-14

## 2025-03-14 RX ORDER — WARFARIN SODIUM 2 MG/1
TABLET ORAL
Qty: 90 TABLET | Refills: 1 | Status: SHIPPED | OUTPATIENT
Start: 2025-03-14 | End: 2025-03-14

## 2025-03-14 NOTE — PROGRESS NOTES
Anticoagulation Clinic Progress Note    Anticoagulation Summary  As of 3/14/2025      INR goal:  2.0-3.0   TTR:  53.3% (4.1 y)   INR used for dosin.3 (3/14/2025)   Warfarin maintenance plan:  2 mg every day   Weekly warfarin total:  14 mg   Plan last modified:  Octavio Garnett, PharmD (1/10/2025)   Next INR check:  3/21/2025   Priority:  High   Target end date:  --    Indications    PAF (paroxysmal atrial fibrillation) [I48.0]                 Anticoagulation Episode Summary       INR check location:  --    Preferred lab:  --    [facility]    Comments:  Previously apixaban (cost)          Anticoagulation Care Providers       Provider Role Specialty Phone number    Francesco Riojas MD Referring Cardiology [phone]            Clinic Interview:  Patient Findings     Negatives:  Signs/symptoms of thrombosis, Signs/symptoms of bleeding,   Laboratory test error suspected, Change in health, Change in alcohol use,   Change in activity, Upcoming invasive procedure, Emergency department   visit, Upcoming dental procedure, Missed doses, Extra doses, Change in   medications, Change in diet/appetite, Hospital admission, Bruising, Other   complaints      Clinical Outcomes     Negatives:  Major bleeding event, Thromboembolic event,   Anticoagulation-related hospital admission, Anticoagulation-related ED   visit, Anticoagulation-related fatality        INR History:      2024     8:45 AM 2024     8:45 AM 1/10/2025    11:15 AM 2025     8:15 AM 2025     8:30 AM 2025     8:45 AM 3/14/2025     8:45 AM   Anticoagulation Monitoring   INR 2.0 1.6 1.5 3.8 3.0 1.6 4.3   INR Date 2024 2024 1/10/2025 2025 2025 2025 3/14/2025   INR Goal 2.0-3.0 2.0-3.0 2.0-3.0 2.0-3.0 2.0-3.0 2.0-3.0 2.0-3.0   Trend Same Up Up Same Same Same Same   Last Week Total 12 mg 12 mg 13 mg 14 mg 14 mg 12 mg 14 mg   Next Week Total 12 mg 13 mg 15 mg 11 mg 14 mg 18 mg  12 mg   Sun 2 mg 2 mg 2 mg 1 mg (2/2); Otherwise 2 mg 2 mg 2 mg 2 mg   Mon 1 mg 1 mg 2 mg 2 mg 2 mg 2 mg 2 mg   Tue 2 mg 2 mg 2 mg 2 mg 2 mg 2 mg 2 mg   Wed 2 mg 2 mg 2 mg 2 mg 2 mg 2 mg 2 mg   Thu 2 mg 2 mg 2 mg 2 mg 2 mg 2 mg 2 mg   Fri 1 mg 2 mg 3 mg (1/10); Otherwise 2 mg Hold (1/31); Otherwise 2 mg 2 mg 6 mg (2/28); Otherwise 2 mg Hold (3/14)   Sat 2 mg 2 mg 2 mg 2 mg 2 mg 2 mg 2 mg   Visit Report     Report         Plan:  1. INR is Supratherapeutic today- see above in Anticoagulation Summary.  Will instruct Ariadne Telles to HOLD today's dose then, Continue their warfarin regimen of 2 mg daily - see above in Anticoagulation Summary.  2. Follow up in 1 week  3. Patient desires warfarin refills. Reports that she lost recent refill of warfarin--> refill called in with notation of this loss  4. Verbal and written information provided. Patient expresses understanding and has no further questions at this time.    [place], Pharmacy Intern

## 2025-03-14 NOTE — TELEPHONE ENCOUNTER
Caller: Ariadne Telles    Relationship to patient: Self    Best call back number: [phone]    Patient is needing: A FOLLOW UP WITH DR. ALLEN. NO ADVISEMENT ON SCHEDULING. PLEASE CALL TO SCHEDULE.

## 2025-03-14 NOTE — PROGRESS NOTES
I have supervised and reviewed the notes, assessments, and/or procedures performed. The documented assessment and plan were developed cooperatively, and the plan was implemented in my presence. I concur with the documentation of this patient encounter.    Linsey Stafford [facility]

## 2025-03-21 ENCOUNTER — ANTICOAGULATION VISIT (OUTPATIENT)
Dept: PHARMACY | Facility: HOSPITAL | Age: 75
End: 2025-03-21
Payer: MEDICARE

## 2025-03-21 DIAGNOSIS — I48.0 PAF (PAROXYSMAL ATRIAL FIBRILLATION): Primary | Chronic | ICD-10-CM

## 2025-03-21 LAB
INR PPP: 3 (ref 0.91–1.09)
PROTHROMBIN TIME: 35.7 SECONDS (ref 10–13.8)

## 2025-03-21 PROCEDURE — G0463 HOSPITAL OUTPT CLINIC VISIT: HCPCS

## 2025-03-21 PROCEDURE — 85610 PROTHROMBIN TIME: CPT

## 2025-03-21 NOTE — PROGRESS NOTES
Anticoagulation Clinic Progress Note    Anticoagulation Summary  As of 3/21/2025      INR goal:  2.0-3.0   TTR:  53.0% (4.1 y)   INR used for dosing:  3.0 (3/21/2025)   Warfarin maintenance plan:  1 mg every Mon; 2 mg all other days   Weekly warfarin total:  13 mg   Plan last modified:  Octavio Garnett, PharmD (3/21/2025)   Next INR check:  4/4/2025   Priority:  High   Target end date:  --    Indications    PAF (paroxysmal atrial fibrillation) [I48.0]                 Anticoagulation Episode Summary       INR check location:  --    Preferred lab:  --    Send INR reminders to:   [facility] [place]    Comments:  Previously apixaban (cost)          Anticoagulation Care Providers       Provider Role Specialty Phone number    Francesco Riojas MD Referring Cardiology [phone]            Clinic Interview:  Patient Findings     Negatives:  Signs/symptoms of thrombosis, Signs/symptoms of bleeding,   Laboratory test error suspected, Change in health, Change in alcohol use,   Change in activity, Upcoming invasive procedure, Emergency department   visit, Upcoming dental procedure, Missed doses, Extra doses, Change in   medications, Change in diet/appetite, Hospital admission, Bruising, Other   complaints      Clinical Outcomes     Negatives:  Major bleeding event, Thromboembolic event,   Anticoagulation-related hospital admission, Anticoagulation-related ED   visit, Anticoagulation-related fatality        INR History:      12/20/2024     8:45 AM 1/10/2025    11:15 AM 1/31/2025     8:15 AM 2/14/2025     8:30 AM 2/28/2025     8:45 AM 3/14/2025     8:45 AM 3/21/2025     8:45 AM   Anticoagulation Monitoring   INR 1.6 1.5 3.8 3.0 1.6 4.3 3.0   INR Date 12/20/2024 1/10/2025 1/31/2025 2/14/2025 2/28/2025 3/14/2025 3/21/2025   INR Goal 2.0-3.0 2.0-3.0 2.0-3.0 2.0-3.0 2.0-3.0 2.0-3.0 2.0-3.0   Trend Up Up Same Same Same Same Down   Last Week Total 12 mg 13 mg 14 mg 14 mg 12 mg 14 mg 12 mg   Next Week Total 13 mg 15 mg 11  mg 14 mg 18 mg 12 mg 13 mg   Sun 2 mg 2 mg 1 mg (2/2); Otherwise 2 mg 2 mg 2 mg 2 mg 2 mg   Mon 1 mg 2 mg 2 mg 2 mg 2 mg 2 mg 1 mg   Tue 2 mg 2 mg 2 mg 2 mg 2 mg 2 mg 2 mg   Wed 2 mg 2 mg 2 mg 2 mg 2 mg 2 mg 2 mg   Thu 2 mg 2 mg 2 mg 2 mg 2 mg 2 mg 2 mg   Fri 2 mg 3 mg (1/10); Otherwise 2 mg Hold (1/31); Otherwise 2 mg 2 mg 6 mg (2/28); Otherwise 2 mg Hold (3/14) 2 mg   Sat 2 mg 2 mg 2 mg 2 mg 2 mg 2 mg 2 mg   Visit Report    Report          Plan:  1. INR is Therapeutic today- see above in Anticoagulation Summary.  Will instruct Ariadne Telles to Change their warfarin regimen to 1 mg Mon, 2 mg all other days - see above in Anticoagulation Summary.  2. Follow up in 2 weeks  3. Patient declines warfarin refills.  4. Verbal and written information provided. Patient expresses understanding and has no further questions at this time.    Octavio Garnett, PharmD

## 2025-04-15 ENCOUNTER — OFFICE VISIT (OUTPATIENT)
Age: 75
End: 2025-04-15
Payer: MEDICARE

## 2025-04-15 VITALS
HEART RATE: 61 BPM | WEIGHT: 167 LBS | BODY MASS INDEX: 31.53 KG/M2 | SYSTOLIC BLOOD PRESSURE: 118 MMHG | HEIGHT: 61 IN | DIASTOLIC BLOOD PRESSURE: 80 MMHG

## 2025-04-15 DIAGNOSIS — I48.0 PAF (PAROXYSMAL ATRIAL FIBRILLATION): Primary | Chronic | ICD-10-CM

## 2025-04-15 PROCEDURE — 93000 ELECTROCARDIOGRAM COMPLETE: CPT | Performed by: INTERNAL MEDICINE

## 2025-04-15 PROCEDURE — 3079F DIAST BP 80-89 MM HG: CPT | Performed by: INTERNAL MEDICINE

## 2025-04-15 PROCEDURE — 3074F SYST BP LT 130 MM HG: CPT | Performed by: INTERNAL MEDICINE

## 2025-04-15 PROCEDURE — 99214 OFFICE O/P EST MOD 30 MIN: CPT | Performed by: INTERNAL MEDICINE

## 2025-04-15 NOTE — PROGRESS NOTES
Date of Office Visit: 04/15/2025  Encounter Provider: Francesco Riojas MD  Place of Service: [facility]  Patient Name: Ariadne Telles  :1950    Chief Complaint   Patient presents with    paroxysmal AFIB   :     HPI: Ariadne Telles is a 74 y.o. female who presents today for paroxysmal atrial fibrillation.    She had ablation several years ago.  We have not documented atrial fibrillation since then.  She has had multiple negative Holter monitors.    She has some intermittent positional vertigo, worse with movement.  She has been evaluated by ENT.    Today she reports he feels well.  She is happy that she is able to work at her age.  She works at [employer].    She is followed by the warfarin clinic, her INRs are in range about 50% of the time.          Past Medical History:   Diagnosis Date    Atrial fibrillation with RVR     Bradycardia 2016    Carpal tunnel syndrome 2018    Endometrial cancer     Essential hypertension 2016    Gastroesophageal reflux disease 2016    History of endometrial cancer 2017, S/P ANGEL, LUIS.  Dr. Catracho Alejo.    Hx of bone density study 2017    , DEXA scan: Osteoporosis in L1, severe osteopenia in both femoral necks.  T-scores: L1, -2.5; L2, -1.9; L3, -0.7; L4, +0.3.  Right femoral neck, -2.1; total -1.8.  Left femoral neck, -2.3; total, -1.8.    Hypercholesterolemia 2016    Hyperlipidemia     Kidney stone     MVP (mitral valve prolapse)     last 2 echocardiograms showed no MVP in 2018 and 2019     Nausea & vomiting     Osteopenia 2016    2017, DEXA scan: Severe osteopenia in both femoral necks, T score= -2.1 in the right femoral neck, -2.3 in the left femoral neck.    Osteoporosis 2017    DEXA scan, T score L1= -2.5    PAF (paroxysmal atrial fibrillation)     Pituitary adenoma 2018    Thought to have a pituitary microadenoma on previous studies but MRI of the  "pituitary with and without contrast, February 10, 2017: \"No convincing evidence to suggest a pituitary adenoma on this MRI study\".  \"Incidentally noted relatively mild changes of chronic small vessel ischemia phenomena.\"    RBBB (right bundle branch block)     Rectal polyp 2016    Surgical menopause     ANGEL, BSO, for endometrial carcinoma.    Vaginal delivery     x1  NITO, (and 2 C-sections, one stillbirth).    Vertigo        Past Surgical History:   Procedure Laterality Date    CARDIAC ELECTROPHYSIOLOGY PROCEDURE N/A 2021    Procedure: Ablation atrial fibrillation cryo;  Surgeon: Francesco Riojas MD;  [facility];  Service: Cardiovascular;  Laterality: N/A;    CARPAL TUNNEL RELEASE WITH CUBITAL TUNNEL RELEASE  2018    right    CATARACT EXTRACTION Right 2016     SECTION      x2   one stillborn Ariadne Isbell    COLONOSCOPY  2007    polyps  Dr. Rueda     TOTAL ABDOMINAL HYSTERECTOMY WITH SALPINGO OOPHORECTOMY      Dr. Alejo       Social History     Socioeconomic History    Marital status:      Spouse name:     Number of children: 2   Tobacco Use    Smoking status: Never    Smokeless tobacco: Never   Vaping Use    Vaping status: Never Used   Substance and Sexual Activity    Alcohol use: No     Comment: caffeine use seldom    Drug use: No    Sexual activity: Never     Birth control/protection: Surgical     Comment:        Family History   Problem Relation Age of Onset    Heart disease Mother     Heart attack Mother 67    COPD Father     Parkinsonism Father         ?    Heart defect Sister          at 18    Alcohol abuse Brother     Diabetes Brother     Heart disease Brother     Hypertension Brother     Hyperlipidemia Brother     Other Daughter         stillborn    Sleep apnea Son     No Known Problems Maternal Grandmother     No Known Problems Paternal Grandmother     Breast cancer Maternal Aunt 75    No " "Known Problems Paternal Aunt     No Known Problems Maternal Grandfather     No Known Problems Paternal Grandfather     Kidney cancer Sister     No Known Problems Son     BRCA 1/2 Neg Hx     Colon cancer Neg Hx     Endometrial cancer Neg Hx     Ovarian cancer Neg Hx        Review of Systems   Constitutional: Negative.   Cardiovascular: Negative.    Respiratory: Negative.     Gastrointestinal: Negative.        Allergies   Allergen Reactions    Naproxen Hives and Swelling    Paroxetine Other (See Comments)     TREMORS         Current Outpatient Medications:     amLODIPine (NORVASC) 5 MG tablet, TAKE 1 TABLET BY MOUTH DAILY, Disp: 90 tablet, Rfl: 1    atorvastatin (LIPITOR) 40 MG tablet, TAKE 1 TABLET DAILY, Disp: 90 tablet, Rfl: 1    fluticasone (FLONASE) 50 MCG/ACT nasal spray, Administer 2 sprays into the nostril(s) as directed by provider Daily., Disp: 18.2 g, Rfl: 2    lisinopril (PRINIVIL,ZESTRIL) 20 MG tablet, TAKE 1 TABLET DAILY, Disp: 90 tablet, Rfl: 1    metoprolol tartrate (LOPRESSOR) 50 MG tablet, TAKE ONE TABLET BY MOUTH TWICE A DAY, Disp: 180 tablet, Rfl: 3    Multiple Vitamin (MULTIVITAMIN) capsule, Take  by mouth., Disp: , Rfl:     warfarin (COUMADIN) 2 MG tablet, Take 1 tablet (2 mg) by mouth daily or as directed, Disp: 90 tablet, Rfl: 1      Objective:     Vitals:    04/15/25 1140   BP: 118/80   BP Location: Left arm   Patient Position: Sitting   Pulse: 61   Weight: 75.8 kg (167 lb)   Height: 154.9 cm (61\")     Body mass index is 31.55 kg/m².    PHYSICAL EXAM:    Vitals and nursing note reviewed.   Constitutional:       General: Not in acute distress.  Pulmonary:      Effort: Pulmonary effort is normal. No respiratory distress.   Cardiovascular:      Normal rate. Regular rhythm.   Skin:     General: Skin is warm and dry.   Neurological:      Mental Status: Alert and oriented to person, place, and time.   Psychiatric:         Behavior: Behavior normal.         Thought Content: Thought content normal.    "      Judgment: Judgment normal.             ECG 12 Lead    Date/Time: 4/15/2025 3:31 PM  Performed by: Francesco Riojas MD    Authorized by: Francesco Riojas MD  Comparison: compared with previous ECG from 10/10/2024  Similar to previous ECG  Rhythm: sinus rhythm            Assessment:       Diagnosis Plan   1. PAF (paroxysmal atrial fibrillation)               Plan:       No current symptoms of atrial fibrillation.  Will remain on warfarin due to elevated [id number] score.      Dizziness seems more related to vertigo than arrhythmia    Follow-up in 1 year    As always, it has been a pleasure to participate in your patient's care.      Sincerely,         Francesco Riojas MD

## 2025-04-18 ENCOUNTER — ANTICOAGULATION VISIT (OUTPATIENT)
Dept: PHARMACY | Facility: HOSPITAL | Age: 75
End: 2025-04-18
Payer: MEDICARE

## 2025-04-18 DIAGNOSIS — I48.0 PAF (PAROXYSMAL ATRIAL FIBRILLATION): Primary | Chronic | ICD-10-CM

## 2025-04-18 LAB
INR PPP: 5.9 (ref 0.91–1.09)
PROTHROMBIN TIME: 71.2 SECONDS (ref 10–13.8)

## 2025-04-18 PROCEDURE — G0463 HOSPITAL OUTPT CLINIC VISIT: HCPCS

## 2025-04-18 PROCEDURE — 85610 PROTHROMBIN TIME: CPT

## 2025-04-18 NOTE — PROGRESS NOTES
Anticoagulation Clinic Progress Note    Anticoagulation Summary  As of 2025      INR goal:  2.0-3.0   TTR:  52.1% (4.2 y)   INR used for dosin.9 (2025)   Warfarin maintenance plan:  1 mg every Mon, Fri; 2 mg all other days   Weekly warfarin total:  12 mg   Plan last modified:  Octavio Garnett, PharmD (2025)   Next INR check:  2025   Priority:  High   Target end date:  --    Indications    PAF (paroxysmal atrial fibrillation) [I48.0]                 Anticoagulation Episode Summary       INR check location:  --    Preferred lab:  --    Send INR reminders to:   [facility]    Comments:  Previously apixaban (cost)          Anticoagulation Care Providers       Provider Role Specialty Phone number    Francesco Riojas MD Referring Cardiology [phone]            Clinic Interview:  Patient Findings     Positives:  Extra doses    Negatives:  Signs/symptoms of thrombosis, Signs/symptoms of bleeding,   Laboratory test error suspected, Change in health, Change in alcohol use,   Change in activity, Upcoming invasive procedure, Emergency department   visit, Upcoming dental procedure, Missed doses, Change in medications,   Change in diet/appetite, Hospital admission, Bruising, Other complaints    Comments:  Patient has been taking 2 mg instead of instructed 1 mg on   .      Clinical Outcomes     Negatives:  Major bleeding event, Thromboembolic event,   Anticoagulation-related hospital admission, Anticoagulation-related ED   visit, Anticoagulation-related fatality    Comments:  Patient has been taking 2 mg instead of instructed 1 mg on   .        INR History:      1/10/2025    11:15 AM 2025     8:15 AM 2025     8:30 AM 2025     8:45 AM 3/14/2025     8:45 AM 3/21/2025     8:45 AM 2025     8:30 AM   Anticoagulation Monitoring   INR 1.5 3.8 3.0 1.6 4.3 3.0 5.9   INR Date 1/10/2025 2025 2025 2025 3/14/2025 3/21/2025 2025   INR Goal  2.0-3.0 2.0-3.0 2.0-3.0 2.0-3.0 2.0-3.0 2.0-3.0 2.0-3.0   Trend Up Same Same Same Same Down Down   Last Week Total 13 mg 14 mg 14 mg 12 mg 14 mg 12 mg 14 mg   Next Week Total 15 mg 11 mg 14 mg 18 mg 12 mg 13 mg 10 mg   Sun 2 mg 1 mg (2/2); Otherwise 2 mg 2 mg 2 mg 2 mg 2 mg 2 mg   Mon 2 mg 2 mg 2 mg 2 mg 2 mg 1 mg 1 mg   Tue 2 mg 2 mg 2 mg 2 mg 2 mg 2 mg 2 mg   Wed 2 mg 2 mg 2 mg 2 mg 2 mg 2 mg 2 mg   Thu 2 mg 2 mg 2 mg 2 mg 2 mg 2 mg 2 mg   Fri 3 mg (1/10); Otherwise 2 mg Hold (1/31); Otherwise 2 mg 2 mg 6 mg (2/28); Otherwise 2 mg Hold (3/14) 2 mg Hold (4/18)   Sat 2 mg 2 mg 2 mg 2 mg 2 mg 2 mg 1 mg (4/19)   Visit Report   Report           Plan:  1. INR is Supratherapeutic today- see above in Anticoagulation Summary.  Will instruct Ariadne Telles to Change their warfarin regimen- see above in Anticoagulation Summary.  2. Discussed ways to control bleeding for minor cuts and scrapes. Emphasized the importance of going to the emergency room if any of the following occur: Falling and hitting your head; noticing bright red blood in urine or dark/tarry stools or excessive unexplained bruising.  3. Follow up in 1 week  4. Patient declines warfarin refills.  5. Verbal and written information provided. Patient expresses understanding and has no further questions at this time.    Brenda Lerner [facility]

## 2025-04-25 ENCOUNTER — ANTICOAGULATION VISIT (OUTPATIENT)
Dept: PHARMACY | Facility: HOSPITAL | Age: 75
End: 2025-04-25
Payer: MEDICARE

## 2025-04-25 DIAGNOSIS — I48.0 PAF (PAROXYSMAL ATRIAL FIBRILLATION): Primary | Chronic | ICD-10-CM

## 2025-04-25 LAB
INR PPP: 2.8 (ref 0.91–1.09)
PROTHROMBIN TIME: 34.1 SECONDS (ref 10–13.8)

## 2025-04-25 PROCEDURE — G0463 HOSPITAL OUTPT CLINIC VISIT: HCPCS

## 2025-04-25 PROCEDURE — 85610 PROTHROMBIN TIME: CPT

## 2025-04-25 RX ORDER — WARFARIN SODIUM 2 MG/1
TABLET ORAL
Qty: 80 TABLET | Refills: 1 | Status: SHIPPED | OUTPATIENT
Start: 2025-04-25

## 2025-04-25 NOTE — PROGRESS NOTES
Anticoagulation Clinic Progress Note    Anticoagulation Summary  As of 2025      INR goal:  2.0-3.0   TTR:  51.9% (4.2 y)   INR used for dosin.8 (2025)   Warfarin maintenance plan:  1 mg every Mon, Fri; 2 mg all other days   Weekly warfarin total:  12 mg   No change documented:  Radha Carver RPH   Plan last modified:  Octavio Garnett, PharmD (2025)   Next INR check:  2025   Priority:  High   Target end date:  --    Indications    PAF (paroxysmal atrial fibrillation) [I48.0]                 Anticoagulation Episode Summary       INR check location:  --    Preferred lab:  --    [facility]    Comments:  Previously apixaban (cost)          Anticoagulation Care Providers       Provider Role Specialty Phone number    Francesco Riojas MD Referring Cardiology [phone]            Clinic Interview:  Patient Findings     Negatives:  Signs/symptoms of thrombosis, Signs/symptoms of bleeding,   Laboratory test error suspected, Change in health, Change in alcohol use,   Change in activity, Upcoming invasive procedure, Emergency department   visit, Upcoming dental procedure, Missed doses, Extra doses, Change in   medications, Change in diet/appetite, Hospital admission, Bruising, Other   complaints      Clinical Outcomes     Negatives:  Major bleeding event, Thromboembolic event,   Anticoagulation-related hospital admission, Anticoagulation-related ED   visit, Anticoagulation-related fatality        INR History:      2025     8:15 AM 2025     8:30 AM 2025     8:45 AM 3/14/2025     8:45 AM 3/21/2025     8:45 AM 2025     8:30 AM 2025     9:15 AM   Anticoagulation Monitoring   INR 3.8 3.0 1.6 4.3 3.0 5.9 2.8   INR Date 2025 2025 2025 3/14/2025 3/21/2025 2025 2025   INR Goal 2.0-3.0 2.0-3.0 2.0-3.0 2.0-3.0 2.0-3.0 2.0-3.0 2.0-3.0   Trend Same Same Same Same Down Down Same   Last Week Total 14 mg 14 mg 12 mg 14  mg 12 mg 14 mg 10 mg   Next Week Total 11 mg 14 mg 18 mg 12 mg 13 mg 10 mg 12 mg   Sun 1 mg (2/2); Otherwise 2 mg 2 mg 2 mg 2 mg 2 mg 2 mg 2 mg   Mon 2 mg 2 mg 2 mg 2 mg 1 mg 1 mg 1 mg   Tue 2 mg 2 mg 2 mg 2 mg 2 mg 2 mg 2 mg   Wed 2 mg 2 mg 2 mg 2 mg 2 mg 2 mg 2 mg   Thu 2 mg 2 mg 2 mg 2 mg 2 mg 2 mg 2 mg   Fri Hold (1/31); Otherwise 2 mg 2 mg 6 mg (2/28); Otherwise 2 mg Hold (3/14) 2 mg Hold (4/18) 1 mg   Sat 2 mg 2 mg 2 mg 2 mg 2 mg 1 mg (4/19) 2 mg   Visit Report  Report            Plan:  1. INR is therapeutic today- see above in Anticoagulation Summary.   Will instruct Ariadne Telles to continue their warfarin regimen- see above in Anticoagulation Summary.  2. Follow up in 2 weeks.  3. Patient declines warfarin refills.  4. Verbal and written information provided. Patient expresses understanding and has no further questions at this time.    Chet Pulido, Pharmacy Technician

## 2025-04-25 NOTE — PROGRESS NOTES
I have supervised and reviewed the notes, assessments, and/or procedures performed. I personally performed the assessment and implemented the plan. I concur with the documentation of this patient encounter.    Radha Carver, [facility]

## 2025-05-09 ENCOUNTER — ANTICOAGULATION VISIT (OUTPATIENT)
Dept: PHARMACY | Facility: HOSPITAL | Age: 75
End: 2025-05-09
Payer: MEDICARE

## 2025-05-09 DIAGNOSIS — I48.0 PAF (PAROXYSMAL ATRIAL FIBRILLATION): Primary | Chronic | ICD-10-CM

## 2025-05-09 LAB
INR PPP: 4.1 (ref 0.91–1.09)
PROTHROMBIN TIME: 49.2 SECONDS (ref 10–13.8)

## 2025-05-09 PROCEDURE — 85610 PROTHROMBIN TIME: CPT

## 2025-05-09 PROCEDURE — G0463 HOSPITAL OUTPT CLINIC VISIT: HCPCS

## 2025-05-09 NOTE — PROGRESS NOTES
Anticoagulation Clinic Progress Note    Anticoagulation Summary  As of 2025      INR goal:  2.0-3.0   TTR:  51.5% (4.2 y)   INR used for dosin.1 (2025)   Warfarin maintenance plan:  1 mg every Mon, Wed, Fri; 2 mg all other days   Weekly warfarin total:  11 mg   Plan last modified:  Radha Carver RPH (2025)   Next INR check:  2025   Priority:  High   Target end date:  --    Indications    PAF (paroxysmal atrial fibrillation) [I48.0]                 Anticoagulation Episode Summary       INR check location:  --    Preferred lab:  --    Send INR reminders to:   [facility]    Comments:  Previously apixaban (cost)          Anticoagulation Care Providers       Provider Role Specialty Phone number    Francesco Riojas MD Referring Cardiology [phone]            Clinic Interview:  Patient Findings     Negatives:  Signs/symptoms of thrombosis, Signs/symptoms of bleeding,   Laboratory test error suspected, Change in health, Change in alcohol use,   Change in activity, Upcoming invasive procedure, Emergency department   visit, Upcoming dental procedure, Missed doses, Extra doses, Change in   medications, Change in diet/appetite, Hospital admission, Bruising, Other   complaints      Clinical Outcomes     Negatives:  Major bleeding event, Thromboembolic event,   Anticoagulation-related hospital admission, Anticoagulation-related ED   visit, Anticoagulation-related fatality        INR History:      2025     8:30 AM 2025     8:45 AM 3/14/2025     8:45 AM 3/21/2025     8:45 AM 2025     8:30 AM 2025     9:15 AM 2025     8:45 AM   Anticoagulation Monitoring   INR 3.0 1.6 4.3 3.0 5.9 2.8 4.1   INR Date 2025 2025 3/14/2025 3/21/2025 2025 2025 2025   INR Goal 2.0-3.0 2.0-3.0 2.0-3.0 2.0-3.0 2.0-3.0 2.0-3.0 2.0-3.0   Trend Same Same Same Down Down Same Down   Last Week Total 14 mg 12 mg 14 mg 12 mg 14 mg 10 mg 12 mg   Next Week Total 14 mg  18 mg 12 mg 13 mg 10 mg 12 mg 10 mg   Sun 2 mg 2 mg 2 mg 2 mg 2 mg 2 mg 2 mg   Mon 2 mg 2 mg 2 mg 1 mg 1 mg 1 mg 1 mg   Tue 2 mg 2 mg 2 mg 2 mg 2 mg 2 mg 2 mg   Wed 2 mg 2 mg 2 mg 2 mg 2 mg 2 mg 1 mg   Thu 2 mg 2 mg 2 mg 2 mg 2 mg 2 mg 2 mg   Fri 2 mg 6 mg (2/28); Otherwise 2 mg Hold (3/14) 2 mg Hold (4/18) 1 mg Hold (5/9)   Sat 2 mg 2 mg 2 mg 2 mg 1 mg (4/19) 2 mg 2 mg   Visit Report Report             Plan:  1. INR is Supratherapeutic today- see above in Anticoagulation Summary.   Will instruct Ariadne CASSIDY Nedaholger to Change their warfarin regimen- see above in Anticoagulation Summary.  2. Follow up in 1 week  3. They have been instructed to call if any changes in medications, doses, concerns, etc. Patient expresses understanding and has no further questions at this time.    Tiffanie Smith, Pharmacy Intern

## 2025-05-09 NOTE — PROGRESS NOTES
I have supervised and reviewed the notes, assessments, and/or procedures performed. The documented assessment and plan were developed cooperatively, and the plan was implemented in my presence. I concur with the documentation of this patient encounter.    Radha Carver, [facility]

## 2025-05-16 ENCOUNTER — ANTICOAGULATION VISIT (OUTPATIENT)
Dept: PHARMACY | Facility: HOSPITAL | Age: 75
End: 2025-05-16
Payer: MEDICARE

## 2025-05-16 DIAGNOSIS — I48.0 PAF (PAROXYSMAL ATRIAL FIBRILLATION): Primary | Chronic | ICD-10-CM

## 2025-05-16 LAB
INR PPP: 4.4 (ref 0.91–1.09)
PROTHROMBIN TIME: 52.8 SECONDS (ref 10–13.8)

## 2025-05-16 PROCEDURE — G0463 HOSPITAL OUTPT CLINIC VISIT: HCPCS

## 2025-05-16 PROCEDURE — 85610 PROTHROMBIN TIME: CPT

## 2025-05-16 NOTE — PROGRESS NOTES
Anticoagulation Clinic Progress Note    Anticoagulation Summary  As of 2025      INR goal:  2.0-3.0   TTR:  51.3% (4.2 y)   INR used for dosin.4 (2025)   Warfarin maintenance plan:  2 mg every Sun, Tue, Thu; 1 mg all other days   Weekly warfarin total:  10 mg   Plan last modified:  Linsey Stafford RPH (2025)   Next INR check:  2025   Priority:  High   Target end date:  --    Indications    PAF (paroxysmal atrial fibrillation) [I48.0]                 Anticoagulation Episode Summary       INR check location:  --    Preferred lab:  --    Send [facility]ers [facility]:   [facility]    Comments:  Previously apixaban (cost)          Anticoagulation Care Providers       Provider Role Specialty Phone number    Francesco Riojas MD Referring Cardiology [phone]            Clinic Interview:  Patient Findings     Negatives:  Signs/symptoms of thrombosis, Signs/symptoms of bleeding,   Laboratory test error suspected, Change in health, Change in alcohol use,   Change in activity, Upcoming invasive procedure, Emergency department   visit, Upcoming dental procedure, Missed doses, Extra doses, Change in   medications, Change in diet/appetite, Hospital admission, Bruising, Other   complaints    Comments:  Reports no changes and taking correct regimen      Clinical Outcomes     Negatives:  Major bleeding event, Thromboembolic event,   Anticoagulation-related hospital admission, Anticoagulation-related ED   visit, Anticoagulation-related fatality    Comments:  Reports no changes and taking correct regimen        INR History:      2025     8:45 AM 3/14/2025     8:45 AM 3/21/2025     8:45 AM 2025     8:30 AM 2025     9:15 AM 2025     8:45 AM 2025     9:45 AM   Anticoagulation Monitoring   INR 1.6 4.3 3.0 5.9 2.8 4.1 4.4   INR Date 2025 3/14/2025 3/21/2025 2025 2025 2025 2025   INR Goal 2.0-3.0 2.0-3.0 2.0-3.0 2.0-3.0 2.0-3.0 2.0-3.0 2.0-3.0    Trend Same Same Down Down Same Down Down   Last Week Total 12 mg 14 mg 12 mg 14 mg 10 mg 12 mg 10 mg   Next Week Total 18 mg 12 mg 13 mg 10 mg 12 mg 10 mg 9 mg   Sun 2 mg 2 mg 2 mg 2 mg 2 mg 2 mg 2 mg   Mon 2 mg 2 mg 1 mg 1 mg 1 mg 1 mg 1 mg   Tue 2 mg 2 mg 2 mg 2 mg 2 mg 2 mg 2 mg   Wed 2 mg 2 mg 2 mg 2 mg 2 mg 1 mg 1 mg   Thu 2 mg 2 mg 2 mg 2 mg 2 mg 2 mg 2 mg   Fri 6 mg (2/28); Otherwise 2 mg Hold (3/14) 2 mg Hold (4/18) 1 mg Hold (5/9) Hold (5/16)   Sat 2 mg 2 mg 2 mg 1 mg (4/19) 2 mg 2 mg 1 mg       Plan:  1. INR is Supratherapeutic today- see above in Anticoagulation Summary.  Will instruct Ariadne Telles to hold today's (5/16) dose and decrease Saturday's (5/17) dose to 1 mg then continue their warfarin regimen- see above in Anticoagulation Summary.  2. Follow up in 1 week  3. Patient declines warfarin refills.  4. Verbal and written information provided. Patient expresses understanding and has no further questions at this time.    Abdoul Aquino [facility]

## 2025-05-23 ENCOUNTER — ANTICOAGULATION VISIT (OUTPATIENT)
Dept: PHARMACY | Facility: HOSPITAL | Age: 75
End: 2025-05-23
Payer: MEDICARE

## 2025-05-23 DIAGNOSIS — I48.0 PAF (PAROXYSMAL ATRIAL FIBRILLATION): Primary | Chronic | ICD-10-CM

## 2025-05-23 LAB
INR PPP: 2.7 (ref 0.91–1.09)
PROTHROMBIN TIME: 32.6 SECONDS (ref 10–13.8)

## 2025-05-23 PROCEDURE — G0463 HOSPITAL OUTPT CLINIC VISIT: HCPCS

## 2025-05-23 PROCEDURE — 85610 PROTHROMBIN TIME: CPT

## 2025-05-23 NOTE — PROGRESS NOTES
Anticoagulation Clinic Progress Note    Anticoagulation Summary  As of 2025      INR goal:  2.0-3.0   TTR:  51.1% (4.2 y)   INR used for dosin.7 (2025)   Warfarin maintenance plan:  2 mg every Sun, Tue, u; 1 mg all other days   Weekly warfarin total:  10 mg   No change documented:  Sharon Britt   Plan last modified:  Linsey Stafford RPH (2025)   Next INR check:  2025   Priority:  High   Target end date:  --    Indications    PAF (paroxysmal atrial fibrillation) [I48.0]                 Anticoagulation Episode Summary       INR check location:  --    Preferred lab:  --    Send INR reminders to:   [facility] [place]    Comments:  Previously apixaban (cost)          Anticoagulation Care Providers       Provider Role Specialty Phone number    Francesco Riojas MD Referring Cardiology [phone]            Clinic Interview:  Patient Findings     Negatives:  Signs/symptoms of thrombosis, Signs/symptoms of bleeding,   Laboratory test error suspected, Change in health, Change in alcohol use,   Change in activity, Upcoming invasive procedure, Emergency department   visit, Upcoming dental procedure, Missed doses, Extra doses, Change in   medications, Change in diet/appetite, Hospital admission, Bruising, Other   complaints    Comments:  Pt reports no changes      Clinical Outcomes     Negatives:  Major bleeding event, Thromboembolic event,   Anticoagulation-related hospital admission, Anticoagulation-related ED   visit, Anticoagulation-related fatality    Comments:  Pt reports no changes        INR History:      3/14/2025     8:45 AM 3/21/2025     8:45 AM 2025     8:30 AM 2025     9:15 AM 2025     8:45 AM 2025     9:45 AM 2025     9:00 AM   Anticoagulation Monitoring   INR 4.3 3.0 5.9 2.8 4.1 4.4 2.7   INR Date 3/14/2025 3/21/2025 2025 2025 2025 2025 2025   INR Goal 2.0-3.0 2.0-3.0 2.0-3.0 2.0-3.0 2.0-3.0 2.0-3.0 2.0-3.0   Trend Same  Down Down Same Down Down Same   Last Week Total 14 mg 12 mg 14 mg 10 mg 12 mg 10 mg 9 mg   Next Week Total 12 mg 13 mg 10 mg 12 mg 10 mg 9 mg 10 mg   Sun 2 mg 2 mg 2 mg 2 mg 2 mg 2 mg 2 mg   Mon 2 mg 1 mg 1 mg 1 mg 1 mg 1 mg 1 mg   Tue 2 mg 2 mg 2 mg 2 mg 2 mg 2 mg 2 mg   Wed 2 mg 2 mg 2 mg 2 mg 1 mg 1 mg 1 mg   Thu 2 mg 2 mg 2 mg 2 mg 2 mg 2 mg 2 mg   Fri Hold (3/14) 2 mg Hold (4/18) 1 mg Hold (5/9) Hold (5/16) 1 mg   Sat 2 mg 2 mg 1 mg (4/19) 2 mg 2 mg 1 mg 1 mg       Plan:  1. INR is Therapeutic today- see above in Anticoagulation Summary.  Will instruct Ariadne Telles to Continue their warfarin regimen- see above in Anticoagulation Summary.  2. Follow up in 2 weeks  3. Patient declines warfarin refills.  4. Verbal and written information provided. Patient expresses understanding and has no further questions at this time.    Sharon Britt

## 2025-06-13 ENCOUNTER — ANTICOAGULATION VISIT (OUTPATIENT)
Dept: PHARMACY | Facility: HOSPITAL | Age: 75
End: 2025-06-13
Payer: MEDICARE

## 2025-06-13 DIAGNOSIS — I48.0 PAF (PAROXYSMAL ATRIAL FIBRILLATION): Primary | Chronic | ICD-10-CM

## 2025-06-13 LAB
INR PPP: 2.7 (ref 0.91–1.09)
PROTHROMBIN TIME: 32.7 SECONDS (ref 10–13.8)

## 2025-06-13 PROCEDURE — G0463 HOSPITAL OUTPT CLINIC VISIT: HCPCS

## 2025-06-13 PROCEDURE — 85610 PROTHROMBIN TIME: CPT

## 2025-06-13 NOTE — PROGRESS NOTES
Anticoagulation Clinic Progress Note    Anticoagulation Summary  As of 2025      INR goal:  2.0-3.0   TTR:  51.8% (4.3 y)   INR used for dosin.7 (2025)   Warfarin maintenance plan:  2 mg every Sun, Tue, u; 1 mg all other days   Weekly warfarin total:  10 mg   No change documented:  Sharon Britt   Plan last modified:  Linsey Stafford RPH (2025)   Next INR check:  2025   Priority:  High   Target end date:  --    Indications    PAF (paroxysmal atrial fibrillation) [I48.0]                 Anticoagulation Episode Summary       INR check location:  --    Preferred lab:  --    Send INR reminders to:   [facility] [place]    Comments:  Previously apixaban (cost)          Anticoagulation Care Providers       Provider Role Specialty Phone number    Francesco Riojas MD Referring Cardiology [phone]            Clinic Interview:  Patient Findings     Negatives:  Signs/symptoms of thrombosis, Signs/symptoms of bleeding,   Laboratory test error suspected, Change in health, Change in alcohol use,   Change in activity, Upcoming invasive procedure, Emergency department   visit, Upcoming dental procedure, Missed doses, Extra doses, Change in   medications, Change in diet/appetite, Hospital admission, Bruising, Other   complaints    Comments:  Pt reports no changes      Clinical Outcomes     Negatives:  Major bleeding event, Thromboembolic event,   Anticoagulation-related hospital admission, Anticoagulation-related ED   visit, Anticoagulation-related fatality    Comments:  Pt reports no changes        INR History:      3/21/2025     8:45 AM 2025     8:30 AM 2025     9:15 AM 2025     8:45 AM 2025     9:45 AM 2025     9:00 AM 2025     8:15 AM   Anticoagulation Monitoring   INR 3.0 5.9 2.8 4.1 4.4 2.7 2.7   INR Date 3/21/2025 2025 2025 2025 2025 2025 2025   INR Goal 2.0-3.0 2.0-3.0 2.0-3.0 2.0-3.0 2.0-3.0 2.0-3.0 2.0-3.0   Trend Down  Down Same Down Down Same Same   Last Week Total 12 mg 14 mg 10 mg 12 mg 10 mg 9 mg 10 mg   Next Week Total 13 mg 10 mg 12 mg 10 mg 9 mg 10 mg 10 mg   Sun 2 mg 2 mg 2 mg 2 mg 2 mg 2 mg 2 mg   Mon 1 mg 1 mg 1 mg 1 mg 1 mg 1 mg 1 mg   Tue 2 mg 2 mg 2 mg 2 mg 2 mg 2 mg 2 mg   Wed 2 mg 2 mg 2 mg 1 mg 1 mg 1 mg 1 mg   Thu 2 mg 2 mg 2 mg 2 mg 2 mg 2 mg 2 mg   Fri 2 mg Hold (4/18) 1 mg Hold (5/9) Hold (5/16) 1 mg 1 mg   Sat 2 mg 1 mg (4/19) 2 mg 2 mg 1 mg 1 mg 1 mg       Plan:  1. INR is Therapeutic today- see above in Anticoagulation Summary.  Will instruct Ariadne Telles to Continue their warfarin regimen- see above in Anticoagulation Summary.  2. Follow up in 4 weeks  3. Patient declines warfarin refills.  4. Verbal and written information provided. Patient expresses understanding and has no further questions at this time.    Sharon Britt

## 2025-07-11 ENCOUNTER — ANTICOAGULATION VISIT (OUTPATIENT)
Dept: PHARMACY | Facility: HOSPITAL | Age: 75
End: 2025-07-11
Payer: MEDICARE

## 2025-07-11 DIAGNOSIS — I48.0 PAF (PAROXYSMAL ATRIAL FIBRILLATION): Primary | Chronic | ICD-10-CM

## 2025-07-11 LAB
INR PPP: 1.4 (ref 0.91–1.09)
PROTHROMBIN TIME: 16.5 SECONDS (ref 10–13.8)

## 2025-07-11 PROCEDURE — 85610 PROTHROMBIN TIME: CPT

## 2025-07-11 PROCEDURE — G0463 HOSPITAL OUTPT CLINIC VISIT: HCPCS

## 2025-07-11 NOTE — PROGRESS NOTES
Anticoagulation Clinic Progress Note    Anticoagulation Summary  As of 2025      INR goal:  2.0-3.0   TTR:  51.8% (4.4 y)   INR used for dosin.4 (2025)   Warfarin maintenance plan:  2 mg every Sun, Tue, Thu; 1 mg all other days   Weekly warfarin total:  10 mg   Plan last modified:  Linsey Stafford RPH (2025)   Next INR check:  2025   Priority:  High   Target end date:  --    Indications    PAF (paroxysmal atrial fibrillation) [I48.0]                 Anticoagulation Episode Summary       INR check location:  --    Preferred lab:  --    [facility]    Comments:  Previously apixaban (cost)          Anticoagulation Care Providers       Provider Role Specialty Phone number    Francesco Riojas MD Referring Cardiology [phone]            Clinic Interview:  Patient Findings     Negatives:  Signs/symptoms of thrombosis, Signs/symptoms of bleeding,   Laboratory test error suspected, Change in health, Change in alcohol use,   Change in activity, Upcoming invasive procedure, Emergency department   visit, Upcoming dental procedure, Missed doses, Extra doses, Change in   medications, Change in diet/appetite, Hospital admission, Bruising, Other   complaints      Clinical Outcomes     Negatives:  Major bleeding event, Thromboembolic event,   Anticoagulation-related hospital admission, Anticoagulation-related ED   visit, Anticoagulation-related fatality        INR History:      2025     8:30 AM 2025     9:15 AM 2025     8:45 AM 2025     9:45 AM 2025     9:00 AM 2025     8:15 AM 2025     8:15 AM   Anticoagulation Monitoring   INR 5.9 2.8 4.1 4.4 2.7 2.7 1.4   INR Date 2025   INR Goal 2.0-3.0 2.0-3.0 2.0-3.0 2.0-3.0 2.0-3.0 2.0-3.0 2.0-3.0   Trend Down Same Down Down Same Same Same   Last Week Total 14 mg 10 mg 12 mg 10 mg 9 mg 10 mg 10 mg   Next Week Total 10 mg  12 mg 10 mg 9 mg 10 mg 10 mg 11 mg   Sun 2 mg 2 mg 2 mg 2 mg 2 mg 2 mg 2 mg   Mon 1 mg 1 mg 1 mg 1 mg 1 mg 1 mg 1 mg   Tue 2 mg 2 mg 2 mg 2 mg 2 mg 2 mg 2 mg   Wed 2 mg 2 mg 1 mg 1 mg 1 mg 1 mg 1 mg   Thu 2 mg 2 mg 2 mg 2 mg 2 mg 2 mg 2 mg   Fri Hold (4/18) 1 mg Hold (5/9) Hold (5/16) 1 mg 1 mg 2 mg (7/11)   Sat 1 mg (4/19) 2 mg 2 mg 1 mg 1 mg 1 mg 1 mg       Plan:  1. INR is Subtherapeutic today- see above in Anticoagulation Summary.  Will instruct Ariadne Telles to Increase their warfarin regimen- see above in Anticoagulation Summary.  Reports she has not been eating much due to renovation on her kitchen. She denies missed doses. Boost to 2 mg then resume, rck 1 week   2. Follow up in 1 week  3. Patient declines warfarin refills.  4. Verbal and written information provided. Patient expresses understanding and has no further questions at this time.    Radha Carver, [facility]

## 2025-07-24 ENCOUNTER — OFFICE VISIT (OUTPATIENT)
Dept: FAMILY MEDICINE CLINIC | Facility: CLINIC | Age: 75
End: 2025-07-24
Payer: MEDICARE

## 2025-07-24 VITALS
BODY MASS INDEX: 30.89 KG/M2 | TEMPERATURE: 99.1 F | OXYGEN SATURATION: 96 % | WEIGHT: 163.6 LBS | SYSTOLIC BLOOD PRESSURE: 138 MMHG | DIASTOLIC BLOOD PRESSURE: 78 MMHG | RESPIRATION RATE: 18 BRPM | HEIGHT: 61 IN | HEART RATE: 66 BPM

## 2025-07-24 DIAGNOSIS — R79.9 ABNORMAL FINDING OF BLOOD CHEMISTRY, UNSPECIFIED: ICD-10-CM

## 2025-07-24 DIAGNOSIS — E78.00 HYPERCHOLESTEROLEMIA: Chronic | ICD-10-CM

## 2025-07-24 DIAGNOSIS — I48.0 PAF (PAROXYSMAL ATRIAL FIBRILLATION): Chronic | ICD-10-CM

## 2025-07-24 DIAGNOSIS — I10 ESSENTIAL HYPERTENSION: Primary | Chronic | ICD-10-CM

## 2025-07-24 DIAGNOSIS — M81.0 OSTEOPOROSIS, UNSPECIFIED OSTEOPOROSIS TYPE, UNSPECIFIED PATHOLOGICAL FRACTURE PRESENCE: Chronic | ICD-10-CM

## 2025-07-24 PROCEDURE — 3075F SYST BP GE 130 - 139MM HG: CPT | Performed by: FAMILY MEDICINE

## 2025-07-24 PROCEDURE — G2211 COMPLEX E/M VISIT ADD ON: HCPCS | Performed by: FAMILY MEDICINE

## 2025-07-24 PROCEDURE — 3044F HG A1C LEVEL LT 7.0%: CPT | Performed by: FAMILY MEDICINE

## 2025-07-24 PROCEDURE — 99214 OFFICE O/P EST MOD 30 MIN: CPT | Performed by: FAMILY MEDICINE

## 2025-07-24 PROCEDURE — 1125F AMNT PAIN NOTED PAIN PRSNT: CPT | Performed by: FAMILY MEDICINE

## 2025-07-24 PROCEDURE — 3078F DIAST BP <80 MM HG: CPT | Performed by: FAMILY MEDICINE

## 2025-07-24 NOTE — PROGRESS NOTES
"Chief Complaint  Hypertension and Hyperlipidemia    Subjective        Ariadne Telles presents to [facility]  History of Present Illness    Follow-up hypertension hyperlipidemia.  Doing well.  Blood pressure minimally elevated today.  No cardiovascular or neurovascular symptoms.  She does have intermittent vertigo which has not been giving her too much trouble.  She has had some leg cramps at nighttime.  Otherwise doing well.  History of osteoporosis.  We stopped her alendronate in recent months because of 9 years of use.  DEXA scan coming due.  No evidence of diabetes.    Objective   Vital Signs:  /78 (BP Location: Right arm, Patient Position: Sitting, Cuff Size: Adult)   Pulse 66   Temp 99.1 °F (37.3 °C) (Oral)   Resp 18   Ht 154.9 cm (61\")   Wt 74.2 kg (163 lb 9.6 oz)   SpO2 96%   BMI 30.91 kg/m²   Estimated body mass index is 30.91 kg/m² as calculated from the following:    Height as of this encounter: 154.9 cm (61\").    Weight as of this encounter: 74.2 kg (163 lb 9.6 oz).            Physical Exam  Vitals and nursing note reviewed.   Constitutional:       General: She is not in acute distress.     Appearance: She is well-developed.   Cardiovascular:      Rate and Rhythm: Normal rate and regular rhythm.      Heart sounds: Normal heart sounds.   Pulmonary:      Effort: Pulmonary effort is normal.      Breath sounds: Normal breath sounds.   Skin:     General: Skin is warm and dry.   Psychiatric:         Mood and Affect: Mood normal.        Result Review :                Assessment and Plan   Diagnoses and all orders for this visit:    1. Essential hypertension (Primary)  -     DEXA Bone Density Axial  -     Hemoglobin A1c; Future  -     CBC & Differential; Future  -     Comprehensive Metabolic Panel; Future  -     Lipid Panel; Future    2. Hypercholesterolemia  -     DEXA Bone Density Axial  -     Hemoglobin A1c; Future  -     CBC & Differential; Future  -     " Comprehensive Metabolic Panel; Future  -     Lipid Panel; Future    3. PAF (paroxysmal atrial fibrillation)    4. Osteoporosis, unspecified osteoporosis type, unspecified pathological fracture presence  -     DEXA Bone Density Axial    5. Abnormal finding of blood chemistry, unspecified  -     Hemoglobin A1c; Future    Hypertension.  Overall well-controlled.  Continue amlodipine and lisinopril and metoprolol.    Hyperlipidemia.  Continue atorvastatin.    Paroxysmal atrial fibrillation.  Appears to be in sinus rhythm today clinically.  Continue metoprolol and warfarin.  Discussed benefits versus risks of anticoagulation for stroke prevention in atrial fibrillation with risk factors.  The warfarin is managed by her cardiologist.    Osteoporosis.  Alendronate was stopped a few months ago after 9 years of use.  Checking DEXA scan soon.  See me 6 months Medicare wellness visit.         Follow Up   Return in about 6 months (around 1/24/2026) for Subsequent Medicare Wellness Visit, Lab Visit One Week Prior to Next Appointment.  Patient was given instructions and counseling regarding her condition or for health maintenance advice. Please see specific information pulled into the AVS if appropriate.     Patient has been erroneously marked as diabetic. Based on the available clinical information, she does not have diabetes and should therefore be excluded from diabetic health maintenance and quality measures for the remainder of the reporting period.

## 2025-07-25 ENCOUNTER — ANTICOAGULATION VISIT (OUTPATIENT)
Dept: PHARMACY | Facility: HOSPITAL | Age: 75
End: 2025-07-25
Payer: MEDICARE

## 2025-07-25 DIAGNOSIS — I48.0 PAF (PAROXYSMAL ATRIAL FIBRILLATION): Primary | Chronic | ICD-10-CM

## 2025-07-25 LAB
INR PPP: 1.6 (ref 0.91–1.09)
PROTHROMBIN TIME: 18.7 SECONDS (ref 10–13.8)

## 2025-07-25 PROCEDURE — G0463 HOSPITAL OUTPT CLINIC VISIT: HCPCS

## 2025-07-25 PROCEDURE — 85610 PROTHROMBIN TIME: CPT

## 2025-07-25 NOTE — PROGRESS NOTES
Anticoagulation Clinic Progress Note    Anticoagulation Summary  As of 2025      INR goal:  2.0-3.0   TTR:  51.4% (4.4 y)   INR used for dosin.6 (2025)   Warfarin maintenance plan:  1 mg every Mon, Wed, Fri; 2 mg all other days   Weekly warfarin total:  11 mg   Plan last modified:  Radha Carver RPH (2025)   Next INR check:  2025   Priority:  High   Target end date:  --    Indications    PAF (paroxysmal atrial fibrillation) [I48.0]                 Anticoagulation Episode Summary       INR check location:  --    Preferred lab:  --    Send INR reminders to:   [facility]    Comments:  Previously apixaban (cost)          Anticoagulation Care Providers       Provider Role Specialty Phone number    Francesco Riojas MD Referring Cardiology [phone]            Clinic Interview:  Patient Findings     Negatives:  Signs/symptoms of thrombosis, Signs/symptoms of bleeding,   Laboratory test error suspected, Change in health, Change in alcohol use,   Change in activity, Upcoming invasive procedure, Emergency department   visit, Upcoming dental procedure, Missed doses, Extra doses, Change in   medications, Change in diet/appetite, Hospital admission, Bruising, Other   complaints      Clinical Outcomes     Negatives:  Major bleeding event, Thromboembolic event,   Anticoagulation-related hospital admission, Anticoagulation-related ED   visit, Anticoagulation-related fatality        INR History:      2025     9:15 AM 2025     8:45 AM 2025     9:45 AM 2025     9:00 AM 2025     8:15 AM 2025     8:15 AM 2025     8:15 AM   Anticoagulation Monitoring   INR 2.8 4.1 4.4 2.7 2.7 1.4 1.6   INR Date 2025   INR Goal 2.0-3.0 2.0-3.0 2.0-3.0 2.0-3.0 2.0-3.0 2.0-3.0 2.0-3.0   Trend Same Down Down Same Same Same Up   Last Week Total 10 mg 12 mg 10 mg 9 mg 10 mg 10 mg 10 mg   Next Week Total 12 mg 10  mg 9 mg 10 mg 10 mg 11 mg 12 mg   Sun 2 mg 2 mg 2 mg 2 mg 2 mg 2 mg 2 mg   Mon 1 mg 1 mg 1 mg 1 mg 1 mg 1 mg 1 mg   Tue 2 mg 2 mg 2 mg 2 mg 2 mg 2 mg 2 mg   Wed 2 mg 1 mg 1 mg 1 mg 1 mg 1 mg 1 mg   Thu 2 mg 2 mg 2 mg 2 mg 2 mg 2 mg 2 mg   Fri 1 mg Hold (5/9) Hold (5/16) 1 mg 1 mg 2 mg (7/11) 2 mg (7/25); Otherwise 1 mg   Sat 2 mg 2 mg 1 mg 1 mg 1 mg 1 mg 2 mg       Plan:  1. INR is Subtherapeutic today- see above in Anticoagulation Summary.  Will instruct Ariadne Telles to Increase their warfarin regimen- see above in Anticoagulation Summary.  Boost to 2 mg then trial on 1 mg MWF, 2 mg AOKAYLEEN, rck 2 weeks   2. Follow up in 2 weeks  3. Patient declines warfarin refills.  4. Verbal and written information provided. Patient expresses understanding and has no further questions at this time.    Radha Carver, [facility]

## 2025-07-28 ENCOUNTER — HOSPITAL ENCOUNTER (EMERGENCY)
Facility: HOSPITAL | Age: 75
Discharge: HOME | End: 2025-07-28
Attending: EMERGENCY MEDICINE | Admitting: EMERGENCY MEDICINE
Payer: MEDICARE

## 2025-07-28 ENCOUNTER — TELEPHONE (OUTPATIENT)
Dept: FAMILY MEDICINE CLINIC | Facility: CLINIC | Age: 75
End: 2025-07-28

## 2025-07-28 ENCOUNTER — APPOINTMENT (OUTPATIENT)
Dept: GENERAL RADIOLOGY | Facility: HOSPITAL | Age: 75
End: 2025-07-28
Payer: MEDICARE

## 2025-07-28 VITALS
WEIGHT: 166 LBS | TEMPERATURE: 97.9 F | HEIGHT: 61 IN | SYSTOLIC BLOOD PRESSURE: 134 MMHG | DIASTOLIC BLOOD PRESSURE: 72 MMHG | RESPIRATION RATE: 18 BRPM | HEART RATE: 61 BPM | BODY MASS INDEX: 31.34 KG/M2 | OXYGEN SATURATION: 91 %

## 2025-07-28 DIAGNOSIS — M54.6 ACUTE LEFT-SIDED THORACIC BACK PAIN: Primary | ICD-10-CM

## 2025-07-28 LAB

## 2025-07-28 PROCEDURE — 81001 URINALYSIS AUTO W/SCOPE: CPT | Performed by: PHYSICIAN ASSISTANT

## 2025-07-28 PROCEDURE — 72072 X-RAY EXAM THORAC SPINE 3VWS: CPT

## 2025-07-28 PROCEDURE — 99283 EMERGENCY DEPT VISIT LOW MDM: CPT

## 2025-07-28 RX ORDER — LIDOCAINE 4 G/G
1 PATCH TOPICAL
Status: DISCONTINUED | OUTPATIENT
Start: 2025-07-28 | End: 2025-07-28 | Stop reason: HOSPADM

## 2025-07-28 RX ORDER — METHOCARBAMOL 750 MG/1
750 TABLET, FILM COATED ORAL ONCE
Status: COMPLETED | OUTPATIENT
Start: 2025-07-28 | End: 2025-07-28

## 2025-07-28 RX ORDER — METHOCARBAMOL 500 MG/1
500 TABLET, FILM COATED ORAL 3 TIMES DAILY
Qty: 21 TABLET | Refills: 0 | Status: SHIPPED | OUTPATIENT
Start: 2025-07-28

## 2025-07-28 RX ORDER — LIDOCAINE 50 MG/G
1 PATCH TOPICAL EVERY 24 HOURS
Qty: 5 EACH | Refills: 0 | Status: SHIPPED | OUTPATIENT
Start: 2025-07-28

## 2025-07-28 RX ORDER — METHYLPREDNISOLONE 4 MG/1
TABLET ORAL
Qty: 21 TABLET | Refills: 0 | Status: SHIPPED | OUTPATIENT
Start: 2025-07-28

## 2025-07-28 RX ADMIN — METHOCARBAMOL TABLETS 750 MG: 750 TABLET, COATED ORAL at 14:04

## 2025-07-28 RX ADMIN — LIDOCAINE 1 PATCH: 4 PATCH TOPICAL at 14:04

## 2025-07-28 NOTE — Clinical Note
[facility] EMERGENCY DEPARTMENT  [address] [facility] [address]  Phone: [phone]    Ariadne Telles was seen and treated in our emergency department on 7/28/2025.  She may return to work on 07/31/2025.         Thank you for choosing [facility].    Yisel Mullen PA

## 2025-07-28 NOTE — ED TRIAGE NOTES
Patient to ED per PV from home, ambulatory to triage, w/ reports of left-sided mid-back pain; unable to get relief w/ heat and over-the-counter medications. Patient took 2 time release Tylenol pills at approx 0600. Patient denies injury.

## 2025-07-28 NOTE — ED PROVIDER NOTES
MD ATTESTATION NOTE    The RYAN and I have discussed this patient's history, physical exam, and treatment plan.  I have reviewed the documentation and personally had a face to face interaction with the patient. I affirm the documentation and agree with the treatment and plan.  The attached note describes my personal findings.      I provided a substantive portion of the care of the patient.  I personally performed the physical exam in its entirety, and below are my findings.       Brief HPI: This patient is a 75-year-old female presenting to the emergency room today with acute left-sided mid back discomfort that began yesterday while at work.  She denies any propagating factors but does state that the pain is made worse with certain movements.  She has had this before but typically resolves with treatment with a heating pad however it did not resolve prompting her ED visit.  She denies chest pain, shortness of breath, nausea/vomiting, or fever/chills.      PHYSICAL EXAM  ED Triage Vitals [07/28/25 1253]   Temp Heart Rate Resp BP SpO2   97.9 °F (36.6 °C) 92 18 130/95 97 %      Temp src Heart Rate Source Patient Position BP Location FiO2 (%)   Oral Monitor Sitting Right arm --         GENERAL: Resting comfortably and in no acute distress, nontoxic in appearance  HENT: nares patent  EYES: no scleral icterus  CV: regular rhythm, normal rate, no M/R/G  RESPIRATORY: normal effort, lungs clear bilaterally  ABDOMEN: soft, nontender, no rebound or guarding  MUSCULOSKELETAL: no deformity, no edema  NEURO: alert, moves all extremities, follows commands  PSYCH:  calm, cooperative  SKIN: warm, dry    Vital signs and nursing notes reviewed.      Differential diagnosis includes but is not limited to musculoskeletal pain, thoracic spinous fracture, radiculopathic pain, urinary tract infection, or ureterolithiasis.      Plan: We will treat the patient's discomfort as we obtain thoracic spinous x-ray as well as a UA for evaluation.   Further plan to follow.      Thoracic x-ray independently interpreted by myself with my interpretation showing degeneration without evidence of acute fracture or malalignment.       Mariano Castro MD  07/28/25 3950

## 2025-07-28 NOTE — ED PROVIDER NOTES
EMERGENCY DEPARTMENT ENCOUNTER      PCP: Nick Ariza MD  Patient Care Team:  Nick Ariza MD as PCP - General  Angie Lomeli RPH as Pharmacist (Pharmacy)  Octavio Garnett PharmD as Pharmacist (Pharmacy)   Independent Historians: Patient    HPI:  Chief Complaint: Back pain  A complete HPI/ROS/PMH/PSH/SH/FH are unobtainable due to: None    Chronic or social conditions impacting patient care (social determinants of health): None    Context: Ariadne Telles is a 75 y.o. female who presents to the ED c/o acute left-sided mid back pain that began yesterday.  Patient states pain began while at work but denies any fall or heavy lifting.  She states pressure and heating pad have helped some but then the pain returns.  She has taken Tylenol.  Patient takes Coumadin for history of atrial fibrillation.  Denies previous surgical history to the spine.  No shortness of breath.  No dysuria, difficulty urinating or gross hematuria.    Review of prior external notes and/or external test results outside of this encounter: CMP on 1/16/2025 showed creatinine of 1.28.  Patient has chronically elevated alkaline phosphatase and was 182 that day.  Patient's urinalysis on 4/26/2019 showed small amount of blood.      PAST MEDICAL HISTORY  Active Ambulatory Problems     Diagnosis Date Noted    Gastroesophageal reflux disease 02/25/2016    Bradycardia 02/25/2016    Hypercholesterolemia 02/25/2016    Essential hypertension 02/25/2016    Rectal polyp 02/25/2016    History of endometrial cancer 08/17/2017    Carpal tunnel syndrome 02/22/2018    MVP (mitral valve prolapse) 05/15/2018    Osteoporosis 01/01/2017    Bilateral carotid artery stenosis 03/01/2019    PAF (paroxysmal atrial fibrillation) 12/15/2019    Vertigo 12/30/2019    MVP (mitral valve prolapse)     AF (paroxysmal atrial fibrillation) 03/30/2021    Atrial fibrillation with rapid ventricular response 03/05/2023     Resolved Ambulatory Problems     Diagnosis Date Noted     Headache 2016    Osteopenia 2016    History of pituitary adenoma 2017    Endometrial carcinoma 2017     Past Medical History:   Diagnosis Date    Atrial fibrillation with RVR     Endometrial cancer     Hx of bone density study     Hyperlipidemia     Kidney stone     Nausea & vomiting     Pituitary adenoma     RBBB (right bundle branch block)     Surgical menopause     Vaginal delivery            PAST SURGICAL HISTORY  Past Surgical History:   Procedure Laterality Date    CARDIAC ELECTROPHYSIOLOGY PROCEDURE N/A 2021    Procedure: Ablation atrial fibrillation cryo;  Surgeon: Francesco Riojas MD;  Location:  [facility];  Service: Cardiovascular;  Laterality: N/A;    CARPAL TUNNEL RELEASE WITH CUBITAL TUNNEL RELEASE  2018    right    CATARACT EXTRACTION Right 2016     SECTION      x2   one stillborn Ariadne Isbell    COLONOSCOPY  2007    polyps  Dr. Rueda     TOTAL ABDOMINAL HYSTERECTOMY WITH SALPINGO OOPHORECTOMY      Dr. Alejo         FAMILY HISTORY  Family History   Problem Relation Age of Onset    Heart disease Mother     Heart attack Mother 67    COPD Father     Parkinsonism Father         ?    Heart defect Sister          at 18    Alcohol abuse Brother     Diabetes Brother     Heart disease Brother     Hypertension Brother     Hyperlipidemia Brother     Other Daughter         stillborn    Sleep apnea Son     No Known Problems Maternal Grandmother     No Known Problems Paternal Grandmother     Breast cancer Maternal Aunt 75    No Known Problems Paternal Aunt     No Known Problems Maternal Grandfather     No Known Problems Paternal Grandfather     Kidney cancer Sister     No Known Problems Son     BRCA 1/2 Neg Hx     Colon cancer Neg Hx     Endometrial cancer Neg Hx     Ovarian cancer Neg Hx          SOCIAL HISTORY  Social History     Socioeconomic History    Marital status:      Spouse name:      Number of children: 2   Tobacco Use    Smoking status: Never    Smokeless tobacco: Never   Vaping Use    Vaping status: Never Used   Substance and Sexual Activity    Alcohol use: No     Comment: caffeine use seldom    Drug use: No    Sexual activity: Never     Birth control/protection: Surgical     Comment:          ALLERGIES  Naproxen and Paroxetine        REVIEW OF SYSTEMS  Review of Systems   Constitutional:  Negative for fever.   Respiratory:  Negative for shortness of breath.    Cardiovascular:  Negative for chest pain.   Gastrointestinal:  Negative for abdominal pain.   Genitourinary:  Positive for flank pain. Negative for difficulty urinating, dysuria and hematuria.   Musculoskeletal:  Positive for back pain.   Neurological:  Negative for weakness and numbness.        All systems reviewed and negative except for those discussed in HPI.       PHYSICAL EXAM    I have reviewed the triage vital signs and nursing notes.    ED Triage Vitals [07/28/25 1253]   Temp Heart Rate Resp BP SpO2   97.9 °F (36.6 °C) 92 18 130/95 97 %      Temp src Heart Rate Source Patient Position BP Location FiO2 (%)   Oral Monitor Sitting Right arm --       Physical Exam  GENERAL: alert, no acute distress  SKIN: Warm, dry, no rash  HENT: Normocephalic, atraumatic  EYES: no scleral icterus  CV: regular rhythm, regular rate  RESPIRATORY: normal effort, lungs clear  ABDOMEN: soft, nontender, nondistended  MUSCULOSKELETAL: mild left sided thoracic back pain, no significant midline tenderness  NEURO: alert, moves all extremities, follows commands, no focal deficits          LAB RESULTS  Recent Results (from the past 24 hours)   Urinalysis With Microscopic If Indicated (No Culture) - Urine, Clean Catch    Collection Time: 07/28/25  3:32 PM    Specimen: Urine, Clean Catch   Result Value Ref Range    Color, UA Yellow Yellow, Straw    Appearance, UA Clear Clear    pH, UA <=5.0 5.0 - 8.0    Specific Gravity, UA 1.016 1.005 - 1.030     Glucose, UA Negative Negative    Ketones, UA Negative Negative    Bilirubin, UA Negative Negative    Blood, UA Small (1+) (A) Negative    Protein, UA Negative Negative    Leuk Esterase, UA Small (1+) (A) Negative    Nitrite, UA Negative Negative    Urobilinogen, UA 0.2 E.U./dL 0.2 - 1.0 E.U./dL   Urinalysis, Microscopic Only - Urine, Clean Catch    Collection Time: 07/28/25  3:32 PM    Specimen: Urine, Clean Catch   Result Value Ref Range    RBC, UA 0-2 None Seen, 0-2 /HPF    WBC, UA 6-10 (A) None Seen, 0-2 /HPF    Bacteria, UA 1+ (A) None Seen /HPF    Squamous Epithelial Cells, UA 0-2 None Seen, 0-2 /HPF    Hyaline Casts, UA 0-2 None Seen /LPF    Methodology Automated Microscopy        Ordered the above labs and independently reviewed and interpreted the results.        RADIOLOGY  XR Spine Thoracic 3 View  Result Date: 7/28/2025    XR SPINE THORACIC 3 VW-  HISTORY: Back pain. No specific injury.  COMPARISON: Thoracic spine 02/07/2024.  FINDINGS: There is a scoliotic curvature of the thoracic spine convex to the left at T3-4 and convex to the right at T9-10. Multilevel endplate spur formation is present associated with degenerative disc disease.  T12 compression fracture with 20% height loss is without evidence for change. No additional compression fracture is demonstrated allowing for limitation due to osteopenia.      Evaluation for fracture is limited by osteopenia. There is multilevel degenerative disc disease and there is scoliosis of the thoracic spine without evidence for significant change compared to prior exam 02/07/2024. There is a chronic compression fracture of T12 that is without evidence for change.     This report was finalized on 7/28/2025 2:38 PM by Jose Francisco Reddy M.D on Workstation: [id number]        I ordered the above noted radiological studies. Independently reviewed and interpreted by me.  See dictation for official radiology interpretation.      PROCEDURES    Procedures      MEDICATIONS  GIVEN IN ER    Medications   Lidocaine 4 % 1 patch (1 patch Transdermal Medication Applied 7/28/25 1404)   methocarbamol (ROBAXIN) tablet 750 mg (750 mg Oral Given 7/28/25 1404)         PROGRESS, DATA ANALYSIS, CONSULTS, AND MEDICAL DECISION MAKING    All labs have been independently reviewed and interpreted by me.  All radiology studies have been independently reviewed and interpreted by me and discussed with radiologist dictating the report.   EKG's independently reviewed and interpreted by me.  Discussion below represents my analysis of pertinent findings related to patient's condition, differential diagnosis, treatment plan and final disposition.    My differential diagnosis for back pain includes but is not limited to:  Musculoskeletal strain, contusion, retroperitoneal hematoma, disc protrusion, vertebral fracture, transverse process fracture, rib fracture, facet syndrome, sacroiliac joint strain, sciatica, renal injury, splenic injury, pancreatic injury, osteoarthritis, lumbar spondylosis, spinal stenosis, ankylosing spondylitis, sacroiliac joint inflammation, pancreatitis, perforated peptic ulcer, diverticulitis, epidural abscess, osteomyelitis, retroperitoneal abscess, pyelonephritis, pneumonia, subphrenic abscess, tuberculosis, neurofibroma, meningioma, multiple myeloma, lymphoma, metastatic cancer, primary cancer, AAA, aortic dissection, spinal ischemia, referred pain, ureterolithiasis      ED Course as of 07/28/25 1616   Mon Jul 28, 2025   1500 XR Spine Thoracic 3 View  Radiology study independently interpreted by me and my findings are no acute fracture.   [DC]   1613 Patient is feeling somewhat improved after medication and lidocaine patch given here.  X-ray of the thoracic spine does not show any acute abnormality.  Patient always has small amount of blood in the urine and clinically I do not suspect kidney stone.  We will treat with continued muscle relaxer, lidocaine patches and steroid taper at  home and provide a work note for a few days.  Patient to follow-up with her primary care provider.  ER return precautions given. [DC]      ED Course User Index  [DC] Yisel Mullen PA             AS OF 16:16 EDT VITALS:    BP - 134/72  HR - 61  TEMP - 97.9 °F (36.6 °C) (Oral)  O2 SATS - 91%        DIAGNOSIS  Final diagnoses:   Acute left-sided thoracic back pain         DISPOSITION  ED Disposition       ED Disposition   Discharge    Condition   Stable    Comment   --                  Note Disclaimer: At [facility], we believe that sharing information builds trust and better relationships. You are receiving this note because you recently visited [facility]. It is possible you will see health information before a provider has talked with you about it. This kind of information can be easy to misunderstand. To help you fully understand what it means for your health, we urge you to discuss this note with your provider.         Yisel Mullen PA  07/28/25 [id number]

## 2025-08-04 RX ORDER — METOPROLOL TARTRATE 50 MG
50 TABLET ORAL 2 TIMES DAILY
Qty: 180 TABLET | Refills: 3 | Status: SHIPPED | OUTPATIENT
Start: 2025-08-04

## 2025-08-08 ENCOUNTER — ANTICOAGULATION VISIT (OUTPATIENT)
Dept: PHARMACY | Facility: HOSPITAL | Age: 75
End: 2025-08-08
Payer: MEDICARE

## 2025-08-08 DIAGNOSIS — I48.0 PAF (PAROXYSMAL ATRIAL FIBRILLATION): Primary | Chronic | ICD-10-CM

## 2025-08-08 LAB
INR PPP: 2.5 (ref 0.91–1.09)
PROTHROMBIN TIME: 29.8 SECONDS (ref 10–13.8)

## 2025-08-08 PROCEDURE — 85610 PROTHROMBIN TIME: CPT

## 2025-08-08 PROCEDURE — G0463 HOSPITAL OUTPT CLINIC VISIT: HCPCS

## 2025-08-14 ENCOUNTER — OFFICE VISIT (OUTPATIENT)
Dept: FAMILY MEDICINE CLINIC | Facility: CLINIC | Age: 75
End: 2025-08-14
Payer: MEDICARE

## 2025-08-14 VITALS
SYSTOLIC BLOOD PRESSURE: 118 MMHG | OXYGEN SATURATION: 96 % | HEIGHT: 61 IN | TEMPERATURE: 97.5 F | BODY MASS INDEX: 31.15 KG/M2 | DIASTOLIC BLOOD PRESSURE: 82 MMHG | WEIGHT: 165 LBS | HEART RATE: 69 BPM

## 2025-08-14 DIAGNOSIS — M54.6 ACUTE LEFT-SIDED THORACIC BACK PAIN: Primary | ICD-10-CM

## 2025-08-22 ENCOUNTER — ANTICOAGULATION VISIT (OUTPATIENT)
Dept: PHARMACY | Facility: HOSPITAL | Age: 75
End: 2025-08-22
Payer: MEDICARE

## 2025-08-22 DIAGNOSIS — I48.0 PAF (PAROXYSMAL ATRIAL FIBRILLATION): Primary | Chronic | ICD-10-CM

## 2025-08-22 LAB
INR PPP: 3.4 (ref 0.91–1.09)
PROTHROMBIN TIME: 40.2 SECONDS (ref 10–13.8)

## 2025-08-22 PROCEDURE — G0463 HOSPITAL OUTPT CLINIC VISIT: HCPCS

## 2025-08-22 PROCEDURE — 85610 PROTHROMBIN TIME: CPT

## 2025-08-27 ENCOUNTER — TREATMENT (OUTPATIENT)
Dept: PHYSICAL THERAPY | Facility: CLINIC | Age: 75
End: 2025-08-27
Payer: MEDICARE

## 2025-08-27 DIAGNOSIS — R29.898 WEAKNESS OF BOTH HIPS: ICD-10-CM

## 2025-08-27 DIAGNOSIS — M54.6 CHRONIC MIDLINE THORACIC BACK PAIN: Primary | ICD-10-CM

## 2025-08-27 DIAGNOSIS — M40.04 ACQUIRED KYPHOSIS OF THORACIC SPINE DUE TO POOR POSTURE: ICD-10-CM

## 2025-08-27 DIAGNOSIS — R19.8 ABDOMINAL WEAKNESS: ICD-10-CM

## 2025-08-27 DIAGNOSIS — G89.29 CHRONIC MIDLINE THORACIC BACK PAIN: Primary | ICD-10-CM

## 2025-08-28 ENCOUNTER — HOSPITAL ENCOUNTER (OUTPATIENT)
Dept: BONE DENSITY | Facility: HOSPITAL | Age: 75
Discharge: HOME | End: 2025-08-28
Admitting: FAMILY MEDICINE
Payer: MEDICARE

## 2025-08-28 PROCEDURE — 77080 DXA BONE DENSITY AXIAL: CPT

## (undated) DEVICE — Device

## (undated) DEVICE — CATH ABL ACHIEVE MP 3.3F20MM 165CM

## (undated) DEVICE — INTRO SHEATH STEER BIDIR W/GW

## (undated) DEVICE — PINNACLE INTRODUCER SHEATH: Brand: PINNACLE

## (undated) DEVICE — 1 X VERSACROSS TRANSSEPTAL SHEATH (INCLUDING  1 X J-TIP GUIDEWIRE); 1 X VERSACROSS RF WIRE (INCLUDING 1 X CONNECTOR CABLE (SINGLE USE)); 1 X DISPERSIVE ELECTRODE: Brand: VERSACROSS ACCESS SOLUTION

## (undated) DEVICE — Device: Brand: PENTARAY NAV

## (undated) DEVICE — Device: Brand: SOUNDSTAR

## (undated) DEVICE — CABL CONN CATH EP UMB 48IN

## (undated) DEVICE — CABL CATH ABLAT ACHIEVE 196CM 1P/U

## (undated) DEVICE — CLEARSIGHT FINGER CUFF LARGE MULTI PACK: Brand: CLEARSIGHT

## (undated) DEVICE — Device: Brand: REFERENCE PATCH CARTO 3

## (undated) DEVICE — Device: Brand: DECANAV

## (undated) DEVICE — CATH CRYO/ABL ARCTICFRONTADVANCE MAP 12F 28MM

## (undated) DEVICE — PERCLOSE PROGLIDE™ SUTURE-MEDIATED CLOSURE SYSTEM: Brand: PERCLOSE PROGLIDE™